# Patient Record
Sex: FEMALE | Race: OTHER | NOT HISPANIC OR LATINO | URBAN - METROPOLITAN AREA
[De-identification: names, ages, dates, MRNs, and addresses within clinical notes are randomized per-mention and may not be internally consistent; named-entity substitution may affect disease eponyms.]

---

## 2021-02-05 ENCOUNTER — INPATIENT (INPATIENT)
Facility: HOSPITAL | Age: 86
LOS: 0 days | Discharge: OTHER INSTITUTION | DRG: 178 | End: 2021-02-06
Attending: HOSPITALIST | Admitting: HOSPITALIST
Payer: MEDICARE

## 2021-02-05 VITALS
TEMPERATURE: 99 F | RESPIRATION RATE: 20 BRPM | HEIGHT: 63 IN | WEIGHT: 229.94 LBS | OXYGEN SATURATION: 97 % | DIASTOLIC BLOOD PRESSURE: 82 MMHG | SYSTOLIC BLOOD PRESSURE: 152 MMHG | HEART RATE: 92 BPM

## 2021-02-05 LAB
ALBUMIN SERPL ELPH-MCNC: 3.9 G/DL — SIGNIFICANT CHANGE UP (ref 3.3–5)
ALP SERPL-CCNC: 89 U/L — SIGNIFICANT CHANGE UP (ref 40–120)
ALT FLD-CCNC: 17 U/L — SIGNIFICANT CHANGE UP (ref 10–45)
ANION GAP SERPL CALC-SCNC: 15 MMOL/L — SIGNIFICANT CHANGE UP (ref 5–17)
APTT BLD: 28.5 SEC — SIGNIFICANT CHANGE UP (ref 27.5–35.5)
AST SERPL-CCNC: 50 U/L — HIGH (ref 10–40)
BASOPHILS # BLD AUTO: 0.03 K/UL — SIGNIFICANT CHANGE UP (ref 0–0.2)
BASOPHILS NFR BLD AUTO: 0.5 % — SIGNIFICANT CHANGE UP (ref 0–2)
BILIRUB SERPL-MCNC: 0.4 MG/DL — SIGNIFICANT CHANGE UP (ref 0.2–1.2)
BUN SERPL-MCNC: 24 MG/DL — HIGH (ref 7–23)
CALCIUM SERPL-MCNC: 10.1 MG/DL — SIGNIFICANT CHANGE UP (ref 8.4–10.5)
CHLORIDE SERPL-SCNC: 100 MMOL/L — SIGNIFICANT CHANGE UP (ref 96–108)
CK SERPL-CCNC: 1170 U/L — HIGH (ref 25–170)
CO2 SERPL-SCNC: 19 MMOL/L — LOW (ref 22–31)
CREAT SERPL-MCNC: 1.44 MG/DL — HIGH (ref 0.5–1.3)
CRP SERPL-MCNC: 1.6 MG/DL — HIGH (ref 0–0.4)
D DIMER BLD IA.RAPID-MCNC: 580 NG/ML DDU — HIGH
EOSINOPHIL # BLD AUTO: 0 K/UL — SIGNIFICANT CHANGE UP (ref 0–0.5)
EOSINOPHIL NFR BLD AUTO: 0 % — SIGNIFICANT CHANGE UP (ref 0–6)
GLUCOSE SERPL-MCNC: 310 MG/DL — HIGH (ref 70–99)
HCT VFR BLD CALC: 44.6 % — SIGNIFICANT CHANGE UP (ref 34.5–45)
HGB BLD-MCNC: 14.5 G/DL — SIGNIFICANT CHANGE UP (ref 11.5–15.5)
IMM GRANULOCYTES NFR BLD AUTO: 0.3 % — SIGNIFICANT CHANGE UP (ref 0–1.5)
INR BLD: 0.99 RATIO — SIGNIFICANT CHANGE UP (ref 0.88–1.16)
LDH SERPL L TO P-CCNC: 360 U/L — HIGH (ref 50–242)
LYMPHOCYTES # BLD AUTO: 1.95 K/UL — SIGNIFICANT CHANGE UP (ref 1–3.3)
LYMPHOCYTES # BLD AUTO: 30.5 % — SIGNIFICANT CHANGE UP (ref 13–44)
MCHC RBC-ENTMCNC: 28.3 PG — SIGNIFICANT CHANGE UP (ref 27–34)
MCHC RBC-ENTMCNC: 32.5 GM/DL — SIGNIFICANT CHANGE UP (ref 32–36)
MCV RBC AUTO: 86.9 FL — SIGNIFICANT CHANGE UP (ref 80–100)
MONOCYTES # BLD AUTO: 0.98 K/UL — HIGH (ref 0–0.9)
MONOCYTES NFR BLD AUTO: 15.3 % — HIGH (ref 2–14)
NEUTROPHILS # BLD AUTO: 3.42 K/UL — SIGNIFICANT CHANGE UP (ref 1.8–7.4)
NEUTROPHILS NFR BLD AUTO: 53.4 % — SIGNIFICANT CHANGE UP (ref 43–77)
NRBC # BLD: 0 /100 WBCS — SIGNIFICANT CHANGE UP (ref 0–0)
NT-PROBNP SERPL-SCNC: 558 PG/ML — HIGH (ref 0–300)
PLATELET # BLD AUTO: 233 K/UL — SIGNIFICANT CHANGE UP (ref 150–400)
POTASSIUM SERPL-MCNC: 4.5 MMOL/L — SIGNIFICANT CHANGE UP (ref 3.5–5.3)
POTASSIUM SERPL-SCNC: 4.5 MMOL/L — SIGNIFICANT CHANGE UP (ref 3.5–5.3)
PROCALCITONIN SERPL-MCNC: 0.1 NG/ML — SIGNIFICANT CHANGE UP (ref 0.02–0.1)
PROT SERPL-MCNC: 7.8 G/DL — SIGNIFICANT CHANGE UP (ref 6–8.3)
PROTHROM AB SERPL-ACNC: 11.9 SEC — SIGNIFICANT CHANGE UP (ref 10.6–13.6)
RBC # BLD: 5.13 M/UL — SIGNIFICANT CHANGE UP (ref 3.8–5.2)
RBC # FLD: 12.2 % — SIGNIFICANT CHANGE UP (ref 10.3–14.5)
SODIUM SERPL-SCNC: 134 MMOL/L — LOW (ref 135–145)
TROPONIN T, HIGH SENSITIVITY RESULT: 27 NG/L — SIGNIFICANT CHANGE UP (ref 0–51)
WBC # BLD: 6.4 K/UL — SIGNIFICANT CHANGE UP (ref 3.8–10.5)
WBC # FLD AUTO: 6.4 K/UL — SIGNIFICANT CHANGE UP (ref 3.8–10.5)

## 2021-02-05 PROCEDURE — 73090 X-RAY EXAM OF FOREARM: CPT | Mod: 26,LT

## 2021-02-05 PROCEDURE — 99285 EMERGENCY DEPT VISIT HI MDM: CPT | Mod: CS,GC

## 2021-02-05 PROCEDURE — 72170 X-RAY EXAM OF PELVIS: CPT | Mod: 26

## 2021-02-05 PROCEDURE — 73060 X-RAY EXAM OF HUMERUS: CPT | Mod: 26,RT

## 2021-02-05 PROCEDURE — 73110 X-RAY EXAM OF WRIST: CPT | Mod: 26,RT

## 2021-02-05 PROCEDURE — 73080 X-RAY EXAM OF ELBOW: CPT | Mod: 26,RT

## 2021-02-05 PROCEDURE — 73030 X-RAY EXAM OF SHOULDER: CPT | Mod: 26,RT

## 2021-02-05 PROCEDURE — 71045 X-RAY EXAM CHEST 1 VIEW: CPT | Mod: 26

## 2021-02-05 RX ORDER — DIPHENHYDRAMINE HCL 50 MG
50 CAPSULE ORAL ONCE
Refills: 0 | Status: COMPLETED | OUTPATIENT
Start: 2021-02-05 | End: 2021-02-05

## 2021-02-05 RX ORDER — HYDROCORTISONE 20 MG
100 TABLET ORAL ONCE
Refills: 0 | Status: DISCONTINUED | OUTPATIENT
Start: 2021-02-05 | End: 2021-02-05

## 2021-02-05 RX ORDER — HYDROCORTISONE 20 MG
200 TABLET ORAL ONCE
Refills: 0 | Status: COMPLETED | OUTPATIENT
Start: 2021-02-05 | End: 2021-02-05

## 2021-02-05 RX ADMIN — Medication 50 MILLIGRAM(S): at 23:07

## 2021-02-05 RX ADMIN — Medication 200 MILLIGRAM(S): at 21:39

## 2021-02-05 NOTE — ED PROVIDER NOTE - PHYSICAL EXAMINATION
Gen: obese  NAD  Head: NC/NT  Eyes: PERRL, EOMI,   ENT: airway patent, mmm   CV: RRR, +S1/S2   Resp: CTAB, symmetric breath sounds   GI:   abdomen soft non-distended, NTTP, no ecchymosis  Back: no CVA tenderness, no spinal ttp  Extremities -   symmetric pulses, +ttp at RT elbow. +erythema, warm and swelling of RUE  Neuro: A&Ox2 (name and date), following commands, speech clear, moving all four extremities spontaneously

## 2021-02-05 NOTE — ED PROVIDER NOTE - ATTENDING CONTRIBUTION TO CARE
Attending MD Chisholm: I personally have seen and examined this patient.  Resident note reviewed and agree on plan of care and except where noted.  See below for details.     Seen in [facility]    85F with PMH/PSH including DM, HTN, obesity, PPM placement presents to the ED with multiple falls, found down.  As per family, patient with repeated falls now with RUE pain.  Reports walks with walker, not always compliant.  Unclear of time down.  Patient complaining of RUE pain.  Denies chest pain, shortness of breath, abdominal pain, nausea, vomiting, diarrhea, urinary complaints, fevers, chills.  +COVID exposure.  A ten (10) point review of systems was negative other than as stated in the HPI or elsewhere in the chart.    Exam:   General: NAD, obese, AAOx2 (person, date)  HENT: head NCAT, airway patent  Eyes: PERRL  Lungs: lungs CTAB with good inspiratory effort, no wheezing, no rhonchi, no rales  Cardiac: +S1S2, no m/r/g  GI: abdomen soft with +BS, NT, exam limited secondary to body habitus, no ecchymosis  : no CVAT  MSK: no tenderness to midline palpation, no stepoffs along length of spine, +tenderness to palpation at R elbow, +RUE with warmth, erythema and edema  Neuro: moving all extremities spontaneously, sensory grossly intact, no gross neuro deficits  Psych: normal mood and affect     A/P: 85F with falls, RUE pain, will obtain labs, trauma scans, COVID test given exposure, DDx includes cellulitis, will obtain XRs to rule out bony injury, will send labs eval for metabolic derangement, UA r/o UTI, CXR r/o PNA, COVID, cardiac monitor, likely admit

## 2021-02-05 NOTE — ED PROVIDER NOTE - OBJECTIVE STATEMENT
History obtained from the daughter over the phone: 84 yo F with pmhx pacemaker, DM, HTN presents with multiple falls. Found on the floor multiple times during the day over past days - usually incoherent intiially when found on the floor. Ambulates independently - intermittent walker use. Unsure of how long the patient is the floor.    Pt is only complaining of pain in RT arm.   She lives by herself. Son lives upstairs.   Cardiologist: :[phone] Dr. Eliza Joaquin

## 2021-02-05 NOTE — ED PROVIDER NOTE - NS ED ROS FT
Gen: Denies fever, chills  CV: Denies chest pain  Skin: +rash, erythema  Resp: Denies SOB, cough  ENT: Denies nasal congestion  Eyes: Denies blurry vision  GI: Denies nausea, vomiting, diarrhea  Msk: +RT arm pain Denies LE swelling  : Denies dysuria  Neuro: Denies headache

## 2021-02-05 NOTE — ED PROVIDER NOTE - CARE PLAN
Principal Discharge DX:	Frequent falls  Secondary Diagnosis:	Acute kidney injury  Secondary Diagnosis:	COVID-19

## 2021-02-05 NOTE — ED PROVIDER NOTE - CLINICAL SUMMARY MEDICAL DECISION MAKING FREE TEXT BOX
Prsents with multiple falls and incoherent intermittently as per the family. Even though patient is not tender, pt might be distracted by her RT arm pain - will do trauma scan. Labs. COVID test. RT arm exam is c/w cellulitis. Ddx: fx, COVID, UTI, pneumonia, cellulitis. Likely admit

## 2021-02-05 NOTE — ED ADULT NURSE NOTE - OBJECTIVE STATEMENT
85 female covid + presents for right arm pain/redness s/p fall today. Has had increasingly frequent falls recently, and had negative imaging outpatient today but sent to ED to r/o vascular occlusion. Right upper extremity red, warm to tough on exam. Pt. is poor historian, A&Ox 2. complains of diffuse pain on exam, hips, bilat arms, sometimes left leg. abd soft, skin in tact, pending imaging and lab results. MD to contact pt. daughter for collateral.

## 2021-02-06 ENCOUNTER — TRANSCRIPTION ENCOUNTER (OUTPATIENT)
Age: 86
End: 2021-02-06

## 2021-02-06 ENCOUNTER — INPATIENT (INPATIENT)
Facility: HOSPITAL | Age: 86
LOS: 23 days | Discharge: NURSING, REHAB OR LONG-TERM CARE | End: 2021-03-02
Attending: HOSPITALIST | Admitting: HOSPITALIST
Payer: MEDICARE

## 2021-02-06 VITALS — TEMPERATURE: 99 F | DIASTOLIC BLOOD PRESSURE: 73 MMHG | SYSTOLIC BLOOD PRESSURE: 139 MMHG | HEART RATE: 83 BPM

## 2021-02-06 VITALS
TEMPERATURE: 98 F | RESPIRATION RATE: 18 BRPM | SYSTOLIC BLOOD PRESSURE: 131 MMHG | OXYGEN SATURATION: 97 % | HEART RATE: 98 BPM | DIASTOLIC BLOOD PRESSURE: 86 MMHG

## 2021-02-06 DIAGNOSIS — R56.9 UNSPECIFIED CONVULSIONS: ICD-10-CM

## 2021-02-06 DIAGNOSIS — I10 ESSENTIAL (PRIMARY) HYPERTENSION: ICD-10-CM

## 2021-02-06 DIAGNOSIS — M79.601 PAIN IN RIGHT ARM: ICD-10-CM

## 2021-02-06 DIAGNOSIS — R29.6 REPEATED FALLS: ICD-10-CM

## 2021-02-06 DIAGNOSIS — Z95.810 PRESENCE OF AUTOMATIC (IMPLANTABLE) CARDIAC DEFIBRILLATOR: Chronic | ICD-10-CM

## 2021-02-06 DIAGNOSIS — Z02.9 ENCOUNTER FOR ADMINISTRATIVE EXAMINATIONS, UNSPECIFIED: ICD-10-CM

## 2021-02-06 DIAGNOSIS — R91.1 SOLITARY PULMONARY NODULE: ICD-10-CM

## 2021-02-06 DIAGNOSIS — U07.1 COVID-19: ICD-10-CM

## 2021-02-06 DIAGNOSIS — N17.9 ACUTE KIDNEY FAILURE, UNSPECIFIED: ICD-10-CM

## 2021-02-06 DIAGNOSIS — E11.65 TYPE 2 DIABETES MELLITUS WITH HYPERGLYCEMIA: ICD-10-CM

## 2021-02-06 DIAGNOSIS — Z95.0 PRESENCE OF CARDIAC PACEMAKER: Chronic | ICD-10-CM

## 2021-02-06 DIAGNOSIS — R09.89 OTHER SPECIFIED SYMPTOMS AND SIGNS INVOLVING THE CIRCULATORY AND RESPIRATORY SYSTEMS: ICD-10-CM

## 2021-02-06 DIAGNOSIS — Z29.9 ENCOUNTER FOR PROPHYLACTIC MEASURES, UNSPECIFIED: ICD-10-CM

## 2021-02-06 DIAGNOSIS — F03.90 UNSPECIFIED DEMENTIA, UNSPECIFIED SEVERITY, WITHOUT BEHAVIORAL DISTURBANCE, PSYCHOTIC DISTURBANCE, MOOD DISTURBANCE, AND ANXIETY: ICD-10-CM
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
## 2021-02-06 DIAGNOSIS — E11.9 TYPE 2 DIABETES MELLITUS WITHOUT COMPLICATIONS: ICD-10-CM

## 2021-02-06 LAB
APPEARANCE UR: CLEAR — SIGNIFICANT CHANGE UP
BACTERIA # UR AUTO: NEGATIVE — SIGNIFICANT CHANGE UP
BASE EXCESS BLDV CALC-SCNC: -3.4 MMOL/L — LOW (ref -2–2)
BILIRUB UR-MCNC: NEGATIVE — SIGNIFICANT CHANGE UP
CA-I SERPL-SCNC: 1.31 MMOL/L — HIGH (ref 1.12–1.3)
CHLORIDE BLDV-SCNC: 107 MMOL/L — SIGNIFICANT CHANGE UP (ref 96–108)
CO2 BLDV-SCNC: 24 MMOL/L — SIGNIFICANT CHANGE UP (ref 22–30)
COLOR SPEC: SIGNIFICANT CHANGE UP
DIFF PNL FLD: ABNORMAL
EPI CELLS # UR: 0 /HPF — SIGNIFICANT CHANGE UP
FERRITIN SERPL-MCNC: 176 NG/ML — HIGH (ref 15–150)
GAS PNL BLDV: 134 MMOL/L — LOW (ref 135–145)
GAS PNL BLDV: SIGNIFICANT CHANGE UP
GAS PNL BLDV: SIGNIFICANT CHANGE UP
GLUCOSE BLDV-MCNC: 276 MG/DL — HIGH (ref 70–99)
GLUCOSE UR QL: ABNORMAL
HCO3 BLDV-SCNC: 23 MMOL/L — SIGNIFICANT CHANGE UP (ref 21–29)
HCT VFR BLDA CALC: 41 % — SIGNIFICANT CHANGE UP (ref 39–50)
HGB BLD CALC-MCNC: 13.3 G/DL — SIGNIFICANT CHANGE UP (ref 11.5–15.5)
HYALINE CASTS # UR AUTO: 1 /LPF — SIGNIFICANT CHANGE UP (ref 0–2)
KETONES UR-MCNC: NEGATIVE — SIGNIFICANT CHANGE UP
LACTATE BLDV-MCNC: 1.6 MMOL/L — SIGNIFICANT CHANGE UP (ref 0.7–2)
LEUKOCYTE ESTERASE UR-ACNC: NEGATIVE — SIGNIFICANT CHANGE UP
NITRITE UR-MCNC: NEGATIVE — SIGNIFICANT CHANGE UP
PCO2 BLDV: 47 MMHG — SIGNIFICANT CHANGE UP (ref 35–50)
PH BLDV: 7.3 — LOW (ref 7.35–7.45)
PH UR: 6 — SIGNIFICANT CHANGE UP (ref 5–8)
PO2 BLDV: 41 MMHG — SIGNIFICANT CHANGE UP (ref 25–45)
POTASSIUM BLDV-SCNC: 4.3 MMOL/L — SIGNIFICANT CHANGE UP (ref 3.5–5.3)
PROT UR-MCNC: ABNORMAL
RBC CASTS # UR COMP ASSIST: 5 /HPF — HIGH (ref 0–4)
SAO2 % BLDV: 69 % — SIGNIFICANT CHANGE UP (ref 67–88)
SARS-COV-2 RNA SPEC QL NAA+PROBE: DETECTED
SP GR SPEC: 1.02 — SIGNIFICANT CHANGE UP (ref 1.01–1.02)
TROPONIN T, HIGH SENSITIVITY RESULT: 24 NG/L — SIGNIFICANT CHANGE UP (ref 0–51)
UROBILINOGEN FLD QL: NEGATIVE — SIGNIFICANT CHANGE UP
WBC UR QL: 0 /HPF — SIGNIFICANT CHANGE UP (ref 0–5)

## 2021-02-06 PROCEDURE — 99223 1ST HOSP IP/OBS HIGH 75: CPT | Mod: CS

## 2021-02-06 PROCEDURE — 72125 CT NECK SPINE W/O DYE: CPT

## 2021-02-06 PROCEDURE — 84295 ASSAY OF SERUM SODIUM: CPT

## 2021-02-06 PROCEDURE — 96375 TX/PRO/DX INJ NEW DRUG ADDON: CPT

## 2021-02-06 PROCEDURE — 83880 ASSAY OF NATRIURETIC PEPTIDE: CPT

## 2021-02-06 PROCEDURE — 73030 X-RAY EXAM OF SHOULDER: CPT

## 2021-02-06 PROCEDURE — 80053 COMPREHEN METABOLIC PANEL: CPT

## 2021-02-06 PROCEDURE — 82947 ASSAY GLUCOSE BLOOD QUANT: CPT

## 2021-02-06 PROCEDURE — 73060 X-RAY EXAM OF HUMERUS: CPT

## 2021-02-06 PROCEDURE — 71045 X-RAY EXAM CHEST 1 VIEW: CPT

## 2021-02-06 PROCEDURE — 82435 ASSAY OF BLOOD CHLORIDE: CPT

## 2021-02-06 PROCEDURE — 85025 COMPLETE CBC W/AUTO DIFF WBC: CPT

## 2021-02-06 PROCEDURE — 84132 ASSAY OF SERUM POTASSIUM: CPT

## 2021-02-06 PROCEDURE — 82728 ASSAY OF FERRITIN: CPT

## 2021-02-06 PROCEDURE — 85018 HEMOGLOBIN: CPT

## 2021-02-06 PROCEDURE — 84145 PROCALCITONIN (PCT): CPT

## 2021-02-06 PROCEDURE — 74177 CT ABD & PELVIS W/CONTRAST: CPT

## 2021-02-06 PROCEDURE — 85379 FIBRIN DEGRADATION QUANT: CPT

## 2021-02-06 PROCEDURE — 82803 BLOOD GASES ANY COMBINATION: CPT

## 2021-02-06 PROCEDURE — 81001 URINALYSIS AUTO W/SCOPE: CPT

## 2021-02-06 PROCEDURE — 86140 C-REACTIVE PROTEIN: CPT

## 2021-02-06 PROCEDURE — 74177 CT ABD & PELVIS W/CONTRAST: CPT | Mod: 26,MA

## 2021-02-06 PROCEDURE — 82550 ASSAY OF CK (CPK): CPT

## 2021-02-06 PROCEDURE — U0003: CPT

## 2021-02-06 PROCEDURE — 85610 PROTHROMBIN TIME: CPT

## 2021-02-06 PROCEDURE — 72170 X-RAY EXAM OF PELVIS: CPT

## 2021-02-06 PROCEDURE — 71260 CT THORAX DX C+: CPT

## 2021-02-06 PROCEDURE — 83605 ASSAY OF LACTIC ACID: CPT

## 2021-02-06 PROCEDURE — 73080 X-RAY EXAM OF ELBOW: CPT

## 2021-02-06 PROCEDURE — 83615 LACTATE (LD) (LDH) ENZYME: CPT

## 2021-02-06 PROCEDURE — 85730 THROMBOPLASTIN TIME PARTIAL: CPT

## 2021-02-06 PROCEDURE — G1004: CPT

## 2021-02-06 PROCEDURE — 82330 ASSAY OF CALCIUM: CPT

## 2021-02-06 PROCEDURE — 73090 X-RAY EXAM OF FOREARM: CPT

## 2021-02-06 PROCEDURE — 82010 KETONE BODYS QUAN: CPT

## 2021-02-06 PROCEDURE — 71260 CT THORAX DX C+: CPT | Mod: 26,MA

## 2021-02-06 PROCEDURE — 96374 THER/PROPH/DIAG INJ IV PUSH: CPT

## 2021-02-06 PROCEDURE — 87086 URINE CULTURE/COLONY COUNT: CPT

## 2021-02-06 PROCEDURE — U0005: CPT

## 2021-02-06 PROCEDURE — 82962 GLUCOSE BLOOD TEST: CPT

## 2021-02-06 PROCEDURE — 85014 HEMATOCRIT: CPT

## 2021-02-06 PROCEDURE — 87040 BLOOD CULTURE FOR BACTERIA: CPT

## 2021-02-06 PROCEDURE — 70450 CT HEAD/BRAIN W/O DYE: CPT

## 2021-02-06 PROCEDURE — 99285 EMERGENCY DEPT VISIT HI MDM: CPT

## 2021-02-06 PROCEDURE — 73020 X-RAY EXAM OF SHOULDER: CPT

## 2021-02-06 PROCEDURE — 84484 ASSAY OF TROPONIN QUANT: CPT

## 2021-02-06 PROCEDURE — 70450 CT HEAD/BRAIN W/O DYE: CPT | Mod: 26,MG

## 2021-02-06 PROCEDURE — 72125 CT NECK SPINE W/O DYE: CPT | Mod: 26,MG

## 2021-02-06 PROCEDURE — 73110 X-RAY EXAM OF WRIST: CPT

## 2021-02-06 RX ORDER — DEXTROSE 50 % IN WATER 50 %
25 SYRINGE (ML) INTRAVENOUS ONCE
Refills: 0 | Status: DISCONTINUED | OUTPATIENT
Start: 2021-02-06 | End: 2021-03-02

## 2021-02-06 RX ORDER — ACETAMINOPHEN 500 MG
650 TABLET ORAL EVERY 4 HOURS
Refills: 0 | Status: DISCONTINUED | OUTPATIENT
Start: 2021-02-06 | End: 2021-02-06

## 2021-02-06 RX ORDER — SODIUM CHLORIDE 9 MG/ML
1000 INJECTION, SOLUTION INTRAVENOUS
Refills: 0 | Status: DISCONTINUED | OUTPATIENT
Start: 2021-02-06 | End: 2021-02-06

## 2021-02-06 RX ORDER — ATORVASTATIN CALCIUM 80 MG/1
20 TABLET, FILM COATED ORAL AT BEDTIME
Refills: 0 | Status: DISCONTINUED | OUTPATIENT
Start: 2021-02-06 | End: 2021-03-02

## 2021-02-06 RX ORDER — BENZOCAINE AND MENTHOL 5; 1 G/100ML; G/100ML
1 LIQUID ORAL
Refills: 0 | Status: DISCONTINUED | OUTPATIENT
Start: 2021-02-06 | End: 2021-02-06

## 2021-02-06 RX ORDER — DEXTROSE 50 % IN WATER 50 %
25 SYRINGE (ML) INTRAVENOUS ONCE
Refills: 0 | Status: DISCONTINUED | OUTPATIENT
Start: 2021-02-06 | End: 2021-02-06

## 2021-02-06 RX ORDER — OXYBUTYNIN CHLORIDE 5 MG
5 TABLET ORAL
Refills: 0 | Status: DISCONTINUED | OUTPATIENT
Start: 2021-02-06 | End: 2021-03-02

## 2021-02-06 RX ORDER — INSULIN LISPRO 100/ML
0 VIAL (ML) SUBCUTANEOUS
Qty: 0 | Refills: 0 | DISCHARGE
Start: 2021-02-06

## 2021-02-06 RX ORDER — INSULIN LISPRO 100/ML
VIAL (ML) SUBCUTANEOUS
Refills: 0 | Status: DISCONTINUED | OUTPATIENT
Start: 2021-02-06 | End: 2021-03-02

## 2021-02-06 RX ORDER — AMLODIPINE BESYLATE 2.5 MG/1
5 TABLET ORAL DAILY
Refills: 0 | Status: DISCONTINUED | OUTPATIENT
Start: 2021-02-07 | End: 2021-02-09

## 2021-02-06 RX ORDER — GLUCAGON INJECTION, SOLUTION 0.5 MG/.1ML
1 INJECTION, SOLUTION SUBCUTANEOUS ONCE
Refills: 0 | Status: DISCONTINUED | OUTPATIENT
Start: 2021-02-06 | End: 2021-02-06

## 2021-02-06 RX ORDER — IRBESARTAN 75 MG/1
1 TABLET ORAL
Qty: 0 | Refills: 0 | DISCHARGE

## 2021-02-06 RX ORDER — DORZOLAMIDE HYDROCHLORIDE TIMOLOL MALEATE 20; 5 MG/ML; MG/ML
1 SOLUTION/ DROPS OPHTHALMIC
Refills: 0 | Status: DISCONTINUED | OUTPATIENT
Start: 2021-02-06 | End: 2021-02-06

## 2021-02-06 RX ORDER — SODIUM CHLORIDE 9 MG/ML
1000 INJECTION INTRAMUSCULAR; INTRAVENOUS; SUBCUTANEOUS ONCE
Refills: 0 | Status: COMPLETED | OUTPATIENT
Start: 2021-02-06 | End: 2021-02-06

## 2021-02-06 RX ORDER — DEXTROSE 50 % IN WATER 50 %
12.5 SYRINGE (ML) INTRAVENOUS ONCE
Refills: 0 | Status: DISCONTINUED | OUTPATIENT
Start: 2021-02-06 | End: 2021-02-06

## 2021-02-06 RX ORDER — DONEPEZIL HYDROCHLORIDE 10 MG/1
10 TABLET, FILM COATED ORAL AT BEDTIME
Refills: 0 | Status: DISCONTINUED | OUTPATIENT
Start: 2021-02-06 | End: 2021-03-02

## 2021-02-06 RX ORDER — ENOXAPARIN SODIUM 100 MG/ML
40 INJECTION SUBCUTANEOUS EVERY 12 HOURS
Refills: 0 | Status: DISCONTINUED | OUTPATIENT
Start: 2021-02-06 | End: 2021-02-06

## 2021-02-06 RX ORDER — SODIUM CHLORIDE 9 MG/ML
1000 INJECTION, SOLUTION INTRAVENOUS
Refills: 0 | Status: DISCONTINUED | OUTPATIENT
Start: 2021-02-06 | End: 2021-03-02

## 2021-02-06 RX ORDER — DEXTROSE 50 % IN WATER 50 %
15 SYRINGE (ML) INTRAVENOUS ONCE
Refills: 0 | Status: DISCONTINUED | OUTPATIENT
Start: 2021-02-06 | End: 2021-03-02

## 2021-02-06 RX ORDER — LEVETIRACETAM 250 MG/1
500 TABLET, FILM COATED ORAL
Refills: 0 | Status: DISCONTINUED | OUTPATIENT
Start: 2021-02-06 | End: 2021-02-06

## 2021-02-06 RX ORDER — ESCITALOPRAM OXALATE 10 MG/1
5 TABLET, FILM COATED ORAL DAILY
Refills: 0 | Status: DISCONTINUED | OUTPATIENT
Start: 2021-02-06 | End: 2021-03-02

## 2021-02-06 RX ORDER — DEXTROSE 50 % IN WATER 50 %
15 SYRINGE (ML) INTRAVENOUS ONCE
Refills: 0 | Status: DISCONTINUED | OUTPATIENT
Start: 2021-02-06 | End: 2021-02-06

## 2021-02-06 RX ORDER — INSULIN LISPRO 100/ML
VIAL (ML) SUBCUTANEOUS AT BEDTIME
Refills: 0 | Status: DISCONTINUED | OUTPATIENT
Start: 2021-02-06 | End: 2021-03-02

## 2021-02-06 RX ORDER — BENZOCAINE AND MENTHOL 5; 1 G/100ML; G/100ML
0 LIQUID ORAL
Qty: 0 | Refills: 0 | DISCHARGE
Start: 2021-02-06

## 2021-02-06 RX ORDER — MIRABEGRON 50 MG/1
1 TABLET, EXTENDED RELEASE ORAL
Qty: 0 | Refills: 0 | DISCHARGE

## 2021-02-06 RX ORDER — DONEPEZIL HYDROCHLORIDE 10 MG/1
10 TABLET, FILM COATED ORAL AT BEDTIME
Refills: 0 | Status: DISCONTINUED | OUTPATIENT
Start: 2021-02-06 | End: 2021-02-06

## 2021-02-06 RX ORDER — INSULIN LISPRO 100/ML
VIAL (ML) SUBCUTANEOUS
Refills: 0 | Status: DISCONTINUED | OUTPATIENT
Start: 2021-02-06 | End: 2021-02-06

## 2021-02-06 RX ORDER — DEXTROSE 50 % IN WATER 50 %
12.5 SYRINGE (ML) INTRAVENOUS ONCE
Refills: 0 | Status: DISCONTINUED | OUTPATIENT
Start: 2021-02-06 | End: 2021-03-02

## 2021-02-06 RX ORDER — ATORVASTATIN CALCIUM 80 MG/1
20 TABLET, FILM COATED ORAL AT BEDTIME
Refills: 0 | Status: DISCONTINUED | OUTPATIENT
Start: 2021-02-06 | End: 2021-02-06

## 2021-02-06 RX ORDER — AMLODIPINE BESYLATE 2.5 MG/1
5 TABLET ORAL DAILY
Refills: 0 | Status: DISCONTINUED | OUTPATIENT
Start: 2021-02-06 | End: 2021-02-06

## 2021-02-06 RX ORDER — ACETAMINOPHEN 500 MG
2 TABLET ORAL
Qty: 0 | Refills: 0 | DISCHARGE
Start: 2021-02-06

## 2021-02-06 RX ORDER — ROSUVASTATIN CALCIUM 5 MG/1
1 TABLET ORAL
Qty: 0 | Refills: 0 | DISCHARGE

## 2021-02-06 RX ORDER — OXYBUTYNIN CHLORIDE 5 MG
5 TABLET ORAL
Refills: 0 | Status: DISCONTINUED | OUTPATIENT
Start: 2021-02-06 | End: 2021-02-06

## 2021-02-06 RX ORDER — DORZOLAMIDE HYDROCHLORIDE TIMOLOL MALEATE 20; 5 MG/ML; MG/ML
1 SOLUTION/ DROPS OPHTHALMIC EVERY 12 HOURS
Refills: 0 | Status: DISCONTINUED | OUTPATIENT
Start: 2021-02-06 | End: 2021-03-02

## 2021-02-06 RX ORDER — ATORVASTATIN CALCIUM 80 MG/1
1 TABLET, FILM COATED ORAL
Qty: 0 | Refills: 0 | DISCHARGE
Start: 2021-02-06

## 2021-02-06 RX ORDER — GLUCAGON INJECTION, SOLUTION 0.5 MG/.1ML
1 INJECTION, SOLUTION SUBCUTANEOUS ONCE
Refills: 0 | Status: DISCONTINUED | OUTPATIENT
Start: 2021-02-06 | End: 2021-03-02

## 2021-02-06 RX ORDER — FAMOTIDINE 10 MG/ML
1 INJECTION INTRAVENOUS
Qty: 0 | Refills: 0 | DISCHARGE

## 2021-02-06 RX ORDER — OXYBUTYNIN CHLORIDE 5 MG
1 TABLET ORAL
Qty: 0 | Refills: 0 | DISCHARGE
Start: 2021-02-06

## 2021-02-06 RX ORDER — ACETAMINOPHEN 500 MG
650 TABLET ORAL EVERY 4 HOURS
Refills: 0 | Status: DISCONTINUED | OUTPATIENT
Start: 2021-02-06 | End: 2021-03-02

## 2021-02-06 RX ORDER — ENOXAPARIN SODIUM 100 MG/ML
0 INJECTION SUBCUTANEOUS
Qty: 0 | Refills: 0 | DISCHARGE
Start: 2021-02-06

## 2021-02-06 RX ORDER — SOLIFENACIN SUCCINATE 10 MG/1
1 TABLET ORAL
Qty: 0 | Refills: 0 | DISCHARGE

## 2021-02-06 RX ORDER — REPAGLINIDE 1 MG/1
1 TABLET ORAL
Qty: 0 | Refills: 0 | DISCHARGE

## 2021-02-06 RX ORDER — INSULIN LISPRO 100/ML
VIAL (ML) SUBCUTANEOUS AT BEDTIME
Refills: 0 | Status: DISCONTINUED | OUTPATIENT
Start: 2021-02-06 | End: 2021-02-06

## 2021-02-06 RX ORDER — ENOXAPARIN SODIUM 100 MG/ML
40 INJECTION SUBCUTANEOUS EVERY 12 HOURS
Refills: 0 | Status: DISCONTINUED | OUTPATIENT
Start: 2021-02-06 | End: 2021-02-12

## 2021-02-06 RX ORDER — LEVETIRACETAM 250 MG/1
500 TABLET, FILM COATED ORAL
Refills: 0 | Status: DISCONTINUED | OUTPATIENT
Start: 2021-02-06 | End: 2021-03-02

## 2021-02-06 RX ORDER — AMLODIPINE BESYLATE 2.5 MG/1
1 TABLET ORAL
Qty: 0 | Refills: 0 | DISCHARGE
Start: 2021-02-06

## 2021-02-06 RX ORDER — ESCITALOPRAM OXALATE 10 MG/1
5 TABLET, FILM COATED ORAL DAILY
Refills: 0 | Status: DISCONTINUED | OUTPATIENT
Start: 2021-02-06 | End: 2021-02-06

## 2021-02-06 RX ADMIN — Medication 1: at 23:17

## 2021-02-06 RX ADMIN — Medication 6: at 13:20

## 2021-02-06 RX ADMIN — SODIUM CHLORIDE 1000 MILLILITER(S): 9 INJECTION INTRAMUSCULAR; INTRAVENOUS; SUBCUTANEOUS at 04:33

## 2021-02-06 RX ADMIN — BENZOCAINE AND MENTHOL 1 LOZENGE: 5; 1 LIQUID ORAL at 10:47

## 2021-02-06 RX ADMIN — LEVETIRACETAM 500 MILLIGRAM(S): 250 TABLET, FILM COATED ORAL at 23:22

## 2021-02-06 RX ADMIN — AMLODIPINE BESYLATE 5 MILLIGRAM(S): 2.5 TABLET ORAL at 13:20

## 2021-02-06 RX ADMIN — Medication 3: at 08:46

## 2021-02-06 RX ADMIN — DONEPEZIL HYDROCHLORIDE 10 MILLIGRAM(S): 10 TABLET, FILM COATED ORAL at 23:22

## 2021-02-06 RX ADMIN — ATORVASTATIN CALCIUM 20 MILLIGRAM(S): 80 TABLET, FILM COATED ORAL at 23:22

## 2021-02-06 RX ADMIN — ESCITALOPRAM OXALATE 5 MILLIGRAM(S): 10 TABLET, FILM COATED ORAL at 23:22

## 2021-02-06 NOTE — H&P ADULT - PROBLEM SELECTOR PLAN 4
CT chest at [facility] incidentally found: IIrregular 1.9 cm left upper lobe nodular opacity with associated spiculations.   It also found 1.8 cm cystic pancreatic tail lesion without associated pancreatic ductal dilatation.  Patient did not want to know about these results and the daughter is already aware of these results  will need inpatient v outpatient pulm follow up CT chest at [facility] incidentally found: Irregular 1.9 cm left upper lobe nodular opacity with associated spiculation.   It also found 1.8 cm cystic pancreatic tail lesion without associated pancreatic ductal dilatation.  Patient did not want to know about these results and the daughter is already aware of these results  will need inpatient v outpatient pulm follow up - CT chest at [facility] incidentally found: Irregular 1.9 cm left upper lobe nodular opacity with associated spiculation.   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  - Patient did not want to know about these results and the daughter is already aware of these results  - will need inpatient v outpatient pulm follow up

## 2021-02-06 NOTE — DISCHARGE NOTE NURSING/CASE MANAGEMENT/SOCIAL WORK - PATIENT PORTAL LINK FT
You can access the FollowMyHealth Patient Portal offered by [facility] by registering at the following website: [email or web address]. By joining [employer]’s FollowMyHealth portal, you will also be able to view your health information using other applications (apps) compatible with our system.

## 2021-02-06 NOTE — H&P ADULT - HISTORY OF PRESENT ILLNESS
84 yo F PMH DM2, morbid obesity, dementia, HTN, PPM placement, p/w recurrent falls at home. Per hx from daughter, pt has been falling multiple times during the day, found down on the floor incoherent, complaining of pain in RUE. Ambulates with walker and cane occasionally. Her son who she states lives with her was recently diagnosed with covid and for the past day, she has been experiencing a severe sore throat and occasional productive cough with nonbloody sputum. Denies f/chills, cp, sob, abd pain, n/v, dysuria, diarrhea.    In the ED, afebrile, VSS SBP 130s-160s  Medications received 1L NS

## 2021-02-06 NOTE — H&P ADULT - PROBLEM SELECTOR PLAN 5
Patient's arb was held given ?roberto v ROBERTO  She was given IVF   Monitor Cr  avoid nephrotoxic meds Patient's arb was held given ?neptali v CKD at [facility]  She was given IVF at [facility]  Monitor Cr  avoid nephrotoxic meds - Patient's arb was held given ?neptali v CKD at [facility]  - She was given IVF at [facility]  - Monitor Cr  - avoid nephrotoxic meds

## 2021-02-06 NOTE — H&P ADULT - PROBLEM SELECTOR PLAN 3
1.9 cm DONNA spiculated nodule, no smoking hx, suspicious for malign  - will need close outpt pulm f/u, possible bx 1.9 cm DONNA spiculated nodule, no smoking hx, suspicious for malign  - will need close outpt pulm f/u, possible bx   Also with a pancreatic tail 1.8 cm lesion - outpt follow up with PCP 1.9 cm DONNA spiculated nodule, no smoking hx, suspicious for malign  - will need close outpt pulm f/u, possible bx   Also with a pancreatic tail 1.8 cm lesion - outpt follow up with PCP  - findings of both nodules discussed at length with daughter Emerson, understands need for f/u. Pt would prefer not to know details about CT findings and defer to daughter and is not aware of nodules.

## 2021-02-06 NOTE — H&P ADULT - PROBLEM SELECTOR PLAN 3
Shoulder, elbow, humerus, forearm, wrist xrays --> neg for fractures  Patient complaining of numbness,   RUE dopplers ordered to r/o DVT  Patient with PPM --> needs to confirm with EP for MRI compability Shoulder, elbow, humerus, forearm, wrist xrays --> neg for fractures  Patient complaining of numbness, and pain  neuro consult for further eval givne concern for plexus injury  RUE dopplers ordered to r/o DVT  Patient with PPM --> needs to confirm with EP for MRI compatibility Shoulder, elbow, humerus, forearm, wrist xrays --> neg for fractures  Patient complaining of numbness, and pain  neuro consult for further eval given concern for plexus injury  RUE dopplers ordered to r/o DVT  Patient with PPM --> needs to confirm with EP for MRI compatibility - Shoulder, elbow, humerus, forearm, wrist xrays --> neg for fractures  - Patient complaining of numbness, and pain, has elevated CK at [facility], concern for developing compartment syndrome in setting of rhabdomyolysis -> would start patient on IVF, gen surgery consult called for eval, currently with intact pulses  - Neurovascular checks q4h for now  - neuro consult for further eval given concern for plexus injury though low suspicion at this time, likely has symptoms from rhabdomyolysis/?compartment syndrome  - RUE dopplers ordered to r/o DVT

## 2021-02-06 NOTE — DISCHARGE NOTE PROVIDER - NSDCCPCAREPLAN_GEN_ALL_CORE_FT
PRINCIPAL DISCHARGE DIAGNOSIS  Diagnosis: Frequent falls  Assessment and Plan of Treatment: f/u PT recs, f/u rue ultrasound r/o DVT      SECONDARY DISCHARGE DIAGNOSES  Diagnosis: COVID-19  Assessment and Plan of Treatment: will clarify if candidate for monoclonal ab infusion. not candidate for remdesivir or dexamethasone now    Diagnosis: Acute kidney injury  Assessment and Plan of Treatment: s/p ivf

## 2021-02-06 NOTE — H&P ADULT - NSHPLABSRESULTS_GEN_ALL_CORE
EKG: Sinus with premature supraventricular complex. Flat T in V2, V3 TWI in V1, V4                          14.5   6.40  )-----------( 233      ( 05 Feb 2021 19:18 )             44.6       02-05    134<L>  |  100  |  24<H>  ----------------------------<  310<H>  4.5   |  19<L>  |  1.44<H>    Ca    10.1      05 Feb 2021 19:18    TPro  7.8  /  Alb  3.9  /  TBili  0.4  /  DBili  x   /  AST  50<H>  /  ALT  17  /  AlkPhos  89  02-05      Troponin T, High Sensitivity Result: 27 -->24    < from: CT Abdomen and Pelvis w/ IV Cont (02.06.21 @ 01:25) >      Irregular 1.9 cm left upper lobe nodular opacity with associated spiculations. Follow-up with PET/CT and/or biopsy is recommended, as malignancy is a diagnostic consideration.    No CT evidence of acute traumatic sequelae within the chest, abdomen, or pelvis.    1.8 cm cystic pancreatic tail lesion without associated pancreatic ductal dilatation.    < end of copied text >    < from: CT Head No Cont (02.06.21 @ 01:25) >    CT Head: No acute intracranial hemorrhage or calvarial fracture.    CT cervical spine: No acute cervical spine fracture or evidence of traumatic malalignment.    < end of copied text >    < from: Xray Pelvis AP only (02.05.21 @ 21:47) >    No fracture or dislocation.      < end of copied text > EKG: Sinus with premature supraventricular complex. Flat T in V2, V3 TWI in V1, V4                        14.5   6.40  )-----------( 233      ( 05 Feb 2021 19:18 )             44.6     02-05    134<L>  |  100  |  24<H>  ----------------------------<  310<H>  4.5   |  19<L>  |  1.44<H>    Ca    10.1      05 Feb 2021 19:18    TPro  7.8  /  Alb  3.9  /  TBili  0.4  /  DBili  x   /  AST  50<H>  /  ALT  17  /  AlkPhos  89  02-05    Troponin T, High Sensitivity Result: 27 -->24  Creatine Kinase, Serum (02.05.21 @ 19:18)   Creatine Kinase, Serum: 1170 U/L     < from: CT Abdomen and Pelvis w/ IV Cont (02.06.21 @ 01:25) >  Irregular 1.9 cm left upper lobe nodular opacity with associated spiculations. Follow-up with PET/CT and/or biopsy is recommended, as malignancy is a diagnostic consideration.    No CT evidence of acute traumatic sequelae within the chest, abdomen, or pelvis.    1.8 cm cystic pancreatic tail lesion without associated pancreatic ductal dilatation.  < end of copied text >    < from: CT Head No Cont (02.06.21 @ 01:25) >    CT Head: No acute intracranial hemorrhage or calvarial fracture.    CT cervical spine: No acute cervical spine fracture or evidence of traumatic malalignment.    < end of copied text >    < from: Xray Pelvis AP only (02.05.21 @ 21:47) >    No fracture or dislocation.      < end of copied text >

## 2021-02-06 NOTE — H&P ADULT - ATTENDING COMMENTS
Patient seen and examined on 2/7/2021, case discussed with ZAIRA Mclean. This is an 85F with history as above who presents to the hospital with frequent falls at home. Patient states that she falls because she is weak, denies any syncope/LOC, chest pain/palpitations, or respiratory symptoms. Denies any Patient seen and examined on 2/7/2021, case discussed with ZAIRA Mclean. This is an 85F with history as above who presents to the hospital with frequent falls at home. Patient states that she falls because she is weak, denies any syncope/LOC, chest pain/palpitations, or respiratory symptoms. Denies any head trauma with her falls but states that her fell onto her R side the last time she fell. Has R arm pain/swelling/weakness/numbness since that fall. Denies any other complaints. Spoke with patient's son, Griffin, who corroborated this information. States that he believes she is weak because her sugars ave been high recently (only over the past week). Said that her last fall was on Thursday night and he had found her on Friday morning on her R side with her arm underneath her and had therefore brought her to [facility] for eval. Initial work up there was negative for ACS (stable trops, nonischemic EKG) but was notable for elevated CK and large blood on UA (with minimal RBCs in UA). Imaging was negative for fractures but did note a 1.9cm spiculated L UL nodule and 1.8cm cystic lesion in the pancreatic tail. She was noted to be COVID19 positive and was therefore transferred to [facility] for COVID19 load bearing.  - Currently concern for rhabdomyolysis in R UE given elevated CK and large blood on UA, R arm noted to be swollen, tense to touch, slightly tender, warm, mildly erythematous, and weaker compared to L arm, patient states that she feels tingling in her fingers, said that these symptoms have been present since her fall -> concern for possible compartment syndrome, patient currently with intact radial pulse, seems to have good palmar pulse, good cap refill, warm to touch currently  - Will place patient on IVF for rhabdomyolysis, general surgery consult called for evaluation of possible compartment syndrome  - R UE elevation  - If rhabdo improving and patient without significant compartment syndrome but still with weakness and numbness in arm/hand consider neuro eval for possible plexus injury  - FS elevated to 200s to 300s, relatively new issue as per family, patient only on oral hypoglycemics at home, not on insulin -> will place on ISS for now, check A1C, if significantly elevated then might need insulin therapy at home though suspect some FS elevation is driven by acute stress state from COVID19 and rhabdomyolysis  - Has mild ROBERTO, likely in setting of rhabdo, will hydrate, monitor BUN/Cr  - PT eval for her falls/weakness  - EP eval for PPM interrogation  - Other management as above. Patient seen and examined on 2/7/2021, case discussed with ZAIRA Mclean. This is an 85F with history as above who presents to the hospital with frequent falls at home. Patient states that she falls because she is weak, denies any syncope/LOC, chest pain/palpitations, or respiratory symptoms. Denies any head trauma with her falls but states that her fell onto her R side the last time she fell. Has R arm pain/swelling/weakness/numbness since that fall. Denies any other complaints. Spoke with patient's son, Griffin, who corroborated this information. States that he believes she is weak because her sugars ave been high recently (only over the past week). Said that her last fall was on Thursday night and he had found her on Friday morning on her R side with her arm underneath her and had therefore brought her to [facility] for eval. Initial work up there was negative for ACS (stable trops, nonischemic EKG) but was notable for elevated CK and large blood on UA (with minimal RBCs in UA). Imaging was negative for fractures but did note a 1.9cm spiculated L UL nodule and 1.8cm cystic lesion in the pancreatic tail. She was noted to be COVID19 positive and was therefore transferred to [facility] for COVID19 load bearing.  - Currently concern for rhabdomyolysis in R UE given elevated CK and large blood on UA, R arm noted to be swollen, tense to touch, slightly tender, warm, mildly erythematous, and weaker compared to L arm, patient states that she feels tingling in her fingers, said that these symptoms have been present since her fall -> concern for possible compartment syndrome, patient currently with intact radial pulse, seems to have good palmar pulse, good cap refill, warm to touch currently  - f/u R UE doppler to eval for possible DVT  - Will place patient on IVF for rhabdomyolysis, general surgery consult called for evaluation of possible compartment syndrome  - R UE elevation  - If rhabdo improving and patient without significant compartment syndrome or DVT but still with weakness and numbness in arm/hand consider neuro eval for possible plexus injury  - FS elevated to 200s to 300s, relatively new issue as per family, patient only on oral hypoglycemics at home, not on insulin -> will place on ISS for now, check A1C, if significantly elevated then might need insulin therapy at home though suspect some FS elevation is driven by acute stress state from COVID19 and rhabdomyolysis  - Has mild ROBERTO, likely in setting of rhabdo, will hydrate, monitor BUN/Cr  - PT eval for her falls/weakness  - EP eval for PPM interrogation  - Other management as above.

## 2021-02-06 NOTE — H&P ADULT - NEGATIVE CARDIOVASCULAR SYMPTOMS
no chest pain/no palpitations/no dyspnea on exertion no chest pain/no palpitations/no dyspnea on exertion/no orthopnea/no peripheral edema

## 2021-02-06 NOTE — H&P ADULT - PROBLEM SELECTOR PLAN 10
1.  Name of PCP: Dr. Hawk  2.  PCP Contacted on Admission: [ ] Y    [ ] N    3.  PCP contacted at Discharge: [ ] Y    [ ] N    [ ] N/A  4.  Post-Discharge Appointment Date and Location:  5.  Summary of Handoff given to PCP:

## 2021-02-06 NOTE — H&P ADULT - PROBLEM SELECTOR PLAN 1
Admit to medicine  not requiring oxygen currently  Not a candidate for Remdesvir or decadron currently  trend COVID labs Admit to medicine  not requiring oxygen currently  Not a candidate for Remdesvir or decadron currently  trend COVID labs  Call ID in Am to see if patient qualify for monoclonal antibody - Admit to medicine  - Has mild symptoms at home but here saturating well on RA, not requiring oxygen   - Not a candidate for Remdesvir or decadron currently  - trend COVID labs  - Call ID in Am to see if patient qualify for monoclonal antibody

## 2021-02-06 NOTE — H&P ADULT - PROBLEM SELECTOR PLAN 4
unknown Cr baseline, on adm Cr 1.4  - s/p IVF, will c/w encouraging PO intake  - monitor UOP and trend

## 2021-02-06 NOTE — H&P ADULT - EXTREMITIES COMMENTS
1+ edema b/l 1+ edema b/l  R UE swollen, tender to palpation, more tense than L UE, mild erythema throughout, reduced strength throughout, good radial pulse, seems to have intact palmar pulse

## 2021-02-06 NOTE — H&P ADULT - NSHPLABSRESULTS_GEN_ALL_CORE
LABS:                         14.5   6.40  )-----------( 233      ( 2021 19:18 )             44.6     02-    134<L>  |  100  |  24<H>  ----------------------------<  310<H>  4.5   |  19<L>  |  1.44<H>    Ca    10.1      2021 19:18    TPro  7.8  /  Alb  3.9  /  TBili  0.4  /  DBili  x   /  AST  50<H>  /  ALT  17  /  AlkPhos  89  02-05    PT/INR - ( 2021 19:18 )   PT: 11.9 sec;   INR: 0.99 ratio         PTT - ( 2021 19:18 )  PTT:28.5 sec  Urinalysis Basic - ( 2021 02:55 )    Color: Light Yellow / Appearance: Clear / S.018 / pH: x  Gluc: x / Ketone: Negative  / Bili: Negative / Urobili: Negative   Blood: x / Protein: 30 mg/dL / Nitrite: Negative   Leuk Esterase: Negative / RBC: 5 /hpf / WBC 0 /HPF   Sq Epi: x / Non Sq Epi: 0 /hpf / Bacteria: Negative      CARDIAC MARKERS ( 2021 19:18 )  x     / x     / 1170 U/L / x     / x            EKG personally reviewed old q waves, no STED  CXR personally reviewed clear.  Imaging - CTH, C spine, r arm neg for acute fx.

## 2021-02-06 NOTE — H&P ADULT - NEGATIVE GENERAL GENITOURINARY SYMPTOMS
no hematuria/no renal colic/no flank pain L/no flank pain R no hematuria/no renal colic/no flank pain L/no flank pain R/no dysuria/normal urinary frequency

## 2021-02-06 NOTE — H&P ADULT - RS GEN PE MLT RESP DETAILS PC
airway patent/breath sounds equal/good air movement/respirations non-labored/clear to auscultation bilaterally airway patent/breath sounds equal/good air movement/respirations non-labored/clear to auscultation bilaterally/no rales/no rhonchi/no wheezes

## 2021-02-06 NOTE — H&P ADULT - NEGATIVE OPHTHALMOLOGIC SYMPTOMS
no lacrimation L/no lacrimation R no lacrimation L/no lacrimation R/no blurred vision L/no blurred vision R

## 2021-02-06 NOTE — H&P ADULT - ASSESSMENT
84 y/o F with hx of dementia (A&Ox2-3, forgetful at times), Diabetes type II (not on insulin), PPM, HTN, ?seizure on keppra, presents with falls

## 2021-02-06 NOTE — DISCHARGE NOTE PROVIDER - HOSPITAL COURSE
86 yo F PMH DM2, morbid obesity, dementia, HTN, PPM placement, p/w recurrent falls at home. Per hx from daughter, pt has been falling multiple times during the day, found down on the floor incoherent, complaining of pain in RUE. Ambulates with walker and cane occasionally. Her son who she states lives with her was recently diagnosed with covid and for the past day, she has been experiencing a severe sore throat and occasional productive cough with nonbloody sputum. Denies f/chills, cp, sob, abd pain, n/v, dysuria, diarrhea.    Pt admitted and noted to have RUE edema pending RUE doppler r/o DVT, imaging neg for fx of r wrist/elbow/shoulder. Awaiting PT eval. As discussed w daughter, pt will also need 24 hr help at home or YUE upon discharged. Discussed findings of lung nodule and pancreatic nodule w/ daughter Emerson, who is aware that pt is to follow up closely with pulmolonogist and PCP outpt. Pt prefers not to know details of the nodules or CT findings.   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   COVID+ not requiring O2, not currently candidate for remdes/dexamethasone but may benefit from monoclonal antibody infusion is she is a candidate upon transfer to [facility].

## 2021-02-06 NOTE — H&P ADULT - PROBLEM SELECTOR PLAN 2
Patietn with multiple falls which are unwitnessed  CTH/CT spine: no acute findings  Pelvis xray; neg  PT consult  EP in AM for PPM interrogation given the falls Patient with multiple falls which are unwitnessed  CTH/CT spine: no acute findings  Pelvis xray; neg  PT consult  EP in AM for PPM interrogation given the falls - Patient with multiple falls which are unwitnessed but patient states that she falls due to weakness, denies any syncope, palpitations, LOC, chest pain, SOB prior to her falls  - CTH/CT spine: no acute findings  - Pelvis xray; neg  - PT consult  - EP in AM for PPM interrogation given the falls

## 2021-02-06 NOTE — H&P ADULT - NEGATIVE ENMT SYMPTOMS
no hearing difficulty/no ear pain/no tinnitus no hearing difficulty/no ear pain/no tinnitus/no sinus symptoms/no nasal congestion/no nasal discharge

## 2021-02-06 NOTE — H&P ADULT - VASCULAR
CM met with pt bedside to complete discharge assessment. Pt verified information on facesheet as correct. Denies POA/LW. Reports living at listed address with his spouse. PCP is Dr. Saunders. Pharm is [facility] on [address]. DME- rw, bsc, tub tx bench. Transportation home at discharge will be provided by spouse. Verified insurance as PHN. Denies recent inpt stay in last 30 days. Discharge plan is home with [facility] (disclosure signed).  CM following to assist in any DC needs.      06/15/20 1510   Discharge Assessment   Assessment Type Discharge Planning Assessment   Confirmed/corrected address and phone number on facesheet? Yes   Assessment information obtained from? Patient   Communicated expected length of stay with patient/caregiver yes   Prior to hospitilization cognitive status: Alert/Oriented   Prior to hospitalization functional status: Independent   Current cognitive status: Alert/Oriented   Current Functional Status: Assistive Equipment   Lives With spouse   Able to Return to Prior Arrangements yes   Is patient able to care for self after discharge? Yes   Patient's perception of discharge disposition home health   Readmission Within the Last 30 Days no previous admission in last 30 days   Patient currently being followed by outpatient case management? No   Patient currently receives any other outside agency services? No   Equipment Currently Used at Home walker, rolling;bedside commode;bath bench   Do you have any problems affording any of your prescribed medications? No   Is the patient taking medications as prescribed? yes   Does the patient have transportation home? Yes   Transportation Anticipated family or friend will provide   Does the patient receive services at [facility]? No   Discharge Plan A Home Health   Discharge Plan B Home with family   DME Needed Upon Discharge  none   Patient/Family in Agreement with Plan yes      detailed exam

## 2021-02-06 NOTE — H&P ADULT - NEGATIVE GASTROINTESTINAL SYMPTOMS
no nausea/no vomiting/no diarrhea/no constipation no vomiting/no diarrhea/no constipation/no abdominal pain

## 2021-02-06 NOTE — H&P ADULT - PROBLEM SELECTOR PLAN 7
cont keppra  per daughter, patient has been on keppra for years. Has not seizures in years. She was having seizure/syncope years ago. The symptoms improved after placing PPM and being on keppra. - cont keppra  - per daughter, patient has been on keppra for years. Has not seizures in years. She was having seizure/syncope years ago. The symptoms improved after placing PPM and being on keppra.

## 2021-02-06 NOTE — H&P ADULT - PROBLEM SELECTOR PLAN 8
dvt ppx: lovenox bid given weight  PT c/s  For load balancing, pt to be transferred dvt ppx: lovenox bid given weight  PT c/s  For load balancing, pt to be transferred to [facility]  d/w daughter Emerson over phone, all ques answered, she states pt will need 24 hr help or long-term upon d/c

## 2021-02-06 NOTE — H&P ADULT - PROBLEM SELECTOR PLAN 1
likely in setting of covid infection and weakness associated. img neg for trauma.  - PT c/s  - RUE doppler ordered - r/o DVT  - pain control - tylenol prn

## 2021-02-06 NOTE — H&P ADULT - NSHPPHYSICALEXAM_GEN_ALL_CORE
Vital Signs Last 24 Hrs  T(C): 36.3 (06 Feb 2021 05:20), Max: 37.3 (05 Feb 2021 18:55)  T(F): 97.3 (06 Feb 2021 05:20), Max: 99.1 (05 Feb 2021 18:55)  HR: 99 (06 Feb 2021 05:20) (86 - 99)  BP: 169/82 (06 Feb 2021 05:20) (132/72 - 169/82)  BP(mean): --  RR: 18 (06 Feb 2021 05:20) (14 - 20)  SpO2: 97% (06 Feb 2021 05:20) (97% - 98%)    PHYSICAL EXAM:  GENERAL:  Well appearing, obese f, in NAD  HEAD:  NCAT  EYES: PERRLA, conjunctiva clear  NECK: Supple,  CHEST/LUNG: CTA B/L. No w/r/r.  HEART: Reg rate. Normal S1, S2.  ABDOMEN: SNTND. Bowel sounds present  EXTREMITIES:  2+ Peripheral Pulses, No clubbing, cyanosis  RUE w/ edema, able to flex/dorsiflex at wrist/elbow and ROM of shoulder wnl w/ some limitation 2/2 pain  PSYCH: appropriate affect

## 2021-02-06 NOTE — H&P ADULT - HISTORY OF PRESENT ILLNESS
86 y/o F with hx of dementia (A&Ox2-3, forgetful at times), Diabetes type II (not on insulin), PPM, HTN, ?seizure on keppra,  84 y/o F with hx of dementia (A&Ox2-3, forgetful at times), Diabetes type II (not on insulin), PPM, HTN, ?seizure on keppra, presents with falls. Patient states she has been falling multiple times over the week. Patient with hx of dementia. She is A&Ox2-3. She is forget at times. Patient also complains of pain and numbness in her RUE since the fall 2 days ago. Patient's son was diagnosed with COVID earlier this week. History obtained from daughter. As per daughter, patient has been falling multiple times every night. She would get up to use the bathroom overnight and she falls. She is then unable to get up from the floor. patient's son would then find her on the floor in AM. When she fell two days ago, she fell on her R arm and she has been having pain. She also complains of numbness in the R arm. She states she also has difficulty opening her hand. She also was complaining sore throat and cough at home. She was taken to an  urgent care on Friday where she tested positive for covid. She was then sent to hospital for further work up. Per daughter, patient would need more help at home for care. She currently only has aide 8 hours for 5 days a week. Denies fever, chills, LOC, chest pain, SOB, nausea, vomiting ,melena, hematochezia, or dysuria.     Patient was admitted to [facility]:  She was noted to have RUE edema pending RUE doppler r/o DVT, imaging neg for fx of r wrist/elbow/shoulder. Patient was awaiting PT eval. Pj was incidentally found to have lung nodule and pancreatic nodule. the providers at [facility] discussed findings of lung nodule and pancreatic nodule w/ daughter Emerson, who is aware that pt is to follow up closely with pulmolonogist and PCP outpt. Pt prefers not to know details of the nodules or CT findings.   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   COVID+ not requiring O2, not currently candidate for remdes/dexamethasone but may benefit from monoclonal antibody infusion is she is a candidate upon transfer to [facility].   84 y/o F with hx of dementia (A&Ox2-3, forgetful at times), Diabetes type II (not on insulin), PPM, HTN, ?seizure on keppra, presents with falls. Patient states she has been falling multiple times over the week. Patient with hx of dementia. She is A&Ox2-3. She is forgetful at times. Patient also complains of pain and numbness in her RUE since the fall 2 days ago. Patient's son was diagnosed with COVID earlier this week. History obtained from daughter. As per daughter, patient has been falling multiple times every night. She would get up to use the bathroom overnight and she falls. She is then unable to get up from the floor. Patient denies head trauma or LOC.  Patient's son would then find her on the floor in AM. When she fell two days ago, she fell on her R arm and she has been having pain. She also complains of numbness in the R arm. She states she also has difficulty opening her hand. She also was complaining sore throat and cough at home. She was taken to an  urgent care on Friday where she tested positive for covid. She was then sent to hospital for further work up. Per daughter, patient would need more help at home for care. She currently only has aide 8 hours for 5 days a week. Denies fever, chills, LOC, chest pain, SOB, nausea, vomiting ,melena, hematochezia, or dysuria.     Patient was admitted to [facility]:  She was noted to have RUE edema pending RUE doppler r/o DVT, imaging neg for fx of r wrist/elbow/shoulder. Patient was awaiting PT eval. Pj was incidentally found to have lung nodule and pancreatic nodule. the providers at [facility] discussed findings of lung nodule and pancreatic nodule w/ daughter Emerson, who is aware that pt is to follow up closely with pulmolonogist and PCP outpt. Pt prefers not to know details of the nodules or CT findings.   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   COVID+ not requiring O2, not currently candidate for remdes/dexamethasone but may benefit from monoclonal antibody infusion is she is a candidate upon transfer to [facility].   84 y/o F with hx of dementia (A&Ox2-3, forgetful at times), Diabetes type II (not on insulin), s/p PPM, HTN, ?seizure on keppra, presents with falls. Patient states she has been falling multiple times over the week. Patient with hx of dementia. She is A&Ox2-3. She is forgetful at times. Patient also complains of pain and numbness in her RUE since the fall 2 days ago. Patient's son was diagnosed with COVID earlier this week. History obtained from daughter. As per daughter, patient has been falling multiple times every night. She would get up to use the bathroom overnight and she falls. She is then unable to get up from the floor. Patient denies head trauma or LOC.  Patient's son would then find her on the floor in AM. When she fell two days ago, she fell on her R arm and she has been having pain. She also complains of numbness in the R arm. She states she also has difficulty opening her hand. She also was complaining sore throat and cough at home. She was taken to an  urgent care on Friday where she tested positive for covid. She was then sent to hospital for further work up. Per daughter, patient would need more help at home for care. She currently only has aide 8 hours for 5 days a week. Denies fever, chills, LOC, chest pain, SOB, nausea, vomiting ,melena, hematochezia, or dysuria.     Patient was admitted to [facility]:  She was noted to have RUE edema pending RUE doppler r/o DVT, imaging neg for fx of r wrist/elbow/shoulder. Patient was awaiting PT eval. Pj was incidentally found to have lung nodule and pancreatic nodule. the providers at [facility] discussed findings of lung nodule and pancreatic nodule w/ daughter Emerson, who is aware that pt is to follow up closely with pulmolonogist and PCP outpt. Pt prefers not to know details of the nodules or CT findings.   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   COVID+ not requiring O2, not currently candidate for remdes/dexamethasone but may benefit from monoclonal antibody infusion is she is a candidate upon transfer to [facility].

## 2021-02-06 NOTE — PATIENT PROFILE ADULT - NSTOBACCONEVERSMOKERY/N_GEN_A
No
Implemented All Universal Safety Interventions:  [place] to call system. Call bell, personal items and telephone within reach. Instruct patient to call for assistance. Room bathroom lighting operational. Non-slip footwear when patient is off stretcher. Physically safe environment: no spills, clutter or unnecessary equipment. Stretcher in lowest position, wheels locked, appropriate side rails in place.

## 2021-02-06 NOTE — H&P ADULT - NSHPREVIEWOFSYSTEMS_GEN_ALL_CORE
Review of Systems:   CONSTITUTIONAL: No fever, weight loss  EYES: No eye pain, visual disturbances, or discharge  ENMT:  No difficulty hearing, tinnitus, vertigo; No sinus or throat pain  RESPIRATORY: +cough. No SOB. No wheezing, chills or hemoptysis  CARDIOVASCULAR: No chest pain, palpitations, dizziness, or leg swelling  GASTROINTESTINAL: No abdominal or epigastric pain. No nausea, vomiting, or hematemesis; No diarrhea or constipation. No melena or hematochezia.  GENITOURINARY: No dysuria, frequency, hematuria, or incontinence  NEUROLOGICAL: No headaches, memory loss, loss of strength, numbness, or tremors  SKIN: No itching, burning, rashes, or lesions   LYMPH NODES: No enlarged glands  ENDOCRINE: No heat or cold intolerance; No hair loss  MUSCULOSKELETAL: r arm pain  PSYCHIATRIC: No depression, anxiety, mood swings, or difficulty sleeping  HEME/LYMPH: No easy bruising, or bleeding gums

## 2021-02-06 NOTE — H&P ADULT - ASSESSMENT
84 yo F PMH DM2, morbid obesity, dementia, HTN, PPM placement, p/w recurrent falls at home, found to have COVID19.

## 2021-02-06 NOTE — H&P ADULT - PROBLEM SELECTOR PLAN 5
hyperglycemic to 300s, on prandin at home  - c/w iss moderate scale  - monitor for needs for lantus  - monitor fs

## 2021-02-06 NOTE — DISCHARGE NOTE PROVIDER - NSDCMRMEDTOKEN_GEN_ALL_CORE_FT
acetaminophen 325 mg oral tablet: 2 tab(s) orally every 4 hours, As needed, Temp greater or equal to 38.5C (101.3F)  amLODIPine 5 mg oral tablet: 1 tab(s) orally once a day  atorvastatin 20 mg oral tablet: 1 tab(s) orally once a day (at bedtime)  Cosopt 2.23%-0.68% ophthalmic solution: 1 drop(s) in the left eye 2 times a day  donepezil 10 mg oral tablet: 1 tab(s) orally once a day (at bedtime)  enoxaparin:   guaiFENesin 100 mg/5 mL oral liquid: 5 milliliter(s) orally every 6 hours, As needed, Cough  insulin lispro 100 units/mL injectable solution:  injectable   insulin lispro 100 units/mL injectable solution:  injectable   Keppra 500 mg oral tablet: 1 tab(s) orally 2 times a day  Lexapro 5 mg oral tablet: 1 tab(s) orally once a day  menthol-benzocaine 3.6 mg-15 mg mucous membrane lozenge:  mucous membrane   oxybutynin 5 mg oral tablet: 1 tab(s) orally 2 times a day

## 2021-02-06 NOTE — H&P ADULT - MENTAL STATUS
A&Ox2-3 (thought [facility] the [facility] was [facility], knows her name and knows its Feb 2021)

## 2021-02-07 PROBLEM — I10 ESSENTIAL (PRIMARY) HYPERTENSION: Chronic | Status: ACTIVE | Noted: 2021-02-06

## 2021-02-07 PROBLEM — E66.9 OBESITY, UNSPECIFIED: Chronic | Status: ACTIVE | Noted: 2021-02-06

## 2021-02-07 PROBLEM — E11.9 TYPE 2 DIABETES MELLITUS WITHOUT COMPLICATIONS: Chronic | Status: ACTIVE | Noted: 2021-02-06

## 2021-02-07 PROBLEM — F03.90 UNSPECIFIED DEMENTIA, UNSPECIFIED SEVERITY, WITHOUT BEHAVIORAL DISTURBANCE, PSYCHOTIC DISTURBANCE, MOOD DISTURBANCE, AND ANXIETY: Chronic | Status: ACTIVE | Noted: 2021-02-06

## 2021-02-07 LAB
A1C WITH ESTIMATED AVERAGE GLUCOSE RESULT: 10.6 % — HIGH (ref 4–5.6)
ANION GAP SERPL CALC-SCNC: 15 MMOL/L — HIGH (ref 7–14)
BASE EXCESS BLDV CALC-SCNC: -2.6 MMOL/L — SIGNIFICANT CHANGE UP (ref -3–2)
BASOPHILS # BLD AUTO: 0.03 K/UL — SIGNIFICANT CHANGE UP (ref 0–0.2)
BASOPHILS NFR BLD AUTO: 0.4 % — SIGNIFICANT CHANGE UP (ref 0–2)
BLOOD GAS VENOUS - CREATININE: 1.5 MG/DL — HIGH (ref 0.5–1.3)
BLOOD GAS VENOUS COMPREHENSIVE RESULT: SIGNIFICANT CHANGE UP
BUN SERPL-MCNC: 25 MG/DL — HIGH (ref 7–23)
CALCIUM SERPL-MCNC: 9.9 MG/DL — SIGNIFICANT CHANGE UP (ref 8.4–10.5)
CHLORIDE BLDV-SCNC: 109 MMOL/L — HIGH (ref 96–108)
CHLORIDE SERPL-SCNC: 104 MMOL/L — SIGNIFICANT CHANGE UP (ref 98–107)
CHOLEST SERPL-MCNC: 171 MG/DL — SIGNIFICANT CHANGE UP
CK SERPL-CCNC: 782 U/L — HIGH (ref 25–170)
CO2 SERPL-SCNC: 17 MMOL/L — LOW (ref 22–31)
CREAT SERPL-MCNC: 1.33 MG/DL — HIGH (ref 0.5–1.3)
CRP SERPL-MCNC: 21.4 MG/L — HIGH
CULTURE RESULTS: NO GROWTH — SIGNIFICANT CHANGE UP
D DIMER BLD IA.RAPID-MCNC: 470 NG/ML DDU — HIGH
EOSINOPHIL # BLD AUTO: 0 K/UL — SIGNIFICANT CHANGE UP (ref 0–0.5)
EOSINOPHIL NFR BLD AUTO: 0 % — SIGNIFICANT CHANGE UP (ref 0–6)
ESTIMATED AVERAGE GLUCOSE: 258 MG/DL — HIGH (ref 68–114)
FERRITIN SERPL-MCNC: 206 NG/ML — HIGH (ref 15–150)
GAS PNL BLDV: 137 MMOL/L — SIGNIFICANT CHANGE UP (ref 136–146)
GLUCOSE BLDV-MCNC: 257 MG/DL — HIGH (ref 70–99)
GLUCOSE SERPL-MCNC: 231 MG/DL — HIGH (ref 70–99)
HCO3 BLDV-SCNC: 22 MMOL/L — SIGNIFICANT CHANGE UP (ref 20–27)
HCT VFR BLD CALC: 42 % — SIGNIFICANT CHANGE UP (ref 34.5–45)
HCT VFR BLDA CALC: 40.6 % — SIGNIFICANT CHANGE UP (ref 34.5–46.5)
HDLC SERPL-MCNC: 38 MG/DL — LOW
HGB BLD CALC-MCNC: 13.2 G/DL — SIGNIFICANT CHANGE UP (ref 11.5–15.5)
HGB BLD-MCNC: 13.4 G/DL — SIGNIFICANT CHANGE UP (ref 11.5–15.5)
IANC: 4.48 K/UL — SIGNIFICANT CHANGE UP (ref 1.5–8.5)
IMM GRANULOCYTES NFR BLD AUTO: 0.4 % — SIGNIFICANT CHANGE UP (ref 0–1.5)
LACTATE BLDV-MCNC: 1.7 MMOL/L — SIGNIFICANT CHANGE UP (ref 0.5–2)
LDH SERPL L TO P-CCNC: 281 U/L — HIGH (ref 135–225)
LIPID PNL WITH DIRECT LDL SERPL: 98 MG/DL — SIGNIFICANT CHANGE UP
LYMPHOCYTES # BLD AUTO: 1.84 K/UL — SIGNIFICANT CHANGE UP (ref 1–3.3)
LYMPHOCYTES # BLD AUTO: 26.1 % — SIGNIFICANT CHANGE UP (ref 13–44)
MAGNESIUM SERPL-MCNC: 1.9 MG/DL — SIGNIFICANT CHANGE UP (ref 1.6–2.6)
MCHC RBC-ENTMCNC: 27.7 PG — SIGNIFICANT CHANGE UP (ref 27–34)
MCHC RBC-ENTMCNC: 31.9 GM/DL — LOW (ref 32–36)
MCV RBC AUTO: 86.8 FL — SIGNIFICANT CHANGE UP (ref 80–100)
MONOCYTES # BLD AUTO: 0.68 K/UL — SIGNIFICANT CHANGE UP (ref 0–0.9)
MONOCYTES NFR BLD AUTO: 9.6 % — SIGNIFICANT CHANGE UP (ref 2–14)
NEUTROPHILS # BLD AUTO: 4.48 K/UL — SIGNIFICANT CHANGE UP (ref 1.8–7.4)
NEUTROPHILS NFR BLD AUTO: 63.5 % — SIGNIFICANT CHANGE UP (ref 43–77)
NON HDL CHOLESTEROL: 133 MG/DL — HIGH
NRBC # BLD: 0 /100 WBCS — SIGNIFICANT CHANGE UP
NRBC # FLD: 0 K/UL — SIGNIFICANT CHANGE UP
NT-PROBNP SERPL-SCNC: 465 PG/ML — HIGH
PCO2 BLDV: 38 MMHG — LOW (ref 41–51)
PH BLDV: 7.37 — SIGNIFICANT CHANGE UP (ref 7.32–7.43)
PHOSPHATE SERPL-MCNC: 2.3 MG/DL — LOW (ref 2.5–4.5)
PLATELET # BLD AUTO: 193 K/UL — SIGNIFICANT CHANGE UP (ref 150–400)
PO2 BLDV: 47 MMHG — HIGH (ref 35–40)
POTASSIUM BLDV-SCNC: 3.8 MMOL/L — SIGNIFICANT CHANGE UP (ref 3.4–4.5)
POTASSIUM SERPL-MCNC: 4.5 MMOL/L — SIGNIFICANT CHANGE UP (ref 3.5–5.3)
POTASSIUM SERPL-SCNC: 4.5 MMOL/L — SIGNIFICANT CHANGE UP (ref 3.5–5.3)
RBC # BLD: 4.84 M/UL — SIGNIFICANT CHANGE UP (ref 3.8–5.2)
RBC # FLD: 12.4 % — SIGNIFICANT CHANGE UP (ref 10.3–14.5)
SAO2 % BLDV: 80.9 % — SIGNIFICANT CHANGE UP (ref 60–85)
SODIUM SERPL-SCNC: 136 MMOL/L — SIGNIFICANT CHANGE UP (ref 135–145)
SPECIMEN SOURCE: SIGNIFICANT CHANGE UP
TRIGL SERPL-MCNC: 175 MG/DL — HIGH
TROPONIN T, HIGH SENSITIVITY RESULT: 25 NG/L — SIGNIFICANT CHANGE UP
TSH SERPL-MCNC: 0.97 UIU/ML — SIGNIFICANT CHANGE UP (ref 0.27–4.2)
WBC # BLD: 7.06 K/UL — SIGNIFICANT CHANGE UP (ref 3.8–10.5)
WBC # FLD AUTO: 7.06 K/UL — SIGNIFICANT CHANGE UP (ref 3.8–10.5)

## 2021-02-07 PROCEDURE — 99223 1ST HOSP IP/OBS HIGH 75: CPT | Mod: CS

## 2021-02-07 RX ORDER — SODIUM CHLORIDE 9 MG/ML
1000 INJECTION INTRAMUSCULAR; INTRAVENOUS; SUBCUTANEOUS
Refills: 0 | Status: DISCONTINUED | OUTPATIENT
Start: 2021-02-07 | End: 2021-02-19

## 2021-02-07 RX ORDER — MORPHINE SULFATE 50 MG/1
2 CAPSULE, EXTENDED RELEASE ORAL EVERY 6 HOURS
Refills: 0 | Status: DISCONTINUED | OUTPATIENT
Start: 2021-02-07 | End: 2021-02-07

## 2021-02-07 RX ADMIN — Medication 5 MILLIGRAM(S): at 19:21

## 2021-02-07 RX ADMIN — DONEPEZIL HYDROCHLORIDE 10 MILLIGRAM(S): 10 TABLET, FILM COATED ORAL at 21:49

## 2021-02-07 RX ADMIN — Medication 2: at 13:58

## 2021-02-07 RX ADMIN — SODIUM CHLORIDE 100 MILLILITER(S): 9 INJECTION INTRAMUSCULAR; INTRAVENOUS; SUBCUTANEOUS at 09:26

## 2021-02-07 RX ADMIN — DORZOLAMIDE HYDROCHLORIDE TIMOLOL MALEATE 1 DROP(S): 20; 5 SOLUTION/ DROPS OPHTHALMIC at 06:03

## 2021-02-07 RX ADMIN — Medication 5 MILLIGRAM(S): at 06:03

## 2021-02-07 RX ADMIN — Medication 2: at 19:29

## 2021-02-07 RX ADMIN — Medication 650 MILLIGRAM(S): at 21:45

## 2021-02-07 RX ADMIN — DORZOLAMIDE HYDROCHLORIDE TIMOLOL MALEATE 1 DROP(S): 20; 5 SOLUTION/ DROPS OPHTHALMIC at 19:20

## 2021-02-07 RX ADMIN — ESCITALOPRAM OXALATE 5 MILLIGRAM(S): 10 TABLET, FILM COATED ORAL at 11:46

## 2021-02-07 RX ADMIN — LEVETIRACETAM 500 MILLIGRAM(S): 250 TABLET, FILM COATED ORAL at 19:21

## 2021-02-07 RX ADMIN — SODIUM CHLORIDE 100 MILLILITER(S): 9 INJECTION INTRAMUSCULAR; INTRAVENOUS; SUBCUTANEOUS at 21:50

## 2021-02-07 RX ADMIN — LEVETIRACETAM 500 MILLIGRAM(S): 250 TABLET, FILM COATED ORAL at 06:03

## 2021-02-07 RX ADMIN — Medication 2: at 09:26

## 2021-02-07 RX ADMIN — AMLODIPINE BESYLATE 5 MILLIGRAM(S): 2.5 TABLET ORAL at 06:03

## 2021-02-07 RX ADMIN — ENOXAPARIN SODIUM 40 MILLIGRAM(S): 100 INJECTION SUBCUTANEOUS at 19:20

## 2021-02-07 RX ADMIN — ATORVASTATIN CALCIUM 20 MILLIGRAM(S): 80 TABLET, FILM COATED ORAL at 21:49

## 2021-02-07 RX ADMIN — ENOXAPARIN SODIUM 40 MILLIGRAM(S): 100 INJECTION SUBCUTANEOUS at 06:03

## 2021-02-07 RX ADMIN — Medication 650 MILLIGRAM(S): at 12:05

## 2021-02-07 NOTE — PROGRESS NOTE ADULT - SUBJECTIVE AND OBJECTIVE BOX
Pt seen and examined. She seemed to have some slow mentation and responds "yes" to every question, Denies any active complaints    Home Medications:  amLODIPine 5 mg oral tablet: 1 tab(s) orally once a day (06 Feb 2021 22:38)  atorvastatin 20 mg oral tablet: 1 tab(s) orally once a day (at bedtime)    was on crestor at home   (06 Feb 2021 22:38)  Cosopt 2.23%-0.68% ophthalmic solution: 1 drop(s) in the left eye 2 times a day (06 Feb 2021 22:38)  donepezil 10 mg oral tablet: 1 tab(s) orally once a day (at bedtime) (06 Feb 2021 22:38)  enoxaparin:  started at [facility] (06 Feb 2021 22:38)  guaiFENesin 100 mg/5 mL oral liquid: 5 milliliter(s) orally every 6 hours, As needed, Cough     started at [facility] (06 Feb 2021 22:38)  insulin lispro 100 units/mL injectable solution: sliding scale and started at [facility] (06 Feb 2021 22:38)  Keppra 500 mg oral tablet: 1 tab(s) orally 2 times a day (06 Feb 2021 22:38)  Lexapro 5 mg oral tablet: 1 tab(s) orally once a day (06 Feb 2021 22:38)  menthol-benzocaine 3.6 mg-15 mg mucous membrane lozenge:  mucous membrane  (06 Feb 2021 22:38)  MYRBETRIQ ER 50 MG TABLET:  (06 Feb 2021 22:38)  oxybutynin 5 mg oral tablet: 1 tab(s) orally 2 times a day (06 Feb 2021 22:38)  repaglinide 1 mg oral tablet: 1 tab(s) orally once a day (06 Feb 2021 22:38)      MEDICATIONS  (STANDING):  amLODIPine   Tablet 5 milliGRAM(s) Oral daily  atorvastatin 20 milliGRAM(s) Oral at bedtime  dextrose 40% Gel 15 Gram(s) Oral once  dextrose 5%. 1000 milliLiter(s) (50 mL/Hr) IV Continuous <Continuous>  dextrose 5%. 1000 milliLiter(s) (100 mL/Hr) IV Continuous <Continuous>  dextrose 50% Injectable 25 Gram(s) IV Push once  dextrose 50% Injectable 12.5 Gram(s) IV Push once  dextrose 50% Injectable 25 Gram(s) IV Push once  donepezil 10 milliGRAM(s) Oral at bedtime  dorzolamide 2%/timolol 0.5% Ophthalmic Solution 1 Drop(s) Both EYES every 12 hours  enoxaparin Injectable 40 milliGRAM(s) SubCutaneous every 12 hours  escitalopram 5 milliGRAM(s) Oral daily  glucagon  Injectable 1 milliGRAM(s) IntraMuscular once  insulin lispro (ADMELOG) corrective regimen sliding scale   SubCutaneous three times a day before meals  insulin lispro (ADMELOG) corrective regimen sliding scale   SubCutaneous at bedtime  levETIRAcetam 500 milliGRAM(s) Oral two times a day  oxybutynin 5 milliGRAM(s) Oral two times a day  sodium chloride 0.9%. 1000 milliLiter(s) (100 mL/Hr) IV Continuous <Continuous>    MEDICATIONS  (PRN):  acetaminophen   Tablet .. 650 milliGRAM(s) Oral every 4 hours PRN Temp greater or equal to 38.5C (101.3F)      Vital Signs Last 24 Hrs  T(C): 38.4 (07 Feb 2021 14:00), Max: 39.3 (07 Feb 2021 12:06)  T(F): 101.1 (07 Feb 2021 14:00), Max: 102.7 (07 Feb 2021 12:06)  HR: 100 (07 Feb 2021 12:01) (83 - 105)  BP: 133/66 (07 Feb 2021 12:01) (133/66 - 143/72)  BP(mean): --  RR: 18 (07 Feb 2021 12:01) (18 - 18)  SpO2: 100% (07 Feb 2021 12:01) (96% - 100%)    CONSTITUTIONAL: NAD  RESPIRATORY: breathing comfortably, lungs CTA without wheeze/rales/rhonchi  CARDDIOVASCULAR: regular rate and rhythm; +S1S2, no murmurs, rubs, or gallops, no lower extremity edema, 2+ peripheral pulses  GASTROINTESTINAL: soft, nontender, nondistended; +BS throughout, no rebound/guarding  Extremities: RUE; swelling, mildly tender to palpation, mild erythema throughout, reduced strength throughout, good radial pulse, intact palmar pulse  NEUROLOGIC:  AAOx1  SKIN: no rashes or lesions, warm

## 2021-02-07 NOTE — CONSULT NOTE ADULT - ATTENDING COMMENTS
I personally interviewed, examined, and participated in the care of this patient, on rounds 2/7/21 with the neurology consult service team.  Reviewed findings and management plan with the team.  History as reported above.    On examination I note in particular, or in addition to or instead of the findings reported above, as follows:    Obese.  Awake semi-recumbent in bed.  Somewhat bewildered facial expression.  Moderate psychomotor retardation.  Speaks in single words or short phrases. Gives date in piecemeal fashion as early in February 2021.   Swollen RUE.      There is a constant 6+ Hz symmetric tremor of all 4 limbs.  No vocal tremor.      She is very weak in all 4 limbs and guards against RUE movements and movement at each ankle.  Weakness and guarding are such that focal or lateralized motor deficits cannot be excluded.  As an example, she does not lift either LE up off the mattress and has limited ability to extend at either knee with the examiner supporting her thigh in 45 degrees of flexion.     P/LT sensation grossly intact face, neck, 4 limbs.      As best can be ascertained, and on repeated testing, she appears to have no proprioception (does not detect slow, or even large amplitude rapid deflections) at the fingers, ankles or toes, and severely impaired proprioception at the wrists.  CAVEAT: psychomotor retardation may  play a role in impairing her responses.    Reflex                           Right    Left   Comment    Pectoralis            obesity and position interfere  Biceps                            1         1  Triceps                           0         0  Burgess                    absent   absent  Suprapatellar                  1+      1+  Infrapatellar             pain 7 lack of relaxation interfere  Gastroc                        pain interferes  Plantar                      flexor  flexor      Hgb A1c 10.6%  CK 1170 (2/5/21), 782 (2/7/21) - post trauma  CRP 21.4 (2/7/21), 1.6 (2/5/21)    DISCUSSION    The most important neurologic issue here is WHY HAS THIS PATIENT BEEN FALLING.    Differential diagnosis includes (non-exhaustive list):     large fiber sensorimotor polyneuropathy (R/O causes other than DM);      myopathy;      frontal lobe gait disorder or gait apraxia;     myelopathy (if so would likely be a cervical myelopathy superimposed on one or more of the above).           RECOMMENDATIONS    As above; in addition:    B12, folate, methylmalonic acid, homocysteine, TSH, syphilis serology, SPEP, serum immunofixation, zinc, copper, KATHERYN.      MR c-spine (non-con)

## 2021-02-07 NOTE — PROGRESS NOTE ADULT - PROBLEM SELECTOR PLAN 4
- CT chest at [facility] incidentally found: Irregular 1.9 cm left upper lobe nodular opacity with associated spiculation.   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  - Patient did not want to know about these results and the daughter is already aware of these results  - will need inpatient vs outpatient pulm follow up

## 2021-02-07 NOTE — PROGRESS NOTE ADULT - PROBLEM SELECTOR PLAN 5
- Patient's arb was held given ?neptali v CKD at [facility]  - She was given IVF at [facility]  - Monitor Cr  - avoid nephrotoxic meds

## 2021-02-07 NOTE — CONSULT NOTE ADULT - ASSESSMENT
84yo R-handed woman with PMH of dementia (baseline AAOx2-3), T2DM, PPM, HTN, and ?seizures (on Keppra) admitted with recent diagnosis of COVID-19 and recurrent falls x 1week. Her most recent fall was 2 days ago where she fell on her R arm. Neurology consulted for R arm weakness and numbness worse distally. Physical exam notable for decreased finger and wrist extension with mild hypoesthesia, inconsistently medial vs. lateral. CTH showed no acute pathology and CT c-spine showed degenerative changes.    Impression: RUE weakness, distal>proximal, and hypoesthesia likely due to brachial plexopathy in the setting of recent fall.    Recommendations:   [] MR R brachial plexus w/w/o contrast after primary team confirms if PPM is MRI compatible  [] F/u RUE doppler  [] Frequent reorientation and avoid sedatives  [] Pain management as per primary team  [] Monitor CBC, BMP, B12, folate, UA  [] PT/OT  [] Fall precautions  [] Will likely need EMG/NCS after 3 weeks for further evaluation and care    Case to be seen and discussed with neurology attending Dr. Edgar.

## 2021-02-07 NOTE — PROGRESS NOTE ADULT - ASSESSMENT
86 y/o F with hx of dementia (A&Ox2-3, forgetful at times), Diabetes type II (not on insulin), PPM, HTN, ?seizure on keppra, presents with falls

## 2021-02-07 NOTE — PHYSICAL THERAPY INITIAL EVALUATION ADULT - DIAGNOSIS, PT EVAL
Called pt to remind her of cardioMEMS readings and thanking her for doing her readings consistently. PAD has been stable since she has been doing her readings consistently. Await readings today- she reports she will do her readings daily in the evening when she gets home from work.     PADd has been 23-29 mmHg since last week when she started doing them.     Pt reports she will NOT be taking entresto due to cost. JENNY Smart aware.      deconditioning

## 2021-02-07 NOTE — PROGRESS NOTE ADULT - PROBLEM SELECTOR PLAN 6
- FS significantly elevated, not on insulin at home, possibly elevated in setting of COVID19 and rhabdomyolysis  - ISS  - monitor fingersticks  - A1C  - hold oral hypoglycemics

## 2021-02-07 NOTE — PROGRESS NOTE ADULT - PROBLEM SELECTOR PLAN 1
- Has mild symptoms at home but here saturating well on RA, not requiring oxygen   - Not a candidate for Remdesvir or decadron currently

## 2021-02-07 NOTE — PHYSICAL THERAPY INITIAL EVALUATION ADULT - PERTINENT HX OF CURRENT PROBLEM, REHAB EVAL
patient presents with (+) COVID 19. PMH includes dementia (A&Ox2-3, forgetful at times), Diabetes type II (not on insulin), s/p PPM, HTN, ?seizure on keppra

## 2021-02-07 NOTE — CONSULT NOTE ADULT - SUBJECTIVE AND OBJECTIVE BOX
Neurology  Consult Note  02-07-21    Name:  PONCE MEJIA; 85y (1935)    Reason for Admission: Infection due to severe acute respiratory syndrome coronavirus 2 (SARS-CoV-2)    Neurology consult: 86yo R-handed woman with PMH of dementia (baseline AAOx2-3), T2DM, PPM, HTN, and ?seizures (on Keppra) admitted with recent diagnosis of COVID-19 and recurrent falls x 1week. Patient was a poor historian therefore details obtained from EMR. As per daughter, patient was having multiple falls every night while trying to use the bathroom. Her most recent fall was 2 days ago where she fell on her R arm. Neurology consulted for R arm weakness and numbness. Patient reports she has pain primarily at her wrist and elbow. She notes that she is unable to open her hand and feels numbness throughout her arm but is worse distally. Patient denies acute weakness or numbness in the L, headache, or visual disturbance. Patient was initially at [facility] where RUE xrays showed no acute fracture or dislocation, and RUE doppler was pending. CTH showed no acute pathology and CT c-spine showed degenerative changes.    Allergies:  iodine (Hives)  penicillin (Hives)      PMHx:   Diabetes    HTN (hypertension)    Mild HTN    Obesity    Dementia      PFHx:   Unknown status of immunity to COVID-19 virus    No pertinent family history in first degree relatives      PSuHx:   Pacemaker    History of implantable cardiac defibrillator (ICD)    No significant past surgical history        Medications:  MEDICATIONS  (STANDING):  amLODIPine   Tablet 5 milliGRAM(s) Oral daily  atorvastatin 20 milliGRAM(s) Oral at bedtime  dextrose 40% Gel 15 Gram(s) Oral once  dextrose 5%. 1000 milliLiter(s) (50 mL/Hr) IV Continuous <Continuous>  dextrose 5%. 1000 milliLiter(s) (100 mL/Hr) IV Continuous <Continuous>  dextrose 50% Injectable 25 Gram(s) IV Push once  dextrose 50% Injectable 12.5 Gram(s) IV Push once  dextrose 50% Injectable 25 Gram(s) IV Push once  donepezil 10 milliGRAM(s) Oral at bedtime  dorzolamide 2%/timolol 0.5% Ophthalmic Solution 1 Drop(s) Both EYES every 12 hours  enoxaparin Injectable 40 milliGRAM(s) SubCutaneous every 12 hours  escitalopram 5 milliGRAM(s) Oral daily  glucagon  Injectable 1 milliGRAM(s) IntraMuscular once  insulin lispro (ADMELOG) corrective regimen sliding scale   SubCutaneous three times a day before meals  insulin lispro (ADMELOG) corrective regimen sliding scale   SubCutaneous at bedtime  levETIRAcetam 500 milliGRAM(s) Oral two times a day  oxybutynin 5 milliGRAM(s) Oral two times a day    MEDICATIONS  (PRN):  acetaminophen   Tablet .. 650 milliGRAM(s) Oral every 4 hours PRN Temp greater or equal to 38.5C (101.3F)      Vitals:  T(C): 36.7 (02-07-21 @ 05:59), Max: 37.2 (02-06-21 @ 22:06)  HR: 105 (02-07-21 @ 05:59) (83 - 105)  BP: 137/62 (02-07-21 @ 05:59) (131/86 - 143/72)  RR: 18 (02-07-21 @ 05:59) (18 - 18)  SpO2: 96% (02-07-21 @ 05:59) (96% - 100%)    Physical Examination:  Neurologic:  - Mental Status: Alert, awake, oriented to person and time but not place; Speech is mildly slowed with cognitive impairment, with intact comprehension. Able to follow simple 1 step commands.  - Cranial Nerves: Visual fields are full to confrontation; Pupils are equal, round, and reactive to light; Extraocular movements occurring in all directions spontaneously. Face is symmetric with normal eye closure and smile.  - Motor: Limited pronation at the R wrist due to pain. Mildly diminished finger flexion in R hand, unable to perform finger extension or wrist extension even on a flat surface. Able to flex/extend at the elbow, again limited by pain, and able to abduct RUE overhead. Intact ROM and strength in the LUE. Hip flexion 4/5 bilaterally, dorsiflexion/plantar flexion subtly diminished symmetrically.  - Reflexes: 3+ at the R brachioradialis, 2+ and symmetric at the R bicep, 1+ at the L brachioradialis and bicep and knees. Plantar responses down bilaterally.  - Sensory: Reportedly diminished throughout the RUE to light touch compared to LUE. Mildly diminished to pinprick in R fingertips compared to L but able to distinguish pinprick from soft touch. Inconsistently diminished on medial vs lateral fingertips and forearm.  - Gait: Deferred    Labs:                        14.5   6.40  )-----------( 233      ( 05 Feb 2021 19:18 )             44.6     02-05    134<L>  |  100  |  24<H>  ----------------------------<  310<H>  4.5   |  19<L>  |  1.44<H>    Ca    10.1      05 Feb 2021 19:18    TPro  7.8  /  Alb  3.9  /  TBili  0.4  /  DBili  x   /  AST  50<H>  /  ALT  17  /  AlkPhos  89  02-05    CAPILLARY BLOOD GLUCOSE      POCT Blood Glucose.: 267 mg/dL (06 Feb 2021 22:42)    LIVER FUNCTIONS - ( 05 Feb 2021 19:18 )  Alb: 3.9 g/dL / Pro: 7.8 g/dL / ALK PHOS: 89 U/L / ALT: 17 U/L / AST: 50 U/L / GGT: x             Culture - Blood (collected 06 Feb 2021 00:38)  Source: .Blood Blood-Peripheral  Preliminary Report (07 Feb 2021 01:02):    No growth to date.    Culture - Blood (collected 06 Feb 2021 00:38)  Source: .Blood Blood-Peripheral  Preliminary Report (07 Feb 2021 01:02):    No growth to date.      PT/INR - ( 05 Feb 2021 19:18 )   PT: 11.9 sec;   INR: 0.99 ratio    PTT - ( 05 Feb 2021 19:18 )  PTT:28.5 sec      Radiology:  CT Head No Cont:  (06 Feb 2021 01:25)  IMPRESSION:    CT Head: No acute intracranial hemorrhage or calvarial fracture.    CT cervical spine: No acute cervical spine fracture or evidence of traumatic malalignment.

## 2021-02-08 LAB
ANION GAP SERPL CALC-SCNC: 12 MMOL/L — SIGNIFICANT CHANGE UP (ref 7–14)
APPEARANCE UR: CLEAR — SIGNIFICANT CHANGE UP
BACTERIA # UR AUTO: ABNORMAL
BILIRUB UR-MCNC: NEGATIVE — SIGNIFICANT CHANGE UP
BUN SERPL-MCNC: 23 MG/DL — SIGNIFICANT CHANGE UP (ref 7–23)
CALCIUM SERPL-MCNC: 9.3 MG/DL — SIGNIFICANT CHANGE UP (ref 8.4–10.5)
CHLORIDE SERPL-SCNC: 101 MMOL/L — SIGNIFICANT CHANGE UP (ref 98–107)
CO2 SERPL-SCNC: 22 MMOL/L — SIGNIFICANT CHANGE UP (ref 22–31)
COLOR SPEC: YELLOW — SIGNIFICANT CHANGE UP
CREAT SERPL-MCNC: 1.3 MG/DL — SIGNIFICANT CHANGE UP (ref 0.5–1.3)
CRP SERPL-MCNC: 38.1 MG/L — HIGH
DIFF PNL FLD: ABNORMAL
EPI CELLS # UR: 4 /HPF — SIGNIFICANT CHANGE UP (ref 0–5)
FERRITIN SERPL-MCNC: 283 NG/ML — HIGH (ref 15–150)
GLUCOSE BLDC GLUCOMTR-MCNC: 241 MG/DL — HIGH (ref 70–99)
GLUCOSE BLDC GLUCOMTR-MCNC: 260 MG/DL — HIGH (ref 70–99)
GLUCOSE BLDC GLUCOMTR-MCNC: 283 MG/DL — HIGH (ref 70–99)
GLUCOSE SERPL-MCNC: 281 MG/DL — HIGH (ref 70–99)
GLUCOSE UR QL: ABNORMAL
HCT VFR BLD CALC: 42.4 % — SIGNIFICANT CHANGE UP (ref 34.5–45)
HGB BLD-MCNC: 13.2 G/DL — SIGNIFICANT CHANGE UP (ref 11.5–15.5)
HYALINE CASTS # UR AUTO: 0 /LPF — SIGNIFICANT CHANGE UP (ref 0–7)
KETONES UR-MCNC: NEGATIVE — SIGNIFICANT CHANGE UP
LDH SERPL L TO P-CCNC: 268 U/L — HIGH (ref 135–225)
LEUKOCYTE ESTERASE UR-ACNC: NEGATIVE — SIGNIFICANT CHANGE UP
MAGNESIUM SERPL-MCNC: 1.7 MG/DL — SIGNIFICANT CHANGE UP (ref 1.6–2.6)
MCHC RBC-ENTMCNC: 27.1 PG — SIGNIFICANT CHANGE UP (ref 27–34)
MCHC RBC-ENTMCNC: 31.1 GM/DL — LOW (ref 32–36)
MCV RBC AUTO: 87.1 FL — SIGNIFICANT CHANGE UP (ref 80–100)
NITRITE UR-MCNC: NEGATIVE — SIGNIFICANT CHANGE UP
NRBC # BLD: 0 /100 WBCS — SIGNIFICANT CHANGE UP
NRBC # FLD: 0 K/UL — SIGNIFICANT CHANGE UP
PH UR: 6 — SIGNIFICANT CHANGE UP (ref 5–8)
PHOSPHATE SERPL-MCNC: 2.2 MG/DL — LOW (ref 2.5–4.5)
PLATELET # BLD AUTO: 197 K/UL — SIGNIFICANT CHANGE UP (ref 150–400)
POTASSIUM SERPL-MCNC: 4.1 MMOL/L — SIGNIFICANT CHANGE UP (ref 3.5–5.3)
POTASSIUM SERPL-SCNC: 4.1 MMOL/L — SIGNIFICANT CHANGE UP (ref 3.5–5.3)
PROT UR-MCNC: ABNORMAL
RBC # BLD: 4.87 M/UL — SIGNIFICANT CHANGE UP (ref 3.8–5.2)
RBC # FLD: 12.1 % — SIGNIFICANT CHANGE UP (ref 10.3–14.5)
RBC CASTS # UR COMP ASSIST: 15 /HPF — HIGH (ref 0–4)
SODIUM SERPL-SCNC: 135 MMOL/L — SIGNIFICANT CHANGE UP (ref 135–145)
SP GR SPEC: 1.02 — SIGNIFICANT CHANGE UP (ref 1.01–1.02)
UROBILINOGEN FLD QL: SIGNIFICANT CHANGE UP
WBC # BLD: 4.62 K/UL — SIGNIFICANT CHANGE UP (ref 3.8–10.5)
WBC # FLD AUTO: 4.62 K/UL — SIGNIFICANT CHANGE UP (ref 3.8–10.5)
WBC UR QL: 8 /HPF — HIGH (ref 0–5)

## 2021-02-08 PROCEDURE — 99232 SBSQ HOSP IP/OBS MODERATE 35: CPT | Mod: CS

## 2021-02-08 RX ORDER — AMLODIPINE BESYLATE 2.5 MG/1
10 TABLET ORAL DAILY
Refills: 0 | Status: DISCONTINUED | OUTPATIENT
Start: 2021-02-09 | End: 2021-02-09

## 2021-02-08 RX ORDER — INSULIN GLARGINE 100 [IU]/ML
5 INJECTION, SOLUTION SUBCUTANEOUS AT BEDTIME
Refills: 0 | Status: DISCONTINUED | OUTPATIENT
Start: 2021-02-08 | End: 2021-02-15

## 2021-02-08 RX ORDER — ACETAMINOPHEN 500 MG
325 TABLET ORAL ONCE
Refills: 0 | Status: COMPLETED | OUTPATIENT
Start: 2021-02-08 | End: 2021-02-08

## 2021-02-08 RX ORDER — AMLODIPINE BESYLATE 2.5 MG/1
5 TABLET ORAL ONCE
Refills: 0 | Status: COMPLETED | OUTPATIENT
Start: 2021-02-08 | End: 2021-02-08

## 2021-02-08 RX ORDER — INSULIN LISPRO 100/ML
2 VIAL (ML) SUBCUTANEOUS
Refills: 0 | Status: DISCONTINUED | OUTPATIENT
Start: 2021-02-08 | End: 2021-03-02

## 2021-02-08 RX ORDER — KETOROLAC TROMETHAMINE 30 MG/ML
15 SYRINGE (ML) INJECTION ONCE
Refills: 0 | Status: DISCONTINUED | OUTPATIENT
Start: 2021-02-08 | End: 2021-02-08

## 2021-02-08 RX ADMIN — AMLODIPINE BESYLATE 5 MILLIGRAM(S): 2.5 TABLET ORAL at 06:38

## 2021-02-08 RX ADMIN — DONEPEZIL HYDROCHLORIDE 10 MILLIGRAM(S): 10 TABLET, FILM COATED ORAL at 21:38

## 2021-02-08 RX ADMIN — Medication 325 MILLIGRAM(S): at 21:26

## 2021-02-08 RX ADMIN — SODIUM CHLORIDE 100 MILLILITER(S): 9 INJECTION INTRAMUSCULAR; INTRAVENOUS; SUBCUTANEOUS at 20:55

## 2021-02-08 RX ADMIN — Medication 3: at 13:39

## 2021-02-08 RX ADMIN — Medication 3: at 18:24

## 2021-02-08 RX ADMIN — Medication 3: at 10:54

## 2021-02-08 RX ADMIN — Medication 650 MILLIGRAM(S): at 06:38

## 2021-02-08 RX ADMIN — SODIUM CHLORIDE 100 MILLILITER(S): 9 INJECTION INTRAMUSCULAR; INTRAVENOUS; SUBCUTANEOUS at 12:51

## 2021-02-08 RX ADMIN — Medication 2 UNIT(S): at 18:25

## 2021-02-08 RX ADMIN — Medication 650 MILLIGRAM(S): at 18:24

## 2021-02-08 RX ADMIN — Medication 5 MILLIGRAM(S): at 08:02

## 2021-02-08 RX ADMIN — DORZOLAMIDE HYDROCHLORIDE TIMOLOL MALEATE 1 DROP(S): 20; 5 SOLUTION/ DROPS OPHTHALMIC at 18:03

## 2021-02-08 RX ADMIN — LEVETIRACETAM 500 MILLIGRAM(S): 250 TABLET, FILM COATED ORAL at 06:38

## 2021-02-08 RX ADMIN — INSULIN GLARGINE 5 UNIT(S): 100 INJECTION, SOLUTION SUBCUTANEOUS at 21:26

## 2021-02-08 RX ADMIN — Medication 15 MILLIGRAM(S): at 21:26

## 2021-02-08 RX ADMIN — DORZOLAMIDE HYDROCHLORIDE TIMOLOL MALEATE 1 DROP(S): 20; 5 SOLUTION/ DROPS OPHTHALMIC at 06:38

## 2021-02-08 RX ADMIN — ENOXAPARIN SODIUM 40 MILLIGRAM(S): 100 INJECTION SUBCUTANEOUS at 18:06

## 2021-02-08 RX ADMIN — ENOXAPARIN SODIUM 40 MILLIGRAM(S): 100 INJECTION SUBCUTANEOUS at 06:38

## 2021-02-08 RX ADMIN — ESCITALOPRAM OXALATE 5 MILLIGRAM(S): 10 TABLET, FILM COATED ORAL at 12:50

## 2021-02-08 RX ADMIN — Medication 2 UNIT(S): at 13:39

## 2021-02-08 RX ADMIN — ATORVASTATIN CALCIUM 20 MILLIGRAM(S): 80 TABLET, FILM COATED ORAL at 21:38

## 2021-02-08 RX ADMIN — LEVETIRACETAM 500 MILLIGRAM(S): 250 TABLET, FILM COATED ORAL at 18:03

## 2021-02-08 RX ADMIN — Medication 5 MILLIGRAM(S): at 18:03

## 2021-02-08 NOTE — PROGRESS NOTE ADULT - PROBLEM SELECTOR PLAN 4
- CT chest at [facility] incidentally found: Irregular 1.9 cm left upper lobe nodular opacity with associated spiculation.   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  - Patient did not want to know about these results and the daughter is already aware of these results  - will need inpatient vs outpatient pulm follow up

## 2021-02-08 NOTE — PROGRESS NOTE ADULT - PROBLEM SELECTOR PLAN 1
- Stable on RA at this time w/ good O2 sats >95%   - Not a candidate for Remdesvir or decadron currently

## 2021-02-08 NOTE — PROGRESS NOTE ADULT - PROBLEM SELECTOR PLAN 2
- Patient with multiple falls which are unwitnessed but patient states that she falls due to weakness, denies any syncope, palpitations, LOC, chest pain, SOB prior to her falls  - CTH/CT spine: no acute findings  - Pelvis xray; neg  - PT and Neuro consult  - EP in AM for PPM interrogation given the falls

## 2021-02-08 NOTE — DIETITIAN INITIAL EVALUATION ADULT. - PERTINENT LABORATORY DATA
(2/7) BUN 25 H, Creat 1.33 H, Glu 231 H, HbA1c 10.6% H, Triglycerides 175 H, HDL 38 L;     (2/5) Albumin 3.9, AST 50 H

## 2021-02-08 NOTE — DIETITIAN INITIAL EVALUATION ADULT. - ADD RECOMMEND
1. Encourage & assist Pt with meals; Monitor PO diet tolerance; Honor food preferences;          2. Monitor labs, hydration status;            3. Will recommend to follow up with appropriate RDN upon discharge for the purposes of long-term nutrition evaluation and diet education;

## 2021-02-08 NOTE — PROGRESS NOTE ADULT - PROBLEM SELECTOR PLAN 5
- Patient's arb was held given ?neptali v CKD at [facility]  - She was given IVF at [facility]  - Monitor Cr  - avoid nephrotoxic meds

## 2021-02-08 NOTE — DIETITIAN INITIAL EVALUATION ADULT. - OTHER INFO
Pt 84 yo female with PMHx of dementia (A&Ox2-3, forgetful at times), Diabetes type II, PPM, HTN presented with falls - per chart review.     Unable to conduct a face to face interview or nutrition-focused physical exam due to limited contact restrictions related to Pt's medical condition and isolation precautions. Collateral obtained from chart review. Unable to assess Pt's PO intake/appetite @ present. No report of chewing or swallowing difficulties; no report of GI distress (nausea/vomiting/diarrhea) @ present.    Of note Pt's HbA1c level 10.6% (2/7). Unable to assess Consistent Carbohydrate diet at present. Will recommend to follow up with appropriate RDN upon discharge for the purposes of long-term nutrition evaluation and diet education. RDN remains available.

## 2021-02-08 NOTE — DIETITIAN INITIAL EVALUATION ADULT. - REASON INDICATOR FOR ASSESSMENT
Pressure Injury Stage 2 or >  not appropriate, no report of pressure injury at present; +IAD/ecchymosis, skin tear on right buttock

## 2021-02-08 NOTE — PROGRESS NOTE ADULT - SUBJECTIVE AND OBJECTIVE BOX
Patient is a 85y old  Female who presents with a chief complaint of COVID/Falls (2021 19:15)      SUBJECTIVE / OVERNIGHT EVENTS: Examined the patient at the bedside. Noted to be resting in bed at the time in NAD. Pt reported frequent falls at home in the past as she states that she becomes lightheaded. She denies any cp or sob, dizziness or lightheadedness at this time.    MEDICATIONS  (STANDING):  amLODIPine   Tablet 5 milliGRAM(s) Oral daily  amLODIPine   Tablet 10 milliGRAM(s) Oral daily  atorvastatin 20 milliGRAM(s) Oral at bedtime  dextrose 40% Gel 15 Gram(s) Oral once  dextrose 5%. 1000 milliLiter(s) (50 mL/Hr) IV Continuous <Continuous>  dextrose 5%. 1000 milliLiter(s) (100 mL/Hr) IV Continuous <Continuous>  dextrose 50% Injectable 25 Gram(s) IV Push once  dextrose 50% Injectable 12.5 Gram(s) IV Push once  dextrose 50% Injectable 25 Gram(s) IV Push once  donepezil 10 milliGRAM(s) Oral at bedtime  dorzolamide 2%/timolol 0.5% Ophthalmic Solution 1 Drop(s) Both EYES every 12 hours  enoxaparin Injectable 40 milliGRAM(s) SubCutaneous every 12 hours  escitalopram 5 milliGRAM(s) Oral daily  glucagon  Injectable 1 milliGRAM(s) IntraMuscular once  insulin glargine Injectable (LANTUS) 5 Unit(s) SubCutaneous at bedtime  insulin lispro (ADMELOG) corrective regimen sliding scale   SubCutaneous three times a day before meals  insulin lispro (ADMELOG) corrective regimen sliding scale   SubCutaneous at bedtime  insulin lispro Injectable (ADMELOG) 2 Unit(s) SubCutaneous three times a day before meals  levETIRAcetam 500 milliGRAM(s) Oral two times a day  oxybutynin 5 milliGRAM(s) Oral two times a day  sodium chloride 0.9%. 1000 milliLiter(s) (100 mL/Hr) IV Continuous <Continuous>    MEDICATIONS  (PRN):  acetaminophen   Tablet .. 650 milliGRAM(s) Oral every 4 hours PRN Temp greater or equal to 38.5C (101.3F)  morphine  - Injectable 2 milliGRAM(s) IV Push every 6 hours PRN Moderate Pain (4 - 6)      PHYSICAL EXAM:  Vital Signs Last 24 Hrs  T(C): 36.9 (2021 10:40), Max: 39.3 (2021 21:16)  T(F): 98.4 (2021 10:40), Max: 102.8 (2021 21:16)  HR: 76 (2021 10:40) (75 - 92)  BP: 103/59 (2021 10:40) (103/59 - 176/81)  BP(mean): --  RR: 19 (2021 10:40) (18 - 19)  SpO2: 99% (2021 10:40) (95% - 100%)    CONSTITUTIONAL: NAD  RESPIRATORY: Normal respiratory effort; lungs are clear to auscultation bilaterally  CARDIOVASCULAR: Regular rate and rhythm, normal S1 and S2, no murmur/rub/gallop; No lower extremity edema  NEUROLOGY: non-focal; no gross sensory deficits   PSYCH: A+O to person, place, and time; affect appropriate  SKIN: No rashes; warm     LABS:                        13.2   4.62  )-----------( 197      ( 2021 16:00 )             42.4     02-08    135  |  101  |  23  ----------------------------<  281<H>  4.1   |  22  |  1.30    Ca    9.3      2021 16:00  Phos  2.2     02-08  Mg     1.7     02-08        CARDIAC MARKERS ( 2021 07:45 )  x     / x     / 782 U/L / x     / x          Urinalysis Basic - ( 2021 03:28 )    Color: Yellow / Appearance: Clear / S.024 / pH: x  Gluc: x / Ketone: Negative  / Bili: Negative / Urobili: <2 mg/dL   Blood: x / Protein: 30 mg/dL / Nitrite: Negative   Leuk Esterase: Negative / RBC: 15 /HPF / WBC 8 /HPF   Sq Epi: x / Non Sq Epi: 4 /HPF / Bacteria: Moderate        Culture - Urine (collected 2021 10:07)  Source: .Urine Clean Catch (Midstream)  Final Report (2021 08:44):    No growth    Culture - Blood (collected 2021 00:38)  Source: .Blood Blood-Peripheral  Preliminary Report (2021 01:02):    No growth to date.    Culture - Blood (collected 2021 00:38)  Source: .Blood Blood-Peripheral  Preliminary Report (2021 01:02):    No growth to date.

## 2021-02-09 LAB
ANION GAP SERPL CALC-SCNC: 13 MMOL/L — SIGNIFICANT CHANGE UP (ref 7–14)
BUN SERPL-MCNC: 24 MG/DL — HIGH (ref 7–23)
CALCIUM SERPL-MCNC: 8.8 MG/DL — SIGNIFICANT CHANGE UP (ref 8.4–10.5)
CHLORIDE SERPL-SCNC: 101 MMOL/L — SIGNIFICANT CHANGE UP (ref 98–107)
CO2 SERPL-SCNC: 18 MMOL/L — LOW (ref 22–31)
CREAT SERPL-MCNC: 1.2 MG/DL — SIGNIFICANT CHANGE UP (ref 0.5–1.3)
CRP SERPL-MCNC: 26.8 MG/L — HIGH
FERRITIN SERPL-MCNC: 344 NG/ML — HIGH (ref 15–150)
GLUCOSE BLDC GLUCOMTR-MCNC: 159 MG/DL — HIGH (ref 70–99)
GLUCOSE BLDC GLUCOMTR-MCNC: 161 MG/DL — HIGH (ref 70–99)
GLUCOSE BLDC GLUCOMTR-MCNC: 237 MG/DL — HIGH (ref 70–99)
GLUCOSE BLDC GLUCOMTR-MCNC: 275 MG/DL — HIGH (ref 70–99)
GLUCOSE SERPL-MCNC: 227 MG/DL — HIGH (ref 70–99)
HCT VFR BLD CALC: 45 % — SIGNIFICANT CHANGE UP (ref 34.5–45)
HGB BLD-MCNC: 13.9 G/DL — SIGNIFICANT CHANGE UP (ref 11.5–15.5)
LDH SERPL L TO P-CCNC: 620 U/L — HIGH (ref 135–225)
MAGNESIUM SERPL-MCNC: 1.8 MG/DL — SIGNIFICANT CHANGE UP (ref 1.6–2.6)
MCHC RBC-ENTMCNC: 27.3 PG — SIGNIFICANT CHANGE UP (ref 27–34)
MCHC RBC-ENTMCNC: 30.9 GM/DL — LOW (ref 32–36)
MCV RBC AUTO: 88.2 FL — SIGNIFICANT CHANGE UP (ref 80–100)
NRBC # BLD: 0 /100 WBCS — SIGNIFICANT CHANGE UP
NRBC # FLD: 0 K/UL — SIGNIFICANT CHANGE UP
PHOSPHATE SERPL-MCNC: 2.4 MG/DL — LOW (ref 2.5–4.5)
PLATELET # BLD AUTO: 175 K/UL — SIGNIFICANT CHANGE UP (ref 150–400)
POTASSIUM SERPL-MCNC: 5 MMOL/L — SIGNIFICANT CHANGE UP (ref 3.5–5.3)
POTASSIUM SERPL-SCNC: 5 MMOL/L — SIGNIFICANT CHANGE UP (ref 3.5–5.3)
RBC # BLD: 5.1 M/UL — SIGNIFICANT CHANGE UP (ref 3.8–5.2)
RBC # FLD: 12 % — SIGNIFICANT CHANGE UP (ref 10.3–14.5)
SODIUM SERPL-SCNC: 132 MMOL/L — LOW (ref 135–145)
WBC # BLD: 4.8 K/UL — SIGNIFICANT CHANGE UP (ref 3.8–10.5)
WBC # FLD AUTO: 4.8 K/UL — SIGNIFICANT CHANGE UP (ref 3.8–10.5)

## 2021-02-09 PROCEDURE — 93970 EXTREMITY STUDY: CPT | Mod: 26

## 2021-02-09 PROCEDURE — 99232 SBSQ HOSP IP/OBS MODERATE 35: CPT | Mod: CS

## 2021-02-09 PROCEDURE — 93971 EXTREMITY STUDY: CPT | Mod: 26,59

## 2021-02-09 PROCEDURE — 93280 PM DEVICE PROGR EVAL DUAL: CPT | Mod: 26

## 2021-02-09 RX ORDER — IBUPROFEN 200 MG
400 TABLET ORAL ONCE
Refills: 0 | Status: DISCONTINUED | OUTPATIENT
Start: 2021-02-09 | End: 2021-02-09

## 2021-02-09 RX ORDER — ACETAMINOPHEN 500 MG
325 TABLET ORAL ONCE
Refills: 0 | Status: COMPLETED | OUTPATIENT
Start: 2021-02-09 | End: 2021-02-09

## 2021-02-09 RX ORDER — AMLODIPINE BESYLATE 2.5 MG/1
5 TABLET ORAL DAILY
Refills: 0 | Status: DISCONTINUED | OUTPATIENT
Start: 2021-02-09 | End: 2021-03-02

## 2021-02-09 RX ADMIN — DORZOLAMIDE HYDROCHLORIDE TIMOLOL MALEATE 1 DROP(S): 20; 5 SOLUTION/ DROPS OPHTHALMIC at 08:48

## 2021-02-09 RX ADMIN — DONEPEZIL HYDROCHLORIDE 10 MILLIGRAM(S): 10 TABLET, FILM COATED ORAL at 21:30

## 2021-02-09 RX ADMIN — Medication 2 UNIT(S): at 18:02

## 2021-02-09 RX ADMIN — Medication 2 UNIT(S): at 13:30

## 2021-02-09 RX ADMIN — Medication 2: at 09:26

## 2021-02-09 RX ADMIN — Medication 650 MILLIGRAM(S): at 09:31

## 2021-02-09 RX ADMIN — ENOXAPARIN SODIUM 40 MILLIGRAM(S): 100 INJECTION SUBCUTANEOUS at 06:28

## 2021-02-09 RX ADMIN — ENOXAPARIN SODIUM 40 MILLIGRAM(S): 100 INJECTION SUBCUTANEOUS at 18:04

## 2021-02-09 RX ADMIN — LEVETIRACETAM 500 MILLIGRAM(S): 250 TABLET, FILM COATED ORAL at 06:27

## 2021-02-09 RX ADMIN — Medication 650 MILLIGRAM(S): at 18:45

## 2021-02-09 RX ADMIN — DORZOLAMIDE HYDROCHLORIDE TIMOLOL MALEATE 1 DROP(S): 20; 5 SOLUTION/ DROPS OPHTHALMIC at 18:04

## 2021-02-09 RX ADMIN — AMLODIPINE BESYLATE 10 MILLIGRAM(S): 2.5 TABLET ORAL at 09:37

## 2021-02-09 RX ADMIN — ATORVASTATIN CALCIUM 20 MILLIGRAM(S): 80 TABLET, FILM COATED ORAL at 21:30

## 2021-02-09 RX ADMIN — SODIUM CHLORIDE 100 MILLILITER(S): 9 INJECTION INTRAMUSCULAR; INTRAVENOUS; SUBCUTANEOUS at 09:37

## 2021-02-09 RX ADMIN — Medication 1: at 18:02

## 2021-02-09 RX ADMIN — Medication 5 MILLIGRAM(S): at 06:27

## 2021-02-09 RX ADMIN — Medication 2 UNIT(S): at 09:26

## 2021-02-09 RX ADMIN — SODIUM CHLORIDE 100 MILLILITER(S): 9 INJECTION INTRAMUSCULAR; INTRAVENOUS; SUBCUTANEOUS at 18:45

## 2021-02-09 RX ADMIN — LEVETIRACETAM 500 MILLIGRAM(S): 250 TABLET, FILM COATED ORAL at 18:03

## 2021-02-09 RX ADMIN — ESCITALOPRAM OXALATE 5 MILLIGRAM(S): 10 TABLET, FILM COATED ORAL at 12:09

## 2021-02-09 RX ADMIN — Medication 325 MILLIGRAM(S): at 21:30

## 2021-02-09 RX ADMIN — INSULIN GLARGINE 5 UNIT(S): 100 INJECTION, SOLUTION SUBCUTANEOUS at 21:30

## 2021-02-09 RX ADMIN — Medication 3: at 13:31

## 2021-02-09 RX ADMIN — Medication 5 MILLIGRAM(S): at 18:03

## 2021-02-09 NOTE — PROGRESS NOTE ADULT - PROBLEM SELECTOR PLAN 4
- CT chest at [facility] incidentally found: Irregular 1.9 cm left upper lobe nodular opacity with associated spiculation.   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  - Patient did not want to know about these results and the daughter is already aware of these results  - will need inpatient vs outpatient pulm follow up

## 2021-02-09 NOTE — PROCEDURE NOTE - ADDITIONAL PROCEDURE DETAILS
normal pacemaker function  sensing and pacing well  good impedance  thresholds measured via iterative testing  no events  no reprogramming required  report in chart

## 2021-02-09 NOTE — PROGRESS NOTE ADULT - PROBLEM SELECTOR PLAN 2
- Would reduce Pt's CCB amlodipine to 5 mg qd; given her age a -155 is appropriate given JNC 8 findings   - Pending EEG   - Patient with multiple falls which are unwitnessed but patient states that she falls due to weakness, denies any syncope, palpitations, LOC, chest pain, SOB prior to her falls  - CTH/CT spine: no acute findings  - Pelvis xray; neg  - PT and Neuro consult  - EP in AM for PPM interrogation given the falls

## 2021-02-09 NOTE — PROGRESS NOTE ADULT - SUBJECTIVE AND OBJECTIVE BOX
Patient is a 85y old  Female who presents with a chief complaint of COVID/Falls (2021 17:54)      SUBJECTIVE / OVERNIGHT EVENTS: examined Pt at the bedside. Reports constipation at this time. Pacer interrogation notes no cardiac events.    MEDICATIONS  (STANDING):  amLODIPine   Tablet 10 milliGRAM(s) Oral daily  amLODIPine   Tablet 5 milliGRAM(s) Oral daily  atorvastatin 20 milliGRAM(s) Oral at bedtime  dextrose 40% Gel 15 Gram(s) Oral once  dextrose 5%. 1000 milliLiter(s) (100 mL/Hr) IV Continuous <Continuous>  dextrose 5%. 1000 milliLiter(s) (50 mL/Hr) IV Continuous <Continuous>  dextrose 50% Injectable 25 Gram(s) IV Push once  dextrose 50% Injectable 12.5 Gram(s) IV Push once  dextrose 50% Injectable 25 Gram(s) IV Push once  donepezil 10 milliGRAM(s) Oral at bedtime  dorzolamide 2%/timolol 0.5% Ophthalmic Solution 1 Drop(s) Both EYES every 12 hours  enoxaparin Injectable 40 milliGRAM(s) SubCutaneous every 12 hours  escitalopram 5 milliGRAM(s) Oral daily  glucagon  Injectable 1 milliGRAM(s) IntraMuscular once  insulin glargine Injectable (LANTUS) 5 Unit(s) SubCutaneous at bedtime  insulin lispro (ADMELOG) corrective regimen sliding scale   SubCutaneous three times a day before meals  insulin lispro (ADMELOG) corrective regimen sliding scale   SubCutaneous at bedtime  insulin lispro Injectable (ADMELOG) 2 Unit(s) SubCutaneous three times a day before meals  levETIRAcetam 500 milliGRAM(s) Oral two times a day  oxybutynin 5 milliGRAM(s) Oral two times a day  sodium chloride 0.9%. 1000 milliLiter(s) (100 mL/Hr) IV Continuous <Continuous>    MEDICATIONS  (PRN):  acetaminophen   Tablet .. 650 milliGRAM(s) Oral every 4 hours PRN Temp greater or equal to 38.5C (101.3F)  morphine  - Injectable 2 milliGRAM(s) IV Push every 6 hours PRN Moderate Pain (4 - 6)      PHYSICAL EXAM:  Vital Signs Last 24 Hrs  T(C): 38.9 (2021 08:47), Max: 39.4 (2021 18:24)  T(F): 102.1 (2021 08:47), Max: 102.9 (2021 18:24)  HR: 85 (2021 09:34) (70 - 89)  BP: 153/78 (2021 09:34) (116/71 - 153/78)  BP(mean): --  RR: 16 (2021 09:34) (16 - 19)  SpO2: 98% (2021 09:34) (96% - 98%)    CONSTITUTIONAL: NAD, well-developed, well-groomed  RESPIRATORY: Normal respiratory effort; lungs are clear to auscultation bilaterally  CARDIOVASCULAR: Regular rate and rhythm, normal S1 and S2, no murmur/rub/gallop; No lower extremity edema  NEUROLOGY: non-focal; no gross sensory deficits   PSYCH: A+O to person, place, and time; affect appropriate  SKIN: No rashes; warm     LABS:                        13.9   4.80  )-----------( 175      ( 2021 08:34 )             45.0     02-09    132<L>  |  101  |  24<H>  ----------------------------<  227<H>  5.0   |  18<L>  |  x     Ca    8.8      2021 08:34  Phos  2.4     02-09  Mg     1.7     02-08            Urinalysis Basic - ( 2021 03:28 )    Color: Yellow / Appearance: Clear / S.024 / pH: x  Gluc: x / Ketone: Negative  / Bili: Negative / Urobili: <2 mg/dL   Blood: x / Protein: 30 mg/dL / Nitrite: Negative   Leuk Esterase: Negative / RBC: 15 /HPF / WBC 8 /HPF   Sq Epi: x / Non Sq Epi: 4 /HPF / Bacteria: Moderate        Culture - Blood (collected 2021 01:55)  Source: .Blood Blood-Venous  Preliminary Report (2021 02:02):    No growth to date.    Culture - Blood (collected 2021 01:55)  Source: .Blood Blood-Peripheral  Preliminary Report (2021 02:02):    No growth to date.

## 2021-02-09 NOTE — PROGRESS NOTE ADULT - PROBLEM SELECTOR PLAN 5
Resolved - Patient's arb was held given ?neptali v CKD at [facility]  - She was given IVF at [facility]  - Monitor Cr  - avoid nephrotoxic meds

## 2021-02-10 LAB
ANION GAP SERPL CALC-SCNC: 14 MMOL/L — SIGNIFICANT CHANGE UP (ref 7–14)
BUN SERPL-MCNC: 18 MG/DL — SIGNIFICANT CHANGE UP (ref 7–23)
CALCIUM SERPL-MCNC: 8.3 MG/DL — LOW (ref 8.4–10.5)
CHLORIDE SERPL-SCNC: 104 MMOL/L — SIGNIFICANT CHANGE UP (ref 98–107)
CO2 SERPL-SCNC: 15 MMOL/L — LOW (ref 22–31)
CREAT SERPL-MCNC: 1.06 MG/DL — SIGNIFICANT CHANGE UP (ref 0.5–1.3)
GLUCOSE BLDC GLUCOMTR-MCNC: 143 MG/DL — HIGH (ref 70–99)
GLUCOSE BLDC GLUCOMTR-MCNC: 149 MG/DL — HIGH (ref 70–99)
GLUCOSE BLDC GLUCOMTR-MCNC: 178 MG/DL — HIGH (ref 70–99)
GLUCOSE BLDC GLUCOMTR-MCNC: 200 MG/DL — HIGH (ref 70–99)
GLUCOSE SERPL-MCNC: 159 MG/DL — HIGH (ref 70–99)
HCT VFR BLD CALC: 38.1 % — SIGNIFICANT CHANGE UP (ref 34.5–45)
HGB BLD-MCNC: 12.3 G/DL — SIGNIFICANT CHANGE UP (ref 11.5–15.5)
MAGNESIUM SERPL-MCNC: 1.5 MG/DL — LOW (ref 1.6–2.6)
MCHC RBC-ENTMCNC: 27.2 PG — SIGNIFICANT CHANGE UP (ref 27–34)
MCHC RBC-ENTMCNC: 32.3 GM/DL — SIGNIFICANT CHANGE UP (ref 32–36)
MCV RBC AUTO: 84.3 FL — SIGNIFICANT CHANGE UP (ref 80–100)
NRBC # BLD: 0 /100 WBCS — SIGNIFICANT CHANGE UP
NRBC # FLD: 0 K/UL — SIGNIFICANT CHANGE UP
PHOSPHATE SERPL-MCNC: 2.1 MG/DL — LOW (ref 2.5–4.5)
PLATELET # BLD AUTO: 171 K/UL — SIGNIFICANT CHANGE UP (ref 150–400)
POTASSIUM SERPL-MCNC: 3.4 MMOL/L — LOW (ref 3.5–5.3)
POTASSIUM SERPL-SCNC: 3.4 MMOL/L — LOW (ref 3.5–5.3)
RBC # BLD: 4.52 M/UL — SIGNIFICANT CHANGE UP (ref 3.8–5.2)
RBC # FLD: 12.1 % — SIGNIFICANT CHANGE UP (ref 10.3–14.5)
SODIUM SERPL-SCNC: 133 MMOL/L — LOW (ref 135–145)
WBC # BLD: 4.29 K/UL — SIGNIFICANT CHANGE UP (ref 3.8–10.5)
WBC # FLD AUTO: 4.29 K/UL — SIGNIFICANT CHANGE UP (ref 3.8–10.5)

## 2021-02-10 PROCEDURE — 99233 SBSQ HOSP IP/OBS HIGH 50: CPT | Mod: CS

## 2021-02-10 PROCEDURE — 71045 X-RAY EXAM CHEST 1 VIEW: CPT | Mod: 26

## 2021-02-10 RX ADMIN — Medication 5 MILLIGRAM(S): at 05:29

## 2021-02-10 RX ADMIN — ENOXAPARIN SODIUM 40 MILLIGRAM(S): 100 INJECTION SUBCUTANEOUS at 05:29

## 2021-02-10 RX ADMIN — ESCITALOPRAM OXALATE 5 MILLIGRAM(S): 10 TABLET, FILM COATED ORAL at 12:12

## 2021-02-10 RX ADMIN — Medication 1: at 17:57

## 2021-02-10 RX ADMIN — LEVETIRACETAM 500 MILLIGRAM(S): 250 TABLET, FILM COATED ORAL at 05:29

## 2021-02-10 RX ADMIN — INSULIN GLARGINE 5 UNIT(S): 100 INJECTION, SOLUTION SUBCUTANEOUS at 22:22

## 2021-02-10 RX ADMIN — Medication 2 UNIT(S): at 09:45

## 2021-02-10 RX ADMIN — Medication 5 MILLIGRAM(S): at 18:36

## 2021-02-10 RX ADMIN — Medication 2 UNIT(S): at 13:34

## 2021-02-10 RX ADMIN — Medication 1: at 13:34

## 2021-02-10 RX ADMIN — Medication 650 MILLIGRAM(S): at 13:06

## 2021-02-10 RX ADMIN — ENOXAPARIN SODIUM 40 MILLIGRAM(S): 100 INJECTION SUBCUTANEOUS at 18:35

## 2021-02-10 RX ADMIN — SODIUM CHLORIDE 100 MILLILITER(S): 9 INJECTION INTRAMUSCULAR; INTRAVENOUS; SUBCUTANEOUS at 09:45

## 2021-02-10 RX ADMIN — ATORVASTATIN CALCIUM 20 MILLIGRAM(S): 80 TABLET, FILM COATED ORAL at 22:22

## 2021-02-10 RX ADMIN — Medication 2 UNIT(S): at 17:57

## 2021-02-10 RX ADMIN — LEVETIRACETAM 500 MILLIGRAM(S): 250 TABLET, FILM COATED ORAL at 18:36

## 2021-02-10 RX ADMIN — DORZOLAMIDE HYDROCHLORIDE TIMOLOL MALEATE 1 DROP(S): 20; 5 SOLUTION/ DROPS OPHTHALMIC at 18:35

## 2021-02-10 RX ADMIN — AMLODIPINE BESYLATE 5 MILLIGRAM(S): 2.5 TABLET ORAL at 05:29

## 2021-02-10 RX ADMIN — DONEPEZIL HYDROCHLORIDE 10 MILLIGRAM(S): 10 TABLET, FILM COATED ORAL at 22:22

## 2021-02-10 RX ADMIN — DORZOLAMIDE HYDROCHLORIDE TIMOLOL MALEATE 1 DROP(S): 20; 5 SOLUTION/ DROPS OPHTHALMIC at 05:29

## 2021-02-10 NOTE — PROGRESS NOTE ADULT - PROBLEM SELECTOR PLAN 2
- Would reduce Pt's CCB amlodipine to 5 mg qd; given her age a -155 is appropriate given JNC 8 findings   - Pending EEG   - Patient with multiple falls which are unwitnessed but patient states that she falls due to weakness, denies any syncope, palpitations, LOC, chest pain, SOB prior to her falls  - CTH/CT spine: no acute findings  - Pelvis xray; neg  - PT and Neuro consult  - PPM done, pt normal

## 2021-02-10 NOTE — PROGRESS NOTE ADULT - PROBLEM SELECTOR PLAN 4
- CT chest at [facility] incidentally found: Irregular 1.9 cm left upper lobe nodular opacity with associated spiculation.   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  - Patient did not want to know about these results and the daughter is already aware of these results  - will need inpatient vs outpatient pulm follow up

## 2021-02-10 NOTE — PROGRESS NOTE ADULT - PROBLEM SELECTOR PLAN 5
Resolved - Patient's arb was held given ?neptali v CKD at [facility]  - She was given IVF at [facility]  - Monitor Cr  - avoid nephrotoxic meds

## 2021-02-10 NOTE — PROGRESS NOTE ADULT - SUBJECTIVE AND OBJECTIVE BOX
Patient is a 85y old  Female who presents with a chief complaint of COVID/Falls (09 Feb 2021 14:22)      SUBJECTIVE / OVERNIGHT EVENTS: CARI overnight, patient states the swelling on her hand is stable. Does feel her LE weakness is improving. Feels stronger  ADDITIONAL REVIEW OF SYSTEMS: negative as per above    MEDICATIONS  (STANDING):  amLODIPine   Tablet 5 milliGRAM(s) Oral daily  atorvastatin 20 milliGRAM(s) Oral at bedtime  dextrose 40% Gel 15 Gram(s) Oral once  dextrose 5%. 1000 milliLiter(s) (50 mL/Hr) IV Continuous <Continuous>  dextrose 5%. 1000 milliLiter(s) (100 mL/Hr) IV Continuous <Continuous>  dextrose 50% Injectable 25 Gram(s) IV Push once  dextrose 50% Injectable 12.5 Gram(s) IV Push once  dextrose 50% Injectable 25 Gram(s) IV Push once  donepezil 10 milliGRAM(s) Oral at bedtime  dorzolamide 2%/timolol 0.5% Ophthalmic Solution 1 Drop(s) Both EYES every 12 hours  enoxaparin Injectable 40 milliGRAM(s) SubCutaneous every 12 hours  escitalopram 5 milliGRAM(s) Oral daily  glucagon  Injectable 1 milliGRAM(s) IntraMuscular once  insulin glargine Injectable (LANTUS) 5 Unit(s) SubCutaneous at bedtime  insulin lispro (ADMELOG) corrective regimen sliding scale   SubCutaneous three times a day before meals  insulin lispro (ADMELOG) corrective regimen sliding scale   SubCutaneous at bedtime  insulin lispro Injectable (ADMELOG) 2 Unit(s) SubCutaneous three times a day before meals  levETIRAcetam 500 milliGRAM(s) Oral two times a day  oxybutynin 5 milliGRAM(s) Oral two times a day  sodium chloride 0.9%. 1000 milliLiter(s) (100 mL/Hr) IV Continuous <Continuous>    MEDICATIONS  (PRN):  acetaminophen   Tablet .. 650 milliGRAM(s) Oral every 4 hours PRN Temp greater or equal to 38.5C (101.3F)  morphine  - Injectable 2 milliGRAM(s) IV Push every 6 hours PRN Moderate Pain (4 - 6)      CAPILLARY BLOOD GLUCOSE      POCT Blood Glucose.: 200 mg/dL (10 Feb 2021 13:31)  POCT Blood Glucose.: 143 mg/dL (10 Feb 2021 09:24)  POCT Blood Glucose.: 161 mg/dL (09 Feb 2021 21:11)  POCT Blood Glucose.: 159 mg/dL (09 Feb 2021 17:38)    I&O's Summary    09 Feb 2021 07:01  -  10 Feb 2021 07:00  --------------------------------------------------------  IN: 1300 mL / OUT: 600 mL / NET: 700 mL    10 Feb 2021 07:01  -  10 Feb 2021 15:14  --------------------------------------------------------  IN: 500 mL / OUT: 200 mL / NET: 300 mL        PHYSICAL EXAM:  Vital Signs Last 24 Hrs  T(C): 38.7 (10 Feb 2021 13:04), Max: 39.1 (09 Feb 2021 18:45)  T(F): 101.7 (10 Feb 2021 13:04), Max: 102.3 (09 Feb 2021 18:45)  HR: 80 (10 Feb 2021 12:08) (80 - 89)  BP: 139/67 (10 Feb 2021 12:08) (115/80 - 150/71)  BP(mean): --  RR: 18 (10 Feb 2021 12:08) (18 - 18)  SpO2: 99% (10 Feb 2021 12:08) (96% - 99%)    CONSTITUTIONAL: NAD,    EYES: PERRLA; conjunctiva and sclera clear  RESPIRATORY: Normal respiratory effort; lungs are clear to auscultation bilaterally  CARDIOVASCULAR: Regular rate and rhythm, normal S1 and S2, no murmur/rub/gallop; No lower extremity edema; Peripheral pulses are 2+ bilaterally  ABDOMEN: Nontender to palpation, normoactive bowel sounds, no rebound/guarding; No hepatosplenomegaly  PSYCH: A+O to person, place, and time; affect appropriate   MSK: RUE 2-3+ swelling, pulses intact    LABS:                        12.3   4.29  )-----------( 171      ( 10 Feb 2021 09:50 )             38.1     02-10    133<L>  |  104  |  18  ----------------------------<  159<H>  3.4<L>   |  15<L>  |  1.06    Ca    8.3<L>      10 Feb 2021 09:50  Phos  2.1     02-10  Mg     1.5     02-10                Culture - Blood (collected 08 Feb 2021 01:55)  Source: .Blood Blood-Venous  Preliminary Report (09 Feb 2021 02:02):    No growth to date.    Culture - Blood (collected 08 Feb 2021 01:55)  Source: .Blood Blood-Peripheral  Preliminary Report (09 Feb 2021 02:02):    No growth to date.      Trend Procalcitonin  02-05-21 @ 19:18   -  0.10      C-Reactive Protein, Serum: 26.8 mg/L (02-09-21 @ 08:34)  C-Reactive Protein, Serum: 38.1 mg/L (02-08-21 @ 07:25)  C-Reactive Protein, Serum: 21.4 mg/L (02-07-21 @ 07:45)  C-Reactive Protein, Serum: 1.60 mg/dL (02-05-21 @ 19:18)    Ferritin, Serum: 344 ng/mL (02-09-21 @ 08:34)  Ferritin, Serum: 283 ng/mL (02-08-21 @ 07:25)  Ferritin, Serum: 206 ng/mL (02-07-21 @ 07:45)  Ferritin, Serum: 176 ng/mL (02-06-21 @ 03:49)    D-Dimer:  470 ng/mL DDU (02-07-21 @ 07:45)  580 ng/mL DDU (02-05-21 @ 19:18)      RADIOLOGY & ADDITIONAL TESTS:  Results Reviewed:   Imaging Personally Reviewed:  Electrocardiogram Personally Reviewed:    COORDINATION OF CARE:  Care Discussed with Consultants/Other Providers [Y/N]:  Prior or Outpatient Records Reviewed [Y/N]:

## 2021-02-11 LAB
ALBUMIN SERPL ELPH-MCNC: 2.8 G/DL — LOW (ref 3.3–5)
ALP SERPL-CCNC: 50 U/L — SIGNIFICANT CHANGE UP (ref 40–120)
ALT FLD-CCNC: 20 U/L — SIGNIFICANT CHANGE UP (ref 4–33)
ANION GAP SERPL CALC-SCNC: 11 MMOL/L — SIGNIFICANT CHANGE UP (ref 7–14)
AST SERPL-CCNC: 43 U/L — HIGH (ref 4–32)
BILIRUB SERPL-MCNC: 0.3 MG/DL — SIGNIFICANT CHANGE UP (ref 0.2–1.2)
BUN SERPL-MCNC: 14 MG/DL — SIGNIFICANT CHANGE UP (ref 7–23)
CALCIUM SERPL-MCNC: 8.3 MG/DL — LOW (ref 8.4–10.5)
CHLORIDE SERPL-SCNC: 105 MMOL/L — SIGNIFICANT CHANGE UP (ref 98–107)
CO2 SERPL-SCNC: 19 MMOL/L — LOW (ref 22–31)
CREAT SERPL-MCNC: 1.01 MG/DL — SIGNIFICANT CHANGE UP (ref 0.5–1.3)
CRP SERPL-MCNC: 51.5 MG/L — HIGH
CULTURE RESULTS: SIGNIFICANT CHANGE UP
CULTURE RESULTS: SIGNIFICANT CHANGE UP
D DIMER BLD IA.RAPID-MCNC: 366 NG/ML DDU — HIGH
FERRITIN SERPL-MCNC: 380 NG/ML — HIGH (ref 15–150)
GLUCOSE BLDC GLUCOMTR-MCNC: 145 MG/DL — HIGH (ref 70–99)
GLUCOSE BLDC GLUCOMTR-MCNC: 160 MG/DL — HIGH (ref 70–99)
GLUCOSE BLDC GLUCOMTR-MCNC: 174 MG/DL — HIGH (ref 70–99)
GLUCOSE BLDC GLUCOMTR-MCNC: 210 MG/DL — HIGH (ref 70–99)
GLUCOSE SERPL-MCNC: 183 MG/DL — HIGH (ref 70–99)
HCT VFR BLD CALC: 44.6 % — SIGNIFICANT CHANGE UP (ref 34.5–45)
HGB BLD-MCNC: 13.8 G/DL — SIGNIFICANT CHANGE UP (ref 11.5–15.5)
MAGNESIUM SERPL-MCNC: 1.4 MG/DL — LOW (ref 1.6–2.6)
MCHC RBC-ENTMCNC: 27.2 PG — SIGNIFICANT CHANGE UP (ref 27–34)
MCHC RBC-ENTMCNC: 30.9 GM/DL — LOW (ref 32–36)
MCV RBC AUTO: 87.8 FL — SIGNIFICANT CHANGE UP (ref 80–100)
NRBC # BLD: 0 /100 WBCS — SIGNIFICANT CHANGE UP
NRBC # FLD: 0 K/UL — SIGNIFICANT CHANGE UP
PHOSPHATE SERPL-MCNC: 2.2 MG/DL — LOW (ref 2.5–4.5)
PLATELET # BLD AUTO: 197 K/UL — SIGNIFICANT CHANGE UP (ref 150–400)
POTASSIUM SERPL-MCNC: 2.9 MMOL/L — CRITICAL LOW (ref 3.5–5.3)
POTASSIUM SERPL-SCNC: 2.9 MMOL/L — CRITICAL LOW (ref 3.5–5.3)
PROT SERPL-MCNC: 5.8 G/DL — LOW (ref 6–8.3)
RBC # BLD: 5.08 M/UL — SIGNIFICANT CHANGE UP (ref 3.8–5.2)
RBC # FLD: 11.9 % — SIGNIFICANT CHANGE UP (ref 10.3–14.5)
SODIUM SERPL-SCNC: 135 MMOL/L — SIGNIFICANT CHANGE UP (ref 135–145)
SPECIMEN SOURCE: SIGNIFICANT CHANGE UP
SPECIMEN SOURCE: SIGNIFICANT CHANGE UP
WBC # BLD: 6.72 K/UL — SIGNIFICANT CHANGE UP (ref 3.8–10.5)
WBC # FLD AUTO: 6.72 K/UL — SIGNIFICANT CHANGE UP (ref 3.8–10.5)

## 2021-02-11 PROCEDURE — 99233 SBSQ HOSP IP/OBS HIGH 50: CPT | Mod: CS

## 2021-02-11 RX ORDER — MAGNESIUM SULFATE 500 MG/ML
1 VIAL (ML) INJECTION ONCE
Refills: 0 | Status: COMPLETED | OUTPATIENT
Start: 2021-02-11 | End: 2021-02-11

## 2021-02-11 RX ORDER — POTASSIUM CHLORIDE 20 MEQ
10 PACKET (EA) ORAL
Refills: 0 | Status: COMPLETED | OUTPATIENT
Start: 2021-02-11 | End: 2021-02-11

## 2021-02-11 RX ORDER — POTASSIUM PHOSPHATE, MONOBASIC POTASSIUM PHOSPHATE, DIBASIC 236; 224 MG/ML; MG/ML
15 INJECTION, SOLUTION INTRAVENOUS ONCE
Refills: 0 | Status: COMPLETED | OUTPATIENT
Start: 2021-02-11 | End: 2021-02-11

## 2021-02-11 RX ADMIN — ENOXAPARIN SODIUM 40 MILLIGRAM(S): 100 INJECTION SUBCUTANEOUS at 06:34

## 2021-02-11 RX ADMIN — DORZOLAMIDE HYDROCHLORIDE TIMOLOL MALEATE 1 DROP(S): 20; 5 SOLUTION/ DROPS OPHTHALMIC at 06:34

## 2021-02-11 RX ADMIN — POTASSIUM PHOSPHATE, MONOBASIC POTASSIUM PHOSPHATE, DIBASIC 62.5 MILLIMOLE(S): 236; 224 INJECTION, SOLUTION INTRAVENOUS at 19:27

## 2021-02-11 RX ADMIN — INSULIN GLARGINE 5 UNIT(S): 100 INJECTION, SOLUTION SUBCUTANEOUS at 22:28

## 2021-02-11 RX ADMIN — Medication 2: at 13:39

## 2021-02-11 RX ADMIN — ENOXAPARIN SODIUM 40 MILLIGRAM(S): 100 INJECTION SUBCUTANEOUS at 19:28

## 2021-02-11 RX ADMIN — Medication 100 GRAM(S): at 14:56

## 2021-02-11 RX ADMIN — Medication 1: at 19:03

## 2021-02-11 RX ADMIN — ATORVASTATIN CALCIUM 20 MILLIGRAM(S): 80 TABLET, FILM COATED ORAL at 22:28

## 2021-02-11 RX ADMIN — LEVETIRACETAM 500 MILLIGRAM(S): 250 TABLET, FILM COATED ORAL at 19:29

## 2021-02-11 RX ADMIN — ESCITALOPRAM OXALATE 5 MILLIGRAM(S): 10 TABLET, FILM COATED ORAL at 14:56

## 2021-02-11 RX ADMIN — Medication 100 MILLIEQUIVALENT(S): at 22:27

## 2021-02-11 RX ADMIN — LEVETIRACETAM 500 MILLIGRAM(S): 250 TABLET, FILM COATED ORAL at 06:34

## 2021-02-11 RX ADMIN — Medication 5 MILLIGRAM(S): at 19:28

## 2021-02-11 RX ADMIN — Medication 2 UNIT(S): at 09:47

## 2021-02-11 RX ADMIN — AMLODIPINE BESYLATE 5 MILLIGRAM(S): 2.5 TABLET ORAL at 06:34

## 2021-02-11 RX ADMIN — DORZOLAMIDE HYDROCHLORIDE TIMOLOL MALEATE 1 DROP(S): 20; 5 SOLUTION/ DROPS OPHTHALMIC at 19:28

## 2021-02-11 RX ADMIN — Medication 100 MILLIEQUIVALENT(S): at 18:37

## 2021-02-11 RX ADMIN — Medication 100 MILLIEQUIVALENT(S): at 16:50

## 2021-02-11 RX ADMIN — DONEPEZIL HYDROCHLORIDE 10 MILLIGRAM(S): 10 TABLET, FILM COATED ORAL at 22:28

## 2021-02-11 RX ADMIN — SODIUM CHLORIDE 100 MILLILITER(S): 9 INJECTION INTRAMUSCULAR; INTRAVENOUS; SUBCUTANEOUS at 17:38

## 2021-02-11 RX ADMIN — Medication 5 MILLIGRAM(S): at 06:33

## 2021-02-11 RX ADMIN — Medication 2 UNIT(S): at 19:03

## 2021-02-11 RX ADMIN — Medication 2 UNIT(S): at 13:39

## 2021-02-11 NOTE — PROGRESS NOTE ADULT - PROBLEM SELECTOR PLAN 4
- CT chest at [facility] incidentally found: Irregular 1.9 cm left upper lobe nodular opacity with associated spiculation.   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  - Patient did not want to know about these results and the daughter is already aware of these results  - will need inpatient v outpatient pulm follow up

## 2021-02-11 NOTE — PROGRESS NOTE ADULT - PROBLEM SELECTOR PLAN 1
- Admit to medicine  - Has mild symptoms at home but here saturating well on RA, not requiring oxygen   - Not a candidate for Remdesvir or decadron currently  - trend COVID labs

## 2021-02-11 NOTE — PROGRESS NOTE ADULT - PROBLEM SELECTOR PLAN 5
- Patient's arb was held given ?neptali v CKD at [facility]  - She was given IVF at [facility]  - Monitor Cr  - avoid nephrotoxic meds

## 2021-02-11 NOTE — PROGRESS NOTE ADULT - SUBJECTIVE AND OBJECTIVE BOX
Patient is a 85y old  Female who presents with a chief complaint of COVID/Falls (10 Feb 2021 15:14)      SUBJECTIVE / OVERNIGHT EVENTS: CARI overnight, patient LUE swelling stable   ADDITIONAL REVIEW OF SYSTEMS: negative as per above    MEDICATIONS  (STANDING):  amLODIPine   Tablet 5 milliGRAM(s) Oral daily  atorvastatin 20 milliGRAM(s) Oral at bedtime  dextrose 40% Gel 15 Gram(s) Oral once  dextrose 5%. 1000 milliLiter(s) (50 mL/Hr) IV Continuous <Continuous>  dextrose 5%. 1000 milliLiter(s) (100 mL/Hr) IV Continuous <Continuous>  dextrose 50% Injectable 25 Gram(s) IV Push once  dextrose 50% Injectable 12.5 Gram(s) IV Push once  dextrose 50% Injectable 25 Gram(s) IV Push once  donepezil 10 milliGRAM(s) Oral at bedtime  dorzolamide 2%/timolol 0.5% Ophthalmic Solution 1 Drop(s) Both EYES every 12 hours  enoxaparin Injectable 40 milliGRAM(s) SubCutaneous every 12 hours  escitalopram 5 milliGRAM(s) Oral daily  glucagon  Injectable 1 milliGRAM(s) IntraMuscular once  insulin glargine Injectable (LANTUS) 5 Unit(s) SubCutaneous at bedtime  insulin lispro (ADMELOG) corrective regimen sliding scale   SubCutaneous three times a day before meals  insulin lispro (ADMELOG) corrective regimen sliding scale   SubCutaneous at bedtime  insulin lispro Injectable (ADMELOG) 2 Unit(s) SubCutaneous three times a day before meals  levETIRAcetam 500 milliGRAM(s) Oral two times a day  oxybutynin 5 milliGRAM(s) Oral two times a day  potassium chloride  10 mEq/100 mL IVPB 10 milliEquivalent(s) IV Intermittent every 1 hour  potassium phosphate IVPB 15 milliMole(s) IV Intermittent once  sodium chloride 0.9%. 1000 milliLiter(s) (100 mL/Hr) IV Continuous <Continuous>    MEDICATIONS  (PRN):  acetaminophen   Tablet .. 650 milliGRAM(s) Oral every 4 hours PRN Temp greater or equal to 38.5C (101.3F)  morphine  - Injectable 2 milliGRAM(s) IV Push every 6 hours PRN Moderate Pain (4 - 6)      CAPILLARY BLOOD GLUCOSE      POCT Blood Glucose.: 210 mg/dL (11 Feb 2021 13:22)  POCT Blood Glucose.: 145 mg/dL (11 Feb 2021 09:08)  POCT Blood Glucose.: 149 mg/dL (10 Feb 2021 21:30)  POCT Blood Glucose.: 178 mg/dL (10 Feb 2021 17:38)    I&O's Summary    10 Feb 2021 07:01  -  11 Feb 2021 07:00  --------------------------------------------------------  IN: 500 mL / OUT: 1050 mL / NET: -550 mL        PHYSICAL EXAM:  Vital Signs Last 24 Hrs  T(C): 36.9 (11 Feb 2021 06:32), Max: 37.9 (10 Feb 2021 18:00)  T(F): 98.5 (11 Feb 2021 06:32), Max: 100.3 (10 Feb 2021 18:00)  HR: 80 (11 Feb 2021 06:32) (80 - 81)  BP: 148/66 (11 Feb 2021 06:32) (145/75 - 148/66)  BP(mean): --  RR: 20 (11 Feb 2021 06:32) (19 - 20)  SpO2: 95% (11 Feb 2021 06:32) (95% - 97%)      CONSTITUTIONAL: NAD,  obese  EYES: PERRLA; conjunctiva and sclera clear  ENMT: Moist oral mucosa, no pharyngeal injection or exudates;   RESPIRATORY: Normal respiratory effort; lungs are clear to auscultation bilaterally  CARDIOVASCULAR: Regular rate and rhythm, normal S1 and S2, no murmur/rub/gallop; No lower extremity edema; Peripheral pulses are 2+ bilaterally  ABDOMEN: Nontender to palpation, normoactive bowel sounds, no rebound/guarding   PSYCH: A+O to person, place, and time; affect appropriate      LABS:                        13.8   6.72  )-----------( 197      ( 11 Feb 2021 11:34 )             44.6     02-11    135  |  105  |  14  ----------------------------<  183<H>  2.9<LL>   |  19<L>  |  1.01    Ca    8.3<L>      11 Feb 2021 12:39  Phos  2.2     02-11  Mg     1.4     02-11    TPro  5.8<L>  /  Alb  2.8<L>  /  TBili  0.3  /  DBili  x   /  AST  43<H>  /  ALT  20  /  AlkPhos  50  02-11              Trend Procalcitonin  02-05-21 @ 19:18   -  0.10      C-Reactive Protein, Serum: 51.5 mg/L (02-11-21 @ 12:39)  C-Reactive Protein, Serum: 26.8 mg/L (02-09-21 @ 08:34)  C-Reactive Protein, Serum: 38.1 mg/L (02-08-21 @ 07:25)  C-Reactive Protein, Serum: 21.4 mg/L (02-07-21 @ 07:45)  C-Reactive Protein, Serum: 1.60 mg/dL (02-05-21 @ 19:18)    Ferritin, Serum: 380 ng/mL (02-11-21 @ 12:39)  Ferritin, Serum: 344 ng/mL (02-09-21 @ 08:34)  Ferritin, Serum: 283 ng/mL (02-08-21 @ 07:25)  Ferritin, Serum: 206 ng/mL (02-07-21 @ 07:45)  Ferritin, Serum: 176 ng/mL (02-06-21 @ 03:49)    D-Dimer:  366 ng/mL DDU (02-11-21 @ 12:39)  470 ng/mL DDU (02-07-21 @ 07:45)  580 ng/mL DDU (02-05-21 @ 19:18)      RADIOLOGY & ADDITIONAL TESTS:  Results Reviewed:   Imaging Personally Reviewed:  Electrocardiogram Personally Reviewed:    COORDINATION OF CARE:  Care Discussed with Consultants/Other Providers [Y/N]:  Prior or Outpatient Records Reviewed [Y/N]:

## 2021-02-11 NOTE — PROGRESS NOTE ADULT - PROBLEM SELECTOR PLAN 2
- Patient with multiple falls which are unwitnessed but patient states that she falls due to weakness, denies any syncope, palpitations, LOC, chest pain, SOB prior to her falls  - CTH/CT spine: no acute findings  - Pelvis xray; neg  - PT consult  - s/p PPM interrogation by EP. PM is MRI conditional

## 2021-02-12 DIAGNOSIS — I82.409 ACUTE EMBOLISM AND THROMBOSIS OF UNSPECIFIED DEEP VEINS OF UNSPECIFIED LOWER EXTREMITY: ICD-10-CM

## 2021-02-12 LAB
ALBUMIN SERPL ELPH-MCNC: 2.5 G/DL — LOW (ref 3.3–5)
ALP SERPL-CCNC: 54 U/L — SIGNIFICANT CHANGE UP (ref 40–120)
ALT FLD-CCNC: 39 U/L — HIGH (ref 4–33)
ANION GAP SERPL CALC-SCNC: 10 MMOL/L — SIGNIFICANT CHANGE UP (ref 7–14)
ANION GAP SERPL CALC-SCNC: 15 MMOL/L — HIGH (ref 7–14)
AST SERPL-CCNC: 86 U/L — HIGH (ref 4–32)
BILIRUB SERPL-MCNC: 0.3 MG/DL — SIGNIFICANT CHANGE UP (ref 0.2–1.2)
BUN SERPL-MCNC: 13 MG/DL — SIGNIFICANT CHANGE UP (ref 7–23)
BUN SERPL-MCNC: 14 MG/DL — SIGNIFICANT CHANGE UP (ref 7–23)
CALCIUM SERPL-MCNC: 8.5 MG/DL — SIGNIFICANT CHANGE UP (ref 8.4–10.5)
CALCIUM SERPL-MCNC: 8.6 MG/DL — SIGNIFICANT CHANGE UP (ref 8.4–10.5)
CHLORIDE SERPL-SCNC: 103 MMOL/L — SIGNIFICANT CHANGE UP (ref 98–107)
CHLORIDE SERPL-SCNC: 105 MMOL/L — SIGNIFICANT CHANGE UP (ref 98–107)
CO2 SERPL-SCNC: 14 MMOL/L — LOW (ref 22–31)
CO2 SERPL-SCNC: 17 MMOL/L — LOW (ref 22–31)
CREAT SERPL-MCNC: 0.88 MG/DL — SIGNIFICANT CHANGE UP (ref 0.5–1.3)
CREAT SERPL-MCNC: 0.98 MG/DL — SIGNIFICANT CHANGE UP (ref 0.5–1.3)
FERRITIN SERPL-MCNC: 517 NG/ML — HIGH (ref 15–150)
GLUCOSE BLDC GLUCOMTR-MCNC: 164 MG/DL — HIGH (ref 70–99)
GLUCOSE BLDC GLUCOMTR-MCNC: 196 MG/DL — HIGH (ref 70–99)
GLUCOSE BLDC GLUCOMTR-MCNC: 238 MG/DL — HIGH (ref 70–99)
GLUCOSE BLDC GLUCOMTR-MCNC: 262 MG/DL — HIGH (ref 70–99)
GLUCOSE SERPL-MCNC: 164 MG/DL — HIGH (ref 70–99)
GLUCOSE SERPL-MCNC: 169 MG/DL — HIGH (ref 70–99)
LDH SERPL L TO P-CCNC: 555 U/L — HIGH (ref 135–225)
MAGNESIUM SERPL-MCNC: 1.6 MG/DL — SIGNIFICANT CHANGE UP (ref 1.6–2.6)
MAGNESIUM SERPL-MCNC: 1.8 MG/DL — SIGNIFICANT CHANGE UP (ref 1.6–2.6)
PHOSPHATE SERPL-MCNC: 2.4 MG/DL — LOW (ref 2.5–4.5)
PHOSPHATE SERPL-MCNC: 2.8 MG/DL — SIGNIFICANT CHANGE UP (ref 2.5–4.5)
POTASSIUM SERPL-MCNC: 3.9 MMOL/L — SIGNIFICANT CHANGE UP (ref 3.5–5.3)
POTASSIUM SERPL-MCNC: 4.7 MMOL/L — SIGNIFICANT CHANGE UP (ref 3.5–5.3)
POTASSIUM SERPL-SCNC: 3.9 MMOL/L — SIGNIFICANT CHANGE UP (ref 3.5–5.3)
POTASSIUM SERPL-SCNC: 4.7 MMOL/L — SIGNIFICANT CHANGE UP (ref 3.5–5.3)
PROT SERPL-MCNC: 6.5 G/DL — SIGNIFICANT CHANGE UP (ref 6–8.3)
SODIUM SERPL-SCNC: 132 MMOL/L — LOW (ref 135–145)
SODIUM SERPL-SCNC: 132 MMOL/L — LOW (ref 135–145)

## 2021-02-12 PROCEDURE — 99233 SBSQ HOSP IP/OBS HIGH 50: CPT | Mod: CS

## 2021-02-12 RX ORDER — ENOXAPARIN SODIUM 100 MG/ML
100 INJECTION SUBCUTANEOUS
Refills: 0 | Status: DISCONTINUED | OUTPATIENT
Start: 2021-02-12 | End: 2021-02-18

## 2021-02-12 RX ORDER — MAGNESIUM SULFATE 500 MG/ML
1 VIAL (ML) INJECTION ONCE
Refills: 0 | Status: COMPLETED | OUTPATIENT
Start: 2021-02-12 | End: 2021-02-12

## 2021-02-12 RX ORDER — POTASSIUM CHLORIDE 20 MEQ
40 PACKET (EA) ORAL ONCE
Refills: 0 | Status: COMPLETED | OUTPATIENT
Start: 2021-02-12 | End: 2021-02-12

## 2021-02-12 RX ADMIN — DONEPEZIL HYDROCHLORIDE 10 MILLIGRAM(S): 10 TABLET, FILM COATED ORAL at 22:47

## 2021-02-12 RX ADMIN — DORZOLAMIDE HYDROCHLORIDE TIMOLOL MALEATE 1 DROP(S): 20; 5 SOLUTION/ DROPS OPHTHALMIC at 06:00

## 2021-02-12 RX ADMIN — Medication 1: at 13:59

## 2021-02-12 RX ADMIN — Medication 2 UNIT(S): at 13:59

## 2021-02-12 RX ADMIN — Medication 2: at 18:15

## 2021-02-12 RX ADMIN — Medication 5 MILLIGRAM(S): at 06:00

## 2021-02-12 RX ADMIN — Medication 1: at 09:22

## 2021-02-12 RX ADMIN — LEVETIRACETAM 500 MILLIGRAM(S): 250 TABLET, FILM COATED ORAL at 18:40

## 2021-02-12 RX ADMIN — Medication 2 UNIT(S): at 09:22

## 2021-02-12 RX ADMIN — DORZOLAMIDE HYDROCHLORIDE TIMOLOL MALEATE 1 DROP(S): 20; 5 SOLUTION/ DROPS OPHTHALMIC at 18:40

## 2021-02-12 RX ADMIN — INSULIN GLARGINE 5 UNIT(S): 100 INJECTION, SOLUTION SUBCUTANEOUS at 22:03

## 2021-02-12 RX ADMIN — Medication 2 UNIT(S): at 18:15

## 2021-02-12 RX ADMIN — ESCITALOPRAM OXALATE 5 MILLIGRAM(S): 10 TABLET, FILM COATED ORAL at 14:21

## 2021-02-12 RX ADMIN — SODIUM CHLORIDE 100 MILLILITER(S): 9 INJECTION INTRAMUSCULAR; INTRAVENOUS; SUBCUTANEOUS at 08:12

## 2021-02-12 RX ADMIN — Medication 650 MILLIGRAM(S): at 22:47

## 2021-02-12 RX ADMIN — ENOXAPARIN SODIUM 100 MILLIGRAM(S): 100 INJECTION SUBCUTANEOUS at 18:40

## 2021-02-12 RX ADMIN — Medication 5 MILLIGRAM(S): at 18:40

## 2021-02-12 RX ADMIN — Medication 40 MILLIEQUIVALENT(S): at 01:29

## 2021-02-12 RX ADMIN — AMLODIPINE BESYLATE 5 MILLIGRAM(S): 2.5 TABLET ORAL at 06:00

## 2021-02-12 RX ADMIN — ENOXAPARIN SODIUM 40 MILLIGRAM(S): 100 INJECTION SUBCUTANEOUS at 06:00

## 2021-02-12 RX ADMIN — LEVETIRACETAM 500 MILLIGRAM(S): 250 TABLET, FILM COATED ORAL at 06:00

## 2021-02-12 RX ADMIN — ATORVASTATIN CALCIUM 20 MILLIGRAM(S): 80 TABLET, FILM COATED ORAL at 22:47

## 2021-02-12 RX ADMIN — SODIUM CHLORIDE 100 MILLILITER(S): 9 INJECTION INTRAMUSCULAR; INTRAVENOUS; SUBCUTANEOUS at 06:01

## 2021-02-12 RX ADMIN — Medication 100 GRAM(S): at 01:30

## 2021-02-12 RX ADMIN — Medication 1: at 22:03

## 2021-02-12 NOTE — PROGRESS NOTE ADULT - PROBLEM SELECTOR PLAN 1
- Patient complaining of numbness, and pain, has elevated CK at [facility], concern for developing compartment syndrome in setting of rhabdomyolysis -> patient s/p IVF, pulses intact currently   - Shoulder, elbow, humerus, forearm, wrist xrays --> neg for fracture  - Neurovascular checks q4h for now  - neuro consult for further eval given concern for plexus injury though low suspicion at this time, likely has symptoms from rhabdomyolysis/?compartment syndrome  - RUE dopplers negative for DVT  - pacemaker is MRI conditional, consent obtained and MRI RUE expedited for today

## 2021-02-12 NOTE — PROGRESS NOTE ADULT - PROBLEM SELECTOR PLAN 4
- CT chest at [facility] incidentally found: Irregular 1.9 cm left upper lobe nodular opacity with associated spiculation.   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  - Patient did not want to know about these results and the daughter is already aware of these results  - will need  outpatient pulm follow up

## 2021-02-12 NOTE — PROGRESS NOTE ADULT - SUBJECTIVE AND OBJECTIVE BOX
Patient is a 85y old  Female who presents with a chief complaint of COVID/Falls (11 Feb 2021 16:20)      SUBJECTIVE / OVERNIGHT EVENTS: CARI overnight, RUE swelling remains stable. MRI pending at this time. Pain controlled, no other complaints  ADDITIONAL REVIEW OF SYSTEMS: negative as per above    MEDICATIONS  (STANDING):  amLODIPine   Tablet 5 milliGRAM(s) Oral daily  atorvastatin 20 milliGRAM(s) Oral at bedtime  dextrose 40% Gel 15 Gram(s) Oral once  dextrose 5%. 1000 milliLiter(s) (50 mL/Hr) IV Continuous <Continuous>  dextrose 5%. 1000 milliLiter(s) (100 mL/Hr) IV Continuous <Continuous>  dextrose 50% Injectable 12.5 Gram(s) IV Push once  dextrose 50% Injectable 25 Gram(s) IV Push once  dextrose 50% Injectable 25 Gram(s) IV Push once  donepezil 10 milliGRAM(s) Oral at bedtime  dorzolamide 2%/timolol 0.5% Ophthalmic Solution 1 Drop(s) Both EYES every 12 hours  enoxaparin Injectable 40 milliGRAM(s) SubCutaneous every 12 hours  escitalopram 5 milliGRAM(s) Oral daily  glucagon  Injectable 1 milliGRAM(s) IntraMuscular once  insulin glargine Injectable (LANTUS) 5 Unit(s) SubCutaneous at bedtime  insulin lispro (ADMELOG) corrective regimen sliding scale   SubCutaneous three times a day before meals  insulin lispro (ADMELOG) corrective regimen sliding scale   SubCutaneous at bedtime  insulin lispro Injectable (ADMELOG) 2 Unit(s) SubCutaneous three times a day before meals  levETIRAcetam 500 milliGRAM(s) Oral two times a day  oxybutynin 5 milliGRAM(s) Oral two times a day  sodium chloride 0.9%. 1000 milliLiter(s) (100 mL/Hr) IV Continuous <Continuous>    MEDICATIONS  (PRN):  acetaminophen   Tablet .. 650 milliGRAM(s) Oral every 4 hours PRN Temp greater or equal to 38.5C (101.3F)  morphine  - Injectable 2 milliGRAM(s) IV Push every 6 hours PRN Moderate Pain (4 - 6)      CAPILLARY BLOOD GLUCOSE      POCT Blood Glucose.: 196 mg/dL (12 Feb 2021 13:15)  POCT Blood Glucose.: 164 mg/dL (12 Feb 2021 09:10)  POCT Blood Glucose.: 160 mg/dL (11 Feb 2021 22:08)  POCT Blood Glucose.: 174 mg/dL (11 Feb 2021 18:10)    I&O's Summary      PHYSICAL EXAM:  Vital Signs Last 24 Hrs  T(C): 36.6 (12 Feb 2021 10:00), Max: 37.7 (12 Feb 2021 05:54)  T(F): 97.9 (12 Feb 2021 10:00), Max: 99.9 (12 Feb 2021 05:54)  HR: 88 (12 Feb 2021 10:00) (81 - 88)  BP: 156/78 (12 Feb 2021 10:00) (143/73 - 156/78)  BP(mean): --  RR: 20 (12 Feb 2021 10:00) (20 - 20)  SpO2: 92% (12 Feb 2021 10:00) (92% - 94%)  CONSTITUTIONAL: NAD, resting comfortably in bed  ENMT: Moist oral mucosa, no pharyngeal injection or exudates;    RESPIRATORY: Normal respiratory effort; lungs are clear to auscultation bilaterally  CARDIOVASCULAR: Regular rate and rhythm, normal S1 and S2, no murmur/rub/gallop; No lower extremity edema; Peripheral pulses are 2+ bilaterally  ABDOMEN: Nontender to palpation, normoactive bowel sounds, no rebound/guarding;    PSYCH: A+O to person, place, and time; affect appropriate  Ext: LUE with 2+ edema up to forearm, pulses palpable    LABS:                        13.8   6.72  )-----------( 197      ( 11 Feb 2021 11:34 )             44.6     02-12    132<L>  |  103  |  13  ----------------------------<  164<H>  4.7   |  14<L>  |  0.88    Ca    8.6      12 Feb 2021 06:54  Phos  2.4     02-12  Mg     1.8     02-12    TPro  6.5  /  Alb  2.5<L>  /  TBili  0.3  /  DBili  x   /  AST  86<H>  /  ALT  39<H>  /  AlkPhos  54  02-12              Trend Procalcitonin  02-05-21 @ 19:18   -  0.10      C-Reactive Protein, Serum: 51.5 mg/L (02-11-21 @ 12:39)  C-Reactive Protein, Serum: 26.8 mg/L (02-09-21 @ 08:34)  C-Reactive Protein, Serum: 38.1 mg/L (02-08-21 @ 07:25)  C-Reactive Protein, Serum: 21.4 mg/L (02-07-21 @ 07:45)  C-Reactive Protein, Serum: 1.60 mg/dL (02-05-21 @ 19:18)    Ferritin, Serum: 517 ng/mL (02-12-21 @ 06:54)  Ferritin, Serum: 380 ng/mL (02-11-21 @ 12:39)  Ferritin, Serum: 344 ng/mL (02-09-21 @ 08:34)  Ferritin, Serum: 283 ng/mL (02-08-21 @ 07:25)  Ferritin, Serum: 206 ng/mL (02-07-21 @ 07:45)  Ferritin, Serum: 176 ng/mL (02-06-21 @ 03:49)    D-Dimer:  366 ng/mL DDU (02-11-21 @ 12:39)  470 ng/mL DDU (02-07-21 @ 07:45)  580 ng/mL DDU (02-05-21 @ 19:18)      RADIOLOGY & ADDITIONAL TESTS:  Results Reviewed:   Imaging Personally Reviewed:  Electrocardiogram Personally Reviewed:    COORDINATION OF CARE:  Care Discussed with Consultants/Other Providers [Y/N]:  Prior or Outpatient Records Reviewed [Y/N]:

## 2021-02-12 NOTE — PROGRESS NOTE ADULT - PROBLEM SELECTOR PLAN 5
- Patient's arb was held given ?neptali v CKD at [facility]  - She was given IVF at [facility]  - Monitor Cr  - avoid nephrotoxic meds

## 2021-02-13 LAB
ALT FLD-CCNC: 43 U/L — HIGH (ref 4–33)
ANION GAP SERPL CALC-SCNC: 8 MMOL/L — SIGNIFICANT CHANGE UP (ref 7–14)
AST SERPL-CCNC: 61 U/L — HIGH (ref 4–32)
BUN SERPL-MCNC: 14 MG/DL — SIGNIFICANT CHANGE UP (ref 7–23)
CALCIUM SERPL-MCNC: 8.7 MG/DL — SIGNIFICANT CHANGE UP (ref 8.4–10.5)
CHLORIDE SERPL-SCNC: 108 MMOL/L — HIGH (ref 98–107)
CO2 SERPL-SCNC: 18 MMOL/L — LOW (ref 22–31)
CREAT SERPL-MCNC: 1.05 MG/DL — SIGNIFICANT CHANGE UP (ref 0.5–1.3)
CULTURE RESULTS: SIGNIFICANT CHANGE UP
CULTURE RESULTS: SIGNIFICANT CHANGE UP
FERRITIN SERPL-MCNC: 529 NG/ML — HIGH (ref 15–150)
FOLATE SERPL-MCNC: 10.4 NG/ML — SIGNIFICANT CHANGE UP (ref 3.1–17.5)
GLUCOSE BLDC GLUCOMTR-MCNC: 168 MG/DL — HIGH (ref 70–99)
GLUCOSE BLDC GLUCOMTR-MCNC: 174 MG/DL — HIGH (ref 70–99)
GLUCOSE BLDC GLUCOMTR-MCNC: 182 MG/DL — HIGH (ref 70–99)
GLUCOSE BLDC GLUCOMTR-MCNC: 213 MG/DL — HIGH (ref 70–99)
GLUCOSE SERPL-MCNC: 198 MG/DL — HIGH (ref 70–99)
HCT VFR BLD CALC: 40.4 % — SIGNIFICANT CHANGE UP (ref 34.5–45)
HGB BLD-MCNC: 12.5 G/DL — SIGNIFICANT CHANGE UP (ref 11.5–15.5)
LDH SERPL L TO P-CCNC: 434 U/L — HIGH (ref 135–225)
MAGNESIUM SERPL-MCNC: 1.7 MG/DL — SIGNIFICANT CHANGE UP (ref 1.6–2.6)
MCHC RBC-ENTMCNC: 26.8 PG — LOW (ref 27–34)
MCHC RBC-ENTMCNC: 30.9 GM/DL — LOW (ref 32–36)
MCV RBC AUTO: 86.7 FL — SIGNIFICANT CHANGE UP (ref 80–100)
NRBC # BLD: 0 /100 WBCS — SIGNIFICANT CHANGE UP
NRBC # FLD: 0 K/UL — SIGNIFICANT CHANGE UP
PHOSPHATE SERPL-MCNC: 1.9 MG/DL — LOW (ref 2.5–4.5)
PLATELET # BLD AUTO: 230 K/UL — SIGNIFICANT CHANGE UP (ref 150–400)
POTASSIUM SERPL-MCNC: 3.2 MMOL/L — LOW (ref 3.5–5.3)
POTASSIUM SERPL-SCNC: 3.2 MMOL/L — LOW (ref 3.5–5.3)
PROT ?TM UR-MCNC: 87 MG/DL — SIGNIFICANT CHANGE UP
PROT SERPL-MCNC: 6.1 G/DL — SIGNIFICANT CHANGE UP (ref 6–8.3)
RBC # BLD: 4.66 M/UL — SIGNIFICANT CHANGE UP (ref 3.8–5.2)
RBC # FLD: 12.3 % — SIGNIFICANT CHANGE UP (ref 10.3–14.5)
SODIUM SERPL-SCNC: 134 MMOL/L — LOW (ref 135–145)
SPECIMEN SOURCE: SIGNIFICANT CHANGE UP
SPECIMEN SOURCE: SIGNIFICANT CHANGE UP
TSH SERPL-MCNC: 0.63 UIU/ML — SIGNIFICANT CHANGE UP (ref 0.27–4.2)
VIT B12 SERPL-MCNC: 1010 PG/ML — HIGH (ref 200–900)
WBC # BLD: 6.68 K/UL — SIGNIFICANT CHANGE UP (ref 3.8–10.5)
WBC # FLD AUTO: 6.68 K/UL — SIGNIFICANT CHANGE UP (ref 3.8–10.5)

## 2021-02-13 PROCEDURE — 99233 SBSQ HOSP IP/OBS HIGH 50: CPT | Mod: CS

## 2021-02-13 PROCEDURE — 84166 PROTEIN E-PHORESIS/URINE/CSF: CPT | Mod: 26

## 2021-02-13 PROCEDURE — 84165 PROTEIN E-PHORESIS SERUM: CPT | Mod: 26

## 2021-02-13 RX ORDER — DEXAMETHASONE 0.5 MG/5ML
6 ELIXIR ORAL DAILY
Refills: 0 | Status: DISCONTINUED | OUTPATIENT
Start: 2021-02-13 | End: 2021-02-13

## 2021-02-13 RX ORDER — DEXAMETHASONE 0.5 MG/5ML
6 ELIXIR ORAL DAILY
Refills: 0 | Status: DISCONTINUED | OUTPATIENT
Start: 2021-02-13 | End: 2021-02-22

## 2021-02-13 RX ORDER — POTASSIUM PHOSPHATE, MONOBASIC POTASSIUM PHOSPHATE, DIBASIC 236; 224 MG/ML; MG/ML
15 INJECTION, SOLUTION INTRAVENOUS ONCE
Refills: 0 | Status: COMPLETED | OUTPATIENT
Start: 2021-02-13 | End: 2021-02-13

## 2021-02-13 RX ORDER — POTASSIUM CHLORIDE 20 MEQ
20 PACKET (EA) ORAL
Refills: 0 | Status: COMPLETED | OUTPATIENT
Start: 2021-02-13 | End: 2021-02-13

## 2021-02-13 RX ADMIN — ENOXAPARIN SODIUM 100 MILLIGRAM(S): 100 INJECTION SUBCUTANEOUS at 04:28

## 2021-02-13 RX ADMIN — DONEPEZIL HYDROCHLORIDE 10 MILLIGRAM(S): 10 TABLET, FILM COATED ORAL at 22:10

## 2021-02-13 RX ADMIN — Medication 1: at 18:03

## 2021-02-13 RX ADMIN — Medication 5 MILLIGRAM(S): at 18:04

## 2021-02-13 RX ADMIN — Medication 5 MILLIGRAM(S): at 04:17

## 2021-02-13 RX ADMIN — AMLODIPINE BESYLATE 5 MILLIGRAM(S): 2.5 TABLET ORAL at 04:17

## 2021-02-13 RX ADMIN — LEVETIRACETAM 500 MILLIGRAM(S): 250 TABLET, FILM COATED ORAL at 04:17

## 2021-02-13 RX ADMIN — Medication 2 UNIT(S): at 13:27

## 2021-02-13 RX ADMIN — POTASSIUM PHOSPHATE, MONOBASIC POTASSIUM PHOSPHATE, DIBASIC 62.5 MILLIMOLE(S): 236; 224 INJECTION, SOLUTION INTRAVENOUS at 09:41

## 2021-02-13 RX ADMIN — ATORVASTATIN CALCIUM 20 MILLIGRAM(S): 80 TABLET, FILM COATED ORAL at 22:10

## 2021-02-13 RX ADMIN — Medication 6 MILLIGRAM(S): at 13:02

## 2021-02-13 RX ADMIN — Medication 20 MILLIEQUIVALENT(S): at 09:41

## 2021-02-13 RX ADMIN — Medication 20 MILLIEQUIVALENT(S): at 13:02

## 2021-02-13 RX ADMIN — DORZOLAMIDE HYDROCHLORIDE TIMOLOL MALEATE 1 DROP(S): 20; 5 SOLUTION/ DROPS OPHTHALMIC at 18:03

## 2021-02-13 RX ADMIN — LEVETIRACETAM 500 MILLIGRAM(S): 250 TABLET, FILM COATED ORAL at 18:03

## 2021-02-13 RX ADMIN — Medication 2 UNIT(S): at 18:03

## 2021-02-13 RX ADMIN — ENOXAPARIN SODIUM 100 MILLIGRAM(S): 100 INJECTION SUBCUTANEOUS at 18:03

## 2021-02-13 RX ADMIN — Medication 1: at 09:38

## 2021-02-13 RX ADMIN — DORZOLAMIDE HYDROCHLORIDE TIMOLOL MALEATE 1 DROP(S): 20; 5 SOLUTION/ DROPS OPHTHALMIC at 04:28

## 2021-02-13 RX ADMIN — Medication 2: at 13:27

## 2021-02-13 RX ADMIN — ESCITALOPRAM OXALATE 5 MILLIGRAM(S): 10 TABLET, FILM COATED ORAL at 13:02

## 2021-02-13 RX ADMIN — Medication 2 UNIT(S): at 09:40

## 2021-02-13 RX ADMIN — INSULIN GLARGINE 5 UNIT(S): 100 INJECTION, SOLUTION SUBCUTANEOUS at 22:10

## 2021-02-13 NOTE — PROGRESS NOTE ADULT - SUBJECTIVE AND OBJECTIVE BOX
Patient is a 85y old  Female who presents with a chief complaint of COVID/Falls (12 Feb 2021 14:50)      SUBJECTIVE / OVERNIGHT EVENTS: CARI overnight, R upper extremity swelling slightly improved, still with weakness. Patient was supposed to get MRI yesterday but was found to have an IVC filter, per MRI tech since some IVC filters are not compatible with the MRI, exam was cancelled. Patient w dementia does not know about IVC filter. Called daughter to obtain serial number for IVC filter but she does not have records.   ADDITIONAL REVIEW OF SYSTEMS: negative as per above    MEDICATIONS  (STANDING):  amLODIPine   Tablet 5 milliGRAM(s) Oral daily  atorvastatin 20 milliGRAM(s) Oral at bedtime  dextrose 40% Gel 15 Gram(s) Oral once  dextrose 5%. 1000 milliLiter(s) (50 mL/Hr) IV Continuous <Continuous>  dextrose 5%. 1000 milliLiter(s) (100 mL/Hr) IV Continuous <Continuous>  dextrose 50% Injectable 25 Gram(s) IV Push once  dextrose 50% Injectable 12.5 Gram(s) IV Push once  dextrose 50% Injectable 25 Gram(s) IV Push once  donepezil 10 milliGRAM(s) Oral at bedtime  dorzolamide 2%/timolol 0.5% Ophthalmic Solution 1 Drop(s) Both EYES every 12 hours  enoxaparin Injectable 100 milliGRAM(s) SubCutaneous two times a day  escitalopram 5 milliGRAM(s) Oral daily  glucagon  Injectable 1 milliGRAM(s) IntraMuscular once  insulin glargine Injectable (LANTUS) 5 Unit(s) SubCutaneous at bedtime  insulin lispro (ADMELOG) corrective regimen sliding scale   SubCutaneous three times a day before meals  insulin lispro (ADMELOG) corrective regimen sliding scale   SubCutaneous at bedtime  insulin lispro Injectable (ADMELOG) 2 Unit(s) SubCutaneous three times a day before meals  levETIRAcetam 500 milliGRAM(s) Oral two times a day  oxybutynin 5 milliGRAM(s) Oral two times a day  potassium chloride    Tablet ER 20 milliEquivalent(s) Oral every 2 hours  sodium chloride 0.9%. 1000 milliLiter(s) (100 mL/Hr) IV Continuous <Continuous>    MEDICATIONS  (PRN):  acetaminophen   Tablet .. 650 milliGRAM(s) Oral every 4 hours PRN Temp greater or equal to 38.5C (101.3F)  morphine  - Injectable 2 milliGRAM(s) IV Push every 6 hours PRN Moderate Pain (4 - 6)      CAPILLARY BLOOD GLUCOSE      POCT Blood Glucose.: 168 mg/dL (13 Feb 2021 09:14)  POCT Blood Glucose.: 262 mg/dL (12 Feb 2021 21:15)  POCT Blood Glucose.: 238 mg/dL (12 Feb 2021 17:36)  POCT Blood Glucose.: 196 mg/dL (12 Feb 2021 13:15)    I&O's Summary      PHYSICAL EXAM:  Vital Signs Last 24 Hrs  T(C): 36.4 (13 Feb 2021 04:15), Max: 38.8 (12 Feb 2021 22:32)  T(F): 97.6 (13 Feb 2021 04:15), Max: 101.8 (12 Feb 2021 22:32)  HR: 81 (13 Feb 2021 04:15) (81 - 93)  BP: 140/54 (13 Feb 2021 04:15) (140/54 - 150/70)  BP(mean): --  RR: 20 (13 Feb 2021 04:15) (20 - 20)  SpO2: 95% (13 Feb 2021 04:15) (88% - 95%)    CONSTITUTIONAL: NAD,    EYES: PERRLA; conjunctiva and sclera clear  ENMT: Moist oral mucosa, no pharyngeal injection or exudates   RESPIRATORY: Normal respiratory effort; lungs are clear to auscultation bilaterally  CARDIOVASCULAR: Regular rate and rhythm, normal S1 and S2, no murmur/rub/gallop; No lower extremity edema; Peripheral pulses are 2+ bilaterally  ABDOMEN: Nontender to palpation, normoactive bowel sounds, no rebound/guarding;   PSYCH: A+O to person, place, and time; affect appropriate       LABS:                        12.5   6.68  )-----------( 230      ( 13 Feb 2021 05:34 )             40.4     02-13    134<L>  |  108<H>  |  14  ----------------------------<  198<H>  3.2<L>   |  18<L>  |  1.05    Ca    8.7      13 Feb 2021 05:34  Phos  1.9     02-13  Mg     1.7     02-13    TPro  6.5  /  Alb  2.5<L>  /  TBili  0.3  /  DBili  x   /  AST  86<H>  /  ALT  39<H>  /  AlkPhos  54  02-12              Trend Procalcitonin  02-05-21 @ 19:18   -  0.10      C-Reactive Protein, Serum: 51.5 mg/L (02-11-21 @ 12:39)  C-Reactive Protein, Serum: 26.8 mg/L (02-09-21 @ 08:34)  C-Reactive Protein, Serum: 38.1 mg/L (02-08-21 @ 07:25)  C-Reactive Protein, Serum: 21.4 mg/L (02-07-21 @ 07:45)  C-Reactive Protein, Serum: 1.60 mg/dL (02-05-21 @ 19:18)    Ferritin, Serum: 529 ng/mL (02-13-21 @ 05:34)  Ferritin, Serum: 517 ng/mL (02-12-21 @ 06:54)  Ferritin, Serum: 380 ng/mL (02-11-21 @ 12:39)  Ferritin, Serum: 344 ng/mL (02-09-21 @ 08:34)  Ferritin, Serum: 283 ng/mL (02-08-21 @ 07:25)  Ferritin, Serum: 206 ng/mL (02-07-21 @ 07:45)  Ferritin, Serum: 176 ng/mL (02-06-21 @ 03:49)    D-Dimer:  366 ng/mL DDU (02-11-21 @ 12:39)  470 ng/mL DDU (02-07-21 @ 07:45)  580 ng/mL DDU (02-05-21 @ 19:18)      RADIOLOGY & ADDITIONAL TESTS:  Results Reviewed:   Imaging Personally Reviewed:  Electrocardiogram Personally Reviewed:    COORDINATION OF CARE:  Care Discussed with Consultants/Other Providers [Y/N]:  Prior or Outpatient Records Reviewed [Y/N]:

## 2021-02-13 NOTE — OCCUPATIONAL THERAPY INITIAL EVALUATION ADULT - PLANNED THERAPY INTERVENTIONS, OT EVAL
ADL retraining/balance training/bed mobility training/cognitive, visual perceptual/neuromuscular re-education/ROM/strengthening/transfer training

## 2021-02-13 NOTE — PROGRESS NOTE ADULT - PROBLEM SELECTOR PLAN 5
- Patient's arb was held given ?neptali v CKD at [facility]  - She was given IVF at [facility]  - Monitor Cr  - avoid nephrotoxic meds

## 2021-02-13 NOTE — PROGRESS NOTE ADULT - PROBLEM SELECTOR PLAN 1
Patient complaining of numbness, and pain, has elevated CK at [facility], concern for developing compartment syndrome in setting of rhabdomyolysis -> patient s/p IVF, pulses intact currently   - Shoulder, elbow, humerus, forearm, wrist xrays --> neg for fracture  - Neurovascular checks q4h for now  - neuro consult for further eval given concern for plexus injury though low suspicion at this time, likely has symptoms from rhabdomyolysis/?compartment syndrome  - RUE dopplers negative for DVT  - pacemaker interrogated and MRI is conditional, consent obtained for MRI  - Patient was supposed to get MRI yesterday but was found to have an IVC filter, per MRI tech since some IVC filters are not compatible with the MRI, exam was cancelled. Patient w dementia does not know about IVC filter. Called daughter to obtain serial number for IVC filter but she does not have records. CK downtrending.  - RUE less swollen compared to 2 days ago on 2/13/2021  - will discuss with neuro regarding alternate imaging if MRI cannot be obtained

## 2021-02-13 NOTE — PROGRESS NOTE ADULT - PROBLEM SELECTOR PLAN 4
- CT chest at [facility] incidentally found: Irregular 1.9 cm left upper lobe nodular opacity with associated spiculation.   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  - Patient did not want to know about these results and the daughter is already aware of these results  - will need  outpatient pulm follow up

## 2021-02-13 NOTE — OCCUPATIONAL THERAPY INITIAL EVALUATION ADULT - PERTINENT HX OF CURRENT PROBLEM, REHAB EVAL
85 year old female with history of dementia, Diabetes type II (not on insulin), PPM, HTN, seizure presents s/p repeated falls with right UE swelling. Also found to be COVID-19+. Right UE x-ray negative for fracture, doppler negative for DVT, CT head/cervical spine negative for acute pathology. Awaiting further work up.

## 2021-02-13 NOTE — OCCUPATIONAL THERAPY INITIAL EVALUATION ADULT - GENERAL OBSERVATIONS, REHAB EVAL
Patient received semisupine in bed in NAD and agreeable to participate in OT evaluation. +Prima fit. +pulse ox. +2L oxygen via nasal cannula.

## 2021-02-13 NOTE — OCCUPATIONAL THERAPY INITIAL EVALUATION ADULT - ADDITIONAL COMMENTS
Patient is a questionable historian; social history confirmed via care coordination assessment. Patient reports living in a private home with her son with "a couple" of stairs. Patient reports having home health aide services 5 days/week for 8 hours/day (10 hours per care coordination) to assist with ADLs including dressing, bathing, and toileting. Patient reports using a rolling walker for ambulation for short distances.

## 2021-02-13 NOTE — PROGRESS NOTE ADULT - PROBLEM SELECTOR PLAN 10
in right popliteal vein seen, age undeterminate. Also has IVC filter in place per MRI tech. Unclear how long patient has had IVC filter.   - will discuss with daughter on obtaining hospital records  - will start pt on lovenox 1 mg/kg

## 2021-02-13 NOTE — OCCUPATIONAL THERAPY INITIAL EVALUATION ADULT - DIAGNOSIS, OT EVAL
s/p falls, s/p right UE swelling; decreased functional mobility, decreased ADL performance, decreased functional use of right UE

## 2021-02-13 NOTE — OCCUPATIONAL THERAPY INITIAL EVALUATION ADULT - RANGE OF MOTION EXAMINATION, UPPER EXTREMITY
Right UE active ROM of shoulder grossly 0-30 degrees flexion, elbow/wrist grossly WFL, hand with limited extension of digits 3-5, 3/4 full fist closure. Right UE active assisted/passive ROM WFL/Left UE Active ROM was WFL (within functional limits)/Right UE Passive ROM was WFL  (within functional limits)/Right UE Active Assistive ROM was WFL  (within functional limits)

## 2021-02-13 NOTE — OCCUPATIONAL THERAPY INITIAL EVALUATION ADULT - LEVEL OF INDEPENDENCE: SUPINE/SIT, REHAB EVAL
Attempted to perform with moderate assistance x 1, however, patient reporting nausea and wishing to return to supine./moderate assist (50% patients effort)

## 2021-02-14 LAB
ALBUMIN SERPL ELPH-MCNC: 2.7 G/DL — LOW (ref 3.3–5)
ALP SERPL-CCNC: 44 U/L — SIGNIFICANT CHANGE UP (ref 40–120)
ALT FLD-CCNC: 34 U/L — HIGH (ref 4–33)
ANION GAP SERPL CALC-SCNC: 10 MMOL/L — SIGNIFICANT CHANGE UP (ref 7–14)
AST SERPL-CCNC: 33 U/L — HIGH (ref 4–32)
BASOPHILS # BLD AUTO: 0.01 K/UL — SIGNIFICANT CHANGE UP (ref 0–0.2)
BASOPHILS NFR BLD AUTO: 0.2 % — SIGNIFICANT CHANGE UP (ref 0–2)
BILIRUB SERPL-MCNC: 0.3 MG/DL — SIGNIFICANT CHANGE UP (ref 0.2–1.2)
BUN SERPL-MCNC: 15 MG/DL — SIGNIFICANT CHANGE UP (ref 7–23)
CALCIUM SERPL-MCNC: 8.7 MG/DL — SIGNIFICANT CHANGE UP (ref 8.4–10.5)
CHLORIDE SERPL-SCNC: 112 MMOL/L — HIGH (ref 98–107)
CO2 SERPL-SCNC: 17 MMOL/L — LOW (ref 22–31)
CREAT SERPL-MCNC: 0.97 MG/DL — SIGNIFICANT CHANGE UP (ref 0.5–1.3)
EOSINOPHIL # BLD AUTO: 0 K/UL — SIGNIFICANT CHANGE UP (ref 0–0.5)
EOSINOPHIL NFR BLD AUTO: 0 % — SIGNIFICANT CHANGE UP (ref 0–6)
FERRITIN SERPL-MCNC: 489 NG/ML — HIGH (ref 15–150)
GLUCOSE BLDC GLUCOMTR-MCNC: 201 MG/DL — HIGH (ref 70–99)
GLUCOSE BLDC GLUCOMTR-MCNC: 247 MG/DL — HIGH (ref 70–99)
GLUCOSE BLDC GLUCOMTR-MCNC: 283 MG/DL — HIGH (ref 70–99)
GLUCOSE BLDC GLUCOMTR-MCNC: 287 MG/DL — HIGH (ref 70–99)
GLUCOSE SERPL-MCNC: 191 MG/DL — HIGH (ref 70–99)
HCT VFR BLD CALC: 34.1 % — LOW (ref 34.5–45)
HGB BLD-MCNC: 10.9 G/DL — LOW (ref 11.5–15.5)
IANC: 4.69 K/UL — SIGNIFICANT CHANGE UP (ref 1.5–8.5)
IMM GRANULOCYTES NFR BLD AUTO: 0.6 % — SIGNIFICANT CHANGE UP (ref 0–1.5)
LDH SERPL L TO P-CCNC: 455 U/L — HIGH (ref 135–225)
LYMPHOCYTES # BLD AUTO: 1.01 K/UL — SIGNIFICANT CHANGE UP (ref 1–3.3)
LYMPHOCYTES # BLD AUTO: 16.4 % — SIGNIFICANT CHANGE UP (ref 13–44)
MAGNESIUM SERPL-MCNC: 1.6 MG/DL — SIGNIFICANT CHANGE UP (ref 1.6–2.6)
MCHC RBC-ENTMCNC: 27 PG — SIGNIFICANT CHANGE UP (ref 27–34)
MCHC RBC-ENTMCNC: 32 GM/DL — SIGNIFICANT CHANGE UP (ref 32–36)
MCV RBC AUTO: 84.6 FL — SIGNIFICANT CHANGE UP (ref 80–100)
MONOCYTES # BLD AUTO: 0.42 K/UL — SIGNIFICANT CHANGE UP (ref 0–0.9)
MONOCYTES NFR BLD AUTO: 6.8 % — SIGNIFICANT CHANGE UP (ref 2–14)
NEUTROPHILS # BLD AUTO: 4.69 K/UL — SIGNIFICANT CHANGE UP (ref 1.8–7.4)
NEUTROPHILS NFR BLD AUTO: 76 % — SIGNIFICANT CHANGE UP (ref 43–77)
NRBC # BLD: 0 /100 WBCS — SIGNIFICANT CHANGE UP
NRBC # FLD: 0 K/UL — SIGNIFICANT CHANGE UP
PHOSPHATE SERPL-MCNC: 2.5 MG/DL — SIGNIFICANT CHANGE UP (ref 2.5–4.5)
PLATELET # BLD AUTO: 258 K/UL — SIGNIFICANT CHANGE UP (ref 150–400)
POTASSIUM SERPL-MCNC: 3.8 MMOL/L — SIGNIFICANT CHANGE UP (ref 3.5–5.3)
POTASSIUM SERPL-SCNC: 3.8 MMOL/L — SIGNIFICANT CHANGE UP (ref 3.5–5.3)
PROT SERPL-MCNC: 5.8 G/DL — LOW (ref 6–8.3)
RBC # BLD: 4.03 M/UL — SIGNIFICANT CHANGE UP (ref 3.8–5.2)
RBC # FLD: 12.3 % — SIGNIFICANT CHANGE UP (ref 10.3–14.5)
SODIUM SERPL-SCNC: 139 MMOL/L — SIGNIFICANT CHANGE UP (ref 135–145)
T PALLIDUM AB TITR SER: NEGATIVE — SIGNIFICANT CHANGE UP
WBC # BLD: 6.17 K/UL — SIGNIFICANT CHANGE UP (ref 3.8–10.5)
WBC # FLD AUTO: 6.17 K/UL — SIGNIFICANT CHANGE UP (ref 3.8–10.5)

## 2021-02-14 PROCEDURE — 99233 SBSQ HOSP IP/OBS HIGH 50: CPT | Mod: CS

## 2021-02-14 RX ORDER — REMDESIVIR 5 MG/ML
INJECTION INTRAVENOUS
Refills: 0 | Status: COMPLETED | OUTPATIENT
Start: 2021-02-14 | End: 2021-02-19

## 2021-02-14 RX ADMIN — AMLODIPINE BESYLATE 5 MILLIGRAM(S): 2.5 TABLET ORAL at 06:42

## 2021-02-14 RX ADMIN — Medication 2 UNIT(S): at 13:45

## 2021-02-14 RX ADMIN — SODIUM CHLORIDE 100 MILLILITER(S): 9 INJECTION INTRAMUSCULAR; INTRAVENOUS; SUBCUTANEOUS at 09:16

## 2021-02-14 RX ADMIN — DONEPEZIL HYDROCHLORIDE 10 MILLIGRAM(S): 10 TABLET, FILM COATED ORAL at 22:05

## 2021-02-14 RX ADMIN — Medication 200 MILLIGRAM(S): at 00:41

## 2021-02-14 RX ADMIN — Medication 2 UNIT(S): at 18:31

## 2021-02-14 RX ADMIN — Medication 6 MILLIGRAM(S): at 06:42

## 2021-02-14 RX ADMIN — DORZOLAMIDE HYDROCHLORIDE TIMOLOL MALEATE 1 DROP(S): 20; 5 SOLUTION/ DROPS OPHTHALMIC at 06:41

## 2021-02-14 RX ADMIN — INSULIN GLARGINE 5 UNIT(S): 100 INJECTION, SOLUTION SUBCUTANEOUS at 22:04

## 2021-02-14 RX ADMIN — Medication 3: at 18:30

## 2021-02-14 RX ADMIN — ENOXAPARIN SODIUM 100 MILLIGRAM(S): 100 INJECTION SUBCUTANEOUS at 06:41

## 2021-02-14 RX ADMIN — Medication 2: at 13:45

## 2021-02-14 RX ADMIN — ENOXAPARIN SODIUM 100 MILLIGRAM(S): 100 INJECTION SUBCUTANEOUS at 19:55

## 2021-02-14 RX ADMIN — ESCITALOPRAM OXALATE 5 MILLIGRAM(S): 10 TABLET, FILM COATED ORAL at 15:37

## 2021-02-14 RX ADMIN — Medication 5 MILLIGRAM(S): at 19:55

## 2021-02-14 RX ADMIN — LEVETIRACETAM 500 MILLIGRAM(S): 250 TABLET, FILM COATED ORAL at 19:55

## 2021-02-14 RX ADMIN — Medication 5 MILLIGRAM(S): at 06:42

## 2021-02-14 RX ADMIN — Medication 2 UNIT(S): at 10:15

## 2021-02-14 RX ADMIN — Medication 2: at 10:15

## 2021-02-14 RX ADMIN — LEVETIRACETAM 500 MILLIGRAM(S): 250 TABLET, FILM COATED ORAL at 06:42

## 2021-02-14 RX ADMIN — DORZOLAMIDE HYDROCHLORIDE TIMOLOL MALEATE 1 DROP(S): 20; 5 SOLUTION/ DROPS OPHTHALMIC at 19:55

## 2021-02-14 RX ADMIN — Medication 1: at 22:04

## 2021-02-14 RX ADMIN — ATORVASTATIN CALCIUM 20 MILLIGRAM(S): 80 TABLET, FILM COATED ORAL at 22:05

## 2021-02-14 NOTE — PROGRESS NOTE ADULT - PROBLEM SELECTOR PLAN 6
- FS significantly elevated on admission, not on insulin at home, possibly elevated in setting of COVID19 and rhabdomyolysis  - ISS  - FS better controlled  - monitor fingersticks  - A1C  - hold oral hypoglycemics

## 2021-02-14 NOTE — PROGRESS NOTE ADULT - SUBJECTIVE AND OBJECTIVE BOX
Patient is a 85y old  Female who presents with a chief complaint of COVID/Falls (13 Feb 2021 10:14)      SUBJECTIVE / OVERNIGHT EVENTS: CARI overnight, patient sleeping comfortably. RUE swelling stable. No other   ADDITIONAL REVIEW OF SYSTEMS: negative as per above    MEDICATIONS  (STANDING):  amLODIPine   Tablet 5 milliGRAM(s) Oral daily  atorvastatin 20 milliGRAM(s) Oral at bedtime  dexAMETHasone  Injectable 6 milliGRAM(s) IV Push daily  dextrose 40% Gel 15 Gram(s) Oral once  dextrose 5%. 1000 milliLiter(s) (50 mL/Hr) IV Continuous <Continuous>  dextrose 5%. 1000 milliLiter(s) (100 mL/Hr) IV Continuous <Continuous>  dextrose 50% Injectable 25 Gram(s) IV Push once  dextrose 50% Injectable 12.5 Gram(s) IV Push once  dextrose 50% Injectable 25 Gram(s) IV Push once  donepezil 10 milliGRAM(s) Oral at bedtime  dorzolamide 2%/timolol 0.5% Ophthalmic Solution 1 Drop(s) Both EYES every 12 hours  enoxaparin Injectable 100 milliGRAM(s) SubCutaneous two times a day  escitalopram 5 milliGRAM(s) Oral daily  glucagon  Injectable 1 milliGRAM(s) IntraMuscular once  insulin glargine Injectable (LANTUS) 5 Unit(s) SubCutaneous at bedtime  insulin lispro (ADMELOG) corrective regimen sliding scale   SubCutaneous three times a day before meals  insulin lispro (ADMELOG) corrective regimen sliding scale   SubCutaneous at bedtime  insulin lispro Injectable (ADMELOG) 2 Unit(s) SubCutaneous three times a day before meals  levETIRAcetam 500 milliGRAM(s) Oral two times a day  oxybutynin 5 milliGRAM(s) Oral two times a day  sodium chloride 0.9%. 1000 milliLiter(s) (100 mL/Hr) IV Continuous <Continuous>    MEDICATIONS  (PRN):  acetaminophen   Tablet .. 650 milliGRAM(s) Oral every 4 hours PRN Temp greater or equal to 38.5C (101.3F)  guaiFENesin   Syrup  (Sugar-Free) 200 milliGRAM(s) Oral every 6 hours PRN cough/congestion  morphine  - Injectable 2 milliGRAM(s) IV Push every 6 hours PRN Moderate Pain (4 - 6)      CAPILLARY BLOOD GLUCOSE      POCT Blood Glucose.: 247 mg/dL (14 Feb 2021 13:04)  POCT Blood Glucose.: 201 mg/dL (14 Feb 2021 09:23)  POCT Blood Glucose.: 182 mg/dL (13 Feb 2021 21:33)  POCT Blood Glucose.: 174 mg/dL (13 Feb 2021 17:33)    I&O's Summary      PHYSICAL EXAM:  Vital Signs Last 24 Hrs  T(C): 36.7 (14 Feb 2021 10:05), Max: 37.2 (13 Feb 2021 17:19)  T(F): 98.1 (14 Feb 2021 10:05), Max: 99 (13 Feb 2021 17:19)  HR: 70 (14 Feb 2021 10:05) (69 - 77)  BP: 130/72 (14 Feb 2021 10:05) (116/65 - 146/72)  BP(mean): --  RR: 18 (14 Feb 2021 10:05) (18 - 19)  SpO2: 94% (14 Feb 2021 10:05) (93% - 98%)  CONSTITUTIONAL: NAD, obese female,   EYES: PERRLA; conjunctiva and sclera clear  RESPIRATORY: Normal respiratory effort; lungs are clear to auscultation bilaterally  CARDIOVASCULAR: Regular rate and rhythm,   ABDOMEN: Nontender to palpation,   PSYCH: A+O to person, place, and time; affect appropriate  Ext: RUE with 2+ swelling, pulses intact.  No lower extremity edema; Peripheral pulses are 2+ bilaterally    LABS:                        10.9   6.17  )-----------( 258      ( 14 Feb 2021 07:18 )             34.1     02-14    139  |  112<H>  |  15  ----------------------------<  191<H>  3.8   |  17<L>  |  0.97    Ca    8.7      14 Feb 2021 07:18  Phos  2.5     02-14  Mg     1.6     02-14    TPro  5.8<L>  /  Alb  2.7<L>  /  TBili  0.3  /  DBili  x   /  AST  33<H>  /  ALT  34<H>  /  AlkPhos  44  02-14              Trend Procalcitonin  02-05-21 @ 19:18   -  0.10      C-Reactive Protein, Serum: 51.5 mg/L (02-11-21 @ 12:39)  C-Reactive Protein, Serum: 26.8 mg/L (02-09-21 @ 08:34)  C-Reactive Protein, Serum: 38.1 mg/L (02-08-21 @ 07:25)  C-Reactive Protein, Serum: 21.4 mg/L (02-07-21 @ 07:45)  C-Reactive Protein, Serum: 1.60 mg/dL (02-05-21 @ 19:18)    Ferritin, Serum: 489 ng/mL (02-14-21 @ 07:18)  Ferritin, Serum: 529 ng/mL (02-13-21 @ 05:34)  Ferritin, Serum: 517 ng/mL (02-12-21 @ 06:54)  Ferritin, Serum: 380 ng/mL (02-11-21 @ 12:39)  Ferritin, Serum: 344 ng/mL (02-09-21 @ 08:34)  Ferritin, Serum: 283 ng/mL (02-08-21 @ 07:25)  Ferritin, Serum: 206 ng/mL (02-07-21 @ 07:45)  Ferritin, Serum: 176 ng/mL (02-06-21 @ 03:49)    D-Dimer:  366 ng/mL DDU (02-11-21 @ 12:39)  470 ng/mL DDU (02-07-21 @ 07:45)  580 ng/mL DDU (02-05-21 @ 19:18)      RADIOLOGY & ADDITIONAL TESTS:  Results Reviewed:   Imaging Personally Reviewed:  Electrocardiogram Personally Reviewed:    COORDINATION OF CARE:  Care Discussed with Consultants/Other Providers [Y/N]:  Prior or Outpatient Records Reviewed [Y/N]:

## 2021-02-14 NOTE — PROGRESS NOTE ADULT - PROBLEM SELECTOR PLAN 1
Patient complaining of numbness, and pain, has elevated CK at [facility], concern for developing compartment syndrome in setting of rhabdomyolysis -> patient s/p IVF, pulses intact currently   - Shoulder, elbow, humerus, forearm, wrist xrays --> neg for fracture  - Neurovascular checks q4h for now  - neuro consult for further eval given concern for plexus injury though low suspicion at this time,   - surgery eval on pt 2/7 not concerned about compartment syndrome  - RUE dopplers negative for DVT  - pacemaker interrogated and MRI is conditional, consent obtained for MRI  - Patient was supposed to get MRI on 2/13 but was found to have an IVC filter, per MRI tech since some IVC filters are not compatible with the MRI, exam was cancelled. Patient w dementia does not know about IVC filter. Called daughter to obtain serial number for IVC filter but she does not have records. CK downtrending.  - RUE less swollen compared to 2 days ago on 2/13/2021  - CT right upper extremity IV contrast ordered per neuro recs  - CT will likely be low yield, continue efforts on obtaining MRI   - f/u labs

## 2021-02-14 NOTE — PROGRESS NOTE ADULT - PROBLEM SELECTOR PLAN 10
in right popliteal vein seen, age undeterminate. Also has IVC filter in place per MRI tech. Pt has had IVC filter for over 7 years.   - please obtain outside hospital records regarding IVC filter  - will start pt on lovenox 1 mg/kg

## 2021-02-14 NOTE — PROGRESS NOTE ADULT - PROBLEM SELECTOR PLAN 4
- CT chest at [facility] incidentally found: Irregular 1.9 cm left upper lobe nodular opacity with associated spiculation.   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  - Patient did not want to know about these results and the daughter is already aware of these results  - will need  outpatient pulm follow up

## 2021-02-14 NOTE — PROGRESS NOTE ADULT - PROBLEM SELECTOR PLAN 5
- Patient's arb was held given ?neptali v CKD at [facility]  - She was given IVF at [facility]  - Monitor Cr  - avoid nephrotoxic meds

## 2021-02-14 NOTE — PROGRESS NOTE ADULT - PROBLEM SELECTOR PLAN 2
Patient requiring 2L on 2/13  - started on remdesivir and decadron   -trend inflammatory markers  - wean O2 as tolerated

## 2021-02-15 LAB
ANION GAP SERPL CALC-SCNC: 11 MMOL/L — SIGNIFICANT CHANGE UP (ref 7–14)
BUN SERPL-MCNC: 20 MG/DL — SIGNIFICANT CHANGE UP (ref 7–23)
CALCIUM SERPL-MCNC: 9.1 MG/DL — SIGNIFICANT CHANGE UP (ref 8.4–10.5)
CHLORIDE SERPL-SCNC: 109 MMOL/L — HIGH (ref 98–107)
CO2 SERPL-SCNC: 20 MMOL/L — LOW (ref 22–31)
CREAT SERPL-MCNC: 0.97 MG/DL — SIGNIFICANT CHANGE UP (ref 0.5–1.3)
FERRITIN SERPL-MCNC: 490 NG/ML — HIGH (ref 15–150)
GLUCOSE BLDC GLUCOMTR-MCNC: 225 MG/DL — HIGH (ref 70–99)
GLUCOSE BLDC GLUCOMTR-MCNC: 259 MG/DL — HIGH (ref 70–99)
GLUCOSE BLDC GLUCOMTR-MCNC: 330 MG/DL — HIGH (ref 70–99)
GLUCOSE BLDC GLUCOMTR-MCNC: 339 MG/DL — HIGH (ref 70–99)
GLUCOSE SERPL-MCNC: 214 MG/DL — HIGH (ref 70–99)
HCT VFR BLD CALC: 34.2 % — LOW (ref 34.5–45)
HGB BLD-MCNC: 11.1 G/DL — LOW (ref 11.5–15.5)
LDH SERPL L TO P-CCNC: 666 U/L — HIGH (ref 135–225)
MAGNESIUM SERPL-MCNC: 1.7 MG/DL — SIGNIFICANT CHANGE UP (ref 1.6–2.6)
MCHC RBC-ENTMCNC: 27.3 PG — SIGNIFICANT CHANGE UP (ref 27–34)
MCHC RBC-ENTMCNC: 32.5 GM/DL — SIGNIFICANT CHANGE UP (ref 32–36)
MCV RBC AUTO: 84.2 FL — SIGNIFICANT CHANGE UP (ref 80–100)
NRBC # BLD: 0 /100 WBCS — SIGNIFICANT CHANGE UP
NRBC # FLD: 0 K/UL — SIGNIFICANT CHANGE UP
PHOSPHATE SERPL-MCNC: 1.7 MG/DL — LOW (ref 2.5–4.5)
PLATELET # BLD AUTO: 366 K/UL — SIGNIFICANT CHANGE UP (ref 150–400)
POTASSIUM SERPL-MCNC: 3.5 MMOL/L — SIGNIFICANT CHANGE UP (ref 3.5–5.3)
POTASSIUM SERPL-SCNC: 3.5 MMOL/L — SIGNIFICANT CHANGE UP (ref 3.5–5.3)
RBC # BLD: 4.06 M/UL — SIGNIFICANT CHANGE UP (ref 3.8–5.2)
RBC # FLD: 12.4 % — SIGNIFICANT CHANGE UP (ref 10.3–14.5)
SODIUM SERPL-SCNC: 140 MMOL/L — SIGNIFICANT CHANGE UP (ref 135–145)
WBC # BLD: 8.87 K/UL — SIGNIFICANT CHANGE UP (ref 3.8–10.5)
WBC # FLD AUTO: 8.87 K/UL — SIGNIFICANT CHANGE UP (ref 3.8–10.5)

## 2021-02-15 PROCEDURE — 99233 SBSQ HOSP IP/OBS HIGH 50: CPT | Mod: CS

## 2021-02-15 RX ORDER — REMDESIVIR 5 MG/ML
100 INJECTION INTRAVENOUS EVERY 24 HOURS
Refills: 0 | Status: COMPLETED | OUTPATIENT
Start: 2021-02-16 | End: 2021-02-19

## 2021-02-15 RX ORDER — INSULIN GLARGINE 100 [IU]/ML
7 INJECTION, SOLUTION SUBCUTANEOUS AT BEDTIME
Refills: 0 | Status: DISCONTINUED | OUTPATIENT
Start: 2021-02-15 | End: 2021-02-17

## 2021-02-15 RX ORDER — REMDESIVIR 5 MG/ML
200 INJECTION INTRAVENOUS EVERY 24 HOURS
Refills: 0 | Status: COMPLETED | OUTPATIENT
Start: 2021-02-14 | End: 2021-02-15

## 2021-02-15 RX ADMIN — Medication 2 UNIT(S): at 18:43

## 2021-02-15 RX ADMIN — Medication 2: at 21:46

## 2021-02-15 RX ADMIN — DONEPEZIL HYDROCHLORIDE 10 MILLIGRAM(S): 10 TABLET, FILM COATED ORAL at 23:31

## 2021-02-15 RX ADMIN — LEVETIRACETAM 500 MILLIGRAM(S): 250 TABLET, FILM COATED ORAL at 19:11

## 2021-02-15 RX ADMIN — ESCITALOPRAM OXALATE 5 MILLIGRAM(S): 10 TABLET, FILM COATED ORAL at 11:50

## 2021-02-15 RX ADMIN — ENOXAPARIN SODIUM 100 MILLIGRAM(S): 100 INJECTION SUBCUTANEOUS at 19:10

## 2021-02-15 RX ADMIN — ENOXAPARIN SODIUM 100 MILLIGRAM(S): 100 INJECTION SUBCUTANEOUS at 06:10

## 2021-02-15 RX ADMIN — REMDESIVIR 500 MILLIGRAM(S): 5 INJECTION INTRAVENOUS at 11:50

## 2021-02-15 RX ADMIN — Medication 2 UNIT(S): at 09:22

## 2021-02-15 RX ADMIN — Medication 5 MILLIGRAM(S): at 19:10

## 2021-02-15 RX ADMIN — INSULIN GLARGINE 7 UNIT(S): 100 INJECTION, SOLUTION SUBCUTANEOUS at 21:45

## 2021-02-15 RX ADMIN — Medication 6 MILLIGRAM(S): at 06:29

## 2021-02-15 RX ADMIN — Medication 2 UNIT(S): at 13:12

## 2021-02-15 RX ADMIN — LEVETIRACETAM 500 MILLIGRAM(S): 250 TABLET, FILM COATED ORAL at 06:11

## 2021-02-15 RX ADMIN — SODIUM CHLORIDE 100 MILLILITER(S): 9 INJECTION INTRAMUSCULAR; INTRAVENOUS; SUBCUTANEOUS at 23:32

## 2021-02-15 RX ADMIN — DORZOLAMIDE HYDROCHLORIDE TIMOLOL MALEATE 1 DROP(S): 20; 5 SOLUTION/ DROPS OPHTHALMIC at 19:11

## 2021-02-15 RX ADMIN — Medication 4: at 18:43

## 2021-02-15 RX ADMIN — DORZOLAMIDE HYDROCHLORIDE TIMOLOL MALEATE 1 DROP(S): 20; 5 SOLUTION/ DROPS OPHTHALMIC at 06:10

## 2021-02-15 RX ADMIN — Medication 2: at 09:22

## 2021-02-15 RX ADMIN — Medication 3: at 13:11

## 2021-02-15 RX ADMIN — Medication 5 MILLIGRAM(S): at 06:11

## 2021-02-15 RX ADMIN — AMLODIPINE BESYLATE 5 MILLIGRAM(S): 2.5 TABLET ORAL at 06:11

## 2021-02-15 RX ADMIN — ATORVASTATIN CALCIUM 20 MILLIGRAM(S): 80 TABLET, FILM COATED ORAL at 23:31

## 2021-02-15 NOTE — PROGRESS NOTE ADULT - PROBLEM SELECTOR PLAN 4
- CT chest at [facility] incidentally found: Irregular 1.9 cm left upper lobe nodular opacity with associated spiculation.   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  - Patient did not want to know about these results and the daughter is already aware of these results  - will need  outpatient pulm follow up

## 2021-02-15 NOTE — PROGRESS NOTE ADULT - SUBJECTIVE AND OBJECTIVE BOX
[facility] Division of Hospital Medicine  Raad Bonilla MD  In House Pager [phone]    Patient is a 85y old  Female who presents with a chief complaint of COVID/Falls (14 Feb 2021 15:40)      SUBJECTIVE / OVERNIGHT EVENTS:  No overnight events. Labs and vitals reviewed.  Patient seen and examined at bedside, no acute complaints.  No fever, no chills, no SOB, no CP, no n/v/d, no abd pain, no dysuria      MEDICATIONS  (STANDING):  amLODIPine   Tablet 5 milliGRAM(s) Oral daily  atorvastatin 20 milliGRAM(s) Oral at bedtime  dexAMETHasone  Injectable 6 milliGRAM(s) IV Push daily  dextrose 40% Gel 15 Gram(s) Oral once  dextrose 5%. 1000 milliLiter(s) (50 mL/Hr) IV Continuous <Continuous>  dextrose 5%. 1000 milliLiter(s) (100 mL/Hr) IV Continuous <Continuous>  dextrose 50% Injectable 25 Gram(s) IV Push once  dextrose 50% Injectable 12.5 Gram(s) IV Push once  dextrose 50% Injectable 25 Gram(s) IV Push once  donepezil 10 milliGRAM(s) Oral at bedtime  dorzolamide 2%/timolol 0.5% Ophthalmic Solution 1 Drop(s) Both EYES every 12 hours  enoxaparin Injectable 100 milliGRAM(s) SubCutaneous two times a day  escitalopram 5 milliGRAM(s) Oral daily  glucagon  Injectable 1 milliGRAM(s) IntraMuscular once  insulin glargine Injectable (LANTUS) 5 Unit(s) SubCutaneous at bedtime  insulin lispro (ADMELOG) corrective regimen sliding scale   SubCutaneous three times a day before meals  insulin lispro (ADMELOG) corrective regimen sliding scale   SubCutaneous at bedtime  insulin lispro Injectable (ADMELOG) 2 Unit(s) SubCutaneous three times a day before meals  levETIRAcetam 500 milliGRAM(s) Oral two times a day  oxybutynin 5 milliGRAM(s) Oral two times a day  remdesivir  IVPB   IV Intermittent   remdesivir  IVPB 200 milliGRAM(s) IV Intermittent every 24 hours  sodium chloride 0.9%. 1000 milliLiter(s) (100 mL/Hr) IV Continuous <Continuous>    MEDICATIONS  (PRN):  acetaminophen   Tablet .. 650 milliGRAM(s) Oral every 4 hours PRN Temp greater or equal to 38.5C (101.3F)  guaiFENesin   Syrup  (Sugar-Free) 200 milliGRAM(s) Oral every 6 hours PRN cough/congestion    CAPILLARY BLOOD GLUCOSE      POCT Blood Glucose.: 225 mg/dL (15 Feb 2021 09:05)  POCT Blood Glucose.: 287 mg/dL (14 Feb 2021 21:17)  POCT Blood Glucose.: 283 mg/dL (14 Feb 2021 17:53)  POCT Blood Glucose.: 247 mg/dL (14 Feb 2021 13:04)    I&O's Summary      PHYSICAL EXAM:  Vital Signs Last 24 Hrs  T(C): 36.4 (15 Feb 2021 05:58), Max: 36.5 (14 Feb 2021 22:00)  T(F): 97.5 (15 Feb 2021 05:58), Max: 97.7 (14 Feb 2021 22:00)  HR: 77 (15 Feb 2021 05:58) (64 - 89)  BP: 121/58 (15 Feb 2021 05:58) (121/58 - 132/55)  BP(mean): --  RR: 18 (15 Feb 2021 05:58) (18 - 18)  SpO2: 98% (15 Feb 2021 05:58) (88% - 98%)    Gen: NAD; resting in bed.  Pulm: no respiratory distress; CTA b/l; no wheezing  Cards: RRR, nl S1/S2; no obvious murmurs  Abd: soft; NT on exam  Ext: no cyanosis; no edema  Skin: no rash; no cyanosis    LABS:                        11.1   8.87  )-----------( 366      ( 15 Feb 2021 07:53 )             34.2     02-15    140  |  109<H>  |  20  ----------------------------<  214<H>  3.5   |  20<L>  |  0.97    Ca    9.1      15 Feb 2021 07:53  Phos  1.7     02-15  Mg     1.7     02-15    TPro  5.8<L>  /  Alb  2.7<L>  /  TBili  0.3  /  DBili  x   /  AST  33<H>  /  ALT  34<H>  /  AlkPhos  44  02-14                RADIOLOGY & ADDITIONAL TESTS:  Results Reviewed: Y  Imaging Personally Reviewed: Y  Electrocardiogram Personally Reviewed: Y    COORDINATION OF CARE:  Care Discussed with Consultants/Other Providers [Y/N]: Y  Prior or Outpatient Records Reviewed [Y/N]: Y   [facility] Division of Hospital Medicine  Raad Bonilla MD  In House Pager [phone]    Patient is a 85y old  Female who presents with a chief complaint of COVID/Falls (14 Feb 2021 15:40)      SUBJECTIVE / OVERNIGHT EVENTS:  No overnight events. Labs and vitals reviewed.  Patient seen and examined at bedside, no acute complaints. R arm swelling still present, but improving.   No fever, no chills, no SOB, no CP, no n/v/d, no abd pain, no dysuria      MEDICATIONS  (STANDING):  amLODIPine   Tablet 5 milliGRAM(s) Oral daily  atorvastatin 20 milliGRAM(s) Oral at bedtime  dexAMETHasone  Injectable 6 milliGRAM(s) IV Push daily  dextrose 40% Gel 15 Gram(s) Oral once  dextrose 5%. 1000 milliLiter(s) (50 mL/Hr) IV Continuous <Continuous>  dextrose 5%. 1000 milliLiter(s) (100 mL/Hr) IV Continuous <Continuous>  dextrose 50% Injectable 25 Gram(s) IV Push once  dextrose 50% Injectable 12.5 Gram(s) IV Push once  dextrose 50% Injectable 25 Gram(s) IV Push once  donepezil 10 milliGRAM(s) Oral at bedtime  dorzolamide 2%/timolol 0.5% Ophthalmic Solution 1 Drop(s) Both EYES every 12 hours  enoxaparin Injectable 100 milliGRAM(s) SubCutaneous two times a day  escitalopram 5 milliGRAM(s) Oral daily  glucagon  Injectable 1 milliGRAM(s) IntraMuscular once  insulin glargine Injectable (LANTUS) 5 Unit(s) SubCutaneous at bedtime  insulin lispro (ADMELOG) corrective regimen sliding scale   SubCutaneous three times a day before meals  insulin lispro (ADMELOG) corrective regimen sliding scale   SubCutaneous at bedtime  insulin lispro Injectable (ADMELOG) 2 Unit(s) SubCutaneous three times a day before meals  levETIRAcetam 500 milliGRAM(s) Oral two times a day  oxybutynin 5 milliGRAM(s) Oral two times a day  remdesivir  IVPB   IV Intermittent   remdesivir  IVPB 200 milliGRAM(s) IV Intermittent every 24 hours  sodium chloride 0.9%. 1000 milliLiter(s) (100 mL/Hr) IV Continuous <Continuous>    MEDICATIONS  (PRN):  acetaminophen   Tablet .. 650 milliGRAM(s) Oral every 4 hours PRN Temp greater or equal to 38.5C (101.3F)  guaiFENesin   Syrup  (Sugar-Free) 200 milliGRAM(s) Oral every 6 hours PRN cough/congestion    CAPILLARY BLOOD GLUCOSE      POCT Blood Glucose.: 225 mg/dL (15 Feb 2021 09:05)  POCT Blood Glucose.: 287 mg/dL (14 Feb 2021 21:17)  POCT Blood Glucose.: 283 mg/dL (14 Feb 2021 17:53)  POCT Blood Glucose.: 247 mg/dL (14 Feb 2021 13:04)    I&O's Summary      PHYSICAL EXAM:  Vital Signs Last 24 Hrs  T(C): 36.4 (15 Feb 2021 05:58), Max: 36.5 (14 Feb 2021 22:00)  T(F): 97.5 (15 Feb 2021 05:58), Max: 97.7 (14 Feb 2021 22:00)  HR: 77 (15 Feb 2021 05:58) (64 - 89)  BP: 121/58 (15 Feb 2021 05:58) (121/58 - 132/55)  BP(mean): --  RR: 18 (15 Feb 2021 05:58) (18 - 18)  SpO2: 98% (15 Feb 2021 05:58) (88% - 98%)    Gen: NAD; resting in bed.  Pulm: no respiratory distress; CTA b/l; no wheezing  Cards: RRR, nl S1/S2; no obvious murmurs  Abd: soft; NT on exam  Ext: no cyanosis; RUE swelling, non-pitting.   Skin: no rash; no cyanosis    LABS:                        11.1   8.87  )-----------( 366      ( 15 Feb 2021 07:53 )             34.2     02-15    140  |  109<H>  |  20  ----------------------------<  214<H>  3.5   |  20<L>  |  0.97    Ca    9.1      15 Feb 2021 07:53  Phos  1.7     02-15  Mg     1.7     02-15    TPro  5.8<L>  /  Alb  2.7<L>  /  TBili  0.3  /  DBili  x   /  AST  33<H>  /  ALT  34<H>  /  AlkPhos  44  02-14                RADIOLOGY & ADDITIONAL TESTS:  Results Reviewed: Y  Imaging Personally Reviewed: Y  Electrocardiogram Personally Reviewed: Y    COORDINATION OF CARE:  Care Discussed with Consultants/Other Providers [Y/N]: Y  Prior or Outpatient Records Reviewed [Y/N]: Y

## 2021-02-15 NOTE — PROGRESS NOTE ADULT - PROBLEM SELECTOR PLAN 5
- Patient's arb was held given ?neptali v CKD at [facility]  - She was given IVF at [facility]  - Monitor Cr  - avoid nephrotoxic meds

## 2021-02-15 NOTE — PROGRESS NOTE ADULT - PROBLEM SELECTOR PLAN 6
- FS significantly elevated on admission, not on insulin at home, possibly elevated in setting of COVID19 and rhabdomyolysis  - FS basal/bolus + ISS.   - FS better controlled  - monitor fingersticks  - A1C  - hold oral hypoglycemics

## 2021-02-15 NOTE — PROGRESS NOTE ADULT - PROBLEM SELECTOR PLAN 1
Patient complaining of numbness, and pain, has elevated CK at [facility], concern for developing compartment syndrome in setting of rhabdomyolysis -> patient s/p IVF, pulses intact currently   - Shoulder, elbow, humerus, forearm, wrist xrays --> neg for fracture  - Neurovascular checks q4h for now  - neuro consult for further eval given concern for plexus injury though low suspicion at this time,   - surgery eval on pt 2/7 not concerned about compartment syndrome  - RUE dopplers negative for DVT  - pacemaker interrogated and MRI is conditional, consent obtained for MRI  - Patient was supposed to get MRI on 2/13 but was found to have an IVC filter, per MRI tech since some IVC filters are not compatible with the MRI, exam was cancelled. Patient w dementia does not know about IVC filter. Called daughter to obtain serial number for IVC filter but she does not have records. CK downtrending.  - RUE less swollen compared to 2 days ago on 2/13/2021  - CT right upper extremity IV contrast ordered per neuro recs  - CT will likely be low yield, continue efforts on obtaining MRI   - f/u labs, monitor CK, electrolytes.

## 2021-02-15 NOTE — PROGRESS NOTE ADULT - PROBLEM SELECTOR PLAN 2
Patient requiring 2L on 2/13  - c/w remdesivir and decadron   -trend inflammatory markers  - wean O2 as tolerated

## 2021-02-15 NOTE — PROGRESS NOTE ADULT - ASSESSMENT
84 y/o F with hx of dementia (A&Ox2-3, forgetful at times), Diabetes type II (not on insulin), PPM, HTN, ?seizure on keppra, presents with falls 84 y/o F with hx of dementia (A&Ox2-3, forgetful at times), Diabetes type II (not on insulin), PPM, HTN, ?seizure on keppra, presents with falls. found to have RUE swelling concern for Plexitis. Would need MRI but has IVCF that's unclear if MRI compatible.

## 2021-02-16 ENCOUNTER — TRANSCRIPTION ENCOUNTER (OUTPATIENT)
Age: 86
End: 2021-02-16

## 2021-02-16 LAB
ANION GAP SERPL CALC-SCNC: 11 MMOL/L — SIGNIFICANT CHANGE UP (ref 7–14)
BUN SERPL-MCNC: 24 MG/DL — HIGH (ref 7–23)
CALCIUM SERPL-MCNC: 9.3 MG/DL — SIGNIFICANT CHANGE UP (ref 8.4–10.5)
CHLORIDE SERPL-SCNC: 107 MMOL/L — SIGNIFICANT CHANGE UP (ref 98–107)
CO2 SERPL-SCNC: 23 MMOL/L — SIGNIFICANT CHANGE UP (ref 22–31)
CREAT SERPL-MCNC: 1.01 MG/DL — SIGNIFICANT CHANGE UP (ref 0.5–1.3)
GLUCOSE BLDC GLUCOMTR-MCNC: 135 MG/DL — HIGH (ref 70–99)
GLUCOSE BLDC GLUCOMTR-MCNC: 172 MG/DL — HIGH (ref 70–99)
GLUCOSE BLDC GLUCOMTR-MCNC: 225 MG/DL — HIGH (ref 70–99)
GLUCOSE BLDC GLUCOMTR-MCNC: 235 MG/DL — HIGH (ref 70–99)
GLUCOSE SERPL-MCNC: 157 MG/DL — HIGH (ref 70–99)
HCT VFR BLD CALC: 37.9 % — SIGNIFICANT CHANGE UP (ref 34.5–45)
HGB BLD-MCNC: 12.3 G/DL — SIGNIFICANT CHANGE UP (ref 11.5–15.5)
MAGNESIUM SERPL-MCNC: 1.5 MG/DL — LOW (ref 1.6–2.6)
MCHC RBC-ENTMCNC: 27.1 PG — SIGNIFICANT CHANGE UP (ref 27–34)
MCHC RBC-ENTMCNC: 32.5 GM/DL — SIGNIFICANT CHANGE UP (ref 32–36)
MCV RBC AUTO: 83.5 FL — SIGNIFICANT CHANGE UP (ref 80–100)
NRBC # BLD: 0 /100 WBCS — SIGNIFICANT CHANGE UP
NRBC # FLD: 0 K/UL — SIGNIFICANT CHANGE UP
PHOSPHATE SERPL-MCNC: 1.8 MG/DL — LOW (ref 2.5–4.5)
PLATELET # BLD AUTO: 457 K/UL — HIGH (ref 150–400)
POTASSIUM SERPL-MCNC: 3.2 MMOL/L — LOW (ref 3.5–5.3)
POTASSIUM SERPL-SCNC: 3.2 MMOL/L — LOW (ref 3.5–5.3)
RBC # BLD: 4.54 M/UL — SIGNIFICANT CHANGE UP (ref 3.8–5.2)
RBC # FLD: 12.3 % — SIGNIFICANT CHANGE UP (ref 10.3–14.5)
SODIUM SERPL-SCNC: 141 MMOL/L — SIGNIFICANT CHANGE UP (ref 135–145)
WBC # BLD: 9.85 K/UL — SIGNIFICANT CHANGE UP (ref 3.8–10.5)
WBC # FLD AUTO: 9.85 K/UL — SIGNIFICANT CHANGE UP (ref 3.8–10.5)

## 2021-02-16 PROCEDURE — 99233 SBSQ HOSP IP/OBS HIGH 50: CPT

## 2021-02-16 RX ORDER — POTASSIUM CHLORIDE 20 MEQ
40 PACKET (EA) ORAL ONCE
Refills: 0 | Status: COMPLETED | OUTPATIENT
Start: 2021-02-16 | End: 2021-02-16

## 2021-02-16 RX ORDER — MAGNESIUM OXIDE 400 MG ORAL TABLET 241.3 MG
400 TABLET ORAL
Refills: 0 | Status: COMPLETED | OUTPATIENT
Start: 2021-02-16 | End: 2021-02-17

## 2021-02-16 RX ORDER — SODIUM CHLORIDE 0.65 %
1 AEROSOL, SPRAY (ML) NASAL
Refills: 0 | Status: DISCONTINUED | OUTPATIENT
Start: 2021-02-16 | End: 2021-03-02

## 2021-02-16 RX ORDER — POTASSIUM CHLORIDE 20 MEQ
40 PACKET (EA) ORAL EVERY 4 HOURS
Refills: 0 | Status: DISCONTINUED | OUTPATIENT
Start: 2021-02-16 | End: 2021-02-16

## 2021-02-16 RX ORDER — SODIUM,POTASSIUM PHOSPHATES 278-250MG
1 POWDER IN PACKET (EA) ORAL THREE TIMES A DAY
Refills: 0 | Status: COMPLETED | OUTPATIENT
Start: 2021-02-16 | End: 2021-02-17

## 2021-02-16 RX ADMIN — ENOXAPARIN SODIUM 100 MILLIGRAM(S): 100 INJECTION SUBCUTANEOUS at 18:37

## 2021-02-16 RX ADMIN — ESCITALOPRAM OXALATE 5 MILLIGRAM(S): 10 TABLET, FILM COATED ORAL at 11:23

## 2021-02-16 RX ADMIN — SODIUM CHLORIDE 100 MILLILITER(S): 9 INJECTION INTRAMUSCULAR; INTRAVENOUS; SUBCUTANEOUS at 13:47

## 2021-02-16 RX ADMIN — Medication 1: at 13:46

## 2021-02-16 RX ADMIN — Medication 1 SPRAY(S): at 08:47

## 2021-02-16 RX ADMIN — Medication 1 PACKET(S): at 22:39

## 2021-02-16 RX ADMIN — DORZOLAMIDE HYDROCHLORIDE TIMOLOL MALEATE 1 DROP(S): 20; 5 SOLUTION/ DROPS OPHTHALMIC at 06:59

## 2021-02-16 RX ADMIN — MAGNESIUM OXIDE 400 MG ORAL TABLET 400 MILLIGRAM(S): 241.3 TABLET ORAL at 18:36

## 2021-02-16 RX ADMIN — AMLODIPINE BESYLATE 5 MILLIGRAM(S): 2.5 TABLET ORAL at 06:58

## 2021-02-16 RX ADMIN — INSULIN GLARGINE 7 UNIT(S): 100 INJECTION, SOLUTION SUBCUTANEOUS at 22:39

## 2021-02-16 RX ADMIN — Medication 2 UNIT(S): at 13:47

## 2021-02-16 RX ADMIN — DONEPEZIL HYDROCHLORIDE 10 MILLIGRAM(S): 10 TABLET, FILM COATED ORAL at 22:38

## 2021-02-16 RX ADMIN — Medication 2 UNIT(S): at 09:50

## 2021-02-16 RX ADMIN — ATORVASTATIN CALCIUM 20 MILLIGRAM(S): 80 TABLET, FILM COATED ORAL at 22:38

## 2021-02-16 RX ADMIN — Medication 6 MILLIGRAM(S): at 07:00

## 2021-02-16 RX ADMIN — Medication 1 PACKET(S): at 12:37

## 2021-02-16 RX ADMIN — Medication 40 MILLIEQUIVALENT(S): at 11:23

## 2021-02-16 RX ADMIN — LEVETIRACETAM 500 MILLIGRAM(S): 250 TABLET, FILM COATED ORAL at 06:58

## 2021-02-16 RX ADMIN — Medication 2 UNIT(S): at 18:22

## 2021-02-16 RX ADMIN — Medication 5 MILLIGRAM(S): at 06:59

## 2021-02-16 RX ADMIN — REMDESIVIR 500 MILLIGRAM(S): 5 INJECTION INTRAVENOUS at 11:23

## 2021-02-16 RX ADMIN — DORZOLAMIDE HYDROCHLORIDE TIMOLOL MALEATE 1 DROP(S): 20; 5 SOLUTION/ DROPS OPHTHALMIC at 18:37

## 2021-02-16 RX ADMIN — Medication 2: at 18:21

## 2021-02-16 RX ADMIN — Medication 1 SPRAY(S): at 13:47

## 2021-02-16 RX ADMIN — ENOXAPARIN SODIUM 100 MILLIGRAM(S): 100 INJECTION SUBCUTANEOUS at 07:00

## 2021-02-16 RX ADMIN — LEVETIRACETAM 500 MILLIGRAM(S): 250 TABLET, FILM COATED ORAL at 18:37

## 2021-02-16 RX ADMIN — Medication 5 MILLIGRAM(S): at 18:37

## 2021-02-16 NOTE — DISCHARGE NOTE PROVIDER - NSDCMRMEDTOKEN_GEN_ALL_CORE_FT
amLODIPine 5 mg oral tablet: 1 tab(s) orally once a day  atorvastatin 20 mg oral tablet: 1 tab(s) orally once a day (at bedtime)    was on crestor at home    Cosopt 2.23%-0.68% ophthalmic solution: 1 drop(s) in the left eye 2 times a day  donepezil 10 mg oral tablet: 1 tab(s) orally once a day (at bedtime)  enoxaparin:  started at [facility]  guaiFENesin 100 mg/5 mL oral liquid: 5 milliliter(s) orally every 6 hours, As needed, Cough     started at [facility]  insulin lispro 100 units/mL injectable solution: sliding scale and started at [facility]  Keppra 500 mg oral tablet: 1 tab(s) orally 2 times a day  Lexapro 5 mg oral tablet: 1 tab(s) orally once a day  menthol-benzocaine 3.6 mg-15 mg mucous membrane lozenge:  mucous membrane   MYRBETRIQ ER 50 MG TABLET:   oxybutynin 5 mg oral tablet: 1 tab(s) orally 2 times a day  repaglinide 1 mg oral tablet: 1 tab(s) orally once a day   amLODIPine 5 mg oral tablet: 1 tab(s) orally once a day  apixaban 5 mg oral tablet: 1 tab(s) orally every 12 hours  atorvastatin 20 mg oral tablet: 1 tab(s) orally once a day (at bedtime)    was on crestor at home    calcium carbonate 500 mg (200 mg elemental calcium) oral tablet, chewable: 1 tab(s) orally every 6 hours, As needed, Heartburn  Cosopt 2.23%-0.68% ophthalmic solution: 1 drop(s) in the left eye 2 times a day  donepezil 10 mg oral tablet: 1 tab(s) orally once a day (at bedtime)  guaiFENesin 100 mg/5 mL oral liquid: 5 milliliter(s) orally every 6 hours, As needed, Cough     started at [facility]  insulin glargine: 24 unit(s) subcutaneous once a day (at bedtime)  Keppra 500 mg oral tablet: 1 tab(s) orally 2 times a day  Lexapro 5 mg oral tablet: 1 tab(s) orally once a day  MYRBETRIQ ER 50 MG TABLET:   oxybutynin 5 mg oral tablet: 1 tab(s) orally 2 times a day  sodium chloride 0.65% nasal spray: 1 spray(s) nasal 2 times a day

## 2021-02-16 NOTE — PROGRESS NOTE ADULT - PROBLEM SELECTOR PLAN 1
Patient complaining of numbness, and pain, has elevated CK at [facility], concern for developing compartment syndrome in setting of rhabdomyolysis -> patient s/p IVF, pulses intact currently   - Shoulder, elbow, humerus, forearm, wrist xrays --> neg for fracture  - Neurovascular checks q4h for now  - neuro consult for further eval given concern for plexus injury though low suspicion at this time,   - surgery eval on pt 2/7 not concerned about compartment syndrome  - RUE dopplers negative for DVT  - pacemaker interrogated and MRI is conditional, consent obtained for MRI  - Patient was supposed to get MRI on 2/13 but was found to have an IVC filter, per MRI tech since some IVC filters are not compatible with the MRI, exam was cancelled.  Pt had procedure at [facility]. request faxed to get med records.  CK downtrending.  - RUE less swollen compared to 2 days ago on 2/13/2021  - CT right upper extremity IV contrast ordered per neuro recs  - CT will likely be low yield, continue efforts on obtaining MRI   - f/u labs, monitor CK, electrolytes.

## 2021-02-16 NOTE — DISCHARGE NOTE PROVIDER - NSDCCPCAREPLAN_GEN_ALL_CORE_FT
PRINCIPAL DISCHARGE DIAGNOSIS  Diagnosis: Pain of right upper extremity  Assessment and Plan of Treatment: You were admitted for Right upper extremity pain. You had an MRI, which showed...????. You had a CT of the Right Upper Extremity which showed...???      SECONDARY DISCHARGE DIAGNOSES  Diagnosis: Lung nodule  Assessment and Plan of Treatment: You were found to have a lung nodule. You are to follow up with PCP and pulmonology for further management of lung nodule.    Diagnosis: ROBERTO (acute kidney injury)  Assessment and Plan of Treatment: Your kidney functions were found to be abnormal. You were given fluids. You are to follow up with PCP for further monitoring of kidney function as outpatient.    Diagnosis: Type 2 diabetes mellitus with hyperglycemia, without long-term current use of insulin  Assessment and Plan of Treatment: Monitor finger sticks pre-meal and bedtime, low salt, fat and carbohydrate diet, minimize glucose intake.  Exercise daily for at least 30 minutes and weight loss.  Follow up with primary care physician and endocrinologist for routine Hemoglobin A1C checks and management.  Follow up with your ophthalmologist for routine yearly vision exams.      Diagnosis: DVT (deep venous thrombosis)  Assessment and Plan of Treatment: Follow up with PCP for further management.     PRINCIPAL DISCHARGE DIAGNOSIS  Diagnosis: COVID-19  Assessment and Plan of Treatment: You have been diagnosed with the COVID-19 virus during your hospital stay. you have completed quarantine this admission. Followup with your PMD in 1-2 weeks   Wear a facemask.  Cover your coughs and sneezes.  Clean your hands often.  Avoid sharing personal household items.   Clean all “high-touch” surfaces everyday.  Monitor your symptoms.        SECONDARY DISCHARGE DIAGNOSES  Diagnosis: Pain of right upper extremity  Assessment and Plan of Treatment: You were admitted for Right upper extremity pain. You had an MRI, which showed rotator cuff tear and cervical spine degenerative disease followup with ortho in 1-2 weeks. No acute intervention needed    Diagnosis: Falls  Assessment and Plan of Treatment: likely due to weakness and decondition, continue PT/OT at rehab    Diagnosis: Type 2 diabetes mellitus with hyperglycemia, without long-term current use of insulin  Assessment and Plan of Treatment: Monitor finger sticks pre-meal and bedtime, low salt, fat and carbohydrate diet, minimize glucose intake.  Exercise daily for at least 30 minutes and weight loss.  Follow up with primary care physician and endocrinologist for routine Hemoglobin A1C checks and management.  Follow up with your ophthalmologist for routine yearly vision exams.      Diagnosis: ROBERTO (acute kidney injury)  Assessment and Plan of Treatment: Your kidney functions were found to be abnormal. You were given fluids. You are to follow up with PCP for further monitoring of kidney function as outpatient.    Diagnosis: Lung nodule  Assessment and Plan of Treatment: You were found to have a lung nodule on CT. please follow up with PCP and pulmonology for further management and workup of lung nodule  CT chest:  Some of the segmentaland subsegmental branches of pulmonary arteries are not evaluated secondary to motion. Otherwise, no pulmonary embolus.  Bilateral groundglass and reticular opacities within all lobes new from 2/6/2021 and can represent atypical/viral infection.  Approximately 2.2 x 1.2 cm spiculated nodule within left upper lobe is unchanged.      Diagnosis: DVT (deep venous thrombosis)  Assessment and Plan of Treatment: continue elqiuis. Follow up with PCP for further management.     PRINCIPAL DISCHARGE DIAGNOSIS  Diagnosis: COVID-19  Assessment and Plan of Treatment: You have been diagnosed with the COVID-19 virus during your hospital stay. you have completed quarantine this admission. Followup with your PMD in 1-2 weeks   Wear a facemask.  Cover your coughs and sneezes.  Clean your hands often.  Avoid sharing personal household items.   Clean all “high-touch” surfaces everyday.  Monitor your symptoms.        SECONDARY DISCHARGE DIAGNOSES  Diagnosis: Pain of right upper extremity  Assessment and Plan of Treatment: You were admitted for Right upper extremity pain. You had an MRI, which showed rotator cuff tear and cervical spine degenerative disease followup with ortho in 1-2 weeks. No acute intervention needed    Diagnosis: Falls  Assessment and Plan of Treatment: likely due to weakness and decondition, continue PT/OT at rehab    Diagnosis: Type 2 diabetes mellitus with hyperglycemia, without long-term current use of insulin  Assessment and Plan of Treatment: Monitor finger sticks pre-meal and bedtime, low salt, fat and carbohydrate diet, minimize glucose intake.  Exercise daily for at least 30 minutes and weight loss.  Follow up with primary care physician and endocrinologist for routine Hemoglobin A1C checks and management.  Follow up with your ophthalmologist for routine yearly vision exams.      Diagnosis: ROBERTO (acute kidney injury)  Assessment and Plan of Treatment: Your kidney functions were found to be abnormal. You were given fluids. You are to follow up with PCP for further monitoring of kidney function as outpatient.    Diagnosis: Lung nodule  Assessment and Plan of Treatment: You were found to have a lung nodule on CT. please follow up with PCP and pulmonology for further management and workup of lung nodule  CT chest:  Some of the segmentaland subsegmental branches of pulmonary arteries are not evaluated secondary to motion. Otherwise, no pulmonary embolus.  Bilateral groundglass and reticular opacities within all lobes new from 2/6/2021 and can represent atypical/viral infection.  Approximately 2.2 x 1.2 cm spiculated nodule within left upper lobe is unchanged.  CT A/P: Irregular 1.9 cm left upper lobe nodular opacity with associated spiculations. Follow-up with PET/CT and/or biopsy is recommended, as malignancy is a diagnostic consideration.  No CT evidence of acute traumatic sequelae within the chest, abdomen, or pelvis.  1.8 cm cystic pancreatic tail lesion without associated pancreatic ductal dilatation.  please followup with pulmonary provider within your insurance network within 2 months  and followup with PMD for further workup of pancreatic lesion    Diagnosis: DVT (deep venous thrombosis)  Assessment and Plan of Treatment: continue elqiuis. Follow up with PCP for further management.

## 2021-02-16 NOTE — PROGRESS NOTE ADULT - SUBJECTIVE AND OBJECTIVE BOX
Patient is a 85y old  Female who presents with a chief complaint of COVID/Falls (16 Feb 2021 07:55)      SUBJECTIVE / OVERNIGHT EVENTS:Pt doing well. no  complaints.    ADDITIONAL REVIEW OF SYSTEMS:    MEDICATIONS  (STANDING):  amLODIPine   Tablet 5 milliGRAM(s) Oral daily  atorvastatin 20 milliGRAM(s) Oral at bedtime  dexAMETHasone  Injectable 6 milliGRAM(s) IV Push daily  dextrose 40% Gel 15 Gram(s) Oral once  dextrose 5%. 1000 milliLiter(s) (50 mL/Hr) IV Continuous <Continuous>  dextrose 5%. 1000 milliLiter(s) (100 mL/Hr) IV Continuous <Continuous>  dextrose 50% Injectable 25 Gram(s) IV Push once  dextrose 50% Injectable 12.5 Gram(s) IV Push once  dextrose 50% Injectable 25 Gram(s) IV Push once  donepezil 10 milliGRAM(s) Oral at bedtime  dorzolamide 2%/timolol 0.5% Ophthalmic Solution 1 Drop(s) Both EYES every 12 hours  enoxaparin Injectable 100 milliGRAM(s) SubCutaneous two times a day  escitalopram 5 milliGRAM(s) Oral daily  glucagon  Injectable 1 milliGRAM(s) IntraMuscular once  insulin glargine Injectable (LANTUS) 7 Unit(s) SubCutaneous at bedtime  insulin lispro (ADMELOG) corrective regimen sliding scale   SubCutaneous three times a day before meals  insulin lispro (ADMELOG) corrective regimen sliding scale   SubCutaneous at bedtime  insulin lispro Injectable (ADMELOG) 2 Unit(s) SubCutaneous three times a day before meals  levETIRAcetam 500 milliGRAM(s) Oral two times a day  magnesium oxide 400 milliGRAM(s) Oral two times a day with meals  oxybutynin 5 milliGRAM(s) Oral two times a day  potassium phosphate / sodium phosphate Powder (PHOS-NaK) 1 Packet(s) Oral three times a day  remdesivir  IVPB 100 milliGRAM(s) IV Intermittent every 24 hours  remdesivir  IVPB   IV Intermittent   sodium chloride 0.9%. 1000 milliLiter(s) (100 mL/Hr) IV Continuous <Continuous>    MEDICATIONS  (PRN):  acetaminophen   Tablet .. 650 milliGRAM(s) Oral every 4 hours PRN Temp greater or equal to 38.5C (101.3F)  guaiFENesin   Syrup  (Sugar-Free) 200 milliGRAM(s) Oral every 6 hours PRN cough/congestion  sodium chloride 0.65% Nasal 1 Spray(s) Both Nostrils two times a day PRN Congestion      CAPILLARY BLOOD GLUCOSE      POCT Blood Glucose.: 172 mg/dL (16 Feb 2021 12:57)  POCT Blood Glucose.: 135 mg/dL (16 Feb 2021 09:19)  POCT Blood Glucose.: 339 mg/dL (15 Feb 2021 21:13)  POCT Blood Glucose.: 330 mg/dL (15 Feb 2021 17:55)    I&O's Summary    15 Feb 2021 07:01  -  16 Feb 2021 07:00  --------------------------------------------------------  IN: 0 mL / OUT: 700 mL / NET: -700 mL        PHYSICAL EXAM:  Vital Signs Last 24 Hrs  T(C): 36 (16 Feb 2021 09:53), Max: 37.1 (16 Feb 2021 06:50)  T(F): 96.8 (16 Feb 2021 09:53), Max: 98.8 (16 Feb 2021 06:50)  HR: 81 (16 Feb 2021 09:53) (72 - 81)  BP: 142/68 (16 Feb 2021 09:53) (142/68 - 155/76)  BP(mean): --  RR: 19 (16 Feb 2021 09:53) (19 - 19)  SpO2: 92% (16 Feb 2021 09:53) (92% - 93%)  CONSTITUTIONAL: NAD, well-developed  RESPIRATORY: Normal respiratory effort; lungs are clear to auscultation bilaterally  CARDIOVASCULAR: Regular rate and rhythm, normal S1 and S2, No lower extremity edema  ABDOMEN: Nontender to palpation, normoactive bowel sounds  MUSCLOSKELETAL: no clubbing or cyanosis of digits  PSYCH: A+O to person, place, and time; affect appropriate    LABS:                        12.3   9.85  )-----------( 457      ( 16 Feb 2021 09:05 )             37.9     02-16    141  |  107  |  24<H>  ----------------------------<  157<H>  3.2<L>   |  23  |  1.01    Ca    9.3      16 Feb 2021 09:05  Phos  1.8     02-16  Mg     1.5     02-16                  RADIOLOGY & ADDITIONAL TESTS:  Results Reviewed:   Imaging Personally Reviewed:  Electrocardiogram Personally Reviewed:    COORDINATION OF CARE:  Care Discussed with Consultants/Other Providers [Y/N]:  Prior or Outpatient Records Reviewed [Y/N]:

## 2021-02-16 NOTE — PROGRESS NOTE ADULT - PROBLEM SELECTOR PLAN 10
in right popliteal vein seen, age undeterminate. Also has IVC filter in place per MRI tech. Pt has had IVC filter for over 7 years.   started pt on lovenox 1 mg/kg in right popliteal vein seen, age undeterminate. Also has IVC filter in place per MRI tech. Pt has had IVC filter for over 7 years.   started pt on lovenox 1 mg/kg,    called her daughter.she had IVC  as she had fall and the physician were worried about ICH.  CT head neg.daughter ok with blood thinners.

## 2021-02-16 NOTE — PROGRESS NOTE ADULT - ASSESSMENT
86 y/o F with hx of dementia (A&Ox2-3, forgetful at times), Diabetes type II (not on insulin), PPM, HTN, ?seizure on keppra, presents with falls. found to have RUE swelling concern for Plexitis. Would need MRI but has IVCF that's unclear if MRI compatible.

## 2021-02-16 NOTE — PROGRESS NOTE ADULT - PROBLEM SELECTOR PLAN 5
- Patient's arb was held given ?roberto v CKD at [facility]  - She was given IVF at [facility]  ROBERTO resolved  - Monitor Cr  - avoid nephrotoxic meds

## 2021-02-16 NOTE — DISCHARGE NOTE PROVIDER - EXTENDED VTE YES NO FOR MLM ENOXAPARIN
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 She had a physical in April. She stated that some of it may not apply as she got it done at her school. Please call the patient when the form is completed or if you have questions. The form was scanned and placed in the mailbox. Thank you   , .

## 2021-02-16 NOTE — DISCHARGE NOTE PROVIDER - CARE PROVIDER_API CALL
Lexa Purdy)  Orthopaedic Surgery  [address], [address]  [place] [address]  Phone: [phone]  Fax: [phone]  Follow Up Time:

## 2021-02-16 NOTE — PROGRESS NOTE ADULT - PROBLEM SELECTOR PLAN 2
Patient requiring 2L on 2/13  - c/w remdesivir and decadron   -trend inflammatory markers  - wean O2 as tolerated  on 1 L oxygen

## 2021-02-16 NOTE — DISCHARGE NOTE PROVIDER - HOSPITAL COURSE
86 y/o F with hx of dementia (A&Ox2-3, forgetful at times), Diabetes type II (not on insulin), PPM, HTN, ?seizure on keppra, presents with falls. found to have RUE swelling concern for Plexitis.     Pain of right upper extremity.   - Patient complaining of numbness, and pain, has elevated CK at [facility], concern for developing compartment syndrome in setting of rhabdomyolysis -> patient s/p IVF, pulses intact currently   - Shoulder, elbow, humerus, forearm, wrist xrays --> neg for fracture  - Neurovascular checks q4h   - Neuro consult for further eval given concern for plexus injury though low suspicion at this time,   - Surgery eval on pt 2/7 not concerned about compartment syndrome  - RUE dopplers negative for DVT  - Pacemaker interrogated and MRI is conditional, consent obtained for MRI  - RUE less swollen compared to 2 days ago on 2/13/2021  - CT right upper extremity IV contrast ----------    COVID-19.   - Patient requiring 2L on 2/13  - Pt received remdesivir and decadron   - Pt placed on isolation precautions   - Inflammatory markers trended q 72 hours   - Oxygen saturation monitored, supplemental oxygen given as needed    Falls  - Patient with multiple falls which are unwitnessed but patient states that she falls due to weakness, denies any syncope, palpitations, LOC, chest pain, SOB prior to her falls  - CTH/CT spine: no acute findings  - Pelvis xray; neg    Lung nodule.    - CT chest at [facility] incidentally found: Irregular 1.9 cm left upper lobe nodular opacity with associated spiculation.   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  - Patient did not want to know about these results and the daughter is already aware of these results  - Pt to follow up with pulm and PCP as outpatient for further workup of these CT findings.    ROBERTO (acute kidney injury).     - ARB held in setting of ROBERTO   - SP IVF  - Cr monitored  - Nephrotoxic medications avoided  - Pt to follow up with PCP as outpatient for further monitoring of kidney function.     Type 2 diabetes mellitus with hyperglycemia, without long-term current use of insulin.   - A1c 10.6   - FS significantly elevated on admission, not on insulin at home, possibly elevated in setting of COVID19 and rhabdomyolysis  - FS basal/bolus + ISS.   - monitor fingersticks  - Oral hypoglycemics held during admission   - Pt to follow up with endocrinology as outpatient for further monitoring.     Seizure history   - Keppra continued   - Pt to follow up with PCP for further management.     Dementia.   - Aricept, lexapro continued  - Pt to follow up with PCP for further management.      Need for prophylactic measure.  - Lovenox for VTE px given COVID status.     DVT (deep venous thrombosis).  - in right popliteal vein seen, age undeterminate.   - Pt with IVC filter  - will start pt on lovenox 1 mg/kg.    On_________, discussed with __________, patient is medically cleared and optimized for discharge today. All medications were reviewed with attending, and sent to mutually agreed upon pharmacy.   84 y/o F with hx of dementia (A&Ox2-3, forgetful at times), Diabetes type II (not on insulin), PPM, HTN, ?seizure on keppra, presents with falls. found to have RUE swelling concern for Plexitis.     Hospital course:    Pt admitted with RUE pain and found on the floor with recurrent fall. Admission also noted with COVID. Elevated CK likely rhabdo, pt s/p IVF with improvement. Xray shoulder, elbow, humeral, forearm, wrist xray neg for fx. VA duplex 2/9 of RUE negative for DVT, noted significant edema throughout arm.  MRI shoulder noted supraspinatous tear. MRI C spine noted degenerative disc disease most pronounced at the C6-C7 level, where there is severe right-sided foraminal narrowing secondary to endplate osteophyte formation. Other multilevel degenerative changes, as outlined. Recommended outpatient followup. Ortho eval called with no needs for surgical intervention, mobility OT/PT.  Patient with multiple falls which are unwitnessed but patient states that she falls due to weakness, denies any syncope, palpitations, LOC, chest pain, SOB prior to her falls. CTH/ CT spine and plevic Xray no acute finding. Pt s/p remdisivir and decadone. Pt required 2L NC during admission, continue to wean as tolerates. [facility] CT chest noted irregular 1.9 cm left upper lobe nodular opacity with associated spiculation. Repeat CT chest with IV contrast noted some of the segmental and subsegmental branches of pulmonary arteries are not evaluated secondary to motion. Otherwise, no pulmonary embolus. Bilateral groundglass and reticular opacities within all lobes new from 2/6/2021 and can represent atypical/viral infection. Approximately 2.2 x 1.2 cm spiculated nodule within left upper lobe is unchanged. Recommended outpt pulmonary follow for further workup. Pt with + DVT of indeterminate age on RLE on elqiuis. Hospital course noted for ROBERTO now resolved. Pt's A1C elevated 10.6, insulin regimen tailored.     PT recommended rehab    Case discussed with Dr Colindres *****   84 y/o F with hx of dementia (A&Ox2-3, forgetful at times), Diabetes type II (not on insulin), PPM, HTN, ?seizure on keppra, presents with falls. found to have RUE swelling concern for Plexitis.     Hospital course:    Pt admitted with RUE pain and found on the floor with recurrent fall. Admission also noted with COVID. Elevated CK likely rhabdo, pt s/p IVF with improvement. Xray shoulder, elbow, humeral, forearm, wrist xray neg for fx. VA duplex 2/9 of RUE negative for DVT, noted significant edema throughout arm.  MRI shoulder noted supraspinatous tear. MRI C spine noted degenerative disc disease most pronounced at the C6-C7 level, where there is severe right-sided foraminal narrowing secondary to endplate osteophyte formation. Other multilevel degenerative changes, as outlined. Recommended outpatient followup. Ortho eval called with no needs for surgical intervention, mobility OT/PT.  Patient with multiple falls which are unwitnessed but patient states that she falls due to weakness, denies any syncope, palpitations, LOC, chest pain, SOB prior to her falls. CTH/ CT spine and plevic Xray no acute finding. Pt s/p remdisivir and decadone. Pt required 2L NC during admission, continue to wean as tolerates. [facility] CT chest noted irregular 1.9 cm left upper lobe nodular opacity with associated spiculation. Repeat CT chest with IV contrast noted some of the segmental and subsegmental branches of pulmonary arteries are not evaluated secondary to motion. Otherwise, no pulmonary embolus. Bilateral groundglass and reticular opacities within all lobes new from 2/6/2021 and can represent atypical/viral infection. Approximately 2.2 x 1.2 cm spiculated nodule within left upper lobe is unchanged. Recommended outpt pulmonary follow for further workup. Pt with + DVT of indeterminate age on RLE on elqiuis. Hospital course noted for ROBERTO now resolved. Pt's A1C elevated 10.6, insulin regimen tailored.     PT recommended rehab    Case discussed with Dr Colindres on 2/26 pt medically optimized for discharge to rehab.

## 2021-02-16 NOTE — PROGRESS NOTE ADULT - PROBLEM SELECTOR PLAN 4
- CT chest at [facility] incidentally found: Irregular 1.9 cm left upper lobe nodular opacity with associated spiculation.   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  - Patient did not want to know about these results and the daughter is already aware of these results  - will need  outpatient pulm follow up

## 2021-02-17 LAB
ALBUMIN SERPL ELPH-MCNC: 2.8 G/DL — LOW (ref 3.3–5)
ALP SERPL-CCNC: 61 U/L — SIGNIFICANT CHANGE UP (ref 40–120)
ALT FLD-CCNC: 43 U/L — HIGH (ref 4–33)
ANION GAP SERPL CALC-SCNC: 10 MMOL/L — SIGNIFICANT CHANGE UP (ref 7–14)
AST SERPL-CCNC: 47 U/L — HIGH (ref 4–32)
BILIRUB SERPL-MCNC: 0.4 MG/DL — SIGNIFICANT CHANGE UP (ref 0.2–1.2)
BUN SERPL-MCNC: 19 MG/DL — SIGNIFICANT CHANGE UP (ref 7–23)
CALCIUM SERPL-MCNC: 9.1 MG/DL — SIGNIFICANT CHANGE UP (ref 8.4–10.5)
CHLORIDE SERPL-SCNC: 106 MMOL/L — SIGNIFICANT CHANGE UP (ref 98–107)
CO2 SERPL-SCNC: 23 MMOL/L — SIGNIFICANT CHANGE UP (ref 22–31)
COPPER SERPL-MCNC: 148 UG/DL — SIGNIFICANT CHANGE UP (ref 80–158)
CREAT SERPL-MCNC: 0.87 MG/DL — SIGNIFICANT CHANGE UP (ref 0.5–1.3)
GLUCOSE BLDC GLUCOMTR-MCNC: 200 MG/DL — HIGH (ref 70–99)
GLUCOSE BLDC GLUCOMTR-MCNC: 217 MG/DL — HIGH (ref 70–99)
GLUCOSE BLDC GLUCOMTR-MCNC: 263 MG/DL — HIGH (ref 70–99)
GLUCOSE BLDC GLUCOMTR-MCNC: 288 MG/DL — HIGH (ref 70–99)
GLUCOSE SERPL-MCNC: 205 MG/DL — HIGH (ref 70–99)
HCT VFR BLD CALC: 38.2 % — SIGNIFICANT CHANGE UP (ref 34.5–45)
HGB BLD-MCNC: 12.3 G/DL — SIGNIFICANT CHANGE UP (ref 11.5–15.5)
MAGNESIUM SERPL-MCNC: 1.4 MG/DL — LOW (ref 1.6–2.6)
MCHC RBC-ENTMCNC: 27 PG — SIGNIFICANT CHANGE UP (ref 27–34)
MCHC RBC-ENTMCNC: 32.2 GM/DL — SIGNIFICANT CHANGE UP (ref 32–36)
MCV RBC AUTO: 83.8 FL — SIGNIFICANT CHANGE UP (ref 80–100)
NRBC # BLD: 0 /100 WBCS — SIGNIFICANT CHANGE UP
NRBC # FLD: 0 K/UL — SIGNIFICANT CHANGE UP
PHOSPHATE SERPL-MCNC: 2.5 MG/DL — SIGNIFICANT CHANGE UP (ref 2.5–4.5)
PLATELET # BLD AUTO: 442 K/UL — HIGH (ref 150–400)
POTASSIUM SERPL-MCNC: 3.2 MMOL/L — LOW (ref 3.5–5.3)
POTASSIUM SERPL-SCNC: 3.2 MMOL/L — LOW (ref 3.5–5.3)
PROT SERPL-MCNC: 6.2 G/DL — SIGNIFICANT CHANGE UP (ref 6–8.3)
RBC # BLD: 4.56 M/UL — SIGNIFICANT CHANGE UP (ref 3.8–5.2)
RBC # FLD: 12.4 % — SIGNIFICANT CHANGE UP (ref 10.3–14.5)
SODIUM SERPL-SCNC: 139 MMOL/L — SIGNIFICANT CHANGE UP (ref 135–145)
WBC # BLD: 11.2 K/UL — HIGH (ref 3.8–10.5)
WBC # FLD AUTO: 11.2 K/UL — HIGH (ref 3.8–10.5)
ZINC SERPL-MCNC: 46 UG/DL — SIGNIFICANT CHANGE UP (ref 44–115)

## 2021-02-17 PROCEDURE — 99232 SBSQ HOSP IP/OBS MODERATE 35: CPT

## 2021-02-17 RX ORDER — POTASSIUM CHLORIDE 20 MEQ
40 PACKET (EA) ORAL EVERY 4 HOURS
Refills: 0 | Status: DISCONTINUED | OUTPATIENT
Start: 2021-02-17 | End: 2021-02-17

## 2021-02-17 RX ORDER — MAGNESIUM SULFATE 500 MG/ML
1 VIAL (ML) INJECTION ONCE
Refills: 0 | Status: COMPLETED | OUTPATIENT
Start: 2021-02-17 | End: 2021-02-17

## 2021-02-17 RX ORDER — POTASSIUM CHLORIDE 20 MEQ
40 PACKET (EA) ORAL EVERY 6 HOURS
Refills: 0 | Status: COMPLETED | OUTPATIENT
Start: 2021-02-17 | End: 2021-02-17

## 2021-02-17 RX ORDER — INSULIN GLARGINE 100 [IU]/ML
12 INJECTION, SOLUTION SUBCUTANEOUS AT BEDTIME
Refills: 0 | Status: DISCONTINUED | OUTPATIENT
Start: 2021-02-17 | End: 2021-02-20

## 2021-02-17 RX ADMIN — ATORVASTATIN CALCIUM 20 MILLIGRAM(S): 80 TABLET, FILM COATED ORAL at 21:22

## 2021-02-17 RX ADMIN — AMLODIPINE BESYLATE 5 MILLIGRAM(S): 2.5 TABLET ORAL at 06:09

## 2021-02-17 RX ADMIN — Medication 100 GRAM(S): at 10:24

## 2021-02-17 RX ADMIN — Medication 3: at 13:22

## 2021-02-17 RX ADMIN — Medication 6 MILLIGRAM(S): at 06:14

## 2021-02-17 RX ADMIN — Medication 5 MILLIGRAM(S): at 17:57

## 2021-02-17 RX ADMIN — INSULIN GLARGINE 12 UNIT(S): 100 INJECTION, SOLUTION SUBCUTANEOUS at 22:26

## 2021-02-17 RX ADMIN — MAGNESIUM OXIDE 400 MG ORAL TABLET 400 MILLIGRAM(S): 241.3 TABLET ORAL at 09:11

## 2021-02-17 RX ADMIN — Medication 5 MILLIGRAM(S): at 06:14

## 2021-02-17 RX ADMIN — DORZOLAMIDE HYDROCHLORIDE TIMOLOL MALEATE 1 DROP(S): 20; 5 SOLUTION/ DROPS OPHTHALMIC at 17:57

## 2021-02-17 RX ADMIN — SODIUM CHLORIDE 100 MILLILITER(S): 9 INJECTION INTRAMUSCULAR; INTRAVENOUS; SUBCUTANEOUS at 10:24

## 2021-02-17 RX ADMIN — Medication 1 SPRAY(S): at 17:57

## 2021-02-17 RX ADMIN — Medication 2 UNIT(S): at 13:22

## 2021-02-17 RX ADMIN — Medication 40 MILLIEQUIVALENT(S): at 17:57

## 2021-02-17 RX ADMIN — SODIUM CHLORIDE 100 MILLILITER(S): 9 INJECTION INTRAMUSCULAR; INTRAVENOUS; SUBCUTANEOUS at 09:09

## 2021-02-17 RX ADMIN — DONEPEZIL HYDROCHLORIDE 10 MILLIGRAM(S): 10 TABLET, FILM COATED ORAL at 21:22

## 2021-02-17 RX ADMIN — Medication 2 UNIT(S): at 09:10

## 2021-02-17 RX ADMIN — DORZOLAMIDE HYDROCHLORIDE TIMOLOL MALEATE 1 DROP(S): 20; 5 SOLUTION/ DROPS OPHTHALMIC at 06:09

## 2021-02-17 RX ADMIN — LEVETIRACETAM 500 MILLIGRAM(S): 250 TABLET, FILM COATED ORAL at 17:58

## 2021-02-17 RX ADMIN — ESCITALOPRAM OXALATE 5 MILLIGRAM(S): 10 TABLET, FILM COATED ORAL at 10:26

## 2021-02-17 RX ADMIN — Medication 2: at 09:10

## 2021-02-17 RX ADMIN — ENOXAPARIN SODIUM 100 MILLIGRAM(S): 100 INJECTION SUBCUTANEOUS at 06:09

## 2021-02-17 RX ADMIN — Medication 1 PACKET(S): at 06:09

## 2021-02-17 RX ADMIN — Medication 3: at 17:58

## 2021-02-17 RX ADMIN — Medication 2 UNIT(S): at 17:58

## 2021-02-17 RX ADMIN — LEVETIRACETAM 500 MILLIGRAM(S): 250 TABLET, FILM COATED ORAL at 06:09

## 2021-02-17 RX ADMIN — Medication 40 MILLIEQUIVALENT(S): at 10:28

## 2021-02-17 RX ADMIN — REMDESIVIR 500 MILLIGRAM(S): 5 INJECTION INTRAVENOUS at 13:23

## 2021-02-17 RX ADMIN — ENOXAPARIN SODIUM 100 MILLIGRAM(S): 100 INJECTION SUBCUTANEOUS at 17:57

## 2021-02-17 NOTE — PROGRESS NOTE ADULT - SUBJECTIVE AND OBJECTIVE BOX
Patient is a 85y old  Female who presents with a chief complaint of COVID/Falls (16 Feb 2021 15:32)      SUBJECTIVE / OVERNIGHT EVENTS:Pt doing well. No new complaints, denies rt upper extr weakness,pain  ADDITIONAL REVIEW OF SYSTEMS:    MEDICATIONS  (STANDING):  amLODIPine   Tablet 5 milliGRAM(s) Oral daily  atorvastatin 20 milliGRAM(s) Oral at bedtime  dexAMETHasone  Injectable 6 milliGRAM(s) IV Push daily  dextrose 40% Gel 15 Gram(s) Oral once  dextrose 5%. 1000 milliLiter(s) (50 mL/Hr) IV Continuous <Continuous>  dextrose 5%. 1000 milliLiter(s) (100 mL/Hr) IV Continuous <Continuous>  dextrose 50% Injectable 25 Gram(s) IV Push once  dextrose 50% Injectable 12.5 Gram(s) IV Push once  dextrose 50% Injectable 25 Gram(s) IV Push once  donepezil 10 milliGRAM(s) Oral at bedtime  dorzolamide 2%/timolol 0.5% Ophthalmic Solution 1 Drop(s) Both EYES every 12 hours  enoxaparin Injectable 100 milliGRAM(s) SubCutaneous two times a day  escitalopram 5 milliGRAM(s) Oral daily  glucagon  Injectable 1 milliGRAM(s) IntraMuscular once  insulin glargine Injectable (LANTUS) 7 Unit(s) SubCutaneous at bedtime  insulin lispro (ADMELOG) corrective regimen sliding scale   SubCutaneous three times a day before meals  insulin lispro (ADMELOG) corrective regimen sliding scale   SubCutaneous at bedtime  insulin lispro Injectable (ADMELOG) 2 Unit(s) SubCutaneous three times a day before meals  levETIRAcetam 500 milliGRAM(s) Oral two times a day  oxybutynin 5 milliGRAM(s) Oral two times a day  potassium chloride   Powder 40 milliEquivalent(s) Oral every 6 hours  remdesivir  IVPB 100 milliGRAM(s) IV Intermittent every 24 hours  remdesivir  IVPB   IV Intermittent   sodium chloride 0.9%. 1000 milliLiter(s) (100 mL/Hr) IV Continuous <Continuous>    MEDICATIONS  (PRN):  acetaminophen   Tablet .. 650 milliGRAM(s) Oral every 4 hours PRN Temp greater or equal to 38.5C (101.3F)  guaiFENesin   Syrup  (Sugar-Free) 200 milliGRAM(s) Oral every 6 hours PRN cough/congestion  sodium chloride 0.65% Nasal 1 Spray(s) Both Nostrils two times a day PRN Congestion      CAPILLARY BLOOD GLUCOSE      POCT Blood Glucose.: 288 mg/dL (17 Feb 2021 13:05)  POCT Blood Glucose.: 217 mg/dL (17 Feb 2021 09:01)  POCT Blood Glucose.: 235 mg/dL (16 Feb 2021 22:11)  POCT Blood Glucose.: 225 mg/dL (16 Feb 2021 17:40)    I&O's Summary    16 Feb 2021 07:01  -  17 Feb 2021 07:00  --------------------------------------------------------  IN: 0 mL / OUT: 1000 mL / NET: -1000 mL        PHYSICAL EXAM:  Vital Signs Last 24 Hrs  T(C): 36.5 (17 Feb 2021 10:35), Max: 36.6 (16 Feb 2021 22:28)  T(F): 97.7 (17 Feb 2021 10:35), Max: 97.9 (16 Feb 2021 22:28)  HR: 94 (17 Feb 2021 10:35) (74 - 94)  BP: 131/73 (17 Feb 2021 10:35) (123/68 - 148/83)  BP(mean): --  RR: 18 (17 Feb 2021 10:35) (18 - 18)  SpO2: 93% (17 Feb 2021 10:35) (91% - 93%)  CONSTITUTIONAL: NAD, well-developed  RESPIRATORY: Normal respiratory effort; lungs are clear to auscultation bilaterally  CARDIOVASCULAR: Regular rate and rhythm, normal S1 and S2, No lower extremity edema  ABDOMEN: Nontender to palpation, normoactive bowel sounds  MUSCLOSKELETAL: no clubbing or cyanosis of digits  PSYCH: A+O to person, place, and time; affect appropriate  extr-swelling rt upper extr, janes on post side    LABS:                        12.3   11.20 )-----------( 442      ( 17 Feb 2021 08:23 )             38.2     02-17    139  |  106  |  19  ----------------------------<  205<H>  3.2<L>   |  23  |  0.87    Ca    9.1      17 Feb 2021 08:23  Phos  2.5     02-17  Mg     1.4     02-17    TPro  6.2  /  Alb  2.8<L>  /  TBili  0.4  /  DBili  x   /  AST  47<H>  /  ALT  43<H>  /  AlkPhos  61  02-17                RADIOLOGY & ADDITIONAL TESTS:  Results Reviewed:   Imaging Personally Reviewed:  Electrocardiogram Personally Reviewed:    COORDINATION OF CARE:  Care Discussed with Consultants/Other Providers [Y/N]:  Prior or Outpatient Records Reviewed [Y/N]:

## 2021-02-17 NOTE — PROGRESS NOTE ADULT - PROBLEM SELECTOR PLAN 5
- Patient's arb was held given ?roberto v CKD at [facility]  - She was given IVF at [facility]  ROBERTO resolved  - Monitor Cr  - avoid nephrotoxic meds

## 2021-02-17 NOTE — PROGRESS NOTE ADULT - PROBLEM SELECTOR PLAN 4
- CT chest at [facility] incidentally found: Irregular 1.9 cm left upper lobe nodular opacity with associated spiculation.   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  - Patient did not want to know about these results and the daughter is already aware of these results  - will need  outpatient pulm follow up

## 2021-02-17 NOTE — PROGRESS NOTE ADULT - PROBLEM SELECTOR PLAN 10
in right popliteal vein seen, age undeterminate. Also has IVC filter in place per MRI tech. Pt has had IVC filter for over 7 years.   started pt on lovenox 1 mg/kg,    called her daughter.she had IVC  as she had fall and the physician were worried about ICH.  CT head neg.daughter ok with blood thinners.

## 2021-02-17 NOTE — PROGRESS NOTE ADULT - PROBLEM SELECTOR PLAN 6
- FS significantly elevated on admission, not on insulin at home, possibly elevated in setting of COVID19 and rhabdomyolysis  - FS basal/bolus + ISS.   - FS better controlled  - monitor fingersticks  - A1C  - hold oral hypoglycemics  sugars high- increase lantus - FS significantly elevated on admission, not on insulin at home, possibly elevated in setting of COVID19 and rhabdomyolysis  - FS basal/bolus + ISS.   - FS better controlled  - monitor fingersticks  - A1C 10.6  - hold oral hypoglycemics  sugars high- increase lantus

## 2021-02-17 NOTE — PROGRESS NOTE ADULT - PROBLEM SELECTOR PLAN 1
Patient complaining of numbness, and pain, has elevated CK at [facility], concern for developing compartment syndrome in setting of rhabdomyolysis -> patient s/p IVF, pulses intact currently   - Shoulder, elbow, humerus, forearm, wrist xrays --> neg for fracture  - Neurovascular checks q4h for now  - neuro consult for further eval given concern for plexus injury though low suspicion at this time,   - surgery eval on pt 2/7 not concerned about compartment syndrome  - RUE dopplers negative for DVT  - pacemaker interrogated and MRI is conditional, consent obtained for MRI  - Patient was supposed to get MRI on 2/13 but was found to have an IVC filter, per MRI tech since some IVC filters are not compatible with the MRI, exam was cancelled.  Pt had procedure at [facility]. request faxed to get med records.  CK downtrending.  - RUE less swollen compared to 2 days ago on 2/13/2021  - CT right upper extremity IV contrast ordered per neuro recs  - CT will likely be low yield, continue efforts on obtaining MRI   - f/u labs, monitor CK, electrolytes.  Called [facility] regarding the med records,they will fax it today

## 2021-02-18 LAB
% ALBUMIN: 36.2 % — SIGNIFICANT CHANGE UP
% ALPHA 1: 9.4 % — SIGNIFICANT CHANGE UP
% ALPHA 2: 21.6 % — SIGNIFICANT CHANGE UP
% BETA: 16.6 % — SIGNIFICANT CHANGE UP
% GAMMA: 16.3 % — SIGNIFICANT CHANGE UP
ALBUMIN SERPL ELPH-MCNC: 2.21 G/DL — LOW (ref 3.3–4.4)
ALBUMIN SERPL ELPH-MCNC: 2.8 G/DL — LOW (ref 3.3–5)
ALBUMIN/GLOB SERPL ELPH: 0.6 RATIO — SIGNIFICANT CHANGE UP
ALP SERPL-CCNC: 63 U/L — SIGNIFICANT CHANGE UP (ref 40–120)
ALPHA1 GLOB SERPL ELPH-MCNC: 0.57 G/DL — HIGH (ref 0.1–0.3)
ALPHA2 GLOB SERPL ELPH-MCNC: 1.3 G/DL — HIGH (ref 0.6–1)
ALT FLD-CCNC: 63 U/L — HIGH (ref 4–33)
ANION GAP SERPL CALC-SCNC: 12 MMOL/L — SIGNIFICANT CHANGE UP (ref 7–14)
AST SERPL-CCNC: 68 U/L — HIGH (ref 4–32)
B-GLOBULIN SERPL ELPH-MCNC: 1.01 G/DL — SIGNIFICANT CHANGE UP (ref 0.6–1.1)
BILIRUB SERPL-MCNC: 0.4 MG/DL — SIGNIFICANT CHANGE UP (ref 0.2–1.2)
BUN SERPL-MCNC: 18 MG/DL — SIGNIFICANT CHANGE UP (ref 7–23)
CALCIUM SERPL-MCNC: 8.6 MG/DL — SIGNIFICANT CHANGE UP (ref 8.4–10.5)
CHLORIDE SERPL-SCNC: 107 MMOL/L — SIGNIFICANT CHANGE UP (ref 98–107)
CO2 SERPL-SCNC: 22 MMOL/L — SIGNIFICANT CHANGE UP (ref 22–31)
CREAT SERPL-MCNC: 0.83 MG/DL — SIGNIFICANT CHANGE UP (ref 0.5–1.3)
CRP SERPL-MCNC: 34.3 MG/L — HIGH
D DIMER BLD IA.RAPID-MCNC: 287 NG/ML DDU — HIGH
FERRITIN SERPL-MCNC: 391 NG/ML — HIGH (ref 15–150)
FERRITIN SERPL-MCNC: 428 NG/ML — HIGH (ref 15–150)
GAMMA GLOBULIN: 0.99 G/DL — SIGNIFICANT CHANGE UP (ref 0.7–1.7)
GLUCOSE BLDC GLUCOMTR-MCNC: 117 MG/DL — HIGH (ref 70–99)
GLUCOSE BLDC GLUCOMTR-MCNC: 182 MG/DL — HIGH (ref 70–99)
GLUCOSE BLDC GLUCOMTR-MCNC: 231 MG/DL — HIGH (ref 70–99)
GLUCOSE BLDC GLUCOMTR-MCNC: 248 MG/DL — HIGH (ref 70–99)
GLUCOSE SERPL-MCNC: 128 MG/DL — HIGH (ref 70–99)
HCT VFR BLD CALC: 39.8 % — SIGNIFICANT CHANGE UP (ref 34.5–45)
HGB BLD-MCNC: 12.8 G/DL — SIGNIFICANT CHANGE UP (ref 11.5–15.5)
HOMOCYSTEINE LEVEL: 7.7 UMOL/L — SIGNIFICANT CHANGE UP
LDH SERPL L TO P-CCNC: 397 U/L — HIGH (ref 135–225)
LDH SERPL L TO P-CCNC: 691 U/L — HIGH (ref 135–225)
MAGNESIUM SERPL-MCNC: 1.5 MG/DL — LOW (ref 1.6–2.6)
MCHC RBC-ENTMCNC: 27.1 PG — SIGNIFICANT CHANGE UP (ref 27–34)
MCHC RBC-ENTMCNC: 32.2 GM/DL — SIGNIFICANT CHANGE UP (ref 32–36)
MCV RBC AUTO: 84.3 FL — SIGNIFICANT CHANGE UP (ref 80–100)
METHYLMALONIC ACID LEVEL: 160 NMOL/L — SIGNIFICANT CHANGE UP (ref 87–318)
NRBC # BLD: 0 /100 WBCS — SIGNIFICANT CHANGE UP
NRBC # FLD: 0.02 K/UL — HIGH
PHOSPHATE SERPL-MCNC: 2.2 MG/DL — LOW (ref 2.5–4.5)
PLATELET # BLD AUTO: 445 K/UL — HIGH (ref 150–400)
POTASSIUM SERPL-MCNC: 3.2 MMOL/L — LOW (ref 3.5–5.3)
POTASSIUM SERPL-SCNC: 3.2 MMOL/L — LOW (ref 3.5–5.3)
PROCALCITONIN SERPL-MCNC: 0.07 NG/ML — SIGNIFICANT CHANGE UP (ref 0.02–0.1)
PROT PATTERN SERPL ELPH-IMP: SIGNIFICANT CHANGE UP
PROT SERPL-MCNC: 6 G/DL — SIGNIFICANT CHANGE UP (ref 6–8.3)
PROT SERPL-MCNC: 6.1 G/DL — SIGNIFICANT CHANGE UP
RBC # BLD: 4.72 M/UL — SIGNIFICANT CHANGE UP (ref 3.8–5.2)
RBC # FLD: 12.3 % — SIGNIFICANT CHANGE UP (ref 10.3–14.5)
SODIUM SERPL-SCNC: 141 MMOL/L — SIGNIFICANT CHANGE UP (ref 135–145)
WBC # BLD: 10.91 K/UL — HIGH (ref 3.8–10.5)
WBC # FLD AUTO: 10.91 K/UL — HIGH (ref 3.8–10.5)

## 2021-02-18 PROCEDURE — 99233 SBSQ HOSP IP/OBS HIGH 50: CPT

## 2021-02-18 RX ORDER — MAGNESIUM SULFATE 500 MG/ML
2 VIAL (ML) INJECTION ONCE
Refills: 0 | Status: COMPLETED | OUTPATIENT
Start: 2021-02-18 | End: 2021-02-18

## 2021-02-18 RX ORDER — POTASSIUM CHLORIDE 20 MEQ
40 PACKET (EA) ORAL EVERY 4 HOURS
Refills: 0 | Status: COMPLETED | OUTPATIENT
Start: 2021-02-18 | End: 2021-02-18

## 2021-02-18 RX ORDER — POTASSIUM CHLORIDE 20 MEQ
40 PACKET (EA) ORAL EVERY 4 HOURS
Refills: 0 | Status: DISCONTINUED | OUTPATIENT
Start: 2021-02-18 | End: 2021-02-18

## 2021-02-18 RX ORDER — SODIUM,POTASSIUM PHOSPHATES 278-250MG
1 POWDER IN PACKET (EA) ORAL
Refills: 0 | Status: COMPLETED | OUTPATIENT
Start: 2021-02-18 | End: 2021-02-19

## 2021-02-18 RX ORDER — APIXABAN 2.5 MG/1
5 TABLET, FILM COATED ORAL EVERY 12 HOURS
Refills: 0 | Status: DISCONTINUED | OUTPATIENT
Start: 2021-02-19 | End: 2021-03-02

## 2021-02-18 RX ORDER — APIXABAN 2.5 MG/1
10 TABLET, FILM COATED ORAL EVERY 12 HOURS
Refills: 0 | Status: COMPLETED | OUTPATIENT
Start: 2021-02-18 | End: 2021-02-19

## 2021-02-18 RX ADMIN — DONEPEZIL HYDROCHLORIDE 10 MILLIGRAM(S): 10 TABLET, FILM COATED ORAL at 23:01

## 2021-02-18 RX ADMIN — Medication 6 MILLIGRAM(S): at 05:08

## 2021-02-18 RX ADMIN — Medication 1 SPRAY(S): at 23:01

## 2021-02-18 RX ADMIN — Medication 2 UNIT(S): at 12:37

## 2021-02-18 RX ADMIN — Medication 40 MILLIEQUIVALENT(S): at 12:32

## 2021-02-18 RX ADMIN — Medication 2: at 17:56

## 2021-02-18 RX ADMIN — Medication 50 GRAM(S): at 08:33

## 2021-02-18 RX ADMIN — ATORVASTATIN CALCIUM 20 MILLIGRAM(S): 80 TABLET, FILM COATED ORAL at 23:01

## 2021-02-18 RX ADMIN — Medication 50 GRAM(S): at 15:51

## 2021-02-18 RX ADMIN — INSULIN GLARGINE 12 UNIT(S): 100 INJECTION, SOLUTION SUBCUTANEOUS at 23:01

## 2021-02-18 RX ADMIN — Medication 40 MILLIEQUIVALENT(S): at 15:51

## 2021-02-18 RX ADMIN — SODIUM CHLORIDE 100 MILLILITER(S): 9 INJECTION INTRAMUSCULAR; INTRAVENOUS; SUBCUTANEOUS at 15:52

## 2021-02-18 RX ADMIN — ENOXAPARIN SODIUM 100 MILLIGRAM(S): 100 INJECTION SUBCUTANEOUS at 05:08

## 2021-02-18 RX ADMIN — DORZOLAMIDE HYDROCHLORIDE TIMOLOL MALEATE 1 DROP(S): 20; 5 SOLUTION/ DROPS OPHTHALMIC at 05:08

## 2021-02-18 RX ADMIN — Medication 200 MILLIGRAM(S): at 18:03

## 2021-02-18 RX ADMIN — SODIUM CHLORIDE 100 MILLILITER(S): 9 INJECTION INTRAMUSCULAR; INTRAVENOUS; SUBCUTANEOUS at 08:29

## 2021-02-18 RX ADMIN — Medication 40 MILLIEQUIVALENT(S): at 17:57

## 2021-02-18 RX ADMIN — ESCITALOPRAM OXALATE 5 MILLIGRAM(S): 10 TABLET, FILM COATED ORAL at 17:57

## 2021-02-18 RX ADMIN — Medication 200 MILLIGRAM(S): at 12:38

## 2021-02-18 RX ADMIN — APIXABAN 10 MILLIGRAM(S): 2.5 TABLET, FILM COATED ORAL at 18:03

## 2021-02-18 RX ADMIN — AMLODIPINE BESYLATE 5 MILLIGRAM(S): 2.5 TABLET ORAL at 05:08

## 2021-02-18 RX ADMIN — DORZOLAMIDE HYDROCHLORIDE TIMOLOL MALEATE 1 DROP(S): 20; 5 SOLUTION/ DROPS OPHTHALMIC at 17:57

## 2021-02-18 RX ADMIN — Medication 1 PACKET(S): at 18:03

## 2021-02-18 RX ADMIN — Medication 2: at 12:37

## 2021-02-18 RX ADMIN — Medication 5 MILLIGRAM(S): at 05:08

## 2021-02-18 RX ADMIN — LEVETIRACETAM 500 MILLIGRAM(S): 250 TABLET, FILM COATED ORAL at 05:08

## 2021-02-18 RX ADMIN — REMDESIVIR 500 MILLIGRAM(S): 5 INJECTION INTRAVENOUS at 12:33

## 2021-02-18 RX ADMIN — LEVETIRACETAM 500 MILLIGRAM(S): 250 TABLET, FILM COATED ORAL at 17:58

## 2021-02-18 RX ADMIN — Medication 5 MILLIGRAM(S): at 17:58

## 2021-02-18 RX ADMIN — Medication 2 UNIT(S): at 17:56

## 2021-02-18 NOTE — PROGRESS NOTE ADULT - PROBLEM SELECTOR PLAN 4
- CT chest at [facility] incidentally found: Irregular 1.9 cm left upper lobe nodular opacity with associated spiculation.   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  - Patient did not want to know about these results and the daughter is already aware of these results  - will need  outpatient pulm follow up

## 2021-02-18 NOTE — PROGRESS NOTE ADULT - PROBLEM SELECTOR PLAN 5
- Patient's arb was held given ?roberto v CKD at [facility]  - She was given IVF at [facility]  ROBERTO resolved  - Monitor Cr  - avoid nephrotoxic meds

## 2021-02-18 NOTE — PROGRESS NOTE ADULT - SUBJECTIVE AND OBJECTIVE BOX
Patient is a 86y old  Female who presents with a chief complaint of COVID/Falls (17 Feb 2021 15:04)    SUBJECTIVE / OVERNIGHT EVENTS:Pt doing well.Denies any complaints.  ADDITIONAL REVIEW OF SYSTEMS:    MEDICATIONS  (STANDING):  amLODIPine   Tablet 5 milliGRAM(s) Oral daily  atorvastatin 20 milliGRAM(s) Oral at bedtime  dexAMETHasone  Injectable 6 milliGRAM(s) IV Push daily  dextrose 40% Gel 15 Gram(s) Oral once  dextrose 5%. 1000 milliLiter(s) (50 mL/Hr) IV Continuous <Continuous>  dextrose 5%. 1000 milliLiter(s) (100 mL/Hr) IV Continuous <Continuous>  dextrose 50% Injectable 25 Gram(s) IV Push once  dextrose 50% Injectable 12.5 Gram(s) IV Push once  dextrose 50% Injectable 25 Gram(s) IV Push once  donepezil 10 milliGRAM(s) Oral at bedtime  dorzolamide 2%/timolol 0.5% Ophthalmic Solution 1 Drop(s) Both EYES every 12 hours  enoxaparin Injectable 100 milliGRAM(s) SubCutaneous two times a day  escitalopram 5 milliGRAM(s) Oral daily  glucagon  Injectable 1 milliGRAM(s) IntraMuscular once  insulin glargine Injectable (LANTUS) 12 Unit(s) SubCutaneous at bedtime  insulin lispro (ADMELOG) corrective regimen sliding scale   SubCutaneous three times a day before meals  insulin lispro (ADMELOG) corrective regimen sliding scale   SubCutaneous at bedtime  insulin lispro Injectable (ADMELOG) 2 Unit(s) SubCutaneous three times a day before meals  levETIRAcetam 500 milliGRAM(s) Oral two times a day  magnesium sulfate  IVPB 2 Gram(s) IV Intermittent once  oxybutynin 5 milliGRAM(s) Oral two times a day  potassium chloride    Tablet ER 40 milliEquivalent(s) Oral every 4 hours  potassium phosphate / sodium phosphate Powder (PHOS-NaK) 1 Packet(s) Oral two times a day  remdesivir  IVPB   IV Intermittent   remdesivir  IVPB 100 milliGRAM(s) IV Intermittent every 24 hours  sodium chloride 0.9%. 1000 milliLiter(s) (100 mL/Hr) IV Continuous <Continuous>    MEDICATIONS  (PRN):  acetaminophen   Tablet .. 650 milliGRAM(s) Oral every 4 hours PRN Temp greater or equal to 38.5C (101.3F)  guaiFENesin   Syrup  (Sugar-Free) 200 milliGRAM(s) Oral every 6 hours PRN cough/congestion  sodium chloride 0.65% Nasal 1 Spray(s) Both Nostrils two times a day PRN Congestion      CAPILLARY BLOOD GLUCOSE      POCT Blood Glucose.: 248 mg/dL (18 Feb 2021 12:12)  POCT Blood Glucose.: 117 mg/dL (18 Feb 2021 08:33)  POCT Blood Glucose.: 200 mg/dL (17 Feb 2021 21:49)  POCT Blood Glucose.: 263 mg/dL (17 Feb 2021 17:37)    I&O's Summary    17 Feb 2021 07:01  -  18 Feb 2021 07:00  --------------------------------------------------------  IN: 180 mL / OUT: 800 mL / NET: -620 mL        PHYSICAL EXAM:  Vital Signs Last 24 Hrs  T(C): 36.8 (18 Feb 2021 12:30), Max: 36.9 (18 Feb 2021 05:05)  T(F): 98.3 (18 Feb 2021 12:30), Max: 98.5 (18 Feb 2021 05:05)  HR: 74 (18 Feb 2021 12:30) (73 - 76)  BP: 150/54 (18 Feb 2021 12:30) (142/69 - 172/77)  BP(mean): --  RR: 16 (18 Feb 2021 12:30) (16 - 18)  SpO2: 95% (18 Feb 2021 12:30) (92% - 95%)  CONSTITUTIONAL: NAD, well-developed  RESPIRATORY: Normal respiratory effort; lungs are clear to auscultation bilaterally  CARDIOVASCULAR: Regular rate and rhythm, normal S1 and S2, No lower extremity edema  ABDOMEN: Nontender to palpation, normoactive bowel sounds  MUSCLOSKELETAL: no clubbing or cyanosis of digits  PSYCH: A+O to person, place, and time; affect appropriate  Extr- edema of rt arm, pulses palpable    LABS:                        12.8   10.91 )-----------( 445      ( 18 Feb 2021 06:56 )             39.8     02-18    141  |  107  |  18  ----------------------------<  128<H>  3.2<L>   |  22  |  0.83    Ca    8.6      18 Feb 2021 06:56  Phos  2.2     02-18  Mg     1.5     02-18    TPro  6.0  /  Alb  2.8<L>  /  TBili  0.4  /  DBili  x   /  AST  68<H>  /  ALT  63<H>  /  AlkPhos  63  02-18                RADIOLOGY & ADDITIONAL TESTS:  Results Reviewed:   Imaging Personally Reviewed:  Electrocardiogram Personally Reviewed:    COORDINATION OF CARE:  Care Discussed with Consultants/Other Providers [Y/N]:  Prior or Outpatient Records Reviewed [Y/N]:

## 2021-02-18 NOTE — PROGRESS NOTE ADULT - PROBLEM SELECTOR PLAN 1
Patient complaining of numbness, and pain, has elevated CK at [facility], concern for developing compartment syndrome in setting of rhabdomyolysis -> patient s/p IVF, pulses intact currently   - Shoulder, elbow, humerus, forearm, wrist xrays --> neg for fracture  - Neurovascular checks q4h for now  - neuro consult for further eval given concern for plexus injury though low suspicion at this time,   - surgery eval on pt 2/7 not concerned about compartment syndrome  - RUE dopplers negative for DVT  - pacemaker interrogated and MRI is conditional, consent obtained for MRI  - Patient was supposed to get MRI on 2/13 but was found to have an IVC filter, per MRI tech since some IVC filters are not compatible with the MRI, exam was cancelled.  Pt had procedure at [facility]. request faxed to get med records.  CK downtrending.  - RUE less swollen compared to 2 days ago on 2/13/2021  -got information about IV filter from [facility].Documents sent to MRI dept, they will review it and also need to co ordinate with PPM tech for MRI.  will fu MRI

## 2021-02-18 NOTE — PROGRESS NOTE ADULT - PROBLEM SELECTOR PLAN 10
in right popliteal vein seen, age undeterminate. Also has IVC filter in place per MRI tech. Pt has had IVC filter for over 7 years.   called her daughter.she had IVC  as she had fall and the physician were worried about ICH.  CT head neg.daughter ok with blood thinners..DC lovenox and start on Eliquis

## 2021-02-18 NOTE — PROGRESS NOTE ADULT - PROBLEM SELECTOR PLAN 6
- FS significantly elevated on admission, not on insulin at home, possibly elevated in setting of COVID19 and rhabdomyolysis  - FS basal/bolus + ISS.   - FS better controlled  - monitor fingersticks  - A1C 10.6  - hold oral hypoglycemics  sugars high- increase lantus

## 2021-02-19 LAB
ALBUMIN SERPL ELPH-MCNC: 2.9 G/DL — LOW (ref 3.3–5)
ALP SERPL-CCNC: 69 U/L — SIGNIFICANT CHANGE UP (ref 40–120)
ALT FLD-CCNC: 79 U/L — HIGH (ref 4–33)
ANION GAP SERPL CALC-SCNC: 9 MMOL/L — SIGNIFICANT CHANGE UP (ref 7–14)
AST SERPL-CCNC: 76 U/L — HIGH (ref 4–32)
BILIRUB SERPL-MCNC: 0.4 MG/DL — SIGNIFICANT CHANGE UP (ref 0.2–1.2)
BUN SERPL-MCNC: 24 MG/DL — HIGH (ref 7–23)
CALCIUM SERPL-MCNC: 8.7 MG/DL — SIGNIFICANT CHANGE UP (ref 8.4–10.5)
CHLORIDE SERPL-SCNC: 106 MMOL/L — SIGNIFICANT CHANGE UP (ref 98–107)
CO2 SERPL-SCNC: 21 MMOL/L — LOW (ref 22–31)
CREAT SERPL-MCNC: 0.86 MG/DL — SIGNIFICANT CHANGE UP (ref 0.5–1.3)
GLUCOSE BLDC GLUCOMTR-MCNC: 204 MG/DL — HIGH (ref 70–99)
GLUCOSE BLDC GLUCOMTR-MCNC: 246 MG/DL — HIGH (ref 70–99)
GLUCOSE BLDC GLUCOMTR-MCNC: 258 MG/DL — HIGH (ref 70–99)
GLUCOSE BLDC GLUCOMTR-MCNC: 293 MG/DL — HIGH (ref 70–99)
GLUCOSE SERPL-MCNC: 296 MG/DL — HIGH (ref 70–99)
HCT VFR BLD CALC: 38.2 % — SIGNIFICANT CHANGE UP (ref 34.5–45)
HGB BLD-MCNC: 12.2 G/DL — SIGNIFICANT CHANGE UP (ref 11.5–15.5)
MAGNESIUM SERPL-MCNC: 2 MG/DL — SIGNIFICANT CHANGE UP (ref 1.6–2.6)
MCHC RBC-ENTMCNC: 27 PG — SIGNIFICANT CHANGE UP (ref 27–34)
MCHC RBC-ENTMCNC: 31.9 GM/DL — LOW (ref 32–36)
MCV RBC AUTO: 84.5 FL — SIGNIFICANT CHANGE UP (ref 80–100)
NRBC # BLD: 0 /100 WBCS — SIGNIFICANT CHANGE UP
NRBC # FLD: 0 K/UL — SIGNIFICANT CHANGE UP
PHOSPHATE SERPL-MCNC: 3.1 MG/DL — SIGNIFICANT CHANGE UP (ref 2.5–4.5)
PLATELET # BLD AUTO: 451 K/UL — HIGH (ref 150–400)
POTASSIUM SERPL-MCNC: 5.3 MMOL/L — SIGNIFICANT CHANGE UP (ref 3.5–5.3)
POTASSIUM SERPL-SCNC: 5.3 MMOL/L — SIGNIFICANT CHANGE UP (ref 3.5–5.3)
PROT SERPL-MCNC: 6.1 G/DL — SIGNIFICANT CHANGE UP (ref 6–8.3)
RBC # BLD: 4.52 M/UL — SIGNIFICANT CHANGE UP (ref 3.8–5.2)
RBC # FLD: 12.7 % — SIGNIFICANT CHANGE UP (ref 10.3–14.5)
SODIUM SERPL-SCNC: 136 MMOL/L — SIGNIFICANT CHANGE UP (ref 135–145)
WBC # BLD: 9.16 K/UL — SIGNIFICANT CHANGE UP (ref 3.8–10.5)
WBC # FLD AUTO: 9.16 K/UL — SIGNIFICANT CHANGE UP (ref 3.8–10.5)

## 2021-02-19 PROCEDURE — 99232 SBSQ HOSP IP/OBS MODERATE 35: CPT

## 2021-02-19 RX ADMIN — Medication 200 MILLIGRAM(S): at 21:48

## 2021-02-19 RX ADMIN — Medication 5 MILLIGRAM(S): at 18:17

## 2021-02-19 RX ADMIN — AMLODIPINE BESYLATE 5 MILLIGRAM(S): 2.5 TABLET ORAL at 05:04

## 2021-02-19 RX ADMIN — ESCITALOPRAM OXALATE 5 MILLIGRAM(S): 10 TABLET, FILM COATED ORAL at 18:13

## 2021-02-19 RX ADMIN — LEVETIRACETAM 500 MILLIGRAM(S): 250 TABLET, FILM COATED ORAL at 18:14

## 2021-02-19 RX ADMIN — DONEPEZIL HYDROCHLORIDE 10 MILLIGRAM(S): 10 TABLET, FILM COATED ORAL at 21:43

## 2021-02-19 RX ADMIN — Medication 3: at 18:10

## 2021-02-19 RX ADMIN — Medication 1: at 21:44

## 2021-02-19 RX ADMIN — Medication 2 UNIT(S): at 09:24

## 2021-02-19 RX ADMIN — Medication 2: at 13:00

## 2021-02-19 RX ADMIN — Medication 200 MILLIGRAM(S): at 11:47

## 2021-02-19 RX ADMIN — Medication 2 UNIT(S): at 13:00

## 2021-02-19 RX ADMIN — Medication 6 MILLIGRAM(S): at 05:04

## 2021-02-19 RX ADMIN — INSULIN GLARGINE 12 UNIT(S): 100 INJECTION, SOLUTION SUBCUTANEOUS at 21:43

## 2021-02-19 RX ADMIN — Medication 2: at 09:25

## 2021-02-19 RX ADMIN — REMDESIVIR 500 MILLIGRAM(S): 5 INJECTION INTRAVENOUS at 12:58

## 2021-02-19 RX ADMIN — APIXABAN 10 MILLIGRAM(S): 2.5 TABLET, FILM COATED ORAL at 05:04

## 2021-02-19 RX ADMIN — DORZOLAMIDE HYDROCHLORIDE TIMOLOL MALEATE 1 DROP(S): 20; 5 SOLUTION/ DROPS OPHTHALMIC at 18:12

## 2021-02-19 RX ADMIN — Medication 5 MILLIGRAM(S): at 05:04

## 2021-02-19 RX ADMIN — LEVETIRACETAM 500 MILLIGRAM(S): 250 TABLET, FILM COATED ORAL at 05:04

## 2021-02-19 RX ADMIN — Medication 2 UNIT(S): at 18:10

## 2021-02-19 RX ADMIN — Medication 200 MILLIGRAM(S): at 05:08

## 2021-02-19 RX ADMIN — DORZOLAMIDE HYDROCHLORIDE TIMOLOL MALEATE 1 DROP(S): 20; 5 SOLUTION/ DROPS OPHTHALMIC at 05:04

## 2021-02-19 RX ADMIN — Medication 1 PACKET(S): at 05:04

## 2021-02-19 RX ADMIN — ATORVASTATIN CALCIUM 20 MILLIGRAM(S): 80 TABLET, FILM COATED ORAL at 21:43

## 2021-02-19 RX ADMIN — APIXABAN 5 MILLIGRAM(S): 2.5 TABLET, FILM COATED ORAL at 18:13

## 2021-02-19 NOTE — PROGRESS NOTE ADULT - PROBLEM SELECTOR PLAN 5
- Patient's arb was held given ?roberto v CKD at [facility]  - She was given IVF at [facility]  ROBERTO resolved  - Monitor Cr  - avoid nephrotoxic meds

## 2021-02-19 NOTE — PROGRESS NOTE ADULT - PROBLEM SELECTOR PLAN 2
Chest xray-Multifocal pneumonia  Completed course of Remdisivir  cont decadron  on 2 L oxygen  will wean off  Ddimer,CRP coming down

## 2021-02-19 NOTE — PROGRESS NOTE ADULT - PROBLEM SELECTOR PLAN 4
- CT chest at [facility] incidentally found: Irregular 1.9 cm left upper lobe nodular opacity with associated spiculation.   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  - Patient did not want to know about these results and the daughter is already aware of these results  - will need  outpatient pulm follow up

## 2021-02-19 NOTE — PROGRESS NOTE ADULT - PROBLEM SELECTOR PLAN 1
Patient complaining of numbness, and pain, has elevated CK at [facility], concern for developing compartment syndrome in setting of rhabdomyolysis -> patient s/p IVF, pulses intact currently   - Shoulder, elbow, humerus, forearm, wrist xrays --> neg for fracture  - neuro consult for further eval given concern for plexus injury though low suspicion at this time,   - surgery eval on pt 2/7 not concerned about compartment syndrome  - RUE dopplers negative for DVT  - pacemaker interrogated and MRI is conditional, consent obtained for MRI  - Patient was supposed to get MRI on 2/13 but was found to have an IVC filter, per MRI tech since some IVC filters are not compatible with the MRI, exam was cancelled.  Pt had procedure at [facility]. request faxed to get med records.  CK downtrending.  - RUE less swollen compared to 2 days ago on 2/13/2021  -got information about IV filter from [facility].Documents sent to MRI dept, they will review it and also need to co ordinate with PPM tech for MRI.  will fu MRI

## 2021-02-19 NOTE — PROGRESS NOTE ADULT - ASSESSMENT
84 y/o F with hx of dementia (A&Ox2-3, forgetful at times), Diabetes type II (not on insulin), PPM, HTN, ?seizure on keppra, presents with falls. found to have RUE swelling concern for Plexitis. Would need MRI but has IVCF that's unclear if MRI compatible.

## 2021-02-19 NOTE — PROGRESS NOTE ADULT - SUBJECTIVE AND OBJECTIVE BOX
Patient is a 86y old  Female who presents with a chief complaint of COVID/Falls (18 Feb 2021 13:51)      SUBJECTIVE / OVERNIGHT EVENTS:Pt doing well.No complaints,  Denies shortness of breath  ADDITIONAL REVIEW OF SYSTEMS:    MEDICATIONS  (STANDING):  amLODIPine   Tablet 5 milliGRAM(s) Oral daily  apixaban 5 milliGRAM(s) Oral every 12 hours  atorvastatin 20 milliGRAM(s) Oral at bedtime  dexAMETHasone  Injectable 6 milliGRAM(s) IV Push daily  dextrose 40% Gel 15 Gram(s) Oral once  dextrose 5%. 1000 milliLiter(s) (50 mL/Hr) IV Continuous <Continuous>  dextrose 5%. 1000 milliLiter(s) (100 mL/Hr) IV Continuous <Continuous>  dextrose 50% Injectable 25 Gram(s) IV Push once  dextrose 50% Injectable 12.5 Gram(s) IV Push once  dextrose 50% Injectable 25 Gram(s) IV Push once  donepezil 10 milliGRAM(s) Oral at bedtime  dorzolamide 2%/timolol 0.5% Ophthalmic Solution 1 Drop(s) Both EYES every 12 hours  escitalopram 5 milliGRAM(s) Oral daily  glucagon  Injectable 1 milliGRAM(s) IntraMuscular once  insulin glargine Injectable (LANTUS) 12 Unit(s) SubCutaneous at bedtime  insulin lispro (ADMELOG) corrective regimen sliding scale   SubCutaneous at bedtime  insulin lispro (ADMELOG) corrective regimen sliding scale   SubCutaneous three times a day before meals  insulin lispro Injectable (ADMELOG) 2 Unit(s) SubCutaneous three times a day before meals  levETIRAcetam 500 milliGRAM(s) Oral two times a day  oxybutynin 5 milliGRAM(s) Oral two times a day  sodium chloride 0.9%. 1000 milliLiter(s) (100 mL/Hr) IV Continuous <Continuous>    MEDICATIONS  (PRN):  acetaminophen   Tablet .. 650 milliGRAM(s) Oral every 4 hours PRN Temp greater or equal to 38.5C (101.3F)  guaiFENesin   Syrup  (Sugar-Free) 200 milliGRAM(s) Oral every 6 hours PRN cough/congestion  sodium chloride 0.65% Nasal 1 Spray(s) Both Nostrils two times a day PRN Congestion      CAPILLARY BLOOD GLUCOSE      POCT Blood Glucose.: 204 mg/dL (19 Feb 2021 12:20)  POCT Blood Glucose.: 246 mg/dL (19 Feb 2021 08:51)  POCT Blood Glucose.: 182 mg/dL (18 Feb 2021 22:59)  POCT Blood Glucose.: 231 mg/dL (18 Feb 2021 17:34)    I&O's Summary    18 Feb 2021 07:01  -  19 Feb 2021 07:00  --------------------------------------------------------  IN: 200 mL / OUT: 800 mL / NET: -600 mL        PHYSICAL EXAM:  Vital Signs Last 24 Hrs  T(C): 36.5 (19 Feb 2021 11:25), Max: 37.1 (18 Feb 2021 22:55)  T(F): 97.7 (19 Feb 2021 11:25), Max: 98.8 (18 Feb 2021 22:55)  HR: 82 (19 Feb 2021 11:25) (77 - 82)  BP: 160/70 (19 Feb 2021 11:25) (147/64 - 160/70)  BP(mean): --  RR: 16 (19 Feb 2021 11:25) (16 - 16)  SpO2: 95% (19 Feb 2021 11:25) (95% - 98%)  CONSTITUTIONAL: NAD, well-developed  RESPIRATORY: Normal respiratory effort; lungs are clear to auscultation bilaterally  CARDIOVASCULAR: Regular rate and rhythm, normal S1 and S2, No lower extremity edema  ABDOMEN: Nontender to palpation, normoactive bowel sounds  MUSCLOSKELETAL: no clubbing or cyanosis of digits  PSYCH: A+O to person, place, and time; affect appropriate  Extr- rt upper extr-edema+ in rt hand    LABS:                        12.8   10.91 )-----------( 445      ( 18 Feb 2021 06:56 )             39.8     02-18    141  |  107  |  18  ----------------------------<  128<H>  3.2<L>   |  22  |  0.83    Ca    8.6      18 Feb 2021 06:56  Phos  2.2     02-18  Mg     1.5     02-18    TPro  6.0  /  Alb  2.8<L>  /  TBili  0.4  /  DBili  x   /  AST  68<H>  /  ALT  63<H>  /  AlkPhos  63  02-18                RADIOLOGY & ADDITIONAL TESTS:  Results Reviewed:   Imaging Personally Reviewed:  Electrocardiogram Personally Reviewed:    COORDINATION OF CARE:  Care Discussed with Consultants/Other Providers [Y/N]:  Prior or Outpatient Records Reviewed [Y/N]:

## 2021-02-20 LAB
ALBUMIN SERPL ELPH-MCNC: 2.7 G/DL — LOW (ref 3.3–5)
ALP SERPL-CCNC: 73 U/L — SIGNIFICANT CHANGE UP (ref 40–120)
ALT FLD-CCNC: 83 U/L — HIGH (ref 4–33)
ANION GAP SERPL CALC-SCNC: 10 MMOL/L — SIGNIFICANT CHANGE UP (ref 7–14)
AST SERPL-CCNC: 70 U/L — HIGH (ref 4–32)
BILIRUB SERPL-MCNC: 0.5 MG/DL — SIGNIFICANT CHANGE UP (ref 0.2–1.2)
BUN SERPL-MCNC: 23 MG/DL — SIGNIFICANT CHANGE UP (ref 7–23)
CALCIUM SERPL-MCNC: 9.3 MG/DL — SIGNIFICANT CHANGE UP (ref 8.4–10.5)
CHLORIDE SERPL-SCNC: 104 MMOL/L — SIGNIFICANT CHANGE UP (ref 98–107)
CO2 SERPL-SCNC: 22 MMOL/L — SIGNIFICANT CHANGE UP (ref 22–31)
CREAT SERPL-MCNC: 0.9 MG/DL — SIGNIFICANT CHANGE UP (ref 0.5–1.3)
GLUCOSE BLDC GLUCOMTR-MCNC: 203 MG/DL — HIGH (ref 70–99)
GLUCOSE BLDC GLUCOMTR-MCNC: 251 MG/DL — HIGH (ref 70–99)
GLUCOSE BLDC GLUCOMTR-MCNC: 281 MG/DL — HIGH (ref 70–99)
GLUCOSE BLDC GLUCOMTR-MCNC: 307 MG/DL — HIGH (ref 70–99)
GLUCOSE SERPL-MCNC: 163 MG/DL — HIGH (ref 70–99)
HCT VFR BLD CALC: 40.9 % — SIGNIFICANT CHANGE UP (ref 34.5–45)
HGB BLD-MCNC: 13.1 G/DL — SIGNIFICANT CHANGE UP (ref 11.5–15.5)
MCHC RBC-ENTMCNC: 27.1 PG — SIGNIFICANT CHANGE UP (ref 27–34)
MCHC RBC-ENTMCNC: 32 GM/DL — SIGNIFICANT CHANGE UP (ref 32–36)
MCV RBC AUTO: 84.5 FL — SIGNIFICANT CHANGE UP (ref 80–100)
NRBC # BLD: 0 /100 WBCS — SIGNIFICANT CHANGE UP
NRBC # FLD: 0 K/UL — SIGNIFICANT CHANGE UP
PLATELET # BLD AUTO: 417 K/UL — HIGH (ref 150–400)
POTASSIUM SERPL-MCNC: 4.2 MMOL/L — SIGNIFICANT CHANGE UP (ref 3.5–5.3)
POTASSIUM SERPL-SCNC: 4.2 MMOL/L — SIGNIFICANT CHANGE UP (ref 3.5–5.3)
PROT SERPL-MCNC: 6 G/DL — SIGNIFICANT CHANGE UP (ref 6–8.3)
RBC # BLD: 4.84 M/UL — SIGNIFICANT CHANGE UP (ref 3.8–5.2)
RBC # FLD: 12.9 % — SIGNIFICANT CHANGE UP (ref 10.3–14.5)
SODIUM SERPL-SCNC: 136 MMOL/L — SIGNIFICANT CHANGE UP (ref 135–145)
WBC # BLD: 11.67 K/UL — HIGH (ref 3.8–10.5)
WBC # FLD AUTO: 11.67 K/UL — HIGH (ref 3.8–10.5)

## 2021-02-20 PROCEDURE — 99232 SBSQ HOSP IP/OBS MODERATE 35: CPT

## 2021-02-20 RX ORDER — INSULIN GLARGINE 100 [IU]/ML
20 INJECTION, SOLUTION SUBCUTANEOUS AT BEDTIME
Refills: 0 | Status: DISCONTINUED | OUTPATIENT
Start: 2021-02-20 | End: 2021-02-25

## 2021-02-20 RX ADMIN — Medication 6 MILLIGRAM(S): at 06:18

## 2021-02-20 RX ADMIN — LEVETIRACETAM 500 MILLIGRAM(S): 250 TABLET, FILM COATED ORAL at 06:18

## 2021-02-20 RX ADMIN — INSULIN GLARGINE 20 UNIT(S): 100 INJECTION, SOLUTION SUBCUTANEOUS at 23:01

## 2021-02-20 RX ADMIN — Medication 2 UNIT(S): at 12:40

## 2021-02-20 RX ADMIN — APIXABAN 5 MILLIGRAM(S): 2.5 TABLET, FILM COATED ORAL at 06:18

## 2021-02-20 RX ADMIN — LEVETIRACETAM 500 MILLIGRAM(S): 250 TABLET, FILM COATED ORAL at 18:19

## 2021-02-20 RX ADMIN — AMLODIPINE BESYLATE 5 MILLIGRAM(S): 2.5 TABLET ORAL at 06:18

## 2021-02-20 RX ADMIN — Medication 2 UNIT(S): at 18:19

## 2021-02-20 RX ADMIN — Medication 2 UNIT(S): at 09:11

## 2021-02-20 RX ADMIN — Medication 3: at 12:39

## 2021-02-20 RX ADMIN — DONEPEZIL HYDROCHLORIDE 10 MILLIGRAM(S): 10 TABLET, FILM COATED ORAL at 23:01

## 2021-02-20 RX ADMIN — ESCITALOPRAM OXALATE 5 MILLIGRAM(S): 10 TABLET, FILM COATED ORAL at 12:40

## 2021-02-20 RX ADMIN — APIXABAN 5 MILLIGRAM(S): 2.5 TABLET, FILM COATED ORAL at 18:19

## 2021-02-20 RX ADMIN — Medication 5 MILLIGRAM(S): at 18:20

## 2021-02-20 RX ADMIN — Medication 2: at 09:11

## 2021-02-20 RX ADMIN — ATORVASTATIN CALCIUM 20 MILLIGRAM(S): 80 TABLET, FILM COATED ORAL at 23:01

## 2021-02-20 RX ADMIN — Medication 4: at 18:18

## 2021-02-20 RX ADMIN — Medication 5 MILLIGRAM(S): at 06:18

## 2021-02-20 RX ADMIN — DORZOLAMIDE HYDROCHLORIDE TIMOLOL MALEATE 1 DROP(S): 20; 5 SOLUTION/ DROPS OPHTHALMIC at 18:21

## 2021-02-20 RX ADMIN — Medication 1: at 23:03

## 2021-02-20 RX ADMIN — DORZOLAMIDE HYDROCHLORIDE TIMOLOL MALEATE 1 DROP(S): 20; 5 SOLUTION/ DROPS OPHTHALMIC at 06:18

## 2021-02-20 NOTE — PROGRESS NOTE ADULT - PROBLEM SELECTOR PLAN 1
Patient complaining of numbness, and pain, has elevated CK at [facility], concern for developing compartment syndrome in setting of rhabdomyolysis -> patient s/p IVF, pulses intact currently   - Shoulder, elbow, humerus, forearm, wrist xrays --> neg for fracture  - neuro consult for further eval given concern for plexus injury though low suspicion at this time,   - surgery eval on pt 2/7 not concerned about compartment syndrome  - RUE dopplers negative for DVT  - pacemaker interrogated and MRI is conditional, consent obtained for MRI  - Patient was supposed to get MRI on 2/13 but was found to have an IVC filter, per MRI tech since some IVC filters are not compatible with the MRI, exam was cancelled.  Pt had procedure at [facility]. request faxed to get med records.  CK downtrending.  - RUE less swollen compared to 2 days ago on 2/13/2021  -got information about IV filter from [facility].Documents sent to MRI dept, they will review it and also need to co ordinate with PPM tech for MRI.  -MRI scheduled for Monday 2/22 between 9.30 am to 1pm

## 2021-02-20 NOTE — PROGRESS NOTE ADULT - PROBLEM SELECTOR PLAN 10
in right popliteal vein seen, age undeterminate. Also has IVC filter in place per MRI tech. Pt has had IVC filter for over 7 years.   called her daughter.she had IVC  as she had fall and the physician were worried about ICH.  CT head neg.daughter ok with blood thinners..DC lovenox and started on Eliquis

## 2021-02-20 NOTE — PROGRESS NOTE ADULT - SUBJECTIVE AND OBJECTIVE BOX
Patient is a 86y old  Female who presents with a chief complaint of COVID/Falls (19 Feb 2021 14:26)      SUBJECTIVE / OVERNIGHT EVENTS:Pt doing well.No new complaints  ADDITIONAL REVIEW OF SYSTEMS:    MEDICATIONS  (STANDING):  amLODIPine   Tablet 5 milliGRAM(s) Oral daily  apixaban 5 milliGRAM(s) Oral every 12 hours  atorvastatin 20 milliGRAM(s) Oral at bedtime  dexAMETHasone  Injectable 6 milliGRAM(s) IV Push daily  dextrose 40% Gel 15 Gram(s) Oral once  dextrose 5%. 1000 milliLiter(s) (50 mL/Hr) IV Continuous <Continuous>  dextrose 5%. 1000 milliLiter(s) (100 mL/Hr) IV Continuous <Continuous>  dextrose 50% Injectable 25 Gram(s) IV Push once  dextrose 50% Injectable 12.5 Gram(s) IV Push once  dextrose 50% Injectable 25 Gram(s) IV Push once  donepezil 10 milliGRAM(s) Oral at bedtime  dorzolamide 2%/timolol 0.5% Ophthalmic Solution 1 Drop(s) Both EYES every 12 hours  escitalopram 5 milliGRAM(s) Oral daily  glucagon  Injectable 1 milliGRAM(s) IntraMuscular once  insulin glargine Injectable (LANTUS) 12 Unit(s) SubCutaneous at bedtime  insulin lispro (ADMELOG) corrective regimen sliding scale   SubCutaneous three times a day before meals  insulin lispro (ADMELOG) corrective regimen sliding scale   SubCutaneous at bedtime  insulin lispro Injectable (ADMELOG) 2 Unit(s) SubCutaneous three times a day before meals  levETIRAcetam 500 milliGRAM(s) Oral two times a day  oxybutynin 5 milliGRAM(s) Oral two times a day    MEDICATIONS  (PRN):  acetaminophen   Tablet .. 650 milliGRAM(s) Oral every 4 hours PRN Temp greater or equal to 38.5C (101.3F)  guaiFENesin   Syrup  (Sugar-Free) 200 milliGRAM(s) Oral every 6 hours PRN cough/congestion  sodium chloride 0.65% Nasal 1 Spray(s) Both Nostrils two times a day PRN Congestion      CAPILLARY BLOOD GLUCOSE      POCT Blood Glucose.: 281 mg/dL (20 Feb 2021 12:16)  POCT Blood Glucose.: 203 mg/dL (20 Feb 2021 08:30)  POCT Blood Glucose.: 258 mg/dL (19 Feb 2021 21:42)  POCT Blood Glucose.: 293 mg/dL (19 Feb 2021 17:41)    I&O's Summary      PHYSICAL EXAM:  Vital Signs Last 24 Hrs  T(C): 36.6 (20 Feb 2021 12:39), Max: 36.7 (19 Feb 2021 21:42)  T(F): 97.8 (20 Feb 2021 12:39), Max: 98 (19 Feb 2021 21:42)  HR: 78 (20 Feb 2021 12:39) (76 - 78)  BP: 148/76 (20 Feb 2021 12:39) (148/76 - 165/79)  BP(mean): --  RR: 16 (20 Feb 2021 12:39) (16 - 16)  SpO2: 95% (20 Feb 2021 12:39) (95% - 95%)  CONSTITUTIONAL: NAD, well-developed  RESPIRATORY: Normal respiratory effort; lungs are clear to auscultation bilaterally  CARDIOVASCULAR: Regular rate and rhythm, normal S1 and S2, No lower extremity edema  ABDOMEN: Nontender to palpation, normoactive bowel sounds  MUSCLOSKELETAL: no clubbing or cyanosis of digits  PSYCH: A+O to person, place, and time; affect appropriate    LABS:                        13.1   11.67 )-----------( 417      ( 20 Feb 2021 07:42 )             40.9     02-20    136  |  104  |  23  ----------------------------<  163<H>  4.2   |  22  |  0.90    Ca    9.3      20 Feb 2021 07:45  Phos  3.1     02-19  Mg     2.0     02-19    TPro  6.0  /  Alb  2.7<L>  /  TBili  0.5  /  DBili  x   /  AST  70<H>  /  ALT  83<H>  /  AlkPhos  73  02-20                RADIOLOGY & ADDITIONAL TESTS:  Results Reviewed:   Imaging Personally Reviewed:  Electrocardiogram Personally Reviewed:    COORDINATION OF CARE:  Care Discussed with Consultants/Other Providers [Y/N]:  Prior or Outpatient Records Reviewed [Y/N]:

## 2021-02-20 NOTE — PROGRESS NOTE ADULT - PROBLEM SELECTOR PLAN 4
- CT chest at [facility] incidentally found: Irregular 1.9 cm left upper lobe nodular opacity with associated spiculation.   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  - Patient did not want to know about these results and the daughter is already aware of these results  - will need  outpatient pulm follow up

## 2021-02-20 NOTE — PROGRESS NOTE ADULT - PROBLEM SELECTOR PLAN 5
- Patient's arb was held given ?roberto v CKD at [facility]  - She was given IVF at [facility]  ROBERTO resolved  - Monitor Cr  - avoid nephrotoxic meds

## 2021-02-20 NOTE — PROGRESS NOTE ADULT - PROBLEM SELECTOR PLAN 6
- FS significantly elevated on admission, not on insulin at home, possibly elevated in setting of COVID19 and rhabdomyolysis  - FS basal/bolus + ISS.   - FS better controlled  - monitor fingersticks  - A1C 10.6  - hold oral hypoglycemics  sugars high- increase lantus to 20units  sq HS

## 2021-02-21 LAB
ALBUMIN SERPL ELPH-MCNC: 2.4 G/DL — LOW (ref 3.3–5)
ALP SERPL-CCNC: 77 U/L — SIGNIFICANT CHANGE UP (ref 40–120)
ALT FLD-CCNC: 66 U/L — HIGH (ref 4–33)
ANION GAP SERPL CALC-SCNC: 9 MMOL/L — SIGNIFICANT CHANGE UP (ref 7–14)
AST SERPL-CCNC: 38 U/L — HIGH (ref 4–32)
BILIRUB SERPL-MCNC: 0.5 MG/DL — SIGNIFICANT CHANGE UP (ref 0.2–1.2)
BUN SERPL-MCNC: 24 MG/DL — HIGH (ref 7–23)
CALCIUM SERPL-MCNC: 9.4 MG/DL — SIGNIFICANT CHANGE UP (ref 8.4–10.5)
CHLORIDE SERPL-SCNC: 104 MMOL/L — SIGNIFICANT CHANGE UP (ref 98–107)
CO2 SERPL-SCNC: 22 MMOL/L — SIGNIFICANT CHANGE UP (ref 22–31)
CREAT SERPL-MCNC: 0.93 MG/DL — SIGNIFICANT CHANGE UP (ref 0.5–1.3)
GLUCOSE BLDC GLUCOMTR-MCNC: 157 MG/DL — HIGH (ref 70–99)
GLUCOSE BLDC GLUCOMTR-MCNC: 236 MG/DL — HIGH (ref 70–99)
GLUCOSE BLDC GLUCOMTR-MCNC: 273 MG/DL — HIGH (ref 70–99)
GLUCOSE BLDC GLUCOMTR-MCNC: 292 MG/DL — HIGH (ref 70–99)
GLUCOSE SERPL-MCNC: 167 MG/DL — HIGH (ref 70–99)
HCT VFR BLD CALC: 38.7 % — SIGNIFICANT CHANGE UP (ref 34.5–45)
HGB BLD-MCNC: 12.4 G/DL — SIGNIFICANT CHANGE UP (ref 11.5–15.5)
MAGNESIUM SERPL-MCNC: 1.8 MG/DL — SIGNIFICANT CHANGE UP (ref 1.6–2.6)
MCHC RBC-ENTMCNC: 27.3 PG — SIGNIFICANT CHANGE UP (ref 27–34)
MCHC RBC-ENTMCNC: 32 GM/DL — SIGNIFICANT CHANGE UP (ref 32–36)
MCV RBC AUTO: 85.1 FL — SIGNIFICANT CHANGE UP (ref 80–100)
NRBC # BLD: 0 /100 WBCS — SIGNIFICANT CHANGE UP
NRBC # FLD: 0 K/UL — SIGNIFICANT CHANGE UP
PHOSPHATE SERPL-MCNC: 2.8 MG/DL — SIGNIFICANT CHANGE UP (ref 2.5–4.5)
PLATELET # BLD AUTO: 400 K/UL — SIGNIFICANT CHANGE UP (ref 150–400)
POTASSIUM SERPL-MCNC: 3.9 MMOL/L — SIGNIFICANT CHANGE UP (ref 3.5–5.3)
POTASSIUM SERPL-SCNC: 3.9 MMOL/L — SIGNIFICANT CHANGE UP (ref 3.5–5.3)
PROT SERPL-MCNC: 5.9 G/DL — LOW (ref 6–8.3)
RBC # BLD: 4.55 M/UL — SIGNIFICANT CHANGE UP (ref 3.8–5.2)
RBC # FLD: 12.8 % — SIGNIFICANT CHANGE UP (ref 10.3–14.5)
SODIUM SERPL-SCNC: 135 MMOL/L — SIGNIFICANT CHANGE UP (ref 135–145)
WBC # BLD: 13.39 K/UL — HIGH (ref 3.8–10.5)
WBC # FLD AUTO: 13.39 K/UL — HIGH (ref 3.8–10.5)

## 2021-02-21 PROCEDURE — 99232 SBSQ HOSP IP/OBS MODERATE 35: CPT

## 2021-02-21 RX ADMIN — LEVETIRACETAM 500 MILLIGRAM(S): 250 TABLET, FILM COATED ORAL at 06:14

## 2021-02-21 RX ADMIN — ESCITALOPRAM OXALATE 5 MILLIGRAM(S): 10 TABLET, FILM COATED ORAL at 12:51

## 2021-02-21 RX ADMIN — Medication 1: at 08:56

## 2021-02-21 RX ADMIN — Medication 2 UNIT(S): at 08:56

## 2021-02-21 RX ADMIN — Medication 5 MILLIGRAM(S): at 06:14

## 2021-02-21 RX ADMIN — Medication 3: at 18:35

## 2021-02-21 RX ADMIN — Medication 2: at 12:52

## 2021-02-21 RX ADMIN — ATORVASTATIN CALCIUM 20 MILLIGRAM(S): 80 TABLET, FILM COATED ORAL at 21:36

## 2021-02-21 RX ADMIN — Medication 6 MILLIGRAM(S): at 06:14

## 2021-02-21 RX ADMIN — Medication 5 MILLIGRAM(S): at 18:38

## 2021-02-21 RX ADMIN — DORZOLAMIDE HYDROCHLORIDE TIMOLOL MALEATE 1 DROP(S): 20; 5 SOLUTION/ DROPS OPHTHALMIC at 06:14

## 2021-02-21 RX ADMIN — APIXABAN 5 MILLIGRAM(S): 2.5 TABLET, FILM COATED ORAL at 06:14

## 2021-02-21 RX ADMIN — Medication 2 UNIT(S): at 18:37

## 2021-02-21 RX ADMIN — INSULIN GLARGINE 20 UNIT(S): 100 INJECTION, SOLUTION SUBCUTANEOUS at 21:36

## 2021-02-21 RX ADMIN — LEVETIRACETAM 500 MILLIGRAM(S): 250 TABLET, FILM COATED ORAL at 18:53

## 2021-02-21 RX ADMIN — Medication 1: at 21:36

## 2021-02-21 RX ADMIN — Medication 2 UNIT(S): at 12:52

## 2021-02-21 RX ADMIN — DORZOLAMIDE HYDROCHLORIDE TIMOLOL MALEATE 1 DROP(S): 20; 5 SOLUTION/ DROPS OPHTHALMIC at 18:38

## 2021-02-21 RX ADMIN — AMLODIPINE BESYLATE 5 MILLIGRAM(S): 2.5 TABLET ORAL at 06:14

## 2021-02-21 RX ADMIN — DONEPEZIL HYDROCHLORIDE 10 MILLIGRAM(S): 10 TABLET, FILM COATED ORAL at 21:36

## 2021-02-21 RX ADMIN — APIXABAN 5 MILLIGRAM(S): 2.5 TABLET, FILM COATED ORAL at 18:37

## 2021-02-21 NOTE — PROGRESS NOTE ADULT - PROBLEM SELECTOR PLAN 5
- Patient's arb was held given ?roberto v CKD at [facility]  - She was given IVF at [facility]  ROBERTO resolved  - Monitor Cr  - avoid nephrotoxic meds

## 2021-02-21 NOTE — PROGRESS NOTE ADULT - PROBLEM SELECTOR PLAN 1
Patient complaining of numbness, and pain, has elevated CK at [facility], concern for developing compartment syndrome in setting of rhabdomyolysis -> patient s/p IVF, pulses intact currently   - Shoulder, elbow, humerus, forearm, wrist xrays --> neg for fracture  - neuro consult for further eval given concern for plexus injury though low suspicion at this time,   - surgery eval on pt 2/7 not concerned about compartment syndrome  - RUE dopplers negative for DVT  - pacemaker interrogated and MRI is conditional, consent obtained for MRI  - Patient was supposed to get MRI on 2/13 but was found to have an IVC filter, per MRI tech since some IVC filters are not compatible with the MRI, exam was cancelled.  Pt had procedure at [facility]. request faxed to get med records.  CK downtrending.  - RUE less swollen compared to 2 days ago on 2/13/2021  -got information about IV filter from [facility].Documents sent to MRI dept, they reviewed  it and also need to co ordinate with PPM tech for MRI.  -MRI scheduled for Monday 2/22 between 9.30 am to 1pm

## 2021-02-21 NOTE — PROGRESS NOTE ADULT - PROBLEM SELECTOR PLAN 4
- CT chest at [facility] incidentally found: Irregular 1.9 cm left upper lobe nodular opacity with associated spiculation.   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  - Patient did not want to know about these results and the daughter is already aware of these results  - will need  outpatient pulm follow up

## 2021-02-22 LAB
ALBUMIN SERPL ELPH-MCNC: 2.8 G/DL — LOW (ref 3.3–5)
ALP SERPL-CCNC: 81 U/L — SIGNIFICANT CHANGE UP (ref 40–120)
ALT FLD-CCNC: 56 U/L — HIGH (ref 4–33)
ANION GAP SERPL CALC-SCNC: 9 MMOL/L — SIGNIFICANT CHANGE UP (ref 7–14)
AST SERPL-CCNC: 34 U/L — HIGH (ref 4–32)
BILIRUB SERPL-MCNC: 0.6 MG/DL — SIGNIFICANT CHANGE UP (ref 0.2–1.2)
BUN SERPL-MCNC: 24 MG/DL — HIGH (ref 7–23)
CALCIUM SERPL-MCNC: 9.6 MG/DL — SIGNIFICANT CHANGE UP (ref 8.4–10.5)
CHLORIDE SERPL-SCNC: 105 MMOL/L — SIGNIFICANT CHANGE UP (ref 98–107)
CO2 SERPL-SCNC: 23 MMOL/L — SIGNIFICANT CHANGE UP (ref 22–31)
CREAT SERPL-MCNC: 0.91 MG/DL — SIGNIFICANT CHANGE UP (ref 0.5–1.3)
GLUCOSE BLDC GLUCOMTR-MCNC: 183 MG/DL — HIGH (ref 70–99)
GLUCOSE BLDC GLUCOMTR-MCNC: 311 MG/DL — HIGH (ref 70–99)
GLUCOSE BLDC GLUCOMTR-MCNC: 339 MG/DL — HIGH (ref 70–99)
GLUCOSE BLDC GLUCOMTR-MCNC: 371 MG/DL — HIGH (ref 70–99)
GLUCOSE SERPL-MCNC: 190 MG/DL — HIGH (ref 70–99)
HCT VFR BLD CALC: 41.5 % — SIGNIFICANT CHANGE UP (ref 34.5–45)
HGB BLD-MCNC: 12.8 G/DL — SIGNIFICANT CHANGE UP (ref 11.5–15.5)
MAGNESIUM SERPL-MCNC: 1.7 MG/DL — SIGNIFICANT CHANGE UP (ref 1.6–2.6)
MCHC RBC-ENTMCNC: 26.6 PG — LOW (ref 27–34)
MCHC RBC-ENTMCNC: 30.8 GM/DL — LOW (ref 32–36)
MCV RBC AUTO: 86.3 FL — SIGNIFICANT CHANGE UP (ref 80–100)
NRBC # BLD: 0 /100 WBCS — SIGNIFICANT CHANGE UP
NRBC # FLD: 0 K/UL — SIGNIFICANT CHANGE UP
PHOSPHATE SERPL-MCNC: 3.2 MG/DL — SIGNIFICANT CHANGE UP (ref 2.5–4.5)
PLATELET # BLD AUTO: 393 K/UL — SIGNIFICANT CHANGE UP (ref 150–400)
POTASSIUM SERPL-MCNC: 4.2 MMOL/L — SIGNIFICANT CHANGE UP (ref 3.5–5.3)
POTASSIUM SERPL-SCNC: 4.2 MMOL/L — SIGNIFICANT CHANGE UP (ref 3.5–5.3)
PROT SERPL-MCNC: 6.1 G/DL — SIGNIFICANT CHANGE UP (ref 6–8.3)
RBC # BLD: 4.81 M/UL — SIGNIFICANT CHANGE UP (ref 3.8–5.2)
RBC # FLD: 12.8 % — SIGNIFICANT CHANGE UP (ref 10.3–14.5)
SODIUM SERPL-SCNC: 137 MMOL/L — SIGNIFICANT CHANGE UP (ref 135–145)
WBC # BLD: 12.71 K/UL — HIGH (ref 3.8–10.5)
WBC # FLD AUTO: 12.71 K/UL — HIGH (ref 3.8–10.5)

## 2021-02-22 PROCEDURE — 99233 SBSQ HOSP IP/OBS HIGH 50: CPT

## 2021-02-22 RX ADMIN — Medication 4: at 17:45

## 2021-02-22 RX ADMIN — Medication 2 UNIT(S): at 17:45

## 2021-02-22 RX ADMIN — APIXABAN 5 MILLIGRAM(S): 2.5 TABLET, FILM COATED ORAL at 06:05

## 2021-02-22 RX ADMIN — ESCITALOPRAM OXALATE 5 MILLIGRAM(S): 10 TABLET, FILM COATED ORAL at 17:46

## 2021-02-22 RX ADMIN — INSULIN GLARGINE 20 UNIT(S): 100 INJECTION, SOLUTION SUBCUTANEOUS at 21:38

## 2021-02-22 RX ADMIN — Medication 2 UNIT(S): at 15:20

## 2021-02-22 RX ADMIN — LEVETIRACETAM 500 MILLIGRAM(S): 250 TABLET, FILM COATED ORAL at 17:46

## 2021-02-22 RX ADMIN — APIXABAN 5 MILLIGRAM(S): 2.5 TABLET, FILM COATED ORAL at 17:46

## 2021-02-22 RX ADMIN — ATORVASTATIN CALCIUM 20 MILLIGRAM(S): 80 TABLET, FILM COATED ORAL at 21:38

## 2021-02-22 RX ADMIN — Medication 5: at 15:21

## 2021-02-22 RX ADMIN — Medication 2: at 21:37

## 2021-02-22 RX ADMIN — Medication 5 MILLIGRAM(S): at 06:05

## 2021-02-22 RX ADMIN — DONEPEZIL HYDROCHLORIDE 10 MILLIGRAM(S): 10 TABLET, FILM COATED ORAL at 21:38

## 2021-02-22 RX ADMIN — DORZOLAMIDE HYDROCHLORIDE TIMOLOL MALEATE 1 DROP(S): 20; 5 SOLUTION/ DROPS OPHTHALMIC at 17:46

## 2021-02-22 RX ADMIN — Medication 6 MILLIGRAM(S): at 06:06

## 2021-02-22 RX ADMIN — LEVETIRACETAM 500 MILLIGRAM(S): 250 TABLET, FILM COATED ORAL at 06:05

## 2021-02-22 RX ADMIN — Medication 5 MILLIGRAM(S): at 17:46

## 2021-02-22 RX ADMIN — AMLODIPINE BESYLATE 5 MILLIGRAM(S): 2.5 TABLET ORAL at 06:05

## 2021-02-22 RX ADMIN — Medication 1: at 08:48

## 2021-02-22 RX ADMIN — Medication 2 UNIT(S): at 08:47

## 2021-02-22 RX ADMIN — DORZOLAMIDE HYDROCHLORIDE TIMOLOL MALEATE 1 DROP(S): 20; 5 SOLUTION/ DROPS OPHTHALMIC at 06:06

## 2021-02-22 NOTE — PROGRESS NOTE ADULT - PROBLEM SELECTOR PLAN 4
- CT chest at [facility] incidentally found: Irregular 1.9 cm left upper lobe nodular opacity with associated spiculation.   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  - Patient did not want to know about these results and the daughter is already aware of these results  - will need  outpatient pulm follow up - CT chest at [facility] incidentally found: Irregular 1.9 cm left upper lobe nodular opacity with associated spiculation.   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  - Patient did not want to know about these results and the daughter is already aware of these results  - will need  outpatient pulm follow up.

## 2021-02-22 NOTE — PROGRESS NOTE ADULT - PROBLEM SELECTOR PLAN 2
Chest xray-Multifocal pneumonia  Completed course of Remdisivir  cont decadron  on 2 L oxygen  will wean off  Ddimer,CRP coming down Chest xray-Multifocal pneumonia  Completed course of Remdisivir  cont decadron  will trial of O2  will wean off  Ddimer,CRP coming down.

## 2021-02-22 NOTE — PROGRESS NOTE ADULT - PROBLEM SELECTOR PLAN 10
in right popliteal vein seen, age undeterminate. Also has IVC filter in place per MRI tech. Pt has had IVC filter for over 7 years.   called her daughter.she had IVC  as she had fall and the physician were worried about ICH.  CT head neg.daughter ok with blood thinners..DC lovenox and started on Eliquis in right popliteal vein seen, age undeterminate. Also has IVC filter in place per MRI tech. Pt has had IVC filter for over 7 years, c/w Eliquis

## 2021-02-22 NOTE — PROGRESS NOTE ADULT - SUBJECTIVE AND OBJECTIVE BOX
Patient is a 86y old  Female who presents with a chief complaint of COVID/Falls (21 Feb 2021 13:53)      SUBJECTIVE / OVERNIGHT EVENTS:    Patient seen and examined at bedside. No dyspnea or chest pain overnight. Planned for MR c-spine and RUE today.    MEDICATIONS  (STANDING):  amLODIPine   Tablet 5 milliGRAM(s) Oral daily  apixaban 5 milliGRAM(s) Oral every 12 hours  atorvastatin 20 milliGRAM(s) Oral at bedtime  dextrose 40% Gel 15 Gram(s) Oral once  dextrose 5%. 1000 milliLiter(s) (50 mL/Hr) IV Continuous <Continuous>  dextrose 5%. 1000 milliLiter(s) (100 mL/Hr) IV Continuous <Continuous>  dextrose 50% Injectable 25 Gram(s) IV Push once  dextrose 50% Injectable 12.5 Gram(s) IV Push once  dextrose 50% Injectable 25 Gram(s) IV Push once  donepezil 10 milliGRAM(s) Oral at bedtime  dorzolamide 2%/timolol 0.5% Ophthalmic Solution 1 Drop(s) Both EYES every 12 hours  escitalopram 5 milliGRAM(s) Oral daily  glucagon  Injectable 1 milliGRAM(s) IntraMuscular once  insulin glargine Injectable (LANTUS) 20 Unit(s) SubCutaneous at bedtime  insulin lispro (ADMELOG) corrective regimen sliding scale   SubCutaneous three times a day before meals  insulin lispro (ADMELOG) corrective regimen sliding scale   SubCutaneous at bedtime  insulin lispro Injectable (ADMELOG) 2 Unit(s) SubCutaneous three times a day before meals  levETIRAcetam 500 milliGRAM(s) Oral two times a day  oxybutynin 5 milliGRAM(s) Oral two times a day    MEDICATIONS  (PRN):  acetaminophen   Tablet .. 650 milliGRAM(s) Oral every 4 hours PRN Temp greater or equal to 38.5C (101.3F)  guaiFENesin   Syrup  (Sugar-Free) 200 milliGRAM(s) Oral every 6 hours PRN cough/congestion  sodium chloride 0.65% Nasal 1 Spray(s) Both Nostrils two times a day PRN Congestion      Vital Signs Last 24 Hrs  T(C): 36.7 (22 Feb 2021 07:30), Max: 36.8 (21 Feb 2021 12:48)  T(F): 98.1 (22 Feb 2021 07:30), Max: 98.2 (21 Feb 2021 12:48)  HR: 73 (22 Feb 2021 07:30) (69 - 80)  BP: 111/61 (22 Feb 2021 07:30) (111/61 - 185/91)  BP(mean): --  RR: 18 (22 Feb 2021 07:30) (16 - 18)  SpO2: 95% (22 Feb 2021 07:30) (86% - 96%)  CAPILLARY BLOOD GLUCOSE      POCT Blood Glucose.: 183 mg/dL (22 Feb 2021 08:25)  POCT Blood Glucose.: 292 mg/dL (21 Feb 2021 21:17)  POCT Blood Glucose.: 273 mg/dL (21 Feb 2021 17:20)  POCT Blood Glucose.: 236 mg/dL (21 Feb 2021 12:29)    I&O's Summary      PHYSICAL EXAM:  Vital Signs Last 24 Hrs  T(C): 36.7 (02-22-21 @ 07:30)  T(F): 98.1 (02-22-21 @ 07:30), Max: 98.2 (02-21-21 @ 12:48)  HR: 73 (02-22-21 @ 07:30) (69 - 80)  BP: 111/61 (02-22-21 @ 07:30)  BP(mean): --  RR: 18 (02-22-21 @ 07:30) (16 - 18)  SpO2: 95% (02-22-21 @ 07:30) (86% - 96%)  Wt(kg): --    CONSTITUTIONAL: NAD, well-developed  RESPIRATORY: Normal respiratory effort; lungs are clear to auscultation bilaterally  CARDIOVASCULAR: Regular rate and rhythm, normal S1 and S2, No lower extremity edema  ABDOMEN: Nontender to palpation, normoactive bowel sounds  MUSCLOSKELETAL: no clubbing or cyanosis of digits  PSYCH: A+O to person, place, and time; affect appropriate  Extr R UE pitting edema, able lift both arms above head, denies numbness/tingling      LABS:                        12.8   12.71 )-----------( 393      ( 22 Feb 2021 07:13 )             41.5     02-22    137  |  105  |  24<H>  ----------------------------<  190<H>  4.2   |  23  |  0.91    Ca    9.6      22 Feb 2021 07:13  Phos  3.2     02-22  Mg     1.7     02-22    TPro  6.1  /  Alb  2.8<L>  /  TBili  0.6  /  DBili  x   /  AST  34<H>  /  ALT  56<H>  /  AlkPhos  81  02-22              RADIOLOGY & ADDITIONAL TESTS:    Imaging Personally Reviewed:    Consultant(s) Notes Reviewed:      Care Discussed with Consultants/Other Providers:

## 2021-02-22 NOTE — PROGRESS NOTE ADULT - ASSESSMENT
84 y/o F with hx of dementia (A&Ox2-3, forgetful at times), Diabetes type II (not on insulin), PPM, HTN, ?seizure on keppra, presents with falls. found to have RUE swelling concern for Plexitis plan for MR c spine and RUE today

## 2021-02-22 NOTE — PROGRESS NOTE ADULT - PROBLEM SELECTOR PLAN 5
- Patient's arb was held given ?roberto v CKD at [facility]  - She was given IVF at [facility]  ROBERTO resolved  - Monitor Cr  - avoid nephrotoxic meds - resolved at this time, continue to trend Cr

## 2021-02-22 NOTE — PROGRESS NOTE ADULT - PROBLEM SELECTOR PLAN 6
- FS significantly elevated on admission, not on insulin at home, possibly elevated in setting of COVID19 and rhabdomyolysis  - FS basal/bolus + ISS.   - FS better controlled  - monitor fingersticks  - A1C 10.6  - hold oral hypoglycemics  sugars high- increase lantus to 20units  sq HS - FS significantly elevated on admission, not on insulin at home, possibly elevated in setting of COVID19 and rhabdomyolysis  - FS basal/bolus + ISS.   - FS better controlled  - monitor fingersticks  - A1C 10.6  - hold oral hypoglycemics  sugars high- increase lantus to 20units  sq HS.

## 2021-02-22 NOTE — PROGRESS NOTE ADULT - PROBLEM SELECTOR PLAN 1
Patient complaining of numbness, and pain, has elevated CK at [facility], concern for developing compartment syndrome in setting of rhabdomyolysis -> patient s/p IVF, pulses intact currently   - Shoulder, elbow, humerus, forearm, wrist xrays --> neg for fracture  - neuro consult for further eval given concern for plexus injury though low suspicion at this time,   - surgery eval on pt 2/7 not concerned about compartment syndrome  - RUE dopplers negative for DVT  - pacemaker interrogated and MRI is conditional, consent obtained for MRI  - Patient was supposed to get MRI on 2/13 but was found to have an IVC filter, per MRI tech since some IVC filters are not compatible with the MRI, exam was cancelled.  Pt had procedure at [facility]. request faxed to get med records.  CK downtrending.  - RUE less swollen compared to 2 days ago on 2/13/2021  -got information about IV filter from [facility].Documents sent to MRI dept, they reviewed  it and also need to co ordinate with PPM tech for MRI.  -MRI scheduled for Monday 2/22 between 9.30 am to 1pm -presented to [facility] in setting of being found on floor w/ recurrent falls at home w/ RUE pain. Initially pt w/ elevated CK and RUE numbness tingling possible rhabdo  - shoulder, elbow, humeral, forearm, wrist xray neg for fx  - VA duplex 2/9 of RUE negative for DVT, noted significant edema throughout arm  - obtian information regarding prior IVC filter at [facility], given to MR dept, plan for MR C-spine/RUE today to r/o plexitis, told 11 AM - 1 PM today

## 2021-02-23 LAB
ALBUMIN SERPL ELPH-MCNC: 2.7 G/DL — LOW (ref 3.3–5)
ALP SERPL-CCNC: 83 U/L — SIGNIFICANT CHANGE UP (ref 40–120)
ALT FLD-CCNC: 47 U/L — HIGH (ref 4–33)
ANION GAP SERPL CALC-SCNC: 10 MMOL/L — SIGNIFICANT CHANGE UP (ref 7–14)
AST SERPL-CCNC: 23 U/L — SIGNIFICANT CHANGE UP (ref 4–32)
BILIRUB SERPL-MCNC: 0.5 MG/DL — SIGNIFICANT CHANGE UP (ref 0.2–1.2)
BUN SERPL-MCNC: 24 MG/DL — HIGH (ref 7–23)
CALCIUM SERPL-MCNC: 10 MG/DL — SIGNIFICANT CHANGE UP (ref 8.4–10.5)
CHLORIDE SERPL-SCNC: 103 MMOL/L — SIGNIFICANT CHANGE UP (ref 98–107)
CO2 SERPL-SCNC: 26 MMOL/L — SIGNIFICANT CHANGE UP (ref 22–31)
CREAT SERPL-MCNC: 1.05 MG/DL — SIGNIFICANT CHANGE UP (ref 0.5–1.3)
GLUCOSE BLDC GLUCOMTR-MCNC: 159 MG/DL — HIGH (ref 70–99)
GLUCOSE BLDC GLUCOMTR-MCNC: 197 MG/DL — HIGH (ref 70–99)
GLUCOSE BLDC GLUCOMTR-MCNC: 264 MG/DL — HIGH (ref 70–99)
GLUCOSE BLDC GLUCOMTR-MCNC: 96 MG/DL — SIGNIFICANT CHANGE UP (ref 70–99)
GLUCOSE SERPL-MCNC: 147 MG/DL — HIGH (ref 70–99)
HCT VFR BLD CALC: 40 % — SIGNIFICANT CHANGE UP (ref 34.5–45)
HGB BLD-MCNC: 12.6 G/DL — SIGNIFICANT CHANGE UP (ref 11.5–15.5)
MCHC RBC-ENTMCNC: 27 PG — SIGNIFICANT CHANGE UP (ref 27–34)
MCHC RBC-ENTMCNC: 31.5 GM/DL — LOW (ref 32–36)
MCV RBC AUTO: 85.7 FL — SIGNIFICANT CHANGE UP (ref 80–100)
NRBC # BLD: 0 /100 WBCS — SIGNIFICANT CHANGE UP
NRBC # FLD: 0 K/UL — SIGNIFICANT CHANGE UP
PLATELET # BLD AUTO: 349 K/UL — SIGNIFICANT CHANGE UP (ref 150–400)
POTASSIUM SERPL-MCNC: 4 MMOL/L — SIGNIFICANT CHANGE UP (ref 3.5–5.3)
POTASSIUM SERPL-SCNC: 4 MMOL/L — SIGNIFICANT CHANGE UP (ref 3.5–5.3)
PROT SERPL-MCNC: 6 G/DL — SIGNIFICANT CHANGE UP (ref 6–8.3)
RBC # BLD: 4.67 M/UL — SIGNIFICANT CHANGE UP (ref 3.8–5.2)
RBC # FLD: 13 % — SIGNIFICANT CHANGE UP (ref 10.3–14.5)
SODIUM SERPL-SCNC: 139 MMOL/L — SIGNIFICANT CHANGE UP (ref 135–145)
WBC # BLD: 13.68 K/UL — HIGH (ref 3.8–10.5)
WBC # FLD AUTO: 13.68 K/UL — HIGH (ref 3.8–10.5)

## 2021-02-23 PROCEDURE — 99233 SBSQ HOSP IP/OBS HIGH 50: CPT

## 2021-02-23 PROCEDURE — 72156 MRI NECK SPINE W/O & W/DYE: CPT | Mod: 26

## 2021-02-23 PROCEDURE — 71551 MRI CHEST W/DYE: CPT | Mod: 26,RT

## 2021-02-23 PROCEDURE — 99221 1ST HOSP IP/OBS SF/LOW 40: CPT

## 2021-02-23 RX ADMIN — ATORVASTATIN CALCIUM 20 MILLIGRAM(S): 80 TABLET, FILM COATED ORAL at 21:59

## 2021-02-23 RX ADMIN — Medication 5 MILLIGRAM(S): at 17:47

## 2021-02-23 RX ADMIN — Medication 1: at 21:59

## 2021-02-23 RX ADMIN — DORZOLAMIDE HYDROCHLORIDE TIMOLOL MALEATE 1 DROP(S): 20; 5 SOLUTION/ DROPS OPHTHALMIC at 17:47

## 2021-02-23 RX ADMIN — DORZOLAMIDE HYDROCHLORIDE TIMOLOL MALEATE 1 DROP(S): 20; 5 SOLUTION/ DROPS OPHTHALMIC at 05:50

## 2021-02-23 RX ADMIN — LEVETIRACETAM 500 MILLIGRAM(S): 250 TABLET, FILM COATED ORAL at 05:51

## 2021-02-23 RX ADMIN — LEVETIRACETAM 500 MILLIGRAM(S): 250 TABLET, FILM COATED ORAL at 17:47

## 2021-02-23 RX ADMIN — Medication 2 UNIT(S): at 08:58

## 2021-02-23 RX ADMIN — ESCITALOPRAM OXALATE 5 MILLIGRAM(S): 10 TABLET, FILM COATED ORAL at 11:53

## 2021-02-23 RX ADMIN — Medication 1: at 17:47

## 2021-02-23 RX ADMIN — APIXABAN 5 MILLIGRAM(S): 2.5 TABLET, FILM COATED ORAL at 05:51

## 2021-02-23 RX ADMIN — INSULIN GLARGINE 20 UNIT(S): 100 INJECTION, SOLUTION SUBCUTANEOUS at 21:59

## 2021-02-23 RX ADMIN — AMLODIPINE BESYLATE 5 MILLIGRAM(S): 2.5 TABLET ORAL at 05:51

## 2021-02-23 RX ADMIN — APIXABAN 5 MILLIGRAM(S): 2.5 TABLET, FILM COATED ORAL at 17:47

## 2021-02-23 RX ADMIN — Medication 2 UNIT(S): at 12:50

## 2021-02-23 RX ADMIN — Medication 1: at 12:50

## 2021-02-23 RX ADMIN — Medication 5 MILLIGRAM(S): at 05:51

## 2021-02-23 RX ADMIN — DONEPEZIL HYDROCHLORIDE 10 MILLIGRAM(S): 10 TABLET, FILM COATED ORAL at 21:59

## 2021-02-23 RX ADMIN — Medication 2 UNIT(S): at 17:47

## 2021-02-23 NOTE — PROGRESS NOTE ADULT - ASSESSMENT
84 y/o F with hx of dementia (A&Ox2-3, forgetful at times), Diabetes type II (not on insulin), PPM, HTN, ?seizure on keppra, presents with falls. found to have RUE swelling concern w/ MR yesterday showing supraspinatous tear and progressive consolidation of R lung

## 2021-02-23 NOTE — PROGRESS NOTE ADULT - PROBLEM SELECTOR PLAN 2
Chest xray-Multifocal pneumonia  Completed course of Remdisivir  cont decadron  will trial of O2  will wean off  Ddimer,CRP coming down.

## 2021-02-23 NOTE — PROGRESS NOTE ADULT - SUBJECTIVE AND OBJECTIVE BOX
Patient is a 86y old  Female who presents with a chief complaint of COVID/Falls (23 Feb 2021 11:39)      SUBJECTIVE / OVERNIGHT EVENTS:    Patient seen and examined at bedside, oriented to name location at this time. No dyspnea or chest pain, had MR completed yesterday showing concern for supraspinatus tendon repair.     MEDICATIONS  (STANDING):  amLODIPine   Tablet 5 milliGRAM(s) Oral daily  apixaban 5 milliGRAM(s) Oral every 12 hours  atorvastatin 20 milliGRAM(s) Oral at bedtime  dextrose 40% Gel 15 Gram(s) Oral once  dextrose 5%. 1000 milliLiter(s) (50 mL/Hr) IV Continuous <Continuous>  dextrose 5%. 1000 milliLiter(s) (100 mL/Hr) IV Continuous <Continuous>  dextrose 50% Injectable 25 Gram(s) IV Push once  dextrose 50% Injectable 12.5 Gram(s) IV Push once  dextrose 50% Injectable 25 Gram(s) IV Push once  donepezil 10 milliGRAM(s) Oral at bedtime  dorzolamide 2%/timolol 0.5% Ophthalmic Solution 1 Drop(s) Both EYES every 12 hours  escitalopram 5 milliGRAM(s) Oral daily  glucagon  Injectable 1 milliGRAM(s) IntraMuscular once  insulin glargine Injectable (LANTUS) 20 Unit(s) SubCutaneous at bedtime  insulin lispro (ADMELOG) corrective regimen sliding scale   SubCutaneous three times a day before meals  insulin lispro (ADMELOG) corrective regimen sliding scale   SubCutaneous at bedtime  insulin lispro Injectable (ADMELOG) 2 Unit(s) SubCutaneous three times a day before meals  levETIRAcetam 500 milliGRAM(s) Oral two times a day  oxybutynin 5 milliGRAM(s) Oral two times a day    MEDICATIONS  (PRN):  acetaminophen   Tablet .. 650 milliGRAM(s) Oral every 4 hours PRN Temp greater or equal to 38.5C (101.3F)  guaiFENesin   Syrup  (Sugar-Free) 200 milliGRAM(s) Oral every 6 hours PRN cough/congestion  sodium chloride 0.65% Nasal 1 Spray(s) Both Nostrils two times a day PRN Congestion      Vital Signs Last 24 Hrs  T(C): 36.8 (23 Feb 2021 11:52), Max: 36.8 (22 Feb 2021 17:52)  T(F): 98.2 (23 Feb 2021 11:52), Max: 98.3 (22 Feb 2021 17:52)  HR: 75 (23 Feb 2021 11:52) (67 - 83)  BP: 126/62 (23 Feb 2021 11:52) (124/61 - 133/62)  BP(mean): --  RR: 18 (23 Feb 2021 11:52) (18 - 20)  SpO2: 95% (23 Feb 2021 11:52) (95% - 98%)  CAPILLARY BLOOD GLUCOSE      POCT Blood Glucose.: 159 mg/dL (23 Feb 2021 12:45)  POCT Blood Glucose.: 96 mg/dL (23 Feb 2021 08:53)  POCT Blood Glucose.: 311 mg/dL (22 Feb 2021 21:02)  POCT Blood Glucose.: 339 mg/dL (22 Feb 2021 17:07)  POCT Blood Glucose.: 371 mg/dL (22 Feb 2021 15:15)    I&O's Summary      PHYSICAL EXAM:  Vital Signs Last 24 Hrs  T(C): 36.8 (02-23-21 @ 11:52)  T(F): 98.2 (02-23-21 @ 11:52), Max: 98.3 (02-22-21 @ 17:52)  HR: 75 (02-23-21 @ 11:52) (67 - 83)  BP: 126/62 (02-23-21 @ 11:52)  BP(mean): --  RR: 18 (02-23-21 @ 11:52) (18 - 20)  SpO2: 95% (02-23-21 @ 11:52) (95% - 98%)  Wt(kg): --    CONSTITUTIONAL: NAD, well-developed  RESPIRATORY: Normal respiratory effort; lungs are clear to auscultation bilaterally  CARDIOVASCULAR: Regular rate and rhythm, normal S1 and S2, No lower extremity edema  ABDOMEN: Nontender to palpation, normoactive bowel sounds  MUSCLOSKELETAL: no clubbing or cyanosis of digits  PSYCH: A+O to person, place, and time; affect appropriate  Extr R UE pitting edema, able lift both arms above head, denies numbness/tingling    LABS:                        12.6   13.68 )-----------( 349      ( 23 Feb 2021 07:04 )             40.0     02-23    139  |  103  |  24<H>  ----------------------------<  147<H>  4.0   |  26  |  1.05    Ca    10.0      23 Feb 2021 07:04  Phos  3.2     02-22  Mg     1.7     02-22    TPro  6.0  /  Alb  2.7<L>  /  TBili  0.5  /  DBili  x   /  AST  23  /  ALT  47<H>  /  AlkPhos  83  02-23              RADIOLOGY & ADDITIONAL TESTS:    Imaging Personally Reviewed:    Consultant(s) Notes Reviewed:      Care Discussed with Consultants/Other Providers:

## 2021-02-23 NOTE — PROGRESS NOTE ADULT - PROBLEM SELECTOR PLAN 1
-presented to [facility] in setting of being found on floor w/ recurrent falls at home w/ RUE pain. Initially pt w/ elevated CK and RUE numbness tingling possible rhabdo  - shoulder, elbow, humeral, forearm, wrist xray neg for fx  - VA duplex 2/9 of RUE negative for DVT, noted significant edema throughout arm  - MR completed 2/22 showing supraspinatous tear, ortho eval pending

## 2021-02-23 NOTE — PROGRESS NOTE ADULT - PROBLEM SELECTOR PLAN 6
- FS significantly elevated on admission, not on insulin at home, possibly elevated in setting of COVID19 and rhabdomyolysis  - FS basal/bolus + ISS.   - FS better controlled  - monitor fingersticks  - A1C 10.6  - hold oral hypoglycemics  sugars high- increase lantus to 20units  sq HS.

## 2021-02-23 NOTE — PROGRESS NOTE ADULT - PROBLEM SELECTOR PLAN 4
- CT chest at [facility] incidentally found: Irregular 1.9 cm left upper lobe nodular opacity with associated spiculation.   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  - Patient did not want to know about these results and the daughter is already aware of these results  - will need  outpatient pulm follow up ultimately  - given progressive consolidation on R lung on MR seen 2/22 will obtain CTA chest to eval if progression related to lung nodule? Will likely need biopsy outside hospital

## 2021-02-23 NOTE — CONSULT NOTE ADULT - SUBJECTIVE AND OBJECTIVE BOX
86y Female presents c/o R shoulder pain after multiple falls. Denies headstrike/LOC. Denies numbness, tingling paresthesias in affected extremity. Pt forgetful and A&O x 2-3. chart reviewed for further history    HPI:  86 y/o F with hx of dementia (A&Ox2-3, forgetful at times), Diabetes type II (not on insulin), s/p PPM, HTN, ?seizure on keppra, presents with falls. Patient states she has been falling multiple times over the week. Patient with hx of dementia. She is A&Ox2-3. She is forgetful at times. Patient also complains of pain and numbness in her RUE since the fall 2 days ago. Patient's son was diagnosed with COVID earlier this week. History obtained from daughter. As per daughter, patient has been falling multiple times every night. She would get up to use the bathroom overnight and she falls. She is then unable to get up from the floor. Patient denies head trauma or LOC.  Patient's son would then find her on the floor in AM. When she fell two days ago, she fell on her R arm and she has been having pain. She also complains of numbness in the R arm. She states she also has difficulty opening her hand. She also was complaining sore throat and cough at home. She was taken to an  urgent care on Friday where she tested positive for covid. She was then sent to hospital for further work up. Per daughter, patient would need more help at home for care. She currently only has aide 8 hours for 5 days a week. Denies fever, chills, LOC, chest pain, SOB, nausea, vomiting ,melena, hematochezia, or dysuria.     Patient was admitted to [facility]:  She was noted to have RUE edema pending RUE doppler r/o DVT, imaging neg for fx of r wrist/elbow/shoulder. Patient was awaiting PT eval. Pj was incidentally found to have lung nodule and pancreatic nodule. the providers at [facility] discussed findings of lung nodule and pancreatic nodule w/ daughter Emerson, who is aware that pt is to follow up closely with pulmolonogist and PCP outpt. Pt prefers not to know details of the nodules or CT findings.   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   COVID+ not requiring O2, not currently candidate for remdes/dexamethasone but may benefit from monoclonal antibody infusion is she is a candidate upon transfer to [facility].   (06 Feb 2021 20:56)      Vital Signs Last 24 Hrs  T(C): 36.7 (02-23-21 @ 05:49), Max: 36.8 (02-22-21 @ 17:52)  T(F): 98 (02-23-21 @ 05:49), Max: 98.3 (02-22-21 @ 17:52)  HR: 83 (02-23-21 @ 05:49) (67 - 83)  BP: 124/61 (02-23-21 @ 05:49) (124/61 - 133/62)  BP(mean): --  RR: 18 (02-23-21 @ 05:49) (18 - 20)  SpO2: 95% (02-23-21 @ 05:49) (95% - 98%)    PAST MEDICAL & SURGICAL HISTORY:  Diabetes    HTN (hypertension)    Obesity    Dementia    Pacemaker      MEDICATIONS  (STANDING):  amLODIPine   Tablet 5 milliGRAM(s) Oral daily  apixaban 5 milliGRAM(s) Oral every 12 hours  atorvastatin 20 milliGRAM(s) Oral at bedtime  dextrose 40% Gel 15 Gram(s) Oral once  dextrose 5%. 1000 milliLiter(s) IV Continuous <Continuous>  dextrose 5%. 1000 milliLiter(s) IV Continuous <Continuous>  dextrose 50% Injectable 25 Gram(s) IV Push once  dextrose 50% Injectable 12.5 Gram(s) IV Push once  dextrose 50% Injectable 25 Gram(s) IV Push once  donepezil 10 milliGRAM(s) Oral at bedtime  dorzolamide 2%/timolol 0.5% Ophthalmic Solution 1 Drop(s) Both EYES every 12 hours  escitalopram 5 milliGRAM(s) Oral daily  glucagon  Injectable 1 milliGRAM(s) IntraMuscular once  insulin glargine Injectable (LANTUS) 20 Unit(s) SubCutaneous at bedtime  insulin lispro (ADMELOG) corrective regimen sliding scale   SubCutaneous three times a day before meals  insulin lispro (ADMELOG) corrective regimen sliding scale   SubCutaneous at bedtime  insulin lispro Injectable (ADMELOG) 2 Unit(s) SubCutaneous three times a day before meals  levETIRAcetam 500 milliGRAM(s) Oral two times a day  oxybutynin 5 milliGRAM(s) Oral two times a day    Allergies    iodine (Hives)  penicillin (Hives)    Intolerances      EXAM:  Gen: NAD  RUE: Skin intact, +edema of arm throughout,   no TTP over glenohumeral joint/AC joint/along clavicle  FULL AROM painless,   +ain/pin/m/r/u function, SILT C5-T1, radial pulse intact, compartments soft, brisk cap refill                             12.6   13.68 )-----------( 349      ( 23 Feb 2021 07:04 )             40.0     23 Feb 2021 07:04    139    |  103    |  24     ----------------------------<  147    4.0     |  26     |  1.05     Ca    10.0       23 Feb 2021 07:04  Phos  3.2       22 Feb 2021 07:13  Mg     1.7       22 Feb 2021 07:13    TPro  6.0    /  Alb  2.7    /  TBili  0.5    /  DBili  x      /  AST  23     /  ALT  47     /  AlkPhos  83     23 Feb 2021 07:04            Imaging:   < from: MR Brachial Plexus w/ IV Cont, Right (02.23.21 @ 01:23) >  IMPRESSION:    Degenerative disc disease of the cervical spine that is partially imaged but appears most prominent at C5-C6 and C6-C7 with right-sided foraminal narrowing. Correlation with the cervical spine MRI performed on the same date is recommended.    Full-thickness retracted supraspinatus tendon tear of theright shoulder with moderate to severe AC joint arthrosis and nonspecific edema within the infraspinatus muscle. Dedicated imaging of the shoulder can be obtained as clinically indicated.    No brachial plexus lesion or abnormal brachial plexus enhancement.    Extensive consolidation noted throughout the right lung that may be infectious or from atelectasis. This appears to have progressed compared to prior CT scan.    < end of copied text >      < from: Xray Humerus, Right (02.05.21 @ 20:52) >  RIGHT WRIST, FOREARM, ELBOW, HUMERUS, SHOULDER RADIOGRAPHS  No fracture or dislocation.  No fat pad displacement at the elbow, however evaluation limited due to patient positioning.  Joint spaces are maintained.    < end of copied text >      A/P: 86y Female w R shoulder rotator cuff tear and AC joint arthrosis    Pain control  WBAT  Ice  Active movement of fingers/wrist/elbow/shoulder encouraged  OT   Follow up with Dr. Thurman within 1 week, call office for appointment  Ortho stable, no intervention at this time    Federico Welch MD 86y Female presents c/o R shoulder pain after multiple falls. Denies headstrike/LOC. Denies numbness, tingling paresthesias in affected extremity. Pt forgetful and A&O x 2-3. chart reviewed for further history    HPI:  84 y/o F with hx of dementia (A&Ox2-3, forgetful at times), Diabetes type II (not on insulin), s/p PPM, HTN, ?seizure on keppra, presents with falls. Patient states she has been falling multiple times over the week. Patient with hx of dementia. She is A&Ox2-3. She is forgetful at times. Patient also complains of pain and numbness in her RUE since the fall 2 days ago. Patient's son was diagnosed with COVID earlier this week. History obtained from daughter. As per daughter, patient has been falling multiple times every night. She would get up to use the bathroom overnight and she falls. She is then unable to get up from the floor. Patient denies head trauma or LOC.  Patient's son would then find her on the floor in AM. When she fell two days ago, she fell on her R arm and she has been having pain. She also complains of numbness in the R arm. She states she also has difficulty opening her hand. She also was complaining sore throat and cough at home. She was taken to an  urgent care on Friday where she tested positive for covid. She was then sent to hospital for further work up. Per daughter, patient would need more help at home for care. She currently only has aide 8 hours for 5 days a week. Denies fever, chills, LOC, chest pain, SOB, nausea, vomiting ,melena, hematochezia, or dysuria.     Patient was admitted to [facility]:  She was noted to have RUE edema pending RUE doppler r/o DVT, imaging neg for fx of r wrist/elbow/shoulder. Patient was awaiting PT eval. Pj was incidentally found to have lung nodule and pancreatic nodule. the providers at [facility] discussed findings of lung nodule and pancreatic nodule w/ daughter Emerson, who is aware that pt is to follow up closely with pulmolonogist and PCP outpt. Pt prefers not to know details of the nodules or CT findings.   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   COVID+ not requiring O2, not currently candidate for remdes/dexamethasone but may benefit from monoclonal antibody infusion is she is a candidate upon transfer to [facility].   (06 Feb 2021 20:56)      Vital Signs Last 24 Hrs  T(C): 36.7 (02-23-21 @ 05:49), Max: 36.8 (02-22-21 @ 17:52)  T(F): 98 (02-23-21 @ 05:49), Max: 98.3 (02-22-21 @ 17:52)  HR: 83 (02-23-21 @ 05:49) (67 - 83)  BP: 124/61 (02-23-21 @ 05:49) (124/61 - 133/62)  BP(mean): --  RR: 18 (02-23-21 @ 05:49) (18 - 20)  SpO2: 95% (02-23-21 @ 05:49) (95% - 98%)    PAST MEDICAL & SURGICAL HISTORY:  Diabetes    HTN (hypertension)    Obesity    Dementia    Pacemaker      MEDICATIONS  (STANDING):  amLODIPine   Tablet 5 milliGRAM(s) Oral daily  apixaban 5 milliGRAM(s) Oral every 12 hours  atorvastatin 20 milliGRAM(s) Oral at bedtime  dextrose 40% Gel 15 Gram(s) Oral once  dextrose 5%. 1000 milliLiter(s) IV Continuous <Continuous>  dextrose 5%. 1000 milliLiter(s) IV Continuous <Continuous>  dextrose 50% Injectable 25 Gram(s) IV Push once  dextrose 50% Injectable 12.5 Gram(s) IV Push once  dextrose 50% Injectable 25 Gram(s) IV Push once  donepezil 10 milliGRAM(s) Oral at bedtime  dorzolamide 2%/timolol 0.5% Ophthalmic Solution 1 Drop(s) Both EYES every 12 hours  escitalopram 5 milliGRAM(s) Oral daily  glucagon  Injectable 1 milliGRAM(s) IntraMuscular once  insulin glargine Injectable (LANTUS) 20 Unit(s) SubCutaneous at bedtime  insulin lispro (ADMELOG) corrective regimen sliding scale   SubCutaneous three times a day before meals  insulin lispro (ADMELOG) corrective regimen sliding scale   SubCutaneous at bedtime  insulin lispro Injectable (ADMELOG) 2 Unit(s) SubCutaneous three times a day before meals  levETIRAcetam 500 milliGRAM(s) Oral two times a day  oxybutynin 5 milliGRAM(s) Oral two times a day    Allergies    iodine (Hives)  penicillin (Hives)    Intolerances      EXAM:  Gen: NAD  RUE: Skin intact, +edema of arm throughout,   no TTP over glenohumeral joint/AC joint/along clavicle  FULL AROM painless,   +ain/pin/m/r/u function, SILT C5-T1, radial pulse intact, compartments soft, brisk cap refill                             12.6   13.68 )-----------( 349      ( 23 Feb 2021 07:04 )             40.0     23 Feb 2021 07:04    139    |  103    |  24     ----------------------------<  147    4.0     |  26     |  1.05     Ca    10.0       23 Feb 2021 07:04  Phos  3.2       22 Feb 2021 07:13  Mg     1.7       22 Feb 2021 07:13    TPro  6.0    /  Alb  2.7    /  TBili  0.5    /  DBili  x      /  AST  23     /  ALT  47     /  AlkPhos  83     23 Feb 2021 07:04            Imaging:   < from: MR Brachial Plexus w/ IV Cont, Right (02.23.21 @ 01:23) >  IMPRESSION:    Degenerative disc disease of the cervical spine that is partially imaged but appears most prominent at C5-C6 and C6-C7 with right-sided foraminal narrowing. Correlation with the cervical spine MRI performed on the same date is recommended.    Full-thickness retracted supraspinatus tendon tear of theright shoulder with moderate to severe AC joint arthrosis and nonspecific edema within the infraspinatus muscle. Dedicated imaging of the shoulder can be obtained as clinically indicated.    No brachial plexus lesion or abnormal brachial plexus enhancement.    Extensive consolidation noted throughout the right lung that may be infectious or from atelectasis. This appears to have progressed compared to prior CT scan.    < end of copied text >      < from: Xray Humerus, Right (02.05.21 @ 20:52) >  RIGHT WRIST, FOREARM, ELBOW, HUMERUS, SHOULDER RADIOGRAPHS  No fracture or dislocation.  No fat pad displacement at the elbow, however evaluation limited due to patient positioning.  Joint spaces are maintained.    < end of copied text >      A/P: 86y Female w R shoulder rotator cuff tear and AC joint arthrosis    Pain control  WBAT  Ice  Active movement of fingers/wrist/elbow/shoulder encouraged  OT   will discuss with Dr. Thurman  Follow up with Dr. Thurman within 1 week, call office for appointment  Ortho stable, no intervention at this time    Federico Welch MD 86y Female presents c/o R shoulder pain after multiple falls. Denies headstrike/LOC. Denies numbness, tingling paresthesias in affected extremity. Pt forgetful and A&O x 2-3. chart reviewed for further history    HPI:  84 y/o F with hx of dementia (A&Ox2-3, forgetful at times), Diabetes type II (not on insulin), s/p PPM, HTN, ?seizure on keppra, presents with falls. Patient states she has been falling multiple times over the week. Patient with hx of dementia. She is A&Ox2-3. She is forgetful at times. Patient also complains of pain and numbness in her RUE since the fall 2 days ago. Patient's son was diagnosed with COVID earlier this week. History obtained from daughter. As per daughter, patient has been falling multiple times every night. She would get up to use the bathroom overnight and she falls. She is then unable to get up from the floor. Patient denies head trauma or LOC.  Patient's son would then find her on the floor in AM. When she fell two days ago, she fell on her R arm and she has been having pain. She also complains of numbness in the R arm. She states she also has difficulty opening her hand. She also was complaining sore throat and cough at home. She was taken to an  urgent care on Friday where she tested positive for covid. She was then sent to hospital for further work up. Per daughter, patient would need more help at home for care. She currently only has aide 8 hours for 5 days a week. Denies fever, chills, LOC, chest pain, SOB, nausea, vomiting ,melena, hematochezia, or dysuria.     Patient was admitted to [facility]:  She was noted to have RUE edema pending RUE doppler r/o DVT, imaging neg for fx of r wrist/elbow/shoulder. Patient was awaiting PT eval. Pj was incidentally found to have lung nodule and pancreatic nodule. the providers at [facility] discussed findings of lung nodule and pancreatic nodule w/ daughter Emerson, who is aware that pt is to follow up closely with pulmolonogist and PCP outpt. Pt prefers not to know details of the nodules or CT findings.   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   COVID+ not requiring O2, not currently candidate for remdes/dexamethasone but may benefit from monoclonal antibody infusion is she is a candidate upon transfer to [facility].   (06 Feb 2021 20:56)      Vital Signs Last 24 Hrs  T(C): 36.7 (02-23-21 @ 05:49), Max: 36.8 (02-22-21 @ 17:52)  T(F): 98 (02-23-21 @ 05:49), Max: 98.3 (02-22-21 @ 17:52)  HR: 83 (02-23-21 @ 05:49) (67 - 83)  BP: 124/61 (02-23-21 @ 05:49) (124/61 - 133/62)  BP(mean): --  RR: 18 (02-23-21 @ 05:49) (18 - 20)  SpO2: 95% (02-23-21 @ 05:49) (95% - 98%)    PAST MEDICAL & SURGICAL HISTORY:  Diabetes    HTN (hypertension)    Obesity    Dementia    Pacemaker      MEDICATIONS  (STANDING):  amLODIPine   Tablet 5 milliGRAM(s) Oral daily  apixaban 5 milliGRAM(s) Oral every 12 hours  atorvastatin 20 milliGRAM(s) Oral at bedtime  dextrose 40% Gel 15 Gram(s) Oral once  dextrose 5%. 1000 milliLiter(s) IV Continuous <Continuous>  dextrose 5%. 1000 milliLiter(s) IV Continuous <Continuous>  dextrose 50% Injectable 25 Gram(s) IV Push once  dextrose 50% Injectable 12.5 Gram(s) IV Push once  dextrose 50% Injectable 25 Gram(s) IV Push once  donepezil 10 milliGRAM(s) Oral at bedtime  dorzolamide 2%/timolol 0.5% Ophthalmic Solution 1 Drop(s) Both EYES every 12 hours  escitalopram 5 milliGRAM(s) Oral daily  glucagon  Injectable 1 milliGRAM(s) IntraMuscular once  insulin glargine Injectable (LANTUS) 20 Unit(s) SubCutaneous at bedtime  insulin lispro (ADMELOG) corrective regimen sliding scale   SubCutaneous three times a day before meals  insulin lispro (ADMELOG) corrective regimen sliding scale   SubCutaneous at bedtime  insulin lispro Injectable (ADMELOG) 2 Unit(s) SubCutaneous three times a day before meals  levETIRAcetam 500 milliGRAM(s) Oral two times a day  oxybutynin 5 milliGRAM(s) Oral two times a day    Allergies    iodine (Hives)  penicillin (Hives)    Intolerances      EXAM:  Gen: NAD  RUE: Skin intact, +edema of arm throughout,   no TTP over glenohumeral joint/AC joint/along clavicle  FULL AROM painless,   +ain/pin/m/r/u function, SILT C5-T1, radial pulse intact, compartments soft, brisk cap refill                             12.6   13.68 )-----------( 349      ( 23 Feb 2021 07:04 )             40.0     23 Feb 2021 07:04    139    |  103    |  24     ----------------------------<  147    4.0     |  26     |  1.05     Ca    10.0       23 Feb 2021 07:04  Phos  3.2       22 Feb 2021 07:13  Mg     1.7       22 Feb 2021 07:13    TPro  6.0    /  Alb  2.7    /  TBili  0.5    /  DBili  x      /  AST  23     /  ALT  47     /  AlkPhos  83     23 Feb 2021 07:04            Imaging:   < from: MR Brachial Plexus w/ IV Cont, Right (02.23.21 @ 01:23) >  IMPRESSION:    Degenerative disc disease of the cervical spine that is partially imaged but appears most prominent at C5-C6 and C6-C7 with right-sided foraminal narrowing. Correlation with the cervical spine MRI performed on the same date is recommended.    Full-thickness retracted supraspinatus tendon tear of theright shoulder with moderate to severe AC joint arthrosis and nonspecific edema within the infraspinatus muscle. Dedicated imaging of the shoulder can be obtained as clinically indicated.    No brachial plexus lesion or abnormal brachial plexus enhancement.    Extensive consolidation noted throughout the right lung that may be infectious or from atelectasis. This appears to have progressed compared to prior CT scan.    < end of copied text >      < from: Xray Humerus, Right (02.05.21 @ 20:52) >  RIGHT WRIST, FOREARM, ELBOW, HUMERUS, SHOULDER RADIOGRAPHS  No fracture or dislocation.  No fat pad displacement at the elbow, however evaluation limited due to patient positioning.  Joint spaces are maintained.    < end of copied text >      A/P: 86y Female w R shoulder rotator cuff tear and AC joint arthrosis    Pain control  WBAT  Ice  Active movement of fingers/wrist/elbow/shoulder encouraged  OT   will discuss with Dr. Purdy  Follow up with Dr. Purdy within 1 week, call office for appointment  Ortho stable, no intervention at this time

## 2021-02-23 NOTE — PROGRESS NOTE ADULT - PROBLEM SELECTOR PLAN 10
in right popliteal vein seen, age undeterminate. Also has IVC filter in place per MRI tech. Pt has had IVC filter for over 7 years, c/w Eliquis

## 2021-02-24 LAB
ALBUMIN SERPL ELPH-MCNC: 2.7 G/DL — LOW (ref 3.3–5)
ALP SERPL-CCNC: 87 U/L — SIGNIFICANT CHANGE UP (ref 40–120)
ALT FLD-CCNC: 37 U/L — HIGH (ref 4–33)
ANION GAP SERPL CALC-SCNC: 10 MMOL/L — SIGNIFICANT CHANGE UP (ref 7–14)
AST SERPL-CCNC: 20 U/L — SIGNIFICANT CHANGE UP (ref 4–32)
BILIRUB SERPL-MCNC: 0.6 MG/DL — SIGNIFICANT CHANGE UP (ref 0.2–1.2)
BUN SERPL-MCNC: 26 MG/DL — HIGH (ref 7–23)
CALCIUM SERPL-MCNC: 9.9 MG/DL — SIGNIFICANT CHANGE UP (ref 8.4–10.5)
CHLORIDE SERPL-SCNC: 103 MMOL/L — SIGNIFICANT CHANGE UP (ref 98–107)
CO2 SERPL-SCNC: 23 MMOL/L — SIGNIFICANT CHANGE UP (ref 22–31)
CREAT SERPL-MCNC: 1.01 MG/DL — SIGNIFICANT CHANGE UP (ref 0.5–1.3)
GLUCOSE BLDC GLUCOMTR-MCNC: 170 MG/DL — HIGH (ref 70–99)
GLUCOSE BLDC GLUCOMTR-MCNC: 193 MG/DL — HIGH (ref 70–99)
GLUCOSE BLDC GLUCOMTR-MCNC: 227 MG/DL — HIGH (ref 70–99)
GLUCOSE BLDC GLUCOMTR-MCNC: 230 MG/DL — HIGH (ref 70–99)
GLUCOSE SERPL-MCNC: 167 MG/DL — HIGH (ref 70–99)
HCT VFR BLD CALC: 41.3 % — SIGNIFICANT CHANGE UP (ref 34.5–45)
HGB BLD-MCNC: 13.1 G/DL — SIGNIFICANT CHANGE UP (ref 11.5–15.5)
MCHC RBC-ENTMCNC: 27.2 PG — SIGNIFICANT CHANGE UP (ref 27–34)
MCHC RBC-ENTMCNC: 31.7 GM/DL — LOW (ref 32–36)
MCV RBC AUTO: 85.7 FL — SIGNIFICANT CHANGE UP (ref 80–100)
NRBC # BLD: 0 /100 WBCS — SIGNIFICANT CHANGE UP
NRBC # FLD: 0 K/UL — SIGNIFICANT CHANGE UP
PLATELET # BLD AUTO: 289 K/UL — SIGNIFICANT CHANGE UP (ref 150–400)
POTASSIUM SERPL-MCNC: 4.5 MMOL/L — SIGNIFICANT CHANGE UP (ref 3.5–5.3)
POTASSIUM SERPL-SCNC: 4.5 MMOL/L — SIGNIFICANT CHANGE UP (ref 3.5–5.3)
PROT SERPL-MCNC: 6.2 G/DL — SIGNIFICANT CHANGE UP (ref 6–8.3)
RBC # BLD: 4.82 M/UL — SIGNIFICANT CHANGE UP (ref 3.8–5.2)
RBC # FLD: 13 % — SIGNIFICANT CHANGE UP (ref 10.3–14.5)
SODIUM SERPL-SCNC: 136 MMOL/L — SIGNIFICANT CHANGE UP (ref 135–145)
WBC # BLD: 13.83 K/UL — HIGH (ref 3.8–10.5)
WBC # FLD AUTO: 13.83 K/UL — HIGH (ref 3.8–10.5)

## 2021-02-24 PROCEDURE — 71275 CT ANGIOGRAPHY CHEST: CPT | Mod: 26

## 2021-02-24 PROCEDURE — 99233 SBSQ HOSP IP/OBS HIGH 50: CPT

## 2021-02-24 RX ORDER — DIPHENHYDRAMINE HCL 50 MG
50 CAPSULE ORAL ONCE
Refills: 0 | Status: COMPLETED | OUTPATIENT
Start: 2021-02-24 | End: 2021-02-24

## 2021-02-24 RX ADMIN — Medication 1: at 12:29

## 2021-02-24 RX ADMIN — Medication 2 UNIT(S): at 09:22

## 2021-02-24 RX ADMIN — Medication 2: at 09:22

## 2021-02-24 RX ADMIN — LEVETIRACETAM 500 MILLIGRAM(S): 250 TABLET, FILM COATED ORAL at 05:54

## 2021-02-24 RX ADMIN — Medication 5 MILLIGRAM(S): at 05:54

## 2021-02-24 RX ADMIN — Medication 2 UNIT(S): at 12:28

## 2021-02-24 RX ADMIN — APIXABAN 5 MILLIGRAM(S): 2.5 TABLET, FILM COATED ORAL at 17:31

## 2021-02-24 RX ADMIN — ATORVASTATIN CALCIUM 20 MILLIGRAM(S): 80 TABLET, FILM COATED ORAL at 21:22

## 2021-02-24 RX ADMIN — DORZOLAMIDE HYDROCHLORIDE TIMOLOL MALEATE 1 DROP(S): 20; 5 SOLUTION/ DROPS OPHTHALMIC at 05:54

## 2021-02-24 RX ADMIN — Medication 2 UNIT(S): at 17:31

## 2021-02-24 RX ADMIN — INSULIN GLARGINE 20 UNIT(S): 100 INJECTION, SOLUTION SUBCUTANEOUS at 22:14

## 2021-02-24 RX ADMIN — LEVETIRACETAM 500 MILLIGRAM(S): 250 TABLET, FILM COATED ORAL at 17:31

## 2021-02-24 RX ADMIN — AMLODIPINE BESYLATE 5 MILLIGRAM(S): 2.5 TABLET ORAL at 05:54

## 2021-02-24 RX ADMIN — Medication 5 MILLIGRAM(S): at 17:31

## 2021-02-24 RX ADMIN — Medication 50 MILLIGRAM(S): at 19:16

## 2021-02-24 RX ADMIN — APIXABAN 5 MILLIGRAM(S): 2.5 TABLET, FILM COATED ORAL at 05:54

## 2021-02-24 RX ADMIN — Medication 1: at 17:31

## 2021-02-24 RX ADMIN — DONEPEZIL HYDROCHLORIDE 10 MILLIGRAM(S): 10 TABLET, FILM COATED ORAL at 21:22

## 2021-02-24 RX ADMIN — Medication 40 MILLIGRAM(S): at 15:11

## 2021-02-24 RX ADMIN — ESCITALOPRAM OXALATE 5 MILLIGRAM(S): 10 TABLET, FILM COATED ORAL at 12:28

## 2021-02-24 RX ADMIN — DORZOLAMIDE HYDROCHLORIDE TIMOLOL MALEATE 1 DROP(S): 20; 5 SOLUTION/ DROPS OPHTHALMIC at 17:31

## 2021-02-24 NOTE — PROGRESS NOTE ADULT - PROBLEM SELECTOR PLAN 1
-presented to [facility] in setting of being found on floor w/ recurrent falls at home w/ RUE pain. Initially pt w/ elevated CK and RUE numbness tingling possible rhabdo  - shoulder, elbow, humeral, forearm, wrist xray neg for fx  - VA duplex 2/9 of RUE negative for DVT, noted significant edema throughout arm  - MR completed 2/22 showing supraspinatous tear, ortho eval no surgical intervention, promote mobility w/ OT and PT

## 2021-02-24 NOTE — PROVIDER CONTACT NOTE (OTHER) - RECOMMENDATIONS
Administer AM amlodipine and order additional blood pressure medications
will encourage ice packs
Placed back on 2 L NC.
Patient need 2-3L of oxygen via NC

## 2021-02-24 NOTE — PROVIDER CONTACT NOTE (OTHER) - ASSESSMENT
patient noted desaturating to 85-86% while in 1L oxygen via NC
Patient febrile 102.3 oral, tylenol 650mg given
Patient desat to 88% when wean off to RA, denies SOB and chest pain.
Patient noted with elevated BP. Patient not in any distress, asymptomatic
Patient noted with elevated blood pressure. Patient not in any distress, asymptomatic.

## 2021-02-24 NOTE — PROGRESS NOTE ADULT - ASSESSMENT
84 y/o F with hx of dementia (A&Ox2-3, forgetful at times), Diabetes type II (not on insulin), PPM, HTN, ?seizure on keppra, presents with falls. found to have RUE swelling concern w/ MR yesterday showing supraspinatous tear and progressive consolidation of R lung pending CTA chest

## 2021-02-24 NOTE — PROVIDER CONTACT NOTE (OTHER) - BACKGROUND
Patient admitted 2/6 after fall, hist of seizures, DM2, COVID +, dementia, A&Ox3 forgetful at times

## 2021-02-24 NOTE — PROGRESS NOTE ADULT - PROBLEM SELECTOR PLAN 4
- CT chest at [facility] incidentally found: Irregular 1.9 cm left upper lobe nodular opacity with associated spiculation.   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  - Patient did not want to know about these results and the daughter is already aware of these results  - will need  outpatient pulm follow up ultimately  - given progressive consolidation on R lung on MR seen 2/22 will obtain CTA chest to eval if progression related to lung nodule? Will likely need biopsy outside hospital

## 2021-02-24 NOTE — PROGRESS NOTE ADULT - PROBLEM SELECTOR PLAN 2
Chest xray-Multifocal pneumonia  Completed course of Remdisivir  will trial of O2 today  will wean off  Ddimer,CRP coming down.

## 2021-02-24 NOTE — PROVIDER CONTACT NOTE (OTHER) - ACTION/TREATMENT ORDERED:
Charlotte Arana notified, patient placed back on 2L NC.
ordered manual BP and carried out. monitoring ongoing.
Ordered 3L oxygen via NC. and carried out. monitoring ongoing.
Will look at orders to determine if more antipyretics should be ordered
Provider notified, no further actions ordered at this time

## 2021-02-24 NOTE — PROVIDER CONTACT NOTE (OTHER) - REASON
Patient's blood pressure elevated
Patient febrile
patient noted desaturating to 85-86% while in 1L oxygen via NC
Patient desat to 88% when wean off to RA
Patient noted with elevated BP

## 2021-02-24 NOTE — PROGRESS NOTE ADULT - SUBJECTIVE AND OBJECTIVE BOX
Patient is a 86y old  Female who presents with a chief complaint of COVID/Falls (23 Feb 2021 13:46)      SUBJECTIVE / OVERNIGHT EVENTS:    Patient seen and examined at bedside, tolerating po intake, no chest pain and dyspnea. Pt moving RUE spontaneously, no worsening numbness/tingling. Titrating off oxygen.    MEDICATIONS  (STANDING):  amLODIPine   Tablet 5 milliGRAM(s) Oral daily  apixaban 5 milliGRAM(s) Oral every 12 hours  atorvastatin 20 milliGRAM(s) Oral at bedtime  dextrose 40% Gel 15 Gram(s) Oral once  dextrose 5%. 1000 milliLiter(s) (50 mL/Hr) IV Continuous <Continuous>  dextrose 5%. 1000 milliLiter(s) (100 mL/Hr) IV Continuous <Continuous>  dextrose 50% Injectable 25 Gram(s) IV Push once  dextrose 50% Injectable 12.5 Gram(s) IV Push once  dextrose 50% Injectable 25 Gram(s) IV Push once  donepezil 10 milliGRAM(s) Oral at bedtime  dorzolamide 2%/timolol 0.5% Ophthalmic Solution 1 Drop(s) Both EYES every 12 hours  escitalopram 5 milliGRAM(s) Oral daily  glucagon  Injectable 1 milliGRAM(s) IntraMuscular once  insulin glargine Injectable (LANTUS) 20 Unit(s) SubCutaneous at bedtime  insulin lispro (ADMELOG) corrective regimen sliding scale   SubCutaneous three times a day before meals  insulin lispro (ADMELOG) corrective regimen sliding scale   SubCutaneous at bedtime  insulin lispro Injectable (ADMELOG) 2 Unit(s) SubCutaneous three times a day before meals  levETIRAcetam 500 milliGRAM(s) Oral two times a day  oxybutynin 5 milliGRAM(s) Oral two times a day    MEDICATIONS  (PRN):  acetaminophen   Tablet .. 650 milliGRAM(s) Oral every 4 hours PRN Temp greater or equal to 38.5C (101.3F)  guaiFENesin   Syrup  (Sugar-Free) 200 milliGRAM(s) Oral every 6 hours PRN cough/congestion  sodium chloride 0.65% Nasal 1 Spray(s) Both Nostrils two times a day PRN Congestion      Vital Signs Last 24 Hrs  T(C): 36.9 (24 Feb 2021 12:27), Max: 36.9 (24 Feb 2021 12:27)  T(F): 98.5 (24 Feb 2021 12:27), Max: 98.5 (24 Feb 2021 12:27)  HR: 81 (24 Feb 2021 12:27) (80 - 82)  BP: 123/55 (24 Feb 2021 12:27) (118/65 - 123/68)  BP(mean): --  RR: 16 (24 Feb 2021 12:27) (16 - 16)  SpO2: 95% (24 Feb 2021 12:27) (94% - 95%)  CAPILLARY BLOOD GLUCOSE      POCT Blood Glucose.: 193 mg/dL (24 Feb 2021 12:21)  POCT Blood Glucose.: 230 mg/dL (24 Feb 2021 09:20)  POCT Blood Glucose.: 264 mg/dL (23 Feb 2021 21:20)  POCT Blood Glucose.: 197 mg/dL (23 Feb 2021 17:32)    I&O's Summary    23 Feb 2021 07:01  -  24 Feb 2021 07:00  --------------------------------------------------------  IN: 0 mL / OUT: 0 mL / NET: 0 mL        PHYSICAL EXAM:  Vital Signs Last 24 Hrs  T(C): 36.9 (02-24-21 @ 12:27)  T(F): 98.5 (02-24-21 @ 12:27), Max: 98.5 (02-24-21 @ 12:27)  HR: 81 (02-24-21 @ 12:27) (80 - 82)  BP: 123/55 (02-24-21 @ 12:27)  BP(mean): --  RR: 16 (02-24-21 @ 12:27) (16 - 16)  SpO2: 95% (02-24-21 @ 12:27) (94% - 95%)  Wt(kg): --    02-23 @ 07:01  -  02-24 @ 07:00  --------------------------------------------------------  IN: 0 mL / OUT: 0 mL / NET: 0 mL        CONSTITUTIONAL: NAD, well-developed  RESPIRATORY: Normal respiratory effort; lungs are clear to auscultation bilaterally  CARDIOVASCULAR: Regular rate and rhythm, normal S1 and S2, No lower extremity edema  ABDOMEN: Nontender to palpation, normoactive bowel sounds  MUSCLOSKELETAL: no clubbing or cyanosis of digits  PSYCH: A+O to person, place, and time; affect appropriate  Extr R UE pitting edema, able lift both arms above head, able to grasp objects w/ R arm, denies numbness/tingling    LABS:                        13.1   13.83 )-----------( 289      ( 24 Feb 2021 06:41 )             41.3     02-24    136  |  103  |  26<H>  ----------------------------<  167<H>  4.5   |  23  |  1.01    Ca    9.9      24 Feb 2021 06:44    TPro  6.2  /  Alb  2.7<L>  /  TBili  0.6  /  DBili  x   /  AST  20  /  ALT  37<H>  /  AlkPhos  87  02-24              RADIOLOGY & ADDITIONAL TESTS:    < from: MR Cervical Spine w/wo IV Cont (02.23.21 @ 01:23) >  IMPRESSION: Degenerative disc disease most pronounced at the C6-C7 level, where there is severe right-sided foraminal narrowing secondary to endplate osteophyte formation.    < end of copied text >      Imaging Personally Reviewed:    Consultant(s) Notes Reviewed:      Care Discussed with Consultants/Other Providers:

## 2021-02-24 NOTE — PROVIDER CONTACT NOTE (OTHER) - SITUATION
Patient desat to 88% when wean off to RA
Patient noted with elevated BP
Patient's blood pressure elevated, 185/91
Patient febrile 102.3 oral
patient noted desaturating to 85-86% while in 1L oxygen via NC

## 2021-02-25 LAB
ALBUMIN SERPL ELPH-MCNC: 2.6 G/DL — LOW (ref 3.3–5)
ALP SERPL-CCNC: 87 U/L — SIGNIFICANT CHANGE UP (ref 40–120)
ALT FLD-CCNC: 27 U/L — SIGNIFICANT CHANGE UP (ref 4–33)
ANION GAP SERPL CALC-SCNC: 10 MMOL/L — SIGNIFICANT CHANGE UP (ref 7–14)
AST SERPL-CCNC: 14 U/L — SIGNIFICANT CHANGE UP (ref 4–32)
BILIRUB SERPL-MCNC: 0.5 MG/DL — SIGNIFICANT CHANGE UP (ref 0.2–1.2)
BUN SERPL-MCNC: 29 MG/DL — HIGH (ref 7–23)
CALCIUM SERPL-MCNC: 9.7 MG/DL — SIGNIFICANT CHANGE UP (ref 8.4–10.5)
CHLORIDE SERPL-SCNC: 102 MMOL/L — SIGNIFICANT CHANGE UP (ref 98–107)
CO2 SERPL-SCNC: 23 MMOL/L — SIGNIFICANT CHANGE UP (ref 22–31)
CREAT SERPL-MCNC: 1.15 MG/DL — SIGNIFICANT CHANGE UP (ref 0.5–1.3)
GLUCOSE BLDC GLUCOMTR-MCNC: 209 MG/DL — HIGH (ref 70–99)
GLUCOSE SERPL-MCNC: 260 MG/DL — HIGH (ref 70–99)
HCT VFR BLD CALC: 40.3 % — SIGNIFICANT CHANGE UP (ref 34.5–45)
HGB BLD-MCNC: 12.4 G/DL — SIGNIFICANT CHANGE UP (ref 11.5–15.5)
MCHC RBC-ENTMCNC: 27.1 PG — SIGNIFICANT CHANGE UP (ref 27–34)
MCHC RBC-ENTMCNC: 30.8 GM/DL — LOW (ref 32–36)
MCV RBC AUTO: 88 FL — SIGNIFICANT CHANGE UP (ref 80–100)
NRBC # BLD: 0 /100 WBCS — SIGNIFICANT CHANGE UP
NRBC # FLD: 0 K/UL — SIGNIFICANT CHANGE UP
PLATELET # BLD AUTO: 280 K/UL — SIGNIFICANT CHANGE UP (ref 150–400)
POTASSIUM SERPL-MCNC: 4.6 MMOL/L — SIGNIFICANT CHANGE UP (ref 3.5–5.3)
POTASSIUM SERPL-SCNC: 4.6 MMOL/L — SIGNIFICANT CHANGE UP (ref 3.5–5.3)
PROT SERPL-MCNC: 6.2 G/DL — SIGNIFICANT CHANGE UP (ref 6–8.3)
RBC # BLD: 4.58 M/UL — SIGNIFICANT CHANGE UP (ref 3.8–5.2)
RBC # FLD: 13 % — SIGNIFICANT CHANGE UP (ref 10.3–14.5)
SARS-COV-2 RNA SPEC QL NAA+PROBE: DETECTED
SODIUM SERPL-SCNC: 135 MMOL/L — SIGNIFICANT CHANGE UP (ref 135–145)
WBC # BLD: 11.72 K/UL — HIGH (ref 3.8–10.5)
WBC # FLD AUTO: 11.72 K/UL — HIGH (ref 3.8–10.5)

## 2021-02-25 PROCEDURE — 99233 SBSQ HOSP IP/OBS HIGH 50: CPT

## 2021-02-25 RX ORDER — CALCIUM CARBONATE 500(1250)
1 TABLET ORAL EVERY 6 HOURS
Refills: 0 | Status: DISCONTINUED | OUTPATIENT
Start: 2021-02-25 | End: 2021-03-02

## 2021-02-25 RX ORDER — INSULIN GLARGINE 100 [IU]/ML
24 INJECTION, SOLUTION SUBCUTANEOUS AT BEDTIME
Refills: 0 | Status: DISCONTINUED | OUTPATIENT
Start: 2021-02-25 | End: 2021-03-02

## 2021-02-25 RX ADMIN — Medication 1: at 17:04

## 2021-02-25 RX ADMIN — Medication 2: at 08:55

## 2021-02-25 RX ADMIN — Medication 5 MILLIGRAM(S): at 17:03

## 2021-02-25 RX ADMIN — APIXABAN 5 MILLIGRAM(S): 2.5 TABLET, FILM COATED ORAL at 05:40

## 2021-02-25 RX ADMIN — AMLODIPINE BESYLATE 5 MILLIGRAM(S): 2.5 TABLET ORAL at 05:40

## 2021-02-25 RX ADMIN — LEVETIRACETAM 500 MILLIGRAM(S): 250 TABLET, FILM COATED ORAL at 17:03

## 2021-02-25 RX ADMIN — INSULIN GLARGINE 24 UNIT(S): 100 INJECTION, SOLUTION SUBCUTANEOUS at 21:41

## 2021-02-25 RX ADMIN — Medication 200 MILLIGRAM(S): at 10:17

## 2021-02-25 RX ADMIN — LEVETIRACETAM 500 MILLIGRAM(S): 250 TABLET, FILM COATED ORAL at 05:39

## 2021-02-25 RX ADMIN — Medication 2 UNIT(S): at 12:35

## 2021-02-25 RX ADMIN — DORZOLAMIDE HYDROCHLORIDE TIMOLOL MALEATE 1 DROP(S): 20; 5 SOLUTION/ DROPS OPHTHALMIC at 17:03

## 2021-02-25 RX ADMIN — Medication 2 UNIT(S): at 08:55

## 2021-02-25 RX ADMIN — Medication 2 UNIT(S): at 17:04

## 2021-02-25 RX ADMIN — ESCITALOPRAM OXALATE 5 MILLIGRAM(S): 10 TABLET, FILM COATED ORAL at 11:58

## 2021-02-25 RX ADMIN — DONEPEZIL HYDROCHLORIDE 10 MILLIGRAM(S): 10 TABLET, FILM COATED ORAL at 21:41

## 2021-02-25 RX ADMIN — Medication 5 MILLIGRAM(S): at 05:40

## 2021-02-25 RX ADMIN — DORZOLAMIDE HYDROCHLORIDE TIMOLOL MALEATE 1 DROP(S): 20; 5 SOLUTION/ DROPS OPHTHALMIC at 05:40

## 2021-02-25 RX ADMIN — Medication 4: at 12:35

## 2021-02-25 RX ADMIN — ATORVASTATIN CALCIUM 20 MILLIGRAM(S): 80 TABLET, FILM COATED ORAL at 21:40

## 2021-02-25 RX ADMIN — Medication 1 TABLET(S): at 17:38

## 2021-02-25 RX ADMIN — APIXABAN 5 MILLIGRAM(S): 2.5 TABLET, FILM COATED ORAL at 17:03

## 2021-02-25 NOTE — PROGRESS NOTE ADULT - PROBLEM SELECTOR PLAN 4
- CT chest at [facility] incidentally found: Irregular 1.9 cm left upper lobe nodular opacity with associated spiculation.   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  - Patient did not want to know about these results and the daughter is already aware of these results  - will need  outpatient pulm follow up ultimately  - CTA 2/24 shows unchanged spiculated nodule, b/l opacities c/w sequalae of COVID PNA

## 2021-02-25 NOTE — PROGRESS NOTE ADULT - PROBLEM SELECTOR PLAN 1
-presented to [facility] in setting of being found on floor w/ recurrent falls at home w/ RUE pain. Initially pt w/ elevated CK and RUE numbness tingling possible rhabdo  - shoulder, elbow, humeral, forearm, wrist xray neg for fx  - VA duplex 2/9 of RUE negative for DVT, noted significant edema throughout arm  - MR completed 2/22 showing supraspinatous tear, ortho eval no surgical intervention, promote mobility w/ OT and PT

## 2021-02-25 NOTE — PROGRESS NOTE ADULT - PROBLEM SELECTOR PLAN 2
Chest xray-Multifocal pneumonia  Completed course of Remdisivir/dexamethasone   sating 88% on RA, c/w 2L NC O2 PRN

## 2021-02-25 NOTE — PROGRESS NOTE ADULT - SUBJECTIVE AND OBJECTIVE BOX
Patient is a 86y old  Female who presents with a chief complaint of COVID/Falls (24 Feb 2021 13:07)        SUBJECTIVE / OVERNIGHT EVENTS:    no acute events o/n  pt resting comfortably   denies complaints at this time      MEDICATIONS  (STANDING):  amLODIPine   Tablet 5 milliGRAM(s) Oral daily  apixaban 5 milliGRAM(s) Oral every 12 hours  atorvastatin 20 milliGRAM(s) Oral at bedtime  dextrose 40% Gel 15 Gram(s) Oral once  dextrose 5%. 1000 milliLiter(s) (50 mL/Hr) IV Continuous <Continuous>  dextrose 5%. 1000 milliLiter(s) (100 mL/Hr) IV Continuous <Continuous>  dextrose 50% Injectable 25 Gram(s) IV Push once  dextrose 50% Injectable 12.5 Gram(s) IV Push once  dextrose 50% Injectable 25 Gram(s) IV Push once  donepezil 10 milliGRAM(s) Oral at bedtime  dorzolamide 2%/timolol 0.5% Ophthalmic Solution 1 Drop(s) Both EYES every 12 hours  escitalopram 5 milliGRAM(s) Oral daily  glucagon  Injectable 1 milliGRAM(s) IntraMuscular once  insulin glargine Injectable (LANTUS) 20 Unit(s) SubCutaneous at bedtime  insulin lispro (ADMELOG) corrective regimen sliding scale   SubCutaneous three times a day before meals  insulin lispro (ADMELOG) corrective regimen sliding scale   SubCutaneous at bedtime  insulin lispro Injectable (ADMELOG) 2 Unit(s) SubCutaneous three times a day before meals  levETIRAcetam 500 milliGRAM(s) Oral two times a day  oxybutynin 5 milliGRAM(s) Oral two times a day    MEDICATIONS  (PRN):  acetaminophen   Tablet .. 650 milliGRAM(s) Oral every 4 hours PRN Temp greater or equal to 38.5C (101.3F)  guaiFENesin   Syrup  (Sugar-Free) 200 milliGRAM(s) Oral every 6 hours PRN cough/congestion  sodium chloride 0.65% Nasal 1 Spray(s) Both Nostrils two times a day PRN Congestion      Vital Signs Last 24 Hrs  T(C): 36.5 (25 Feb 2021 11:56), Max: 37 (24 Feb 2021 17:33)  T(F): 97.7 (25 Feb 2021 11:56), Max: 98.6 (24 Feb 2021 17:33)  HR: 78 (25 Feb 2021 11:56) (78 - 89)  BP: 106/55 (25 Feb 2021 11:56) (106/55 - 123/63)  BP(mean): --  RR: 18 (25 Feb 2021 11:56) (16 - 18)  SpO2: 96% (25 Feb 2021 11:56) (88% - 98%)  CAPILLARY BLOOD GLUCOSE      POCT Blood Glucose.: 333 mg/dL (25 Feb 2021 12:28)  POCT Blood Glucose.: 209 mg/dL (25 Feb 2021 08:40)  POCT Blood Glucose.: 227 mg/dL (24 Feb 2021 21:45)  POCT Blood Glucose.: 170 mg/dL (24 Feb 2021 17:16)    I&O's Summary        PHYSICAL EXAM  CONSTITUTIONAL: NAD, well-developed  RESPIRATORY: Normal respiratory effort; lungs are clear to auscultation bilaterally  CARDIOVASCULAR: Regular rate and rhythm, normal S1 and S2, No lower extremity edema  ABDOMEN: Nontender to palpation, normoactive bowel sounds  MUSCLOSKELETAL: no clubbing or cyanosis of digits  EXTR: RUE pitting edema     LABS:                        12.4   11.72 )-----------( 280      ( 25 Feb 2021 06:59 )             40.3     02-25    135  |  102  |  29<H>  ----------------------------<  260<H>  4.6   |  23  |  1.15    Ca    9.7      25 Feb 2021 06:45    TPro  6.2  /  Alb  2.6<L>  /  TBili  0.5  /  DBili  x   /  AST  14  /  ALT  27  /  AlkPhos  87  02-25              RADIOLOGY & ADDITIONAL TESTS:    Imaging Personally Reviewed:  Consultant(s) Notes Reviewed:    Care Discussed with Consultants/Other Providers:

## 2021-02-25 NOTE — PROGRESS NOTE ADULT - PROBLEM SELECTOR PLAN 6
- FS significantly elevated on admission, not on insulin at home, possibly elevated in setting of COVID19 and rhabdomyolysis  - FS basal/bolus + ISS.   - FS better controlled  - monitor fingersticks  - A1C 10.6  - hold oral hypoglycemics  - sugar elevated, increase lantus to 24 u qhs

## 2021-02-25 NOTE — PROGRESS NOTE ADULT - ASSESSMENT
86 y/o F with hx of dementia (A&Ox2-3, forgetful at times), Diabetes type II (not on insulin), PPM, HTN, ?seizure on keppra, presents with falls. found to have RUE swelling concern w/ MRI showing supraspinatous tear and progressive consolidation of R lung -> b/l opacities c/w sequelae of COVID PNA, pending COVID reswab, TARYN placement

## 2021-02-26 LAB
ALBUMIN SERPL ELPH-MCNC: 2.7 G/DL — LOW (ref 3.3–5)
ALP SERPL-CCNC: 82 U/L — SIGNIFICANT CHANGE UP (ref 40–120)
ALT FLD-CCNC: 23 U/L — SIGNIFICANT CHANGE UP (ref 4–33)
ANION GAP SERPL CALC-SCNC: 9 MMOL/L — SIGNIFICANT CHANGE UP (ref 7–14)
AST SERPL-CCNC: 16 U/L — SIGNIFICANT CHANGE UP (ref 4–32)
BILIRUB SERPL-MCNC: 0.4 MG/DL — SIGNIFICANT CHANGE UP (ref 0.2–1.2)
BUN SERPL-MCNC: 31 MG/DL — HIGH (ref 7–23)
CALCIUM SERPL-MCNC: 10 MG/DL — SIGNIFICANT CHANGE UP (ref 8.4–10.5)
CHLORIDE SERPL-SCNC: 106 MMOL/L — SIGNIFICANT CHANGE UP (ref 98–107)
CO2 SERPL-SCNC: 25 MMOL/L — SIGNIFICANT CHANGE UP (ref 22–31)
CREAT SERPL-MCNC: 1.14 MG/DL — SIGNIFICANT CHANGE UP (ref 0.5–1.3)
GLUCOSE SERPL-MCNC: 136 MG/DL — HIGH (ref 70–99)
HCT VFR BLD CALC: 40.1 % — SIGNIFICANT CHANGE UP (ref 34.5–45)
HGB BLD-MCNC: 12.4 G/DL — SIGNIFICANT CHANGE UP (ref 11.5–15.5)
MCHC RBC-ENTMCNC: 27.2 PG — SIGNIFICANT CHANGE UP (ref 27–34)
MCHC RBC-ENTMCNC: 30.9 GM/DL — LOW (ref 32–36)
MCV RBC AUTO: 87.9 FL — SIGNIFICANT CHANGE UP (ref 80–100)
NRBC # BLD: 0 /100 WBCS — SIGNIFICANT CHANGE UP
NRBC # FLD: 0 K/UL — SIGNIFICANT CHANGE UP
PLATELET # BLD AUTO: 280 K/UL — SIGNIFICANT CHANGE UP (ref 150–400)
POTASSIUM SERPL-MCNC: 4.1 MMOL/L — SIGNIFICANT CHANGE UP (ref 3.5–5.3)
POTASSIUM SERPL-SCNC: 4.1 MMOL/L — SIGNIFICANT CHANGE UP (ref 3.5–5.3)
PROT SERPL-MCNC: 5.9 G/DL — LOW (ref 6–8.3)
RBC # BLD: 4.56 M/UL — SIGNIFICANT CHANGE UP (ref 3.8–5.2)
RBC # FLD: 13.2 % — SIGNIFICANT CHANGE UP (ref 10.3–14.5)
SODIUM SERPL-SCNC: 140 MMOL/L — SIGNIFICANT CHANGE UP (ref 135–145)
WBC # BLD: 12.42 K/UL — HIGH (ref 3.8–10.5)
WBC # FLD AUTO: 12.42 K/UL — HIGH (ref 3.8–10.5)

## 2021-02-26 PROCEDURE — 99233 SBSQ HOSP IP/OBS HIGH 50: CPT

## 2021-02-26 RX ORDER — APIXABAN 2.5 MG/1
1 TABLET, FILM COATED ORAL
Qty: 0 | Refills: 0 | DISCHARGE
Start: 2021-02-26

## 2021-02-26 RX ORDER — CALCIUM CARBONATE 500(1250)
1 TABLET ORAL
Qty: 0 | Refills: 0 | DISCHARGE
Start: 2021-02-26

## 2021-02-26 RX ORDER — INSULIN GLARGINE 100 [IU]/ML
24 INJECTION, SOLUTION SUBCUTANEOUS
Qty: 0 | Refills: 0 | DISCHARGE
Start: 2021-02-26

## 2021-02-26 RX ORDER — REPAGLINIDE 1 MG/1
1 TABLET ORAL
Qty: 0 | Refills: 0 | DISCHARGE

## 2021-02-26 RX ORDER — SODIUM CHLORIDE 0.65 %
1 AEROSOL, SPRAY (ML) NASAL
Qty: 0 | Refills: 0 | DISCHARGE
Start: 2021-02-26

## 2021-02-26 RX ADMIN — ATORVASTATIN CALCIUM 20 MILLIGRAM(S): 80 TABLET, FILM COATED ORAL at 22:52

## 2021-02-26 RX ADMIN — Medication 2: at 12:53

## 2021-02-26 RX ADMIN — APIXABAN 5 MILLIGRAM(S): 2.5 TABLET, FILM COATED ORAL at 05:35

## 2021-02-26 RX ADMIN — INSULIN GLARGINE 24 UNIT(S): 100 INJECTION, SOLUTION SUBCUTANEOUS at 22:52

## 2021-02-26 RX ADMIN — ESCITALOPRAM OXALATE 5 MILLIGRAM(S): 10 TABLET, FILM COATED ORAL at 12:53

## 2021-02-26 RX ADMIN — DONEPEZIL HYDROCHLORIDE 10 MILLIGRAM(S): 10 TABLET, FILM COATED ORAL at 22:52

## 2021-02-26 RX ADMIN — APIXABAN 5 MILLIGRAM(S): 2.5 TABLET, FILM COATED ORAL at 18:20

## 2021-02-26 RX ADMIN — LEVETIRACETAM 500 MILLIGRAM(S): 250 TABLET, FILM COATED ORAL at 18:20

## 2021-02-26 RX ADMIN — DORZOLAMIDE HYDROCHLORIDE TIMOLOL MALEATE 1 DROP(S): 20; 5 SOLUTION/ DROPS OPHTHALMIC at 18:20

## 2021-02-26 RX ADMIN — LEVETIRACETAM 500 MILLIGRAM(S): 250 TABLET, FILM COATED ORAL at 05:36

## 2021-02-26 RX ADMIN — AMLODIPINE BESYLATE 5 MILLIGRAM(S): 2.5 TABLET ORAL at 05:36

## 2021-02-26 RX ADMIN — Medication 2 UNIT(S): at 08:41

## 2021-02-26 RX ADMIN — Medication 2 UNIT(S): at 12:53

## 2021-02-26 RX ADMIN — Medication 2 UNIT(S): at 18:19

## 2021-02-26 RX ADMIN — DORZOLAMIDE HYDROCHLORIDE TIMOLOL MALEATE 1 DROP(S): 20; 5 SOLUTION/ DROPS OPHTHALMIC at 05:36

## 2021-02-26 RX ADMIN — Medication 200 MILLIGRAM(S): at 14:04

## 2021-02-26 RX ADMIN — Medication 5 MILLIGRAM(S): at 05:36

## 2021-02-26 RX ADMIN — Medication 2: at 18:19

## 2021-02-26 RX ADMIN — Medication 5 MILLIGRAM(S): at 18:21

## 2021-02-26 NOTE — PROGRESS NOTE ADULT - PROBLEM SELECTOR PLAN 1
-presented to [facility] in setting of being found on floor w/ recurrent falls at home w/ RUE pain. Initially pt w/ elevated CK and RUE numbness tingling possible rhabdo  - shoulder, elbow, humeral, forearm, wrist xray neg for fx  - VA duplex 2/9 of RUE negative for DVT, noted significant edema throughout arm  - MR completed 2/22 showing supraspinatous tear, ortho eval no surgical intervention, promote mobility w/ OT and PT

## 2021-02-26 NOTE — PROGRESS NOTE ADULT - PROBLEM SELECTOR PLAN 4
- CT chest at [facility] incidentally found: Irregular 1.9 cm left upper lobe nodular opacity with associated spiculation.   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  - Patient did not want to know about these results and the daughter is already aware of these results  - will need  outpatient pulm follow up ultimately  - CTA 2/24 shows unchanged spiculated nodule, b/l opacities c/w sequalae of COVID PNA

## 2021-02-26 NOTE — PROGRESS NOTE ADULT - SUBJECTIVE AND OBJECTIVE BOX
Patient is a 86y old  Female who presents with a chief complaint of COVID/Falls (25 Feb 2021 13:48)        SUBJECTIVE / OVERNIGHT EVENTS:  no acute events o/n  denies all complaints      MEDICATIONS  (STANDING):  amLODIPine   Tablet 5 milliGRAM(s) Oral daily  apixaban 5 milliGRAM(s) Oral every 12 hours  atorvastatin 20 milliGRAM(s) Oral at bedtime  dextrose 40% Gel 15 Gram(s) Oral once  dextrose 5%. 1000 milliLiter(s) (50 mL/Hr) IV Continuous <Continuous>  dextrose 5%. 1000 milliLiter(s) (100 mL/Hr) IV Continuous <Continuous>  dextrose 50% Injectable 25 Gram(s) IV Push once  dextrose 50% Injectable 12.5 Gram(s) IV Push once  dextrose 50% Injectable 25 Gram(s) IV Push once  donepezil 10 milliGRAM(s) Oral at bedtime  dorzolamide 2%/timolol 0.5% Ophthalmic Solution 1 Drop(s) Both EYES every 12 hours  escitalopram 5 milliGRAM(s) Oral daily  glucagon  Injectable 1 milliGRAM(s) IntraMuscular once  insulin glargine Injectable (LANTUS) 24 Unit(s) SubCutaneous at bedtime  insulin lispro (ADMELOG) corrective regimen sliding scale   SubCutaneous at bedtime  insulin lispro (ADMELOG) corrective regimen sliding scale   SubCutaneous three times a day before meals  insulin lispro Injectable (ADMELOG) 2 Unit(s) SubCutaneous three times a day before meals  levETIRAcetam 500 milliGRAM(s) Oral two times a day  oxybutynin 5 milliGRAM(s) Oral two times a day    MEDICATIONS  (PRN):  acetaminophen   Tablet .. 650 milliGRAM(s) Oral every 4 hours PRN Temp greater or equal to 38.5C (101.3F)  calcium carbonate    500 mG (Tums) Chewable 1 Tablet(s) Chew every 6 hours PRN Heartburn  guaiFENesin   Syrup  (Sugar-Free) 200 milliGRAM(s) Oral every 6 hours PRN cough/congestion  sodium chloride 0.65% Nasal 1 Spray(s) Both Nostrils two times a day PRN Congestion      Vital Signs Last 24 Hrs  T(C): 36.7 (26 Feb 2021 11:45), Max: 36.7 (25 Feb 2021 20:22)  T(F): 98 (26 Feb 2021 11:45), Max: 98 (25 Feb 2021 20:22)  HR: 77 (26 Feb 2021 11:45) (77 - 83)  BP: 125/63 (26 Feb 2021 11:45) (121/55 - 140/69)  BP(mean): --  RR: 18 (26 Feb 2021 11:45) (16 - 18)  SpO2: 98% (26 Feb 2021 11:45) (98% - 99%)  CAPILLARY BLOOD GLUCOSE      POCT Blood Glucose.: 219 mg/dL (26 Feb 2021 12:18)  POCT Blood Glucose.: 135 mg/dL (26 Feb 2021 08:33)  POCT Blood Glucose.: 236 mg/dL (25 Feb 2021 21:13)  POCT Blood Glucose.: 151 mg/dL (25 Feb 2021 17:03)    I&O's Summary            PHYSICAL EXAM  CONSTITUTIONAL: NAD, well-developed  RESPIRATORY: Normal respiratory effort; lungs are clear to auscultation bilaterally  CARDIOVASCULAR: Regular rate and rhythm, normal S1 and S2, No lower extremity edema  ABDOMEN: Nontender to palpation, normoactive bowel sounds  MUSCLOSKELETAL: no clubbing or cyanosis of digits  EXTR: RUE pitting edema       LABS:                        12.4   12.42 )-----------( 280      ( 26 Feb 2021 07:23 )             40.1     02-26    140  |  106  |  31<H>  ----------------------------<  136<H>  4.1   |  25  |  1.14    Ca    10.0      26 Feb 2021 07:23    TPro  5.9<L>  /  Alb  2.7<L>  /  TBili  0.4  /  DBili  x   /  AST  16  /  ALT  23  /  AlkPhos  82  02-26              RADIOLOGY & ADDITIONAL TESTS:    Imaging Personally Reviewed:  Consultant(s) Notes Reviewed:    Care Discussed with Consultants/Other Providers:

## 2021-02-26 NOTE — PROGRESS NOTE ADULT - PROBLEM SELECTOR PLAN 6
- FS significantly elevated on admission, not on insulin at home, possibly elevated in setting of COVID19 and rhabdomyolysis  - FS basal/bolus + ISS.   - FS better controlled  - monitor fingersticks  - A1C 10.6  - hold oral hypoglycemics  - sugar elevated, increased lantus to 24 u qhs

## 2021-02-27 LAB
ALBUMIN SERPL ELPH-MCNC: 2.7 G/DL — LOW (ref 3.3–5)
ALP SERPL-CCNC: 79 U/L — SIGNIFICANT CHANGE UP (ref 40–120)
ALT FLD-CCNC: 20 U/L — SIGNIFICANT CHANGE UP (ref 4–33)
ANION GAP SERPL CALC-SCNC: 9 MMOL/L — SIGNIFICANT CHANGE UP (ref 7–14)
AST SERPL-CCNC: 15 U/L — SIGNIFICANT CHANGE UP (ref 4–32)
BILIRUB SERPL-MCNC: 0.5 MG/DL — SIGNIFICANT CHANGE UP (ref 0.2–1.2)
BUN SERPL-MCNC: 22 MG/DL — SIGNIFICANT CHANGE UP (ref 7–23)
CALCIUM SERPL-MCNC: 9.8 MG/DL — SIGNIFICANT CHANGE UP (ref 8.4–10.5)
CHLORIDE SERPL-SCNC: 103 MMOL/L — SIGNIFICANT CHANGE UP (ref 98–107)
CO2 SERPL-SCNC: 25 MMOL/L — SIGNIFICANT CHANGE UP (ref 22–31)
CREAT SERPL-MCNC: 1.06 MG/DL — SIGNIFICANT CHANGE UP (ref 0.5–1.3)
GLUCOSE SERPL-MCNC: 134 MG/DL — HIGH (ref 70–99)
HCT VFR BLD CALC: 36.7 % — SIGNIFICANT CHANGE UP (ref 34.5–45)
HGB BLD-MCNC: 11.3 G/DL — LOW (ref 11.5–15.5)
MCHC RBC-ENTMCNC: 27.2 PG — SIGNIFICANT CHANGE UP (ref 27–34)
MCHC RBC-ENTMCNC: 30.8 GM/DL — LOW (ref 32–36)
MCV RBC AUTO: 88.4 FL — SIGNIFICANT CHANGE UP (ref 80–100)
NRBC # BLD: 0 /100 WBCS — SIGNIFICANT CHANGE UP
NRBC # FLD: 0 K/UL — SIGNIFICANT CHANGE UP
PLATELET # BLD AUTO: 237 K/UL — SIGNIFICANT CHANGE UP (ref 150–400)
POTASSIUM SERPL-MCNC: 4 MMOL/L — SIGNIFICANT CHANGE UP (ref 3.5–5.3)
POTASSIUM SERPL-SCNC: 4 MMOL/L — SIGNIFICANT CHANGE UP (ref 3.5–5.3)
PROT SERPL-MCNC: 5.8 G/DL — LOW (ref 6–8.3)
RBC # BLD: 4.15 M/UL — SIGNIFICANT CHANGE UP (ref 3.8–5.2)
RBC # FLD: 13.3 % — SIGNIFICANT CHANGE UP (ref 10.3–14.5)
SODIUM SERPL-SCNC: 137 MMOL/L — SIGNIFICANT CHANGE UP (ref 135–145)
WBC # BLD: 11.68 K/UL — HIGH (ref 3.8–10.5)
WBC # FLD AUTO: 11.68 K/UL — HIGH (ref 3.8–10.5)

## 2021-02-27 PROCEDURE — 99233 SBSQ HOSP IP/OBS HIGH 50: CPT

## 2021-02-27 RX ADMIN — DORZOLAMIDE HYDROCHLORIDE TIMOLOL MALEATE 1 DROP(S): 20; 5 SOLUTION/ DROPS OPHTHALMIC at 05:18

## 2021-02-27 RX ADMIN — DONEPEZIL HYDROCHLORIDE 10 MILLIGRAM(S): 10 TABLET, FILM COATED ORAL at 22:02

## 2021-02-27 RX ADMIN — DORZOLAMIDE HYDROCHLORIDE TIMOLOL MALEATE 1 DROP(S): 20; 5 SOLUTION/ DROPS OPHTHALMIC at 17:54

## 2021-02-27 RX ADMIN — AMLODIPINE BESYLATE 5 MILLIGRAM(S): 2.5 TABLET ORAL at 05:18

## 2021-02-27 RX ADMIN — LEVETIRACETAM 500 MILLIGRAM(S): 250 TABLET, FILM COATED ORAL at 17:55

## 2021-02-27 RX ADMIN — Medication 1: at 17:54

## 2021-02-27 RX ADMIN — LEVETIRACETAM 500 MILLIGRAM(S): 250 TABLET, FILM COATED ORAL at 05:18

## 2021-02-27 RX ADMIN — Medication 5 MILLIGRAM(S): at 05:18

## 2021-02-27 RX ADMIN — Medication 5 MILLIGRAM(S): at 17:55

## 2021-02-27 RX ADMIN — Medication 2 UNIT(S): at 09:29

## 2021-02-27 RX ADMIN — Medication 1: at 13:02

## 2021-02-27 RX ADMIN — ATORVASTATIN CALCIUM 20 MILLIGRAM(S): 80 TABLET, FILM COATED ORAL at 22:02

## 2021-02-27 RX ADMIN — Medication 2 UNIT(S): at 17:54

## 2021-02-27 RX ADMIN — APIXABAN 5 MILLIGRAM(S): 2.5 TABLET, FILM COATED ORAL at 05:18

## 2021-02-27 RX ADMIN — ESCITALOPRAM OXALATE 5 MILLIGRAM(S): 10 TABLET, FILM COATED ORAL at 12:34

## 2021-02-27 RX ADMIN — APIXABAN 5 MILLIGRAM(S): 2.5 TABLET, FILM COATED ORAL at 17:55

## 2021-02-27 RX ADMIN — INSULIN GLARGINE 24 UNIT(S): 100 INJECTION, SOLUTION SUBCUTANEOUS at 22:02

## 2021-02-27 RX ADMIN — Medication 2 UNIT(S): at 13:02

## 2021-02-27 NOTE — PROGRESS NOTE ADULT - PROBLEM SELECTOR PLAN 6
- FS significantly elevated on admission, not on insulin at home, possibly elevated in setting of COVID19 and rhabdomyolysis  - FS basal/bolus + ISS.   - FS better controlled  - monitor fingersticks  - A1C 10.6  - hold oral hypoglycemics  - sugar elevated, increased lantus to 24 u qhs now FS controlled 106-220

## 2021-02-27 NOTE — PROGRESS NOTE ADULT - ASSESSMENT
84 y/o F with hx of dementia (A&Ox2-3, forgetful at times), Diabetes type II (not on insulin), PPM, HTN, ?seizure on keppra, presents with falls. found to have RUE swelling concern w/ MRI showing supraspinatous tear and progressive consolidation of R lung -> b/l opacities c/w sequelae of COVID PNA, pending COVID reswab, TARYN placement

## 2021-02-27 NOTE — PROGRESS NOTE ADULT - PROBLEM SELECTOR PLAN 4
- CT chest at [facility] incidentally found: Irregular 1.9 cm left upper lobe nodular opacity with associated spiculation.   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  - Patient did not want to know about these results and the daughter is already aware of these results  - will need  outpatient pulm follow up ultimately  - CTA 2/24 shows unchanged spiculated nodule, b/l opacities c/w sequalae of COVID PNA

## 2021-02-27 NOTE — PROGRESS NOTE ADULT - SUBJECTIVE AND OBJECTIVE BOX
Patient is a 86y old  Female who presents with a chief complaint of COVID/Falls (26 Feb 2021 14:02)      SUBJECTIVE / OVERNIGHT EVENTS:    Patient seen and examined at bedside, no new RUE motor or sensory deficits, no worsening dyspnea or chest pain, pending rehab placement    MEDICATIONS  (STANDING):  amLODIPine   Tablet 5 milliGRAM(s) Oral daily  apixaban 5 milliGRAM(s) Oral every 12 hours  atorvastatin 20 milliGRAM(s) Oral at bedtime  dextrose 40% Gel 15 Gram(s) Oral once  dextrose 5%. 1000 milliLiter(s) (50 mL/Hr) IV Continuous <Continuous>  dextrose 5%. 1000 milliLiter(s) (100 mL/Hr) IV Continuous <Continuous>  dextrose 50% Injectable 25 Gram(s) IV Push once  dextrose 50% Injectable 12.5 Gram(s) IV Push once  dextrose 50% Injectable 25 Gram(s) IV Push once  donepezil 10 milliGRAM(s) Oral at bedtime  dorzolamide 2%/timolol 0.5% Ophthalmic Solution 1 Drop(s) Both EYES every 12 hours  escitalopram 5 milliGRAM(s) Oral daily  glucagon  Injectable 1 milliGRAM(s) IntraMuscular once  insulin glargine Injectable (LANTUS) 24 Unit(s) SubCutaneous at bedtime  insulin lispro (ADMELOG) corrective regimen sliding scale   SubCutaneous three times a day before meals  insulin lispro (ADMELOG) corrective regimen sliding scale   SubCutaneous at bedtime  insulin lispro Injectable (ADMELOG) 2 Unit(s) SubCutaneous three times a day before meals  levETIRAcetam 500 milliGRAM(s) Oral two times a day  oxybutynin 5 milliGRAM(s) Oral two times a day    MEDICATIONS  (PRN):  acetaminophen   Tablet .. 650 milliGRAM(s) Oral every 4 hours PRN Temp greater or equal to 38.5C (101.3F)  calcium carbonate    500 mG (Tums) Chewable 1 Tablet(s) Chew every 6 hours PRN Heartburn  guaiFENesin   Syrup  (Sugar-Free) 200 milliGRAM(s) Oral every 6 hours PRN cough/congestion  sodium chloride 0.65% Nasal 1 Spray(s) Both Nostrils two times a day PRN Congestion      Vital Signs Last 24 Hrs  T(C): 37.1 (27 Feb 2021 12:41), Max: 37.1 (27 Feb 2021 12:41)  T(F): 98.8 (27 Feb 2021 12:41), Max: 98.8 (27 Feb 2021 12:41)  HR: 79 (27 Feb 2021 12:41) (79 - 83)  BP: 128/56 (27 Feb 2021 12:41) (122/61 - 135/70)  BP(mean): --  RR: 18 (27 Feb 2021 12:41) (16 - 18)  SpO2: 96% (27 Feb 2021 12:41) (96% - 99%)  CAPILLARY BLOOD GLUCOSE      POCT Blood Glucose.: 196 mg/dL (27 Feb 2021 12:47)  POCT Blood Glucose.: 105 mg/dL (27 Feb 2021 09:01)  POCT Blood Glucose.: 248 mg/dL (26 Feb 2021 21:20)  POCT Blood Glucose.: 213 mg/dL (26 Feb 2021 17:28)    I&O's Summary      PHYSICAL EXAM:  Vital Signs Last 24 Hrs  T(C): 37.1 (02-27-21 @ 12:41)  T(F): 98.8 (02-27-21 @ 12:41), Max: 98.8 (02-27-21 @ 12:41)  HR: 79 (02-27-21 @ 12:41) (79 - 83)  BP: 128/56 (02-27-21 @ 12:41)  BP(mean): --  RR: 18 (02-27-21 @ 12:41) (16 - 18)  SpO2: 96% (02-27-21 @ 12:41) (96% - 99%)  Wt(kg): --    PHYSICAL EXAM  CONSTITUTIONAL: NAD, well-developed  RESPIRATORY: Normal respiratory effort; lungs are clear to auscultation bilaterally  CARDIOVASCULAR: Regular rate and rhythm, normal S1 and S2, No lower extremity edema  ABDOMEN: Nontender to palpation, normoactive bowel sounds  MUSCLOSKELETAL: no clubbing or cyanosis of digits, RUE 4/5 strength, no sensory deficits   EXTR: RUE pitting edema       LABS:                        11.3   11.68 )-----------( 237      ( 27 Feb 2021 07:10 )             36.7     02-27    137  |  103  |  22  ----------------------------<  134<H>  4.0   |  25  |  1.06    Ca    9.8      27 Feb 2021 07:10    TPro  5.8<L>  /  Alb  2.7<L>  /  TBili  0.5  /  DBili  x   /  AST  15  /  ALT  20  /  AlkPhos  79  02-27              RADIOLOGY & ADDITIONAL TESTS:    Imaging Personally Reviewed:    < from: CT Angio Chest w/ IV Cont (02.24.21 @ 20:58) >  FINDINGS:    PULMONARYVESSELS: Some of the segmental and subsegmental branches of pulmonary arteries are not evaluated secondary to motion. Otherwise, no pulmonary embolus.    HEART AND VASCULATURE: Heart size is normal. No pericardial effusion. No aortic aneurysm or dissection. The left pacer is present.    LUNGS, AIRWAYS, PLEURA: Patent central airways. Approximately 2.2 x 1.2 cm spiculated nodule within left upper lobe is unchanged. Bilateral groundglass and reticular opacities within all lobes new from prior. No pleural effusions or pneumothorax.    MEDIASTINUM: No mass or lymphadenopathy.    UPPER ABDOMEN: Within normal limits.    BONES AND SOFT TISSUES: No aggressive osseous lesion.    LOWER NECK: Within normal limits.    IMPRESSION:    Some of the segmentaland subsegmental branches of pulmonary arteries are not evaluated secondary to motion. Otherwise, no pulmonary embolus.    Bilateral groundglass and reticular opacities within all lobes new from 2/6/2021 and can represent atypical/viral infection.    Approximately 2.2 x 1.2 cm spiculated nodule within left upper lobe is unchanged.    < end of copied text >      Consultant(s) Notes Reviewed:      Care Discussed with Consultants/Other Providers:

## 2021-02-27 NOTE — PROGRESS NOTE ADULT - PROBLEM SELECTOR PLAN 1
-presented to [facility] in setting of being found on floor w/ recurrent falls at home w/ RUE pain. Initially pt w/ elevated CK and RUE numbness tingling possible rhabdo  - shoulder, elbow, humeral, forearm, wrist xray neg for fx  - VA duplex 2/9 of RUE negative for DVT, noted significant edema throughout arm  - MR completed 2/22 showing supraspinatous tear, ortho eval no surgical intervention, promote mobility w/ OT and PT

## 2021-02-28 LAB
ALBUMIN SERPL ELPH-MCNC: 2.5 G/DL — LOW (ref 3.3–5)
ALP SERPL-CCNC: 79 U/L — SIGNIFICANT CHANGE UP (ref 40–120)
ALT FLD-CCNC: 19 U/L — SIGNIFICANT CHANGE UP (ref 4–33)
ANION GAP SERPL CALC-SCNC: 8 MMOL/L — SIGNIFICANT CHANGE UP (ref 7–14)
ANION GAP SERPL CALC-SCNC: 8 MMOL/L — SIGNIFICANT CHANGE UP (ref 7–14)
AST SERPL-CCNC: 34 U/L — HIGH (ref 4–32)
BILIRUB SERPL-MCNC: 0.6 MG/DL — SIGNIFICANT CHANGE UP (ref 0.2–1.2)
BUN SERPL-MCNC: 15 MG/DL — SIGNIFICANT CHANGE UP (ref 7–23)
BUN SERPL-MCNC: 16 MG/DL — SIGNIFICANT CHANGE UP (ref 7–23)
CALCIUM SERPL-MCNC: 9.7 MG/DL — SIGNIFICANT CHANGE UP (ref 8.4–10.5)
CALCIUM SERPL-MCNC: 9.9 MG/DL — SIGNIFICANT CHANGE UP (ref 8.4–10.5)
CHLORIDE SERPL-SCNC: 100 MMOL/L — SIGNIFICANT CHANGE UP (ref 98–107)
CHLORIDE SERPL-SCNC: 105 MMOL/L — SIGNIFICANT CHANGE UP (ref 98–107)
CO2 SERPL-SCNC: 24 MMOL/L — SIGNIFICANT CHANGE UP (ref 22–31)
CO2 SERPL-SCNC: 25 MMOL/L — SIGNIFICANT CHANGE UP (ref 22–31)
CREAT SERPL-MCNC: 0.95 MG/DL — SIGNIFICANT CHANGE UP (ref 0.5–1.3)
CREAT SERPL-MCNC: 0.99 MG/DL — SIGNIFICANT CHANGE UP (ref 0.5–1.3)
GLUCOSE SERPL-MCNC: 133 MG/DL — HIGH (ref 70–99)
GLUCOSE SERPL-MCNC: 134 MG/DL — HIGH (ref 70–99)
HCT VFR BLD CALC: 40 % — SIGNIFICANT CHANGE UP (ref 34.5–45)
HGB BLD-MCNC: 12.5 G/DL — SIGNIFICANT CHANGE UP (ref 11.5–15.5)
MCHC RBC-ENTMCNC: 27.2 PG — SIGNIFICANT CHANGE UP (ref 27–34)
MCHC RBC-ENTMCNC: 31.3 GM/DL — LOW (ref 32–36)
MCV RBC AUTO: 87 FL — SIGNIFICANT CHANGE UP (ref 80–100)
NRBC # BLD: 0 /100 WBCS — SIGNIFICANT CHANGE UP
NRBC # FLD: 0 K/UL — SIGNIFICANT CHANGE UP
PLATELET # BLD AUTO: 230 K/UL — SIGNIFICANT CHANGE UP (ref 150–400)
POTASSIUM SERPL-MCNC: 4.2 MMOL/L — SIGNIFICANT CHANGE UP (ref 3.5–5.3)
POTASSIUM SERPL-MCNC: 5.9 MMOL/L — HIGH (ref 3.5–5.3)
POTASSIUM SERPL-SCNC: 4.2 MMOL/L — SIGNIFICANT CHANGE UP (ref 3.5–5.3)
POTASSIUM SERPL-SCNC: 5.9 MMOL/L — HIGH (ref 3.5–5.3)
PROT SERPL-MCNC: 6.6 G/DL — SIGNIFICANT CHANGE UP (ref 6–8.3)
RBC # BLD: 4.6 M/UL — SIGNIFICANT CHANGE UP (ref 3.8–5.2)
RBC # FLD: 13.4 % — SIGNIFICANT CHANGE UP (ref 10.3–14.5)
SARS-COV-2 RNA SPEC QL NAA+PROBE: DETECTED
SODIUM SERPL-SCNC: 132 MMOL/L — LOW (ref 135–145)
SODIUM SERPL-SCNC: 138 MMOL/L — SIGNIFICANT CHANGE UP (ref 135–145)
WBC # BLD: 11.68 K/UL — HIGH (ref 3.8–10.5)
WBC # FLD AUTO: 11.68 K/UL — HIGH (ref 3.8–10.5)

## 2021-02-28 PROCEDURE — 99232 SBSQ HOSP IP/OBS MODERATE 35: CPT

## 2021-02-28 RX ADMIN — Medication 2 UNIT(S): at 08:58

## 2021-02-28 RX ADMIN — LEVETIRACETAM 500 MILLIGRAM(S): 250 TABLET, FILM COATED ORAL at 17:38

## 2021-02-28 RX ADMIN — Medication 5 MILLIGRAM(S): at 04:59

## 2021-02-28 RX ADMIN — Medication 2 UNIT(S): at 12:31

## 2021-02-28 RX ADMIN — Medication 1 TABLET(S): at 15:33

## 2021-02-28 RX ADMIN — DONEPEZIL HYDROCHLORIDE 10 MILLIGRAM(S): 10 TABLET, FILM COATED ORAL at 21:17

## 2021-02-28 RX ADMIN — Medication 2 UNIT(S): at 17:39

## 2021-02-28 RX ADMIN — APIXABAN 5 MILLIGRAM(S): 2.5 TABLET, FILM COATED ORAL at 04:59

## 2021-02-28 RX ADMIN — DORZOLAMIDE HYDROCHLORIDE TIMOLOL MALEATE 1 DROP(S): 20; 5 SOLUTION/ DROPS OPHTHALMIC at 04:59

## 2021-02-28 RX ADMIN — ESCITALOPRAM OXALATE 5 MILLIGRAM(S): 10 TABLET, FILM COATED ORAL at 11:28

## 2021-02-28 RX ADMIN — Medication 5 MILLIGRAM(S): at 17:38

## 2021-02-28 RX ADMIN — Medication 1: at 12:31

## 2021-02-28 RX ADMIN — LEVETIRACETAM 500 MILLIGRAM(S): 250 TABLET, FILM COATED ORAL at 04:59

## 2021-02-28 RX ADMIN — INSULIN GLARGINE 24 UNIT(S): 100 INJECTION, SOLUTION SUBCUTANEOUS at 21:17

## 2021-02-28 RX ADMIN — APIXABAN 5 MILLIGRAM(S): 2.5 TABLET, FILM COATED ORAL at 17:38

## 2021-02-28 RX ADMIN — DORZOLAMIDE HYDROCHLORIDE TIMOLOL MALEATE 1 DROP(S): 20; 5 SOLUTION/ DROPS OPHTHALMIC at 17:38

## 2021-02-28 RX ADMIN — ATORVASTATIN CALCIUM 20 MILLIGRAM(S): 80 TABLET, FILM COATED ORAL at 21:17

## 2021-02-28 RX ADMIN — AMLODIPINE BESYLATE 5 MILLIGRAM(S): 2.5 TABLET ORAL at 04:59

## 2021-02-28 NOTE — PROGRESS NOTE ADULT - PROBLEM SELECTOR PLAN 1
-presented to [facility] in setting of being found on floor w/ recurrent falls at home w/ RUE pain. Initially pt w/ elevated CK and RUE numbness tingling possible rhabdo  - shoulder, elbow, humeral, forearm, wrist xray neg for fx  - VA duplex 2/9 of RUE negative for DVT, noted significant edema throughout arm  - MR completed 2/22 showing supraspinatous tear, ortho eval no surgical intervention, promote mobility w/ OT and PT

## 2021-02-28 NOTE — PROGRESS NOTE ADULT - SUBJECTIVE AND OBJECTIVE BOX
Patient is a 86y old  Female who presents with a chief complaint of COVID/Falls (27 Feb 2021 14:22)      SUBJECTIVE / OVERNIGHT EVENTS:    Patient seen and examined at bedside, Resting comfortably in bed, denies chest pain, dyspnea. No new RUE motor deficits    MEDICATIONS  (STANDING):  amLODIPine   Tablet 5 milliGRAM(s) Oral daily  apixaban 5 milliGRAM(s) Oral every 12 hours  atorvastatin 20 milliGRAM(s) Oral at bedtime  dextrose 40% Gel 15 Gram(s) Oral once  dextrose 5%. 1000 milliLiter(s) (50 mL/Hr) IV Continuous <Continuous>  dextrose 5%. 1000 milliLiter(s) (100 mL/Hr) IV Continuous <Continuous>  dextrose 50% Injectable 25 Gram(s) IV Push once  dextrose 50% Injectable 12.5 Gram(s) IV Push once  dextrose 50% Injectable 25 Gram(s) IV Push once  donepezil 10 milliGRAM(s) Oral at bedtime  dorzolamide 2%/timolol 0.5% Ophthalmic Solution 1 Drop(s) Both EYES every 12 hours  escitalopram 5 milliGRAM(s) Oral daily  glucagon  Injectable 1 milliGRAM(s) IntraMuscular once  insulin glargine Injectable (LANTUS) 24 Unit(s) SubCutaneous at bedtime  insulin lispro (ADMELOG) corrective regimen sliding scale   SubCutaneous at bedtime  insulin lispro (ADMELOG) corrective regimen sliding scale   SubCutaneous three times a day before meals  insulin lispro Injectable (ADMELOG) 2 Unit(s) SubCutaneous three times a day before meals  levETIRAcetam 500 milliGRAM(s) Oral two times a day  oxybutynin 5 milliGRAM(s) Oral two times a day    MEDICATIONS  (PRN):  acetaminophen   Tablet .. 650 milliGRAM(s) Oral every 4 hours PRN Temp greater or equal to 38.5C (101.3F)  calcium carbonate    500 mG (Tums) Chewable 1 Tablet(s) Chew every 6 hours PRN Heartburn  guaiFENesin   Syrup  (Sugar-Free) 200 milliGRAM(s) Oral every 6 hours PRN cough/congestion  sodium chloride 0.65% Nasal 1 Spray(s) Both Nostrils two times a day PRN Congestion      Vital Signs Last 24 Hrs  T(C): 36.7 (28 Feb 2021 12:47), Max: 37.3 (27 Feb 2021 21:59)  T(F): 98 (28 Feb 2021 12:47), Max: 99.1 (27 Feb 2021 21:59)  HR: 80 (28 Feb 2021 12:47) (78 - 90)  BP: 122/58 (28 Feb 2021 12:47) (120/59 - 129/53)  BP(mean): --  RR: 16 (28 Feb 2021 12:47) (16 - 18)  SpO2: 93% (28 Feb 2021 12:47) (93% - 96%)  CAPILLARY BLOOD GLUCOSE      POCT Blood Glucose.: 166 mg/dL (28 Feb 2021 12:28)  POCT Blood Glucose.: 117 mg/dL (28 Feb 2021 08:27)  POCT Blood Glucose.: 214 mg/dL (27 Feb 2021 21:10)  POCT Blood Glucose.: 183 mg/dL (27 Feb 2021 17:13)    I&O's Summary      PHYSICAL EXAM:  Vital Signs Last 24 Hrs  T(C): 36.7 (02-28-21 @ 12:47)  T(F): 98 (02-28-21 @ 12:47), Max: 99.1 (02-27-21 @ 21:59)  HR: 80 (02-28-21 @ 12:47) (78 - 90)  BP: 122/58 (02-28-21 @ 12:47)  BP(mean): --  RR: 16 (02-28-21 @ 12:47) (16 - 18)  SpO2: 93% (02-28-21 @ 12:47) (93% - 96%)  Wt(kg): --    PHYSICAL EXAM  CONSTITUTIONAL: NAD, well-developed  RESPIRATORY: Normal respiratory effort; lungs are clear to auscultation bilaterally  CARDIOVASCULAR: Regular rate and rhythm, normal S1 and S2, No lower extremity edema  ABDOMEN: Nontender to palpation, normoactive bowel sounds  MUSCLOSKELETAL: no clubbing or cyanosis of digits, RUE 4/5 strength, no sensory deficits   EXTR: RUE pitting edema       LABS:                        12.5   11.68 )-----------( 230      ( 28 Feb 2021 06:35 )             40.0     02-28    138  |  105  |  15  ----------------------------<  133<H>  4.2   |  25  |  0.99    Ca    9.7      28 Feb 2021 08:24    TPro  6.6  /  Alb  2.5<L>  /  TBili  0.6  /  DBili  x   /  AST  34<H>  /  ALT  19  /  AlkPhos  79  02-28              RADIOLOGY & ADDITIONAL TESTS:    Imaging Personally Reviewed:    Consultant(s) Notes Reviewed:      Care Discussed with Consultants/Other Providers:

## 2021-02-28 NOTE — PROGRESS NOTE ADULT - PROBLEM SELECTOR PLAN 6
- FS significantly elevated on admission, not on insulin at home, possibly elevated in setting of COVID19 and rhabdomyolysis  - FS basal/bolus + ISS.   - FS better controlled  - monitor fingersticks  - A1C 10.6  - hold oral hypoglycemics  - sugar elevated, increased lantus to 24 u qhs now FS controlled 117-214

## 2021-02-28 NOTE — PROGRESS NOTE ADULT - PROBLEM SELECTOR PLAN 4
- CT chest at [facility] incidentally found: Irregular 1.9 cm left upper lobe nodular opacity with associated spiculation.   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  - Patient did not want to know about these results and the daughter is already aware of these results  - will need  outpatient pulm follow up ultimately  - CTA 2/24 shows unchanged spiculated nodule, b/l opacities c/w sequalae of COVID PNA

## 2021-03-01 LAB
ALBUMIN SERPL ELPH-MCNC: 2.5 G/DL — LOW (ref 3.3–5)
ALP SERPL-CCNC: 76 U/L — SIGNIFICANT CHANGE UP (ref 40–120)
ALT FLD-CCNC: 13 U/L — SIGNIFICANT CHANGE UP (ref 4–33)
ANION GAP SERPL CALC-SCNC: 8 MMOL/L — SIGNIFICANT CHANGE UP (ref 7–14)
AST SERPL-CCNC: 14 U/L — SIGNIFICANT CHANGE UP (ref 4–32)
BILIRUB SERPL-MCNC: 0.6 MG/DL — SIGNIFICANT CHANGE UP (ref 0.2–1.2)
BUN SERPL-MCNC: 18 MG/DL — SIGNIFICANT CHANGE UP (ref 7–23)
CALCIUM SERPL-MCNC: 9.7 MG/DL — SIGNIFICANT CHANGE UP (ref 8.4–10.5)
CHLORIDE SERPL-SCNC: 106 MMOL/L — SIGNIFICANT CHANGE UP (ref 98–107)
CO2 SERPL-SCNC: 24 MMOL/L — SIGNIFICANT CHANGE UP (ref 22–31)
CREAT SERPL-MCNC: 1.12 MG/DL — SIGNIFICANT CHANGE UP (ref 0.5–1.3)
GLUCOSE SERPL-MCNC: 93 MG/DL — SIGNIFICANT CHANGE UP (ref 70–99)
HCT VFR BLD CALC: 42.4 % — SIGNIFICANT CHANGE UP (ref 34.5–45)
HGB BLD-MCNC: 12.7 G/DL — SIGNIFICANT CHANGE UP (ref 11.5–15.5)
MAGNESIUM SERPL-MCNC: 1.8 MG/DL — SIGNIFICANT CHANGE UP (ref 1.6–2.6)
MCHC RBC-ENTMCNC: 26.7 PG — LOW (ref 27–34)
MCHC RBC-ENTMCNC: 30 GM/DL — LOW (ref 32–36)
MCV RBC AUTO: 89.1 FL — SIGNIFICANT CHANGE UP (ref 80–100)
NRBC # BLD: 0 /100 WBCS — SIGNIFICANT CHANGE UP
NRBC # FLD: 0 K/UL — SIGNIFICANT CHANGE UP
PHOSPHATE SERPL-MCNC: 3.4 MG/DL — SIGNIFICANT CHANGE UP (ref 2.5–4.5)
PLATELET # BLD AUTO: 214 K/UL — SIGNIFICANT CHANGE UP (ref 150–400)
POTASSIUM SERPL-MCNC: 4.4 MMOL/L — SIGNIFICANT CHANGE UP (ref 3.5–5.3)
POTASSIUM SERPL-SCNC: 4.4 MMOL/L — SIGNIFICANT CHANGE UP (ref 3.5–5.3)
PROT SERPL-MCNC: 5.9 G/DL — LOW (ref 6–8.3)
RBC # BLD: 4.76 M/UL — SIGNIFICANT CHANGE UP (ref 3.8–5.2)
RBC # FLD: 13.3 % — SIGNIFICANT CHANGE UP (ref 10.3–14.5)
SODIUM SERPL-SCNC: 138 MMOL/L — SIGNIFICANT CHANGE UP (ref 135–145)
WBC # BLD: 10.65 K/UL — HIGH (ref 3.8–10.5)
WBC # FLD AUTO: 10.65 K/UL — HIGH (ref 3.8–10.5)

## 2021-03-01 PROCEDURE — 99232 SBSQ HOSP IP/OBS MODERATE 35: CPT

## 2021-03-01 RX ADMIN — Medication 5 MILLIGRAM(S): at 17:21

## 2021-03-01 RX ADMIN — ESCITALOPRAM OXALATE 5 MILLIGRAM(S): 10 TABLET, FILM COATED ORAL at 11:35

## 2021-03-01 RX ADMIN — LEVETIRACETAM 500 MILLIGRAM(S): 250 TABLET, FILM COATED ORAL at 05:08

## 2021-03-01 RX ADMIN — Medication 2: at 12:33

## 2021-03-01 RX ADMIN — DORZOLAMIDE HYDROCHLORIDE TIMOLOL MALEATE 1 DROP(S): 20; 5 SOLUTION/ DROPS OPHTHALMIC at 05:08

## 2021-03-01 RX ADMIN — INSULIN GLARGINE 24 UNIT(S): 100 INJECTION, SOLUTION SUBCUTANEOUS at 22:23

## 2021-03-01 RX ADMIN — Medication 1: at 17:20

## 2021-03-01 RX ADMIN — Medication 2 UNIT(S): at 17:20

## 2021-03-01 RX ADMIN — Medication 200 MILLIGRAM(S): at 11:37

## 2021-03-01 RX ADMIN — APIXABAN 5 MILLIGRAM(S): 2.5 TABLET, FILM COATED ORAL at 05:07

## 2021-03-01 RX ADMIN — DONEPEZIL HYDROCHLORIDE 10 MILLIGRAM(S): 10 TABLET, FILM COATED ORAL at 22:26

## 2021-03-01 RX ADMIN — DORZOLAMIDE HYDROCHLORIDE TIMOLOL MALEATE 1 DROP(S): 20; 5 SOLUTION/ DROPS OPHTHALMIC at 17:21

## 2021-03-01 RX ADMIN — LEVETIRACETAM 500 MILLIGRAM(S): 250 TABLET, FILM COATED ORAL at 17:20

## 2021-03-01 RX ADMIN — ATORVASTATIN CALCIUM 20 MILLIGRAM(S): 80 TABLET, FILM COATED ORAL at 22:26

## 2021-03-01 RX ADMIN — AMLODIPINE BESYLATE 5 MILLIGRAM(S): 2.5 TABLET ORAL at 05:07

## 2021-03-01 RX ADMIN — Medication 5 MILLIGRAM(S): at 05:07

## 2021-03-01 RX ADMIN — Medication 2 UNIT(S): at 08:41

## 2021-03-01 RX ADMIN — APIXABAN 5 MILLIGRAM(S): 2.5 TABLET, FILM COATED ORAL at 17:20

## 2021-03-01 RX ADMIN — Medication 2 UNIT(S): at 12:33

## 2021-03-01 NOTE — PROGRESS NOTE ADULT - PROBLEM SELECTOR PLAN 4
- CT chest at [facility] incidentally found: Irregular 1.9 cm left upper lobe nodular opacity with associated spiculation.   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  - Patient did not want to know about these results and the daughter is already aware of these results  - will need  outpatient pulm follow up ultimately  - CTA 2/24 shows unchanged spiculated nodule, b/l opacities c/w sequelae of COVID PNA

## 2021-03-01 NOTE — PROGRESS NOTE ADULT - PROBLEM SELECTOR PLAN 1
-presented to [facility] in setting of being found on floor w/ recurrent falls at home w/ RUE pain. Initially pt w/ elevated CK and RUE numbness tingling possible rhabdo  - shoulder, elbow, humeral, forearm, wrist xray neg for fx  - VA duplex 2/9 of RUE negative for DVT, noted significant edema throughout arm  - MR completed 2/22 showing supraspinatous tear, ortho eval no surgical intervention, promote mobility w/ OT and PT

## 2021-03-01 NOTE — PROGRESS NOTE ADULT - ASSESSMENT
84 y/o F with hx of dementia (A&Ox2-3, forgetful at times), Diabetes type II (not on insulin), PPM, HTN, ?seizure on keppra, presents with falls. found to have RUE swelling concern w/ MRI showing supraspinatous tear and progressive consolidation of R lung -> b/l opacities c/w sequelae of COVID PNA, pending  TARYN placement

## 2021-03-01 NOTE — PROGRESS NOTE ADULT - PROBLEM SELECTOR PLAN 2
Chest xray-Multifocal pneumonia  Completed course of Remdisivir/dexamethasone   sating 96% on RA now

## 2021-03-01 NOTE — CHART NOTE - NSCHARTNOTEFT_GEN_A_CORE
Briefly, 86F presented after fall w/ RUE weakness.     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     Impression: RUE weakness likely secondary to MSK pathology (supraspinatous tendon tear)    Plan  [] Ortho consult   [] defer further imaging to ortho     No further inpatient neurological workup   [id number]
Faxed medical records request for IVC filter records to [facility] medical records [phone]. Office is open today.    Will follow up IVC filter records and expedite MRI as appropriate.    Sabrina Chambers PA-C  Department of Medicine  Pager [phone]
Please order MR brachial plexus w/w/o contrast; MR c spine w/w/o contrast since the PPM is MRI compatible.
Attempted to call [facility] for further information in regards to MRI compatibility of IVC filter. Medical records office closed for the weekend. Daughter Ceci [phone] denies having any records of IVC filter. Will attempt to retry [facility] tomorrow.     Sabrina Chambers PA-C  Department of Medicine  Pager [phone]
Briefly this is an 84yo R-handed woman with PMH of dementia (baseline AAOx2-3), T2DM, PPM, HTN, and ?seizures (on Keppra) admitted with recent diagnosis of COVID-19 and recurrent falls x 1week. Her most recent fall was 2 days ago where she fell on her R arm. Neurology consulted for R arm weakness and numbness worse distally. Physical exam notable for decreased finger and wrist extension with mild hypoesthesia, inconsistently medial vs. lateral. CTH showed no acute pathology and CT c-spine showed degenerative changes. Currently treated for COVID and workup pursued for R arm weakness and numbness.   PPM noted to be MRI compatible.  Patient with noted IVC filter and unknown if it is compatible with MRI. Primary team is working towards determining if the patient has contraindications.    Today neurology team contacted for further recommendation.    Recs:  [] advise for CT of R upper extremity with focus on brachial plexus region in meantime  [] please obtain B12, folate, MMA, homocysteine, TSH, syphilis screen, SPEP, UPEP, zinc, copper, AN  [] continue to attempt to obtain MR of brachial plexus and cervical spine    Will continue to follow up.    Case discussed with neurology attending, Dr. Cornelius
RD follow-up for length of stay.      Patient an 84 y/o F with hx of dementia (A&Ox2-3, forgetful at times), Diabetes type II, PPM, HTN, presented with falls. Patient found to have progressive consolidation of R lung -> b/l opacities c/w sequelae of COVID PNA, pending COVID reswab. Per chart, plan is for TARYN placement.  Per RN, patient eating well, no reported nutritional concerns or issues. Patient denies any nutritional concerns, acknowledges that she has a good appetite and consuming meals.  No GI distress i.e. nausea, vomiting, diarrhea.  No reported chewing or swallowing difficulties. Patient appears well nourished.      Source: Patient [ X ]    Family [ ]     other [ X ] RN, Chart    Diet, Regular:   Consistent Carbohydrate {Evening Snack} (CSTCHOSN)  DASH/TLC {Sodium & Cholesterol Restricted} (DASH) (02-06-21 @ 20:06)      Current Weight:  No new weights; Adm weight 2/7/21 - 103.8kgs      Pertinent Medications:   amLODIPine   Tablet  atorvastatin  dextrose 40% Gel  dextrose 5%.  dextrose 5%.  dextrose 50% Injectable  dextrose 50% Injectable  dextrose 50% Injectable  donepezil  escitalopram  glucagon  Injectable  insulin glargine Injectable (LANTUS)  insulin lispro (ADMELOG) corrective regimen sliding scale  insulin lispro (ADMELOG) corrective regimen sliding scale  insulin lispro Injectable (ADMELOG)  levETIRAcetam    Pertinent Labs:  03-01 Na138 mmol/L Glu 93 mg/dL K+ 4.4 mmol/L Cr  1.12 mg/dL BUN 18 mg/dL 03-01 Phos 3.4 mg/dL 03-01 Alb 2.5 g/dL<L> 02-07 Chol 171 mg/dL LDL --    HDL 38 mg/dL<L> Trig 175 mg/dL<H>    CAPILLARY BLOOD GLUCOSE    POCT Blood Glucose.: 233 mg/dL (01 Mar 2021 12:24)  POCT Blood Glucose.: 89 mg/dL (01 Mar 2021 08:40)  POCT Blood Glucose.: 117 mg/dL (28 Feb 2021 20:52)  POCT Blood Glucose.: 144 mg/dL (28 Feb 2021 17:22)    A1C with Estimated Average Glucose in AM (02.07.21 @ 07:45)    A1C with Estimated Average Glucose Result: 10.6: High Risk (prediabetic)    5.7 - 6.4 %  Diabetic, diagnostic           > 6.5 %  ADA diabetic treatment goal    < 7.0 %  HbA1C values may not accurately reflect mean blood glucose in patients  with Hb variants.  Suggest clinical correlation. %    Estimated Average Glucose: 258 mg/dL      Skin: No pressure injuries; 3+ edema noted to rt arm per flowsheets    Estimated Needs:   [ X ] no change since previous assessment  [ ] recalculated:       Previous Nutrition Diagnosis: Altered nutrition related lab values    Nutrition Diagnosis is [X] ongoing  [ ] resolved [ ] not applicable      Additional Recommendations:  1) Continue diet order - CCHO with evening snack, DASH/TLC (cholesterol and sodium restricted).  2) Honor food preferences as requested.  3) Please obtain and record current weights to best assess for acute changes in nutritional status.

## 2021-03-01 NOTE — PROGRESS NOTE ADULT - SUBJECTIVE AND OBJECTIVE BOX
Patient is a 86y old  Female who presents with a chief complaint of COVID/Falls (28 Feb 2021 13:39)        SUBJECTIVE / OVERNIGHT EVENTS:  no acute events o/n  sating well on RA  denies all complaints      MEDICATIONS  (STANDING):  amLODIPine   Tablet 5 milliGRAM(s) Oral daily  apixaban 5 milliGRAM(s) Oral every 12 hours  atorvastatin 20 milliGRAM(s) Oral at bedtime  dextrose 40% Gel 15 Gram(s) Oral once  dextrose 5%. 1000 milliLiter(s) (50 mL/Hr) IV Continuous <Continuous>  dextrose 5%. 1000 milliLiter(s) (100 mL/Hr) IV Continuous <Continuous>  dextrose 50% Injectable 25 Gram(s) IV Push once  dextrose 50% Injectable 12.5 Gram(s) IV Push once  dextrose 50% Injectable 25 Gram(s) IV Push once  donepezil 10 milliGRAM(s) Oral at bedtime  dorzolamide 2%/timolol 0.5% Ophthalmic Solution 1 Drop(s) Both EYES every 12 hours  escitalopram 5 milliGRAM(s) Oral daily  glucagon  Injectable 1 milliGRAM(s) IntraMuscular once  insulin glargine Injectable (LANTUS) 24 Unit(s) SubCutaneous at bedtime  insulin lispro (ADMELOG) corrective regimen sliding scale   SubCutaneous three times a day before meals  insulin lispro (ADMELOG) corrective regimen sliding scale   SubCutaneous at bedtime  insulin lispro Injectable (ADMELOG) 2 Unit(s) SubCutaneous three times a day before meals  levETIRAcetam 500 milliGRAM(s) Oral two times a day  oxybutynin 5 milliGRAM(s) Oral two times a day    MEDICATIONS  (PRN):  acetaminophen   Tablet .. 650 milliGRAM(s) Oral every 4 hours PRN Temp greater or equal to 38.5C (101.3F)  calcium carbonate    500 mG (Tums) Chewable 1 Tablet(s) Chew every 6 hours PRN Heartburn  guaiFENesin   Syrup  (Sugar-Free) 200 milliGRAM(s) Oral every 6 hours PRN cough/congestion  sodium chloride 0.65% Nasal 1 Spray(s) Both Nostrils two times a day PRN Congestion      Vital Signs Last 24 Hrs  T(C): 36.8 (01 Mar 2021 11:34), Max: 36.9 (01 Mar 2021 05:07)  T(F): 98.2 (01 Mar 2021 11:34), Max: 98.5 (01 Mar 2021 05:07)  HR: 85 (01 Mar 2021 11:34) (84 - 87)  BP: 110/55 (01 Mar 2021 11:34) (110/55 - 130/51)  BP(mean): --  RR: 16 (01 Mar 2021 11:34) (16 - 18)  SpO2: 95% (01 Mar 2021 11:34) (95% - 96%)  CAPILLARY BLOOD GLUCOSE      POCT Blood Glucose.: 233 mg/dL (01 Mar 2021 12:24)  POCT Blood Glucose.: 89 mg/dL (01 Mar 2021 08:40)  POCT Blood Glucose.: 117 mg/dL (28 Feb 2021 20:52)  POCT Blood Glucose.: 144 mg/dL (28 Feb 2021 17:22)    I&O's Summary        PHYSICAL EXAM  CONSTITUTIONAL: NAD, well-developed  RESPIRATORY: Normal respiratory effort; lungs are clear to auscultation bilaterally  CARDIOVASCULAR: Regular rate and rhythm, normal S1 and S2, No lower extremity edema  ABDOMEN: Nontender to palpation, normoactive bowel sounds  MUSCLOSKELETAL: no clubbing or cyanosis of digits, RUE 4/5 strength, no sensory deficits   EXTR: RUE pitting edema       LABS:                        12.7   10.65 )-----------( 214      ( 01 Mar 2021 04:49 )             42.4     03-01    138  |  106  |  18  ----------------------------<  93  4.4   |  24  |  1.12    Ca    9.7      01 Mar 2021 04:49  Phos  3.4     03-01  Mg     1.8     03-01    TPro  5.9<L>  /  Alb  2.5<L>  /  TBili  0.6  /  DBili  x   /  AST  14  /  ALT  13  /  AlkPhos  76  03-01              RADIOLOGY & ADDITIONAL TESTS:    Imaging Personally Reviewed:  Consultant(s) Notes Reviewed:    Care Discussed with Consultants/Other Providers:

## 2021-03-01 NOTE — CHART NOTE - NSCHARTNOTESELECT_GEN_ALL_CORE
Follow-Up/Nutrition Services
IVC filter/Event Note
Neurology
IVC Filter/Event Note
Neurology/Event Note
Neurology/Event Note

## 2021-03-01 NOTE — PROGRESS NOTE ADULT - PROBLEM SELECTOR PLAN 6
- FS significantly elevated on admission, not on insulin at home, possibly elevated in setting of COVID19 and rhabdomyolysis  - FS basal/bolus + ISS.   - FS better controlled  - monitor fingersticks  - A1C 10.6  - hold oral hypoglycemics  - c/w Lantus 24 u QHS

## 2021-03-02 ENCOUNTER — TRANSCRIPTION ENCOUNTER (OUTPATIENT)
Age: 86
End: 2021-03-02

## 2021-03-02 VITALS
RESPIRATION RATE: 18 BRPM | HEART RATE: 88 BPM | SYSTOLIC BLOOD PRESSURE: 122 MMHG | OXYGEN SATURATION: 96 % | DIASTOLIC BLOOD PRESSURE: 64 MMHG | TEMPERATURE: 98 F

## 2021-03-02 LAB
ANION GAP SERPL CALC-SCNC: 11 MMOL/L — SIGNIFICANT CHANGE UP (ref 7–14)
BASOPHILS # BLD AUTO: 0.06 K/UL — SIGNIFICANT CHANGE UP (ref 0–0.2)
BASOPHILS NFR BLD AUTO: 0.6 % — SIGNIFICANT CHANGE UP (ref 0–2)
BUN SERPL-MCNC: 22 MG/DL — SIGNIFICANT CHANGE UP (ref 7–23)
CALCIUM SERPL-MCNC: 10 MG/DL — SIGNIFICANT CHANGE UP (ref 8.4–10.5)
CHLORIDE SERPL-SCNC: 103 MMOL/L — SIGNIFICANT CHANGE UP (ref 98–107)
CO2 SERPL-SCNC: 24 MMOL/L — SIGNIFICANT CHANGE UP (ref 22–31)
CREAT SERPL-MCNC: 1.1 MG/DL — SIGNIFICANT CHANGE UP (ref 0.5–1.3)
EOSINOPHIL # BLD AUTO: 0.42 K/UL — SIGNIFICANT CHANGE UP (ref 0–0.5)
EOSINOPHIL NFR BLD AUTO: 4.3 % — SIGNIFICANT CHANGE UP (ref 0–6)
GLUCOSE SERPL-MCNC: 159 MG/DL — HIGH (ref 70–99)
HCT VFR BLD CALC: 39.5 % — SIGNIFICANT CHANGE UP (ref 34.5–45)
HGB BLD-MCNC: 12.4 G/DL — SIGNIFICANT CHANGE UP (ref 11.5–15.5)
IANC: 6.2 K/UL — SIGNIFICANT CHANGE UP (ref 1.5–8.5)
IMM GRANULOCYTES NFR BLD AUTO: 0.4 % — SIGNIFICANT CHANGE UP (ref 0–1.5)
LYMPHOCYTES # BLD AUTO: 2.35 K/UL — SIGNIFICANT CHANGE UP (ref 1–3.3)
LYMPHOCYTES # BLD AUTO: 24.1 % — SIGNIFICANT CHANGE UP (ref 13–44)
MAGNESIUM SERPL-MCNC: 1.8 MG/DL — SIGNIFICANT CHANGE UP (ref 1.6–2.6)
MCHC RBC-ENTMCNC: 27.1 PG — SIGNIFICANT CHANGE UP (ref 27–34)
MCHC RBC-ENTMCNC: 31.4 GM/DL — LOW (ref 32–36)
MCV RBC AUTO: 86.4 FL — SIGNIFICANT CHANGE UP (ref 80–100)
MONOCYTES # BLD AUTO: 0.7 K/UL — SIGNIFICANT CHANGE UP (ref 0–0.9)
MONOCYTES NFR BLD AUTO: 7.2 % — SIGNIFICANT CHANGE UP (ref 2–14)
NEUTROPHILS # BLD AUTO: 6.2 K/UL — SIGNIFICANT CHANGE UP (ref 1.8–7.4)
NEUTROPHILS NFR BLD AUTO: 63.4 % — SIGNIFICANT CHANGE UP (ref 43–77)
NRBC # BLD: 0 /100 WBCS — SIGNIFICANT CHANGE UP
NRBC # FLD: 0 K/UL — SIGNIFICANT CHANGE UP
PLATELET # BLD AUTO: 211 K/UL — SIGNIFICANT CHANGE UP (ref 150–400)
POTASSIUM SERPL-MCNC: 4 MMOL/L — SIGNIFICANT CHANGE UP (ref 3.5–5.3)
POTASSIUM SERPL-SCNC: 4 MMOL/L — SIGNIFICANT CHANGE UP (ref 3.5–5.3)
RBC # BLD: 4.57 M/UL — SIGNIFICANT CHANGE UP (ref 3.8–5.2)
RBC # FLD: 13.6 % — SIGNIFICANT CHANGE UP (ref 10.3–14.5)
SODIUM SERPL-SCNC: 138 MMOL/L — SIGNIFICANT CHANGE UP (ref 135–145)
WBC # BLD: 9.77 K/UL — SIGNIFICANT CHANGE UP (ref 3.8–10.5)
WBC # FLD AUTO: 9.77 K/UL — SIGNIFICANT CHANGE UP (ref 3.8–10.5)

## 2021-03-02 PROCEDURE — 99239 HOSP IP/OBS DSCHRG MGMT >30: CPT

## 2021-03-02 RX ADMIN — AMLODIPINE BESYLATE 5 MILLIGRAM(S): 2.5 TABLET ORAL at 05:48

## 2021-03-02 RX ADMIN — Medication 2 UNIT(S): at 08:39

## 2021-03-02 RX ADMIN — LEVETIRACETAM 500 MILLIGRAM(S): 250 TABLET, FILM COATED ORAL at 05:48

## 2021-03-02 RX ADMIN — DORZOLAMIDE HYDROCHLORIDE TIMOLOL MALEATE 1 DROP(S): 20; 5 SOLUTION/ DROPS OPHTHALMIC at 05:49

## 2021-03-02 RX ADMIN — APIXABAN 5 MILLIGRAM(S): 2.5 TABLET, FILM COATED ORAL at 05:48

## 2021-03-02 RX ADMIN — Medication 5 MILLIGRAM(S): at 05:48

## 2021-03-02 RX ADMIN — ESCITALOPRAM OXALATE 5 MILLIGRAM(S): 10 TABLET, FILM COATED ORAL at 11:07

## 2021-03-02 RX ADMIN — Medication 1: at 08:39

## 2021-03-02 NOTE — PROGRESS NOTE ADULT - PROBLEM SELECTOR PROBLEM 2
COVID-19
Falls
COVID-19
Falls
COVID-19
Falls
COVID-19
COVID-19
Falls
COVID-19
Falls

## 2021-03-02 NOTE — PROGRESS NOTE ADULT - PROBLEM SELECTOR PLAN 7
- cont keppra  - per daughter, patient has been on keppra for years. Has not seizures in years. She was having seizure/syncope years ago. The symptoms improved after placing PPM and being on keppra.
Problem: Patient Care Overview  Goal: Plan of Care Review  Outcome: Ongoing (interventions implemented as appropriate)   12/04/18 2980   Coping/Psychosocial   Plan of Care Reviewed With patient   Plan of Care Review   Progress improving   OTHER   Outcome Summary Patient demonstrated ability to maintain nwb while using walker. She is impulsive and unsafe at times. Recommend SNF at discharge.         
- cont keppra  - per daughter, patient has been on keppra for years. Has not seizures in years. She was having seizure/syncope years ago. The symptoms improved after placing PPM and being on keppra.
- cont keppra  - Requires EEG  - keppra levels Pending  - per daughter, patient has been on keppra for years. Has not seizures in years. She was having seizure/syncope years ago. The symptoms improved after placing PPM and being on keppra.
- cont keppra  - Requires EEG  - keppra levels Pending  - per daughter, patient has been on keppra for years. Has not seizures in years. She was having seizure/syncope years ago. The symptoms improved after placing PPM and being on keppra.
- cont keppra  - per daughter, patient has been on keppra for years. Has not seizures in years. She was having seizure/syncope years ago. The symptoms improved after placing PPM and being on keppra.

## 2021-03-02 NOTE — DISCHARGE NOTE NURSING/CASE MANAGEMENT/SOCIAL WORK - PATIENT PORTAL LINK FT
You can access the FollowMyHealth Patient Portal offered by [facility] by registering at the following website: [email or web address]. By joining [facility]’s FollowMyHealth portal, you will also be able to view your health information using other applications (apps) compatible with our system.

## 2021-03-02 NOTE — PROGRESS NOTE ADULT - PROBLEM SELECTOR PROBLEM 10
DVT (deep venous thrombosis)
Discharge planning issues
DVT (deep venous thrombosis)
Discharge planning issues
DVT (deep venous thrombosis)
Discharge planning issues
Discharge planning issues
DVT (deep venous thrombosis)
Discharge planning issues
DVT (deep venous thrombosis)

## 2021-03-02 NOTE — PROGRESS NOTE ADULT - PROBLEM SELECTOR PROBLEM 7
Seizure

## 2021-03-02 NOTE — PROGRESS NOTE ADULT - PROBLEM SELECTOR PLAN 9
Lovenox for VTE px given COVID status
already on Eliquis
Lovenox for VTE px given COVID status
Lovenox for VTE px given COVID status
already on Eliquis
Lovenox for VTE px given COVID status
already on Eliquis
Lovenox for VTE px given COVID status
Lovenox for VTE px given COVID status
already on Eliquis
Lovenox for VTE px given COVID status
already on Eliquis
Lovenox for VTE px given COVID status
already on Eliquis

## 2021-03-02 NOTE — PROGRESS NOTE ADULT - PROVIDER SPECIALTY LIST ADULT
Hospitalist

## 2021-03-02 NOTE — PROGRESS NOTE ADULT - PROBLEM SELECTOR PROBLEM 5
ROBERTO (acute kidney injury)

## 2021-03-02 NOTE — PROGRESS NOTE ADULT - PROBLEM SELECTOR PROBLEM 3
Falls
Pain of right upper extremity
Pain of right upper extremity
Falls
Pain of right upper extremity
Falls
Pain of right upper extremity
Falls
Pain of right upper extremity
Falls
Falls

## 2021-03-02 NOTE — PROGRESS NOTE ADULT - PROBLEM SELECTOR PLAN 8
- cont aricept, lexapro

## 2021-03-02 NOTE — PROGRESS NOTE ADULT - PROBLEM SELECTOR PROBLEM 6
Type 2 diabetes mellitus with hyperglycemia, without long-term current use of insulin

## 2021-03-02 NOTE — PROGRESS NOTE ADULT - PROBLEM SELECTOR PLAN 3
- Patient with multiple falls which are unwitnessed but patient states that she falls due to weakness, denies any syncope, palpitations, LOC, chest pain, SOB prior to her falls  - CTH/CT spine: no acute findings  - Pelvis xray; neg  - PT consult  - s/p PPM interrogation by EP. PM is MRI conditional  PPM functioning well. No events.  PT evaluated her- recommended DC to rehab.
- Patient with multiple falls which are unwitnessed but patient states that she falls due to weakness, denies any syncope, palpitations, LOC, chest pain, SOB prior to her falls  - CTH/CT spine: no acute findings  - Pelvis xray; neg  - PT consult and OT evaluation  PT evaluated her- recommended DC to rehab.
- Shoulder, elbow, humerus, forearm, wrist xrays --> neg for fractures  - Patient complaining of numbness, and pain, has elevated CK at [facility], concern for developing compartment syndrome in setting of rhabdomyolysis -> would start patient on IVF, gen surgery consult called for eval, currently with intact pulses  - Neurovascular checks q4h for now  - neuro consult appreciated  -Will confirm if PPM is MRI compatible, then get MR R brachial plexus w/w/o contrast per neuro recs   - RUE dopplers ordered to r/o DVT  -Morphine PRN
- Patient with multiple falls which are unwitnessed but patient states that she falls due to weakness, denies any syncope, palpitations, LOC, chest pain, SOB prior to her falls  - CTH/CT spine: no acute findings  - Pelvis xray; neg  - PT consult and OT evaluation  PT evaluated her- recommended DC to rehab.
- Patient with multiple falls which are unwitnessed but patient states that she falls due to weakness, denies any syncope, palpitations, LOC, chest pain, SOB prior to her falls  - CTH/CT spine: no acute findings  - Pelvis xray; neg  - PT consult  - s/p PPM interrogation by EP. PM is MRI conditional
- Patient with multiple falls which are unwitnessed but patient states that she falls due to weakness, denies any syncope, palpitations, LOC, chest pain, SOB prior to her falls  - CTH/CT spine: no acute findings  - Pelvis xray; neg  - PT consult  - s/p PPM interrogation by EP. PM is MRI conditional
- Patient with multiple falls which are unwitnessed but patient states that she falls due to weakness, denies any syncope, palpitations, LOC, chest pain, SOB prior to her falls  - CTH/CT spine: no acute findings  - Pelvis xray; neg  - PT consult and OT evaluation  PT evaluated her- recommended DC to rehab.
- Shoulder, elbow, humerus, forearm, wrist xrays --> neg for fractures  - Patient complaining of numbness, and pain, has elevated CK at [facility], concern for developing compartment syndrome in setting of rhabdomyolysis -> patient s/p IVF, pulses intact currently  - recheck CK in the AM  - Neurovascular checks q4h for now  - neuro consult for further eval given concern for plexus injury though low suspicion at this time, likely has symptoms from rhabdomyolysis/?compartment syndrome  - RUE dopplers ordered to r/o DVT, negative  - unclear if surgery consulted, no note. Will consult surgery in the am tomorrow  - pacemaker is MRI conditional, card obtained left in chart, CXR done per MRI tech recs  - consent to be obtained by patient's daughter and MRI RUE order with IV contrast
- Patient with multiple falls which are unwitnessed but patient states that she falls due to weakness, denies any syncope, palpitations, LOC, chest pain, SOB prior to her falls  - CTH/CT spine: no acute findings  - Pelvis xray; neg  - PT consult  - s/p PPM interrogation by EP. PM is MRI conditional  PPM functioning well. No events.  PT evaluated her- recommended DC to rehab.
- Patient with multiple falls which are unwitnessed but patient states that she falls due to weakness, denies any syncope, palpitations, LOC, chest pain, SOB prior to her falls  - CTH/CT spine: no acute findings  - Pelvis xray; neg  - PT consult and OT evaluation  PT evaluated her- recommended DC to rehab.
- Shoulder, elbow, humerus, forearm, wrist xrays --> neg for fractures  - Patient complaining of numbness, and pain, has elevated CK at [facility], concern for developing compartment syndrome in setting of rhabdomyolysis -> would start patient on IVF, gen surgery consult called for eval, currently with intact pulses  - Neurovascular checks q4h for now  - neuro consult appreciated  -Will confirm if PPM is MRI compatible, then get MR R brachial plexus w/w/o contrast per neuro recs   - RUE dopplers ordered to r/o DVT  -Morphine PRN
- Patient with multiple falls which are unwitnessed but patient states that she falls due to weakness, denies any syncope, palpitations, LOC, chest pain, SOB prior to her falls  - CTH/CT spine: no acute findings  - Pelvis xray; neg  - PT consult  - s/p PPM interrogation by EP. PM is MRI conditional
- Patient with multiple falls which are unwitnessed but patient states that she falls due to weakness, denies any syncope, palpitations, LOC, chest pain, SOB prior to her falls  - CTH/CT spine: no acute findings  - Pelvis xray; neg  - PT consult and OT evaluation  PT evaluated her- recommended DC to rehab.
- Patient with multiple falls which are unwitnessed but patient states that she falls due to weakness, denies any syncope, palpitations, LOC, chest pain, SOB prior to her falls  - CTH/CT spine: no acute findings  - Pelvis xray; neg  - PT consult  - s/p PPM interrogation by EP. PM is MRI conditional
- Patient with multiple falls which are unwitnessed but patient states that she falls due to weakness, denies any syncope, palpitations, LOC, chest pain, SOB prior to her falls  - CTH/CT spine: no acute findings  - Pelvis xray; neg  - PT consult  - s/p PPM interrogation by EP. PM is MRI conditional
- Shoulder, elbow, humerus, forearm, wrist xrays --> neg for fractures  - Patient complaining of numbness, and pain, has elevated CK at [facility], concern for developing compartment syndrome in setting of rhabdomyolysis -> would start patient on IVF, gen surgery consult called for eval, currently with intact pulses  - Neurovascular checks q4h for now  - neuro consult appreciated  -Will confirm if PPM is MRI compatible, then get MR R brachial plexus w/w/o contrast per neuro recs   - RUE dopplers ordered to r/o DVT  -Morphine PRN
- Patient with multiple falls which are unwitnessed but patient states that she falls due to weakness, denies any syncope, palpitations, LOC, chest pain, SOB prior to her falls  - CTH/CT spine: no acute findings  - Pelvis xray; neg  - PT consult and OT evaluation  PT evaluated her- recommended DC to rehab.
- Shoulder, elbow, humerus, forearm, wrist xrays --> neg for fractures  - Patient complaining of numbness, and pain, has elevated CK at [facility], concern for developing compartment syndrome in setting of rhabdomyolysis -> would start patient on IVF, gen surgery consult called for eval, currently with intact pulses  - Neurovascular checks q4h for now  - neuro consult appreciated  -PPM MRI compatible,  MR R brachial plexus w/w/o contrast per neuro recs to be ordered once CXR and form my EP and patient's card received per MRI tech   - RUE dopplers ordered to r/o DVT  -Morphine PRN
- Patient with multiple falls which are unwitnessed but patient states that she falls due to weakness, denies any syncope, palpitations, LOC, chest pain, SOB prior to her falls  - CTH/CT spine: no acute findings  - Pelvis xray; neg  - PT consult and OT evaluation  PT evaluated her- recommended DC to rehab.
- Patient with multiple falls which are unwitnessed but patient states that she falls due to weakness, denies any syncope, palpitations, LOC, chest pain, SOB prior to her falls  - CTH/CT spine: no acute findings  - Pelvis xray; neg  - PT consult  - s/p PPM interrogation by EP. PM is MRI conditional  PPM functioning well. No events.  PT evaluated her- recommended DC to rehab.
- Patient with multiple falls which are unwitnessed but patient states that she falls due to weakness, denies any syncope, palpitations, LOC, chest pain, SOB prior to her falls  - CTH/CT spine: no acute findings  - Pelvis xray; neg  - PT consult  - s/p PPM interrogation by EP. PM is MRI conditional
- Patient with multiple falls which are unwitnessed but patient states that she falls due to weakness, denies any syncope, palpitations, LOC, chest pain, SOB prior to her falls  - CTH/CT spine: no acute findings  - Pelvis xray; neg  - PT consult  - s/p PPM interrogation by EP. PM is MRI conditional  PPM functioning well. No events.  PT evaluated her- recommended DC to rehab.
- Patient with multiple falls which are unwitnessed but patient states that she falls due to weakness, denies any syncope, palpitations, LOC, chest pain, SOB prior to her falls  - CTH/CT spine: no acute findings  - Pelvis xray; neg  - PT consult  - s/p PPM interrogation by EP. PM is MRI conditional
- Patient with multiple falls which are unwitnessed but patient states that she falls due to weakness, denies any syncope, palpitations, LOC, chest pain, SOB prior to her falls  - CTH/CT spine: no acute findings  - Pelvis xray; neg  - PT consult and OT evaluation  PT evaluated her- recommended DC to rehab.

## 2021-03-02 NOTE — PROGRESS NOTE ADULT - PROBLEM SELECTOR PLAN 4
- CT chest at [facility] incidentally found: Irregular 1.9 cm left upper lobe nodular opacity with associated spiculation.   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  - Patient did not want to know about these results and the daughter is already aware of these results  - will need  outpatient pulm follow up ultimately  - CTA 2/24 shows unchanged spiculated nodule, b/l opacities c/w sequelae of COVID PNA

## 2021-03-02 NOTE — PROGRESS NOTE ADULT - PROBLEM SELECTOR PROBLEM 8
Dementia

## 2021-03-02 NOTE — PROGRESS NOTE ADULT - SUBJECTIVE AND OBJECTIVE BOX
Patient is a 86y old  Female who presents with a chief complaint of COVID/Falls (01 Mar 2021 14:34)        SUBJECTIVE / OVERNIGHT EVENTS:  no acute events o/n  sating well on RA  denies all complaints  awaiting dc to [facility] ([place])     MEDICATIONS  (STANDING):  amLODIPine   Tablet 5 milliGRAM(s) Oral daily  apixaban 5 milliGRAM(s) Oral every 12 hours  atorvastatin 20 milliGRAM(s) Oral at bedtime  dextrose 40% Gel 15 Gram(s) Oral once  dextrose 5%. 1000 milliLiter(s) (50 mL/Hr) IV Continuous <Continuous>  dextrose 5%. 1000 milliLiter(s) (100 mL/Hr) IV Continuous <Continuous>  dextrose 50% Injectable 25 Gram(s) IV Push once  dextrose 50% Injectable 12.5 Gram(s) IV Push once  dextrose 50% Injectable 25 Gram(s) IV Push once  donepezil 10 milliGRAM(s) Oral at bedtime  dorzolamide 2%/timolol 0.5% Ophthalmic Solution 1 Drop(s) Both EYES every 12 hours  escitalopram 5 milliGRAM(s) Oral daily  glucagon  Injectable 1 milliGRAM(s) IntraMuscular once  insulin glargine Injectable (LANTUS) 24 Unit(s) SubCutaneous at bedtime  insulin lispro (ADMELOG) corrective regimen sliding scale   SubCutaneous three times a day before meals  insulin lispro (ADMELOG) corrective regimen sliding scale   SubCutaneous at bedtime  insulin lispro Injectable (ADMELOG) 2 Unit(s) SubCutaneous three times a day before meals  levETIRAcetam 500 milliGRAM(s) Oral two times a day  oxybutynin 5 milliGRAM(s) Oral two times a day    MEDICATIONS  (PRN):  acetaminophen   Tablet .. 650 milliGRAM(s) Oral every 4 hours PRN Temp greater or equal to 38.5C (101.3F)  calcium carbonate    500 mG (Tums) Chewable 1 Tablet(s) Chew every 6 hours PRN Heartburn  guaiFENesin   Syrup  (Sugar-Free) 200 milliGRAM(s) Oral every 6 hours PRN cough/congestion  sodium chloride 0.65% Nasal 1 Spray(s) Both Nostrils two times a day PRN Congestion      Vital Signs Last 24 Hrs  T(C): 36.7 (02 Mar 2021 12:28), Max: 36.9 (01 Mar 2021 22:24)  T(F): 98 (02 Mar 2021 12:28), Max: 98.5 (01 Mar 2021 22:24)  HR: 88 (02 Mar 2021 12:28) (87 - 98)  BP: 122/64 (02 Mar 2021 12:28) (116/66 - 122/64)  BP(mean): --  RR: 18 (02 Mar 2021 12:28) (16 - 18)  SpO2: 96% (02 Mar 2021 12:28) (96% - 96%)  CAPILLARY BLOOD GLUCOSE      POCT Blood Glucose.: 218 mg/dL (02 Mar 2021 12:05)  POCT Blood Glucose.: 178 mg/dL (02 Mar 2021 08:33)  POCT Blood Glucose.: 246 mg/dL (01 Mar 2021 21:26)  POCT Blood Glucose.: 199 mg/dL (01 Mar 2021 17:19)    I&O's Summary        PHYSICAL EXAM    CONSTITUTIONAL: NAD, well-developed  RESPIRATORY: Normal respiratory effort; lungs are clear to auscultation bilaterally  CARDIOVASCULAR: Regular rate and rhythm, normal S1 and S2, No lower extremity edema  ABDOMEN: Nontender to palpation, normoactive bowel sounds  MUSCLOSKELETAL: no clubbing or cyanosis of digits, RUE 4/5 strength, no sensory deficits   EXTR: RUE pitting edema       LABS:                        12.4   9.77  )-----------( 211      ( 02 Mar 2021 07:51 )             39.5     03-02    138  |  103  |  22  ----------------------------<  159<H>  4.0   |  24  |  1.10    Ca    10.0      02 Mar 2021 07:51  Phos  3.4     03-01  Mg     1.8     03-02    TPro  5.9<L>  /  Alb  2.5<L>  /  TBili  0.6  /  DBili  x   /  AST  14  /  ALT  13  /  AlkPhos  76  03-01              RADIOLOGY & ADDITIONAL TESTS:    Imaging Personally Reviewed:  Consultant(s) Notes Reviewed:    Care Discussed with Consultants/Other Providers:

## 2021-03-02 NOTE — PROGRESS NOTE ADULT - PROBLEM SELECTOR PROBLEM 4
Lung nodule

## 2021-03-02 NOTE — PROGRESS NOTE ADULT - PROBLEM SELECTOR PROBLEM 1
Pain of right upper extremity
COVID-19
Pain of right upper extremity
COVID-19
Pain of right upper extremity
COVID-19
Pain of right upper extremity
COVID-19
COVID-19
Pain of right upper extremity

## 2021-03-02 NOTE — PROGRESS NOTE ADULT - REASON FOR ADMISSION
COVID/Falls

## 2021-03-02 NOTE — PROGRESS NOTE ADULT - ATTENDING COMMENTS
COVID reswab 2/25 - positive  Medically stable for DC to COVID positive Rehab, likely [facility], awaiting placement  35 min spent on DC planning
Updated daughter Emerson turner today, if remains positive, daughter would like pt to go to [facility] planning to [facility]
Medically stable for DC to COVID + [facility]  Awaiting placement   35 min spent on DC planning
Medically stable for DC to COVID + [facility]  Awaiting placement   35 min spent on DC planning

## 2021-05-24 NOTE — ED ADULT NURSE NOTE - CAS TRG GEN SKIN COLOR
Follow Up Appointment      PATIENT: Alecia Mcduffie    MEDICAL RECORD NUMBER: [id number]    YOB: 1972  AGE: 48 year old    DATE: 5/24/2021   Time: 5:30 PM    This visit was performed via live two-way video with patient's verbal consent.     Clinician Location: Clinic.  Patient Location: Home.    Alecia is in [place] and identity has been established.     She was informed that consent to treat includes permission to submit charges to the applicable insurance on file. Alecia was advised regarding the potential risk inherent in video visits, as the assessment may be limited due to what can be seen on the screen which potentially results in an incomplete assessment; as well as either of us may discontinue the video visit if it is. Patient understands that we are doing limited office visits due to coronavirus pandemic and patient consents to a virtual visit with charges submitted to insurance. It is established that the patient is currently in [place].      TOTAL TIME SPENT:    30 min    IDENTIFICATION:  Alecia Mcduffie is a 48 year old, White, female, from [place], [place].    CHIEF COMPLAINT:  \"Anxiety, panic\"    HISTORY OF PRESENTING ILLNESS:  The patient reports a history of depression starting  at age 12 (I remember crying a lot - pacing in my room).  Through the years symptoms have included dysphoria, irritability, poor appetite, increased appetite, low energy, poor sleep, hypersomnia, poor concentration, anhedonia and suicidal ideation, lasting constantly to present.     Psychotic Symptoms: saw bugs around 2005 or 2006 - went on for a whole summer.  Says her psychiatrist started her on Adderall and kept increasing the dose when she said it wasn't doing anything. When she started seeing the bugs her MD stopped Adderall  (doesn't remember dose) and started Lithium and the bugs disappeared but her depression didn't improve.   When she was on high dose Adderall she was only sleeping 2-4 hours/night;  \"I repainted the entire house, I went crazy\".    Manic symptoms: patient has had an abnormally and persistently elevated or irritable mood and at least 3 of the following symptoms including: denies recently - see above for symptoms she experienced when she was started on Lithium    Generalized anxiety: patient has had excessive anxiety/worry for at least 6 months, which is difficult to control, causes distress or impairment and is associated with at least 3 symptoms including: restlessness, feeling keyed up or on edge, easily fatigued, difficulty concentrating, irritability, muscle tension and sleep disturbance.  On a scale from one to ten (10 being the worst), the patient rates anxiety at a 3/10.    Panic disorder: had panic in the past but hasn't had in at least the past 8 or 9 years    Social anxiety: patient has had marked fear/anxiety about social situations in which exposed to possible scrutiny by others.    Since our last visit Alecia reports her mood has been up/down.  She feels more depressed and is supposed to start her new job next month but doesn't know if she can even do that.  She doesn't know if she will be able to function on the job.  She did receive her necessary state credentials in the mail over the weekend to start the position and says, when asked, that it was a good feeling to receive them after completing her studies for [employer].  She still needs 3,000 supervised hours to become licensed which her new position will provide.     Her relationship isn't going well.  Her boyfriend tells her he can tell in the AM if its going to be a good day or a bad day depending on the mood she is in when she wakes up.  She feels like her depression is making her relationship worse.  They are exploring couple's counseling.    Currently working with Josette Jacome [employer] for therapy.  Had been engaged in DBT prior to the pandemic but that was interrupted during Covid 19.  She doesn't think DBT is exactly what she needs  anyway in terms of therapy.  Regarding mood dysregulation, states, \"I don't want to have borderline personality disorder\". We did process her feelings and I acknowledged her frustrations that there doesn't seem to be an easy solution in terms of pharmacotherapy to address her symptoms.       Says she isn't at all sure that she is feeling better on the levomilnacipran ER 80 mg daily.  Her mood was considerably better at our last appointment but she can't say the same about her mood now.  She is understandably very frustrated and tearful during today's visit.     Notes daughter was in the ER in [place] over the weekend for appendicitis.  She was given the option to try a course of antibiotics rather than immediate surgery and daughter opted for that.        Recently, in the present, mood has been \"much worse - my depression is worse - I'm not sure I can even start my new job in June\"  Appetite has been \"normal.\"  Energy has been \"low.\"  Sleep has been \"OK\"  Concentration has been better but pain is a complicating factor - I still struggle to find a word sometimes.\"      REVIEW OF SYSTEMS:  Constitutional: Denies fever.              Integumentary: Denies rash.   Ears, Nose, Throat: Denies rhinorrhea and sore throat.                     Respiratory: Denies cough.  Gastrointestinal: Denies nausea, vomiting and diarrhea.      Cardiovascular: Denies chest pain, palpitations and shortness of breath.    Musculoskeletal: Pain and stiffness since age 37 or 38.  High level pain for the past year and a half - some days can't even walk through a store.  Diagnosis - fibromyalgia     Neurological: Denies headache and weakness.       Urological: Denies painful urination.         ALLERGIES:   Allergen Reactions   • Ragweed Other (See Comments)       MEDICATIONS:  Current Outpatient Medications   Medication Sig   • naltrexone 4.5 mg capsule Take 1 capsule by mouth at bedtime.   • topiramate (TOPAMAX) 50 MG tablet Take 1 tablet by  mouth 2 times daily.   • Levomilnacipran HCl ER 80 MG CAPSULE SR 24 HR Take 1 capsule by mouth daily.   • Fetzima 80 MG CAPSULE SR 24 HR TAKE 1 CAPSULE BY MOUTH DAILY   • busPIRone (BUSPAR) 30 MG tablet Take 1 tablet by mouth 2 times daily.   • meloxicam (MOBIC) 15 MG tablet TAKE 1 TABLET BY MOUTH DAILY   • QUEtiapine (SEROquel) 100 MG tablet Take 1 tablet by mouth at bedtime as needed (sleep).   • LORazepam (ATIVAN) 0.5 MG tablet Take 1 tablet by mouth 1 day a week as needed for Anxiety (TAKE 1 HOUR PRIOR TO PROCEEDURE - MAY REPEAT X 1 IF NEEDED FOR ANXIETY/PANIC).   • methylpheniDATE (RITALIN) 5 MG tablet Take 1 tablet by mouth 2 times daily.   • methylpheniDATE (RITALIN) 5 MG tablet Take 1 tablet by mouth 2 times daily. Do not start before April 3, 2021.   • ADALimumab (Humira Pen) 40 MG/0.4ML citrate free Inject 1 pen into the skin every 14 days.   • cyclobenzaprine (FLEXERIL) 5 MG tablet Take 1 tablet by mouth at bedtime as needed for Muscle spasms.   • linaclotide (Linzess) 290 MCG Take 1 capsule by mouth daily.   • hydrOXYzine (ATARAX) 10 MG tablet Take 1 - 2 tabs every 4-6 hours if needed for anxiety   • triamcinolone (ARISTOCORT) 0.1 % cream Apply sparingly to affected area twice daily.   • rOPINIRole (REQUIP) 0.5 MG tablet Take 3 tablets by mouth nightly.   • polyethylene glycol (MIRALAX) powder Take 17 g by mouth daily.     No current facility-administered medications for this visit.       VITAL SIGNS:  There were no vitals taken for this visit.    LABS:  TSH (mcUnits/mL)   Date Value   07/02/2019 2.657     No results found for: T4FREE    MEDICAL HISTORY:  Patient Active Problem List    Diagnosis Date Noted   • Need for vaccination 11/13/2020     Priority: Low   • Cervical myofascial pain syndrome 10/28/2020     Priority: Low   • Fibromyalgia 10/28/2020     Priority: Low   • Psoriatic arthritis (CMS/HCC) 10/10/2019     Priority: Low   • Encounter for long-term (current) use of NSAIDs 10/10/2019      Priority: Low   • Psychophysiological insomnia 09/04/2019     Priority: Low   • Chronic fatigue 07/04/2019     Priority: Low   • Scalp psoriasis 07/04/2019     Priority: Low   • Vitamin D deficiency 05/13/2019     Priority: Low   • Multiple joint pain 05/13/2019     Priority: Low   • Myalgia 05/13/2019     Priority: Low   • Anxiety and depression 01/20/2015     Priority: Low   • Restless legs 01/20/2015     Priority: Low   • Acne 01/20/2015     Priority: Low     Past Medical History:   Diagnosis Date   • Acne    • Anxiety    • Depressive disorder    • Restless legs      Past Surgical History:   Procedure Laterality Date   • Iud mirena  12/2012    needs to be removed 05/2023       PRIMARY CARE PROVIDER:  OLU Bearden    PSYCHIATRIC HISTORY:  Previous Diagnosis: 1 manic episode possibly induced by a stimulant - labeled bipolar and was on Lithium for 2 years (2005 - 2006) - MDD, panic disorder    Therapist: Josette Jacome LPC; previously Celeste Laguerre PsyD - was in DBT prior to the start of the pandemic    Previous Psychiatrist: [facility]  Previous Psychiatric Hospitalizations: denies.    Previous Suicide Attempts: \"maybe\" - drug OD - \"slept it off - never reported\" - last episode 13 years ago  Self-Injurious Behavior: denies.  AODA Treatment: none     Psychiatric Medication Trials: (per patient recollection and review of electronic record)        Lexapro        Celexa        Wellbutrin        Effexor        Lithium        Prozac  - worked for a long time but stopped        desvenlafaxine         Invega - hasn't started        Hydroxyzine        Lamotrigine        Gabapentin - 500 mg q 4 hours        Duloxetine        Paxil        levomilnacipran - start 2/4/2021; current 80 mg/day        Quetiapine 25 mg - 1 or 2 hs prn sleep        topiramate - ordered by neurology            Buspirone 20 mg bid       Trileptal 300 mg po bid - start 5/25/2021    Level of Education: masters in counseling   (DRE) - completed spring 2021; hired by [employer] for 3,000 hrs supervised therapy     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  They said she wasn't catching on to her job duties.  Says she doesn't think she had adequate orientation as the person who was supposed to orient her was out on quarantine.  She forgot about a meeting she was supposed to be at and that seemed to have been the final straw.    HABITS:  Alcohol: \"zero\" - never a problem.  Drugs: denies currently or in the past.  Smoking: none.  Caffeine 2-4 cups of coffee/day and a couple sodas.    ACCESS TO FIREARMS:  Denies.    SUICIDE RISK ASSESSMENT  Risk Factors: mood disorder, thwarted belongingness, previous suicide attempt, medical illness, severe chronic pain and current stressor.  Protective Factors: evidence of past coping strategies, future-oriented and goal planning, frustration tolerance, responsibility to children or beloved pets, good social supports, access to healthcare and able to make needs known.  Risk Level: low - moderate.    MENTAL STATUS EXAM:   Cooperativity:  Cooperative, forthcoming, appears reliable.    Speech:  Normal rate, tone, and volume.   Language:  No abnormality noted.    Mood: \"depressed\"  Affect:  Mood-congruent, stable, normal range, euthymic.  Thought Process:  Pessimistic about ability to start position at [employer] in June - thinks her depression may be a barrier to success.  Unhappy in current relationship.  Feels depression is a barrier to contentment in personal life as well.     Thought Content:  No overt delusions or abnormality noted. No hallucinations, does not appear to be responding to internal stimuli.   Consciousness:  Awake and alert.   Orientation:  Oriented to person, place and time.   Memory:  Good, able to demonstrate accurate historical recall for recent and more remote events and  Normal for race discussions.  Attention:  Good, no fluctuation or obvious deficit noted and able to follow the conversation.   Fund of Knowledge:  Consistent with education and experiences as evidenced by  vocabulary.   Insight:  Good, based on appropriate recognition of impact of psychiatric symptoms and need for treatment.   Judgment:  Good, based on appropriate recent decisions.  Safety:  denies suicidal ideation, intent and plan; denies homicidal ideation, intent and plan.  Motivation to pursue treatment:  Good.    ASSESSMENT AND PLAN:  1. MDD, moderate, recurrent (F33.1) - (vs Dysthymia) -  Currently on levomilnacipran 80 mg and says the early improvement in depression has not been sustained and she no longer feels like the medication is controlling her symptoms.  She doesn't really want to increase the dose at this point and is conflicted about weaning off as well.      In the past patient has been on escitalopram, citalopram, venlafaxine, desvenlafaxine, Prozac, duloxetine, Paxil and Fetzima (current).      I do not believe patient has been on tricyclics or MAOIs in the past - however, neither category of medication appears to be a good choice metabolically, at least not per "[employer]ight results.      We have not tried Viibryd which is listed in the \"use as directed\" column in [facility] - we could consider switch to Viibryd at next visit.    For now continue levomilnacipran 80 mg po daily.    2. ANNALEE (F41.1) -  levomilnacipran as above;  Continue buspirone 30 mg bid   Continue Seroquel 25-50 mg po q 6 hour prn anxiety    3. Mood disorder (F39) (h/o bipolar diagnosis following psychosis while on stimulant - was d/c'd and antipsychotic was started).  Patient has been off treatment for bipolar for over a decade without another episode of psychosis.      She has been on Abilify, Lithium, lamotrigine, Topamex (current low dose for headaches) and gabapentin in the past.  None have provided benefit for her mood per patient.    Suggest  adding Trileptal 300 mg po bid to see whether we can gain some benefit in terms of her depression and patient is agreeable.  Begin Trileptal 300 mg po bid.      4.  ADHD (F90.0) - had been on stimulant in the past but high dose led to psychosis.  Believes at that time she might have been abusing it (requesting higher dosing).  Fired from a job for failure to attend a mtg she was supposed to be at last December - says she has a difficulty time staying on task without the stimulant.    Continue methylphenidate 5 mg po bid - will place order for May and June 2021 refills    4.  Call or return to clinic as needed if these symptoms worsen, fail to improve as anticipated, or if new symptoms develop.      F/U appointment 7/19/2021      PATIENT EDUCATION  Ready to learn, no apparent learning barriers were identified.  Explained diagnosis and treatment plan; patient expressed understanding of the content. Patient signed treatment consent, medication consent and treatment plan.    OLU Gomes

## 2021-05-25 ENCOUNTER — APPOINTMENT (OUTPATIENT)
Dept: PULMONOLOGY | Facility: CLINIC | Age: 86
End: 2021-05-25
Payer: MEDICARE

## 2021-05-25 VITALS
BODY MASS INDEX: 36.91 KG/M2 | OXYGEN SATURATION: 98 % | HEART RATE: 85 BPM | HEIGHT: 60 IN | SYSTOLIC BLOOD PRESSURE: 124 MMHG | TEMPERATURE: 97.4 F | DIASTOLIC BLOOD PRESSURE: 78 MMHG | WEIGHT: 188 LBS

## 2021-05-25 DIAGNOSIS — R91.8 OTHER NONSPECIFIC ABNORMAL FINDING OF LUNG FIELD: ICD-10-CM

## 2021-05-25 DIAGNOSIS — Z87.42 PERSONAL HISTORY OF OTHER DISEASES OF THE FEMALE GENITAL TRACT: ICD-10-CM

## 2021-05-25 DIAGNOSIS — K21.9 GASTRO-ESOPHAGEAL REFLUX DISEASE W/OUT ESOPHAGITIS: ICD-10-CM

## 2021-05-25 DIAGNOSIS — E66.3 OVERWEIGHT: ICD-10-CM

## 2021-05-25 DIAGNOSIS — Z86.79 PERSONAL HISTORY OF OTHER DISEASES OF THE CIRCULATORY SYSTEM: ICD-10-CM

## 2021-05-25 DIAGNOSIS — R06.83 SNORING: ICD-10-CM

## 2021-05-25 DIAGNOSIS — U07.1 COVID-19: ICD-10-CM

## 2021-05-25 DIAGNOSIS — Z63.4 DISAPPEARANCE AND DEATH OF FAMILY MEMBER: ICD-10-CM

## 2021-05-25 DIAGNOSIS — Z86.69 PERSONAL HISTORY OF OTHER DISEASES OF THE NERVOUS SYSTEM AND SENSE ORGANS: ICD-10-CM

## 2021-05-25 DIAGNOSIS — R09.82 POSTNASAL DRIP: ICD-10-CM

## 2021-05-25 DIAGNOSIS — I82.409 ACUTE EMBOLISM AND THROMBOSIS OF UNSPECIFIED DEEP VEINS OF UNSPECIFIED LOWER EXTREMITY: ICD-10-CM

## 2021-05-25 DIAGNOSIS — Z86.39 PERSONAL HISTORY OF OTHER ENDOCRINE, NUTRITIONAL AND METABOLIC DISEASE: ICD-10-CM

## 2021-05-25 DIAGNOSIS — Z87.19 PERSONAL HISTORY OF OTHER DISEASES OF THE DIGESTIVE SYSTEM: ICD-10-CM

## 2021-05-25 DIAGNOSIS — Z86.718 PERSONAL HISTORY OF OTHER VENOUS THROMBOSIS AND EMBOLISM: ICD-10-CM

## 2021-05-25 DIAGNOSIS — Z87.898 PERSONAL HISTORY OF OTHER SPECIFIED CONDITIONS: ICD-10-CM

## 2021-05-25 PROCEDURE — ZZZZZ: CPT

## 2021-05-25 PROCEDURE — 94729 DIFFUSING CAPACITY: CPT

## 2021-05-25 PROCEDURE — 99204 OFFICE O/P NEW MOD 45 MIN: CPT | Mod: CS,25

## 2021-05-25 PROCEDURE — 71046 X-RAY EXAM CHEST 2 VIEWS: CPT

## 2021-05-25 PROCEDURE — 94010 BREATHING CAPACITY TEST: CPT

## 2021-05-25 PROCEDURE — 94618 PULMONARY STRESS TESTING: CPT

## 2021-05-25 RX ORDER — DONEPEZIL HYDROCHLORIDE 10 MG/1
10 TABLET ORAL
Refills: 0 | Status: ACTIVE | COMMUNITY

## 2021-05-25 RX ORDER — ESCITALOPRAM OXALATE 5 MG/1
5 TABLET ORAL
Refills: 0 | Status: ACTIVE | COMMUNITY

## 2021-05-25 RX ORDER — LEVETIRACETAM 500 MG/1
500 TABLET, FILM COATED ORAL
Refills: 0 | Status: ACTIVE | COMMUNITY

## 2021-05-25 RX ORDER — ATORVASTATIN CALCIUM 40 MG/1
40 TABLET, FILM COATED ORAL
Refills: 0 | Status: ACTIVE | COMMUNITY

## 2021-05-25 RX ORDER — AMLODIPINE BESYLATE 5 MG/1
5 TABLET ORAL
Refills: 0 | Status: ACTIVE | COMMUNITY

## 2021-05-25 RX ORDER — OXYBUTYNIN CHLORIDE 5 MG/1
5 TABLET ORAL
Refills: 0 | Status: ACTIVE | COMMUNITY

## 2021-05-25 RX ORDER — APIXABAN 5 MG/1
5 TABLET, FILM COATED ORAL
Refills: 0 | Status: ACTIVE | COMMUNITY

## 2021-05-25 RX ORDER — DORZOLAMIDE HYDROCHLORIDE AND TIMOLOL MALEATE 20; 5 MG/ML; MG/ML
2-0.5 SOLUTION/ DROPS OPHTHALMIC
Refills: 0 | Status: ACTIVE | COMMUNITY

## 2021-05-25 SDOH — SOCIAL STABILITY - SOCIAL INSECURITY: DISSAPEARANCE AND DEATH OF FAMILY MEMBER: Z63.4

## 2021-05-25 NOTE — PHYSICAL EXAM
[No Acute Distress] : no acute distress [Normal Oropharynx] : normal oropharynx [Normal Appearance] : normal appearance [No Neck Mass] : no neck mass [Normal Rate/Rhythm] : normal rate/rhythm [Normal S1, S2] : normal s1, s2 [No Murmurs] : no murmurs [No Resp Distress] : no resp distress [No Abnormalities] : no abnormalities [Benign] : benign [Normal Gait] : normal gait [No Clubbing] : no clubbing [No Cyanosis] : no cyanosis [No Edema] : no edema [FROM] : FROM [Normal Color/ Pigmentation] : normal color/ pigmentation [No Focal Deficits] : no focal deficits [Oriented x3] : oriented x3 [Normal Affect] : normal affect [III] : Mallampati Class: III [TextBox_2] : OW [TextBox_68] : I:E ratio 1:3; inspiratory crackles at bases

## 2021-05-25 NOTE — ASSESSMENT
[FreeTextEntry1] : Ms. MEJIA is a 86 year old female with a history of nonsmoker, CAD s/p stents, permanent pacemaker, bradycardia, glaucoma, ovarian cyst, prior MI, Diabetes, Diabetic retinopathy, memory disturbances, Seizures, s/p SDH residual to fall and trauma, GERD, s/p COVID 19 infection (2/2021) complicated by DVT who now comes to the office for an initial pulmonary evaluation.\par \par Her shortness of breath is multifactorial due to:\par -poor mechanics of breathing \par -out of shape / overweight\par -pulmonary disease\par -s/p COVID 19 infection, allergies, GERD, Poor sleep/ ?CHIKI, abnormal CT, prior DVT\par -?cardiac disease \par -?Anemia \par \par problem 1: s/p COVID 19 infection (residual RADS?)\par COVID-19 precautionary Immune Support Recommendations:\par -OTC Vitamin C 1000mg BID \par -OTC Quercetin 500mg BID \par -OTC Zinc 50 mg per day \par -OTC Melatonin 5 mg a night \par -OTC Vitamin D 2000mg per day \par -OTC Tonic Water 8oz per day\par -Water 0.5-1 gallon per day\par \par Additional Support Supplements: \par -Liposomal Glutathione 500 mg BID\par -SPM 1 tablet BID \par -Berberine 1000 mg BID  \par \par Problem 2: Allergy/ Sinus/ PND\par -get Blood work to include: asthma panel, food IgE panel, IgE level, eosinophil level, vitamin D level \par -add Flonase 1 sniff/nostril BID\par Environmental measures for allergies were encouraged including mattress and pillow covers, air purifier, and environmental controls. \par \par Problem 3: GERD\par -add Nexium 40 mg QAM\par -Rule of 2s: avoid eating too much, eating too late, eating too spicy, eating two hours before bed.\par -Things to avoid including overeating, spicy foods, tight clothing, eating within three hours of bed, this list is not all inclusive. \par -For treatment of reflux, possible options discussed including diet control, H2 blockers, PPIs, as well as coating motility agents discussed as treatment options. Timing of meals and proximity of last meal to sleep were discussed. If symptoms persist, a formal gastrointestinal evaluation is needed.\par \par Problem 4: snore/ ?CHIKI\par -complete Home sleep study\par Sleep apnea is associated with adverse clinical consequences which can affect most organ systems. Cardiovascular disease risk includes arrhythmias, atrial fibrillation, hypertension, coronary artery disease, and stroke. Metabolic disorders include diabetes type 2, non-alcoholic fatty liver disease. Mood disorder especially depression; and cognitive decline especially in the elderly. Associations with chronic reflux/Hamm’s esophagus some but not all inclusive. \par -Reasons include arousal consistent with hypopnea; respiratory events most prominent in REM sleep or supine position; therefore sleep staging and body position are important for accurate diagnosis and estimation of AHI. \par \par Treatment options discussed including CPAP/BiPAP machine, oral appliance, ProVent therapy, Oxy-Aid by [employer], new technologies, or positional sleep.Recommended use of the CPAP machine for moderate (AHI >15), moderate to severe (AHI 15-30) and severe patients (AHI > 30). Recommended weight loss which can reduce AHI especially in weight loss of greater than 5% of BMI. Positional sleep is recommended in those with low AHI, low-moderate BMI, and younger age. For severe sleep apnea, the hypoglossal nerve stimulator was recommended as well. \par \par Problem 5: Abnormal CT \par DDX: \par -primary lung CA\par -inflammation/ inflammatory Dz\par -hamartoma\par -last 2/2021\par -complete PET CT\par -based on PET CT findings: consider next steps including- bronchoscopy, thoracentesis, CTS evaluation\par CAT scans are the only radiological modality to identify abnormalities w/in the lungs with regards to nodules/masses/lymph nodes. Risks, benefits were reviewed in detail. The guidelines for abnormalities include follow up CT scans at various intervals which could range from 6 weeks to 1 year intervals. If there is a change for the worse then consideration for a biopsy will be considered if you are a candidate. Second opinion evaluation with a thoracic surgeon or an interventional radiologist could be offered. \par \par Problem 6: Prior DVT\par -on Eliquis Blood thinner Rx\par -off Rx pending Ultrasound Follow up\par \par Problem 7: ?Iron Deficiency Anemia\par -complete Blood work to check Iron studies and CBC \par \par problem 8: cardiac disease, CAD\par -recommended to continue to follow up with Cardiologist \par \par problem 9: poor breathing mechanics (poor balance)\par -Proper breathing techniques were reviewed with an emphasis of exhalation. Patient instructed to breath in for 1 second and out for four seconds. Patient was encouraged to not talk while walking. \par \par problem 10: out of shape / overweight\par -Weight loss, exercise, and diet control were discussed and are highly encouraged. Treatment options were given such as, aqua therapy, and contacting a nutritionist. Recommended to use the elliptical, stationary bike, less use of treadmill. Mindful eating was explained to the patient Obesity is associated with worsening asthma, shortness of breath, and potential for cardiac disease, diabetes, and other underlying medical conditions\par \par problem 11: health maintenance \par -recommended yearly flu shot after October 15\par -recommended strep pneumonia vaccines: Prevnar-13 vaccine, followed by Pneumo vaccine 23 one year following after 65\par -recommended early intervention for Upper Respiratory Infections (URIs)\par -recommended regular osteoporosis evaluations\par -recommended early dermatological evaluations\par -recommended after the age of 50 to the age of 70, colonoscopy every 5 years\par \par F/U in 6-8 weeks.\par She is encouraged to call with any changes, concerns, or questions\par

## 2021-05-25 NOTE — HISTORY OF PRESENT ILLNESS
[TextBox_4] : Ms. MEJIA is a 86 year old female presenting to the office today for initial pulmonary evaluation. Her chief complaint is\par \par -she notes s/p COVID 19 infection 2/2021 \par -she notes COVID 19 symptoms onset 1 week prior to ER visit, initially with poor balance with residual fall, ER visit for fall, tested due to known exposure to son who tested positive, hospitalized x 4 weeks, treated at rehabilitation for 2-3 weeks\par -she notes recent move to new apartment in [place]\par -she notes intermittent knee arthralgias \par -she notes intermittent SOB on exertion\par -she notes wheeze triggered by anxiety\par -she notes intermittent cough residual to COVID 19 infection, improving\par -she notes intermittent heartburn and reflux\par -she notes intermittent itchy eyes\par -she notes intermittent ankle swelling\par -she notes weight stable\par -she denies palpitations\par -she notes sleep pattern shifted later\par -she notes snores\par -she notes issues staying asleep\par -she notes regular bowel movements\par -she notes incontinence at night residual to COVID 19 infection\par -she notes intermittent groin myalgias/ arthralgias\par -she notes her memory and concentration are 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

## 2021-05-25 NOTE — PROCEDURE
[FreeTextEntry1] : Full PFT revealed normal flows, with a FEV1 of  1.60L, which is  107% of predicted, normal lung volumes, and a moderate reduced diffusion of  9.5, which is  53% of predicted, with a normal flow volume loop\par \par CT Chest, CT Abdomen and pelvis (2.6.2021) reveals irregular 1.9 cm left upper lobe nodular opacity with associated spiculations. Follow up with PET/ CT and or biopsy is recommended, as malignancy is a diagnostic consideration. no CT evidence of acute traumatic sequelae within the chest, abdomen, or pelvis. 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

## 2021-05-25 NOTE — ADDENDUM
[FreeTextEntry1] : Documented by Federico Goodman acting as a scribe for Dr. Tony Villegas on 05/25/2021.\par \par All medical record entries made by the Scribe were at my, Dr. Tony Villegas's, direction and personally dictated by me on 05/25/2021 . I have reviewed the chart and agree that the record accurately reflects my personal performance of the history, physical exam, assessment and plan. I have also personally directed, reviewed, and agree with the discharge instructions. \par

## 2021-05-25 NOTE — REASON FOR VISIT
[Initial] : an initial visit [TextBox_44] : s/p COVID 19 infection, allergies, GERD, Poor sleep/ ?CHIKI, abnormal CT, prior DVT

## 2021-06-03 ENCOUNTER — APPOINTMENT (OUTPATIENT)
Dept: RADIOLOGY | Facility: IMAGING CENTER | Age: 86
End: 2021-06-03

## 2021-06-11 ENCOUNTER — OUTPATIENT (OUTPATIENT)
Dept: OUTPATIENT SERVICES | Facility: HOSPITAL | Age: 86
LOS: 1 days | End: 2021-06-11
Payer: MEDICARE

## 2021-06-11 ENCOUNTER — APPOINTMENT (OUTPATIENT)
Dept: RADIOLOGY | Facility: IMAGING CENTER | Age: 86
End: 2021-06-11
Payer: MEDICARE

## 2021-06-11 ENCOUNTER — APPOINTMENT (OUTPATIENT)
Dept: NUCLEAR MEDICINE | Facility: IMAGING CENTER | Age: 86
End: 2021-06-11
Payer: MEDICARE

## 2021-06-11 DIAGNOSIS — Z95.0 PRESENCE OF CARDIAC PACEMAKER: Chronic | ICD-10-CM

## 2021-06-11 DIAGNOSIS — R91.8 OTHER NONSPECIFIC ABNORMAL FINDING OF LUNG FIELD: ICD-10-CM

## 2021-06-11 PROCEDURE — 78815 PET IMAGE W/CT SKULL-THIGH: CPT | Mod: 26,PI,MH

## 2021-06-11 PROCEDURE — 71046 X-RAY EXAM CHEST 2 VIEWS: CPT | Mod: 26

## 2021-06-11 PROCEDURE — A9552: CPT

## 2021-06-11 PROCEDURE — 78815 PET IMAGE W/CT SKULL-THIGH: CPT

## 2021-06-11 PROCEDURE — 71046 X-RAY EXAM CHEST 2 VIEWS: CPT

## 2021-06-14 ENCOUNTER — NON-APPOINTMENT (OUTPATIENT)
Age: 86
End: 2021-06-14

## 2021-06-17 ENCOUNTER — RX CHANGE (OUTPATIENT)
Age: 86
End: 2021-06-17

## 2021-06-17 RX ORDER — FLUTICASONE PROPIONATE 50 UG/1
50 SPRAY, METERED NASAL
Qty: 48 | Refills: 1 | Status: ACTIVE | COMMUNITY
Start: 2021-06-17 | End: 1900-01-01

## 2021-06-17 RX ORDER — FLUTICASONE PROPIONATE 50 UG/1
50 SPRAY, METERED NASAL TWICE DAILY
Qty: 16 | Refills: 1 | Status: DISCONTINUED | COMMUNITY
Start: 2021-05-25 | End: 2021-06-17

## 2021-07-22 ENCOUNTER — APPOINTMENT (OUTPATIENT)
Dept: PULMONOLOGY | Facility: CLINIC | Age: 86
End: 2021-07-22

## 2022-01-09 ENCOUNTER — INPATIENT (INPATIENT)
Facility: HOSPITAL | Age: 87
LOS: 6 days | Discharge: HOME | DRG: 181 | End: 2022-01-16
Attending: INTERNAL MEDICINE | Admitting: HOSPITALIST
Payer: MEDICARE

## 2022-01-09 VITALS
DIASTOLIC BLOOD PRESSURE: 77 MMHG | OXYGEN SATURATION: 100 % | HEART RATE: 81 BPM | SYSTOLIC BLOOD PRESSURE: 129 MMHG | HEIGHT: 63 IN | RESPIRATION RATE: 16 BRPM | TEMPERATURE: 98 F

## 2022-01-09 DIAGNOSIS — F32.9 MAJOR DEPRESSIVE DISORDER, SINGLE EPISODE, UNSPECIFIED: ICD-10-CM

## 2022-01-09 DIAGNOSIS — N17.9 ACUTE KIDNEY FAILURE, UNSPECIFIED: ICD-10-CM

## 2022-01-09 DIAGNOSIS — Z29.9 ENCOUNTER FOR PROPHYLACTIC MEASURES, UNSPECIFIED: ICD-10-CM

## 2022-01-09 DIAGNOSIS — I82.422 ACUTE EMBOLISM AND THROMBOSIS OF LEFT ILIAC VEIN: ICD-10-CM

## 2022-01-09 DIAGNOSIS — E11.9 TYPE 2 DIABETES MELLITUS WITHOUT COMPLICATIONS: ICD-10-CM

## 2022-01-09 DIAGNOSIS — I82.402 ACUTE EMBOLISM AND THROMBOSIS OF UNSPECIFIED DEEP VEINS OF LEFT LOWER EXTREMITY: ICD-10-CM

## 2022-01-09 DIAGNOSIS — R01.1 CARDIAC MURMUR, UNSPECIFIED: ICD-10-CM

## 2022-01-09 DIAGNOSIS — I82.409 ACUTE EMBOLISM AND THROMBOSIS OF UNSPECIFIED DEEP VEINS OF UNSPECIFIED LOWER EXTREMITY: ICD-10-CM

## 2022-01-09 DIAGNOSIS — N18.4 CHRONIC KIDNEY DISEASE, STAGE 4 (SEVERE): ICD-10-CM

## 2022-01-09 DIAGNOSIS — D64.9 ANEMIA, UNSPECIFIED: ICD-10-CM

## 2022-01-09 DIAGNOSIS — K21.9 GASTRO-ESOPHAGEAL REFLUX DISEASE WITHOUT ESOPHAGITIS: ICD-10-CM

## 2022-01-09 DIAGNOSIS — I80.10 PHLEBITIS AND THROMBOPHLEBITIS OF UNSPECIFIED FEMORAL VEIN: ICD-10-CM

## 2022-01-09 DIAGNOSIS — Z95.0 PRESENCE OF CARDIAC PACEMAKER: Chronic | ICD-10-CM

## 2022-01-09 LAB
ALBUMIN SERPL ELPH-MCNC: 3.7 G/DL — SIGNIFICANT CHANGE UP (ref 3.3–5)
ALP SERPL-CCNC: 73 U/L — SIGNIFICANT CHANGE UP (ref 40–120)
ALT FLD-CCNC: 5 U/L — LOW (ref 10–45)
ANION GAP SERPL CALC-SCNC: 15 MMOL/L — SIGNIFICANT CHANGE UP (ref 5–17)
APPEARANCE UR: ABNORMAL
APTT BLD: 29.3 SEC — SIGNIFICANT CHANGE UP (ref 27.5–35.5)
AST SERPL-CCNC: 9 U/L — LOW (ref 10–40)
BACTERIA # UR AUTO: ABNORMAL
BASOPHILS # BLD AUTO: 0.05 K/UL — SIGNIFICANT CHANGE UP (ref 0–0.2)
BASOPHILS NFR BLD AUTO: 0.5 % — SIGNIFICANT CHANGE UP (ref 0–2)
BILIRUB SERPL-MCNC: 0.4 MG/DL — SIGNIFICANT CHANGE UP (ref 0.2–1.2)
BILIRUB UR-MCNC: NEGATIVE — SIGNIFICANT CHANGE UP
BLD GP AB SCN SERPL QL: NEGATIVE — SIGNIFICANT CHANGE UP
BUN SERPL-MCNC: 46 MG/DL — HIGH (ref 7–23)
CALCIUM SERPL-MCNC: 10.1 MG/DL — SIGNIFICANT CHANGE UP (ref 8.4–10.5)
CHLORIDE SERPL-SCNC: 103 MMOL/L — SIGNIFICANT CHANGE UP (ref 96–108)
CO2 SERPL-SCNC: 17 MMOL/L — LOW (ref 22–31)
COLOR SPEC: YELLOW — SIGNIFICANT CHANGE UP
CREAT ?TM UR-MCNC: 115 MG/DL — SIGNIFICANT CHANGE UP
CREAT SERPL-MCNC: 2.3 MG/DL — HIGH (ref 0.5–1.3)
DIFF PNL FLD: NEGATIVE — SIGNIFICANT CHANGE UP
EOSINOPHIL # BLD AUTO: 0.14 K/UL — SIGNIFICANT CHANGE UP (ref 0–0.5)
EOSINOPHIL NFR BLD AUTO: 1.3 % — SIGNIFICANT CHANGE UP (ref 0–6)
EPI CELLS # UR: 1 /HPF — SIGNIFICANT CHANGE UP
GLUCOSE BLDC GLUCOMTR-MCNC: 177 MG/DL — HIGH (ref 70–99)
GLUCOSE SERPL-MCNC: 160 MG/DL — HIGH (ref 70–99)
GLUCOSE UR QL: NEGATIVE — SIGNIFICANT CHANGE UP
HCT VFR BLD CALC: 30.6 % — LOW (ref 34.5–45)
HGB BLD-MCNC: 9.8 G/DL — LOW (ref 11.5–15.5)
HYALINE CASTS # UR AUTO: 0 /LPF — SIGNIFICANT CHANGE UP (ref 0–2)
IMM GRANULOCYTES NFR BLD AUTO: 0.8 % — SIGNIFICANT CHANGE UP (ref 0–1.5)
INR BLD: 1.13 RATIO — SIGNIFICANT CHANGE UP (ref 0.88–1.16)
KETONES UR-MCNC: NEGATIVE — SIGNIFICANT CHANGE UP
LEUKOCYTE ESTERASE UR-ACNC: ABNORMAL
LYMPHOCYTES # BLD AUTO: 1.84 K/UL — SIGNIFICANT CHANGE UP (ref 1–3.3)
LYMPHOCYTES # BLD AUTO: 17.2 % — SIGNIFICANT CHANGE UP (ref 13–44)
MCHC RBC-ENTMCNC: 28.5 PG — SIGNIFICANT CHANGE UP (ref 27–34)
MCHC RBC-ENTMCNC: 32 GM/DL — SIGNIFICANT CHANGE UP (ref 32–36)
MCV RBC AUTO: 89 FL — SIGNIFICANT CHANGE UP (ref 80–100)
MONOCYTES # BLD AUTO: 0.74 K/UL — SIGNIFICANT CHANGE UP (ref 0–0.9)
MONOCYTES NFR BLD AUTO: 6.9 % — SIGNIFICANT CHANGE UP (ref 2–14)
NEUTROPHILS # BLD AUTO: 7.86 K/UL — HIGH (ref 1.8–7.4)
NEUTROPHILS NFR BLD AUTO: 73.3 % — SIGNIFICANT CHANGE UP (ref 43–77)
NITRITE UR-MCNC: POSITIVE
NRBC # BLD: 0 /100 WBCS — SIGNIFICANT CHANGE UP (ref 0–0)
PH UR: 5.5 — SIGNIFICANT CHANGE UP (ref 5–8)
PLATELET # BLD AUTO: 187 K/UL — SIGNIFICANT CHANGE UP (ref 150–400)
POTASSIUM SERPL-MCNC: 4.1 MMOL/L — SIGNIFICANT CHANGE UP (ref 3.5–5.3)
POTASSIUM SERPL-SCNC: 4.1 MMOL/L — SIGNIFICANT CHANGE UP (ref 3.5–5.3)
PROT SERPL-MCNC: 6.9 G/DL — SIGNIFICANT CHANGE UP (ref 6–8.3)
PROT UR-MCNC: ABNORMAL
PROTHROM AB SERPL-ACNC: 13.5 SEC — SIGNIFICANT CHANGE UP (ref 10.6–13.6)
RBC # BLD: 3.44 M/UL — LOW (ref 3.8–5.2)
RBC # FLD: 12.5 % — SIGNIFICANT CHANGE UP (ref 10.3–14.5)
RBC CASTS # UR COMP ASSIST: 3 /HPF — SIGNIFICANT CHANGE UP (ref 0–4)
RH IG SCN BLD-IMP: POSITIVE — SIGNIFICANT CHANGE UP
SARS-COV-2 RNA SPEC QL NAA+PROBE: SIGNIFICANT CHANGE UP
SODIUM SERPL-SCNC: 135 MMOL/L — SIGNIFICANT CHANGE UP (ref 135–145)
SODIUM UR-SCNC: 32 MMOL/L — SIGNIFICANT CHANGE UP
SP GR SPEC: 1.02 — SIGNIFICANT CHANGE UP (ref 1.01–1.02)
UROBILINOGEN FLD QL: NEGATIVE — SIGNIFICANT CHANGE UP
UUN UR-MCNC: 709 MG/DL — SIGNIFICANT CHANGE UP
WBC # BLD: 10.72 K/UL — HIGH (ref 3.8–10.5)
WBC # FLD AUTO: 10.72 K/UL — HIGH (ref 3.8–10.5)
WBC UR QL: 317 /HPF — HIGH (ref 0–5)

## 2022-01-09 PROCEDURE — 99285 EMERGENCY DEPT VISIT HI MDM: CPT

## 2022-01-09 PROCEDURE — 99223 1ST HOSP IP/OBS HIGH 75: CPT | Mod: GC

## 2022-01-09 PROCEDURE — 99221 1ST HOSP IP/OBS SF/LOW 40: CPT

## 2022-01-09 PROCEDURE — 93970 EXTREMITY STUDY: CPT | Mod: 26

## 2022-01-09 RX ORDER — ESCITALOPRAM OXALATE 10 MG/1
5 TABLET, FILM COATED ORAL DAILY
Refills: 0 | Status: DISCONTINUED | OUTPATIENT
Start: 2022-01-09 | End: 2022-01-16

## 2022-01-09 RX ORDER — DEXTROSE 50 % IN WATER 50 %
25 SYRINGE (ML) INTRAVENOUS ONCE
Refills: 0 | Status: DISCONTINUED | OUTPATIENT
Start: 2022-01-09 | End: 2022-01-16

## 2022-01-09 RX ORDER — FAMOTIDINE 10 MG/ML
20 INJECTION INTRAVENOUS DAILY
Refills: 0 | Status: DISCONTINUED | OUTPATIENT
Start: 2022-01-09 | End: 2022-01-16

## 2022-01-09 RX ORDER — HEPARIN SODIUM 5000 [USP'U]/ML
INJECTION INTRAVENOUS; SUBCUTANEOUS
Qty: 25000 | Refills: 0 | Status: DISCONTINUED | OUTPATIENT
Start: 2022-01-09 | End: 2022-01-13

## 2022-01-09 RX ORDER — LANOLIN ALCOHOL/MO/W.PET/CERES
3 CREAM (GRAM) TOPICAL AT BEDTIME
Refills: 0 | Status: DISCONTINUED | OUTPATIENT
Start: 2022-01-09 | End: 2022-01-12

## 2022-01-09 RX ORDER — INSULIN LISPRO 100/ML
VIAL (ML) SUBCUTANEOUS
Refills: 0 | Status: DISCONTINUED | OUTPATIENT
Start: 2022-01-09 | End: 2022-01-16

## 2022-01-09 RX ORDER — SODIUM CHLORIDE 9 MG/ML
1000 INJECTION, SOLUTION INTRAVENOUS
Refills: 0 | Status: DISCONTINUED | OUTPATIENT
Start: 2022-01-09 | End: 2022-01-16

## 2022-01-09 RX ORDER — GLUCAGON INJECTION, SOLUTION 0.5 MG/.1ML
1 INJECTION, SOLUTION SUBCUTANEOUS ONCE
Refills: 0 | Status: DISCONTINUED | OUTPATIENT
Start: 2022-01-09 | End: 2022-01-16

## 2022-01-09 RX ORDER — HEPARIN SODIUM 5000 [USP'U]/ML
7000 INJECTION INTRAVENOUS; SUBCUTANEOUS ONCE
Refills: 0 | Status: COMPLETED | OUTPATIENT
Start: 2022-01-09 | End: 2022-01-09

## 2022-01-09 RX ORDER — INSULIN LISPRO 100/ML
VIAL (ML) SUBCUTANEOUS AT BEDTIME
Refills: 0 | Status: DISCONTINUED | OUTPATIENT
Start: 2022-01-09 | End: 2022-01-16

## 2022-01-09 RX ORDER — ACETAMINOPHEN 500 MG
650 TABLET ORAL EVERY 6 HOURS
Refills: 0 | Status: DISCONTINUED | OUTPATIENT
Start: 2022-01-09 | End: 2022-01-12

## 2022-01-09 RX ORDER — LEVETIRACETAM 250 MG/1
1 TABLET, FILM COATED ORAL
Qty: 0 | Refills: 0 | DISCHARGE

## 2022-01-09 RX ORDER — DONEPEZIL HYDROCHLORIDE 10 MG/1
10 TABLET, FILM COATED ORAL AT BEDTIME
Refills: 0 | Status: DISCONTINUED | OUTPATIENT
Start: 2022-01-09 | End: 2022-01-16

## 2022-01-09 RX ORDER — HEPARIN SODIUM 5000 [USP'U]/ML
7000 INJECTION INTRAVENOUS; SUBCUTANEOUS EVERY 6 HOURS
Refills: 0 | Status: DISCONTINUED | OUTPATIENT
Start: 2022-01-09 | End: 2022-01-13

## 2022-01-09 RX ORDER — MIRABEGRON 50 MG/1
0 TABLET, EXTENDED RELEASE ORAL
Qty: 0 | Refills: 0 | DISCHARGE

## 2022-01-09 RX ORDER — ATORVASTATIN CALCIUM 80 MG/1
20 TABLET, FILM COATED ORAL AT BEDTIME
Refills: 0 | Status: DISCONTINUED | OUTPATIENT
Start: 2022-01-09 | End: 2022-01-16

## 2022-01-09 RX ORDER — HEPARIN SODIUM 5000 [USP'U]/ML
3500 INJECTION INTRAVENOUS; SUBCUTANEOUS EVERY 6 HOURS
Refills: 0 | Status: DISCONTINUED | OUTPATIENT
Start: 2022-01-09 | End: 2022-01-13

## 2022-01-09 RX ORDER — DORZOLAMIDE HYDROCHLORIDE TIMOLOL MALEATE 20; 5 MG/ML; MG/ML
1 SOLUTION/ DROPS OPHTHALMIC
Qty: 0 | Refills: 0 | DISCHARGE

## 2022-01-09 RX ORDER — DEXTROSE 50 % IN WATER 50 %
12.5 SYRINGE (ML) INTRAVENOUS ONCE
Refills: 0 | Status: DISCONTINUED | OUTPATIENT
Start: 2022-01-09 | End: 2022-01-16

## 2022-01-09 RX ORDER — DEXTROSE 50 % IN WATER 50 %
15 SYRINGE (ML) INTRAVENOUS ONCE
Refills: 0 | Status: DISCONTINUED | OUTPATIENT
Start: 2022-01-09 | End: 2022-01-16

## 2022-01-09 RX ADMIN — ATORVASTATIN CALCIUM 20 MILLIGRAM(S): 80 TABLET, FILM COATED ORAL at 22:10

## 2022-01-09 RX ADMIN — DONEPEZIL HYDROCHLORIDE 10 MILLIGRAM(S): 10 TABLET, FILM COATED ORAL at 22:10

## 2022-01-09 RX ADMIN — HEPARIN SODIUM 1600 UNIT(S)/HR: 5000 INJECTION INTRAVENOUS; SUBCUTANEOUS at 18:46

## 2022-01-09 RX ADMIN — HEPARIN SODIUM 7000 UNIT(S): 5000 INJECTION INTRAVENOUS; SUBCUTANEOUS at 18:46

## 2022-01-09 NOTE — ED ADULT NURSE NOTE - NSIMPLEMENTINTERV_GEN_ALL_ED
Implemented All Fall Risk Interventions:  [place] to call system. Call bell, personal items and telephone within reach. Instruct patient to call for assistance. Room bathroom lighting operational. Non-slip footwear when patient is off stretcher. Physically safe environment: no spills, clutter or unnecessary equipment. Stretcher in lowest position, wheels locked, appropriate side rails in place. Provide visual cue, wrist band, yellow gown, etc. Monitor gait and stability. Monitor for mental status changes and reorient to person, place, and time. Review medications for side effects contributing to fall risk. Reinforce activity limits and safety measures with patient and family.

## 2022-01-09 NOTE — ED ADULT NURSE REASSESSMENT NOTE - NS ED NURSE REASSESS COMMENT FT1
Spoke to  at this time to ask them to nancy as received pt/ptt specimen at this time and to make lab aware that the specimen result is needed so that an important medication can be started.

## 2022-01-09 NOTE — CONSULT NOTE ADULT - PROBLEM SELECTOR RECOMMENDATION 2
KYLE Mena MD performed a history and physical exam of the patient and discussed  the findings and plan with the house officer. I reviewed the resident note and agree with the findings and plan   I Reilly Mena MD have personally seen and examined the patient at bedside today at  7pm

## 2022-01-09 NOTE — H&P ADULT - PROBLEM SELECTOR PLAN 4
-home prandin 1mg TID  -ISS inpatient, A1C in AM -Noted 2/6 systolic murmur at L upper sternal border  -patient states she has never been told of history of murmur  -will order echo -Noted 2/6 systolic murmur at L upper sternal border  -patient states she has never been told of history of murmur  -given murmur as well as recent syncope, possible c/f RH strain    Plan:  obtain echo

## 2022-01-09 NOTE — ED ADULT NURSE NOTE - OBJECTIVE STATEMENT
86F to ED c/o swelling to LLE. Patient is bib EMS. Patient fall on thursday and c/o pain to her L shoulder because of that fall. Patient states that at the time she did not notice if she hurt her LLE. Patient c/o worse swelling to the LLE since yesterday. Patient has hx of lung CA and HTN. Patient is alert and oriented x2, patient is not sure of today's date. Patient has 20 gauge IV placed to LAC.

## 2022-01-09 NOTE — PATIENT PROFILE ADULT - FALL HARM RISK - RISK INTERVENTIONS
Assistance OOB with selected safe patient handling equipment/Communicate Fall Risk and Risk Factors to all staff, patient, and family/Reinforce activity limits and safety measures with patient and family/Visual Cue: Yellow wristband/Bed in lowest position, wheels locked, appropriate side rails in place/Call bell, personal items and telephone in reach/Instruct patient to call for assistance before getting out of bed or chair/Non-slip footwear when patient is out of bed/[place] to call system/Physically safe environment - no spills, clutter or unnecessary equipment/Purposeful Proactive Rounding/Room/bathroom lighting operational, light cord in reach Assistance OOB with selected safe patient handling equipment/Assistance with ambulation/Communicate Fall Risk and Risk Factors to all staff, patient, and family/Monitor for mental status changes/Monitor gait and stability/Reinforce activity limits and safety measures with patient and family/Sit up slowly, dangle for a short time, stand at bedside before walking/Use of alarms - bed, chair and/or voice tab/Visual Cue: Yellow wristband/Bed in lowest position, wheels locked, appropriate side rails in place/Call bell, personal items and telephone in reach/Instruct patient to call for assistance before getting out of bed or chair/Non-slip footwear when patient is out of bed/[place] to call system/Physically safe environment - no spills, clutter or unnecessary equipment/Purposeful Proactive Rounding/Room/bathroom lighting operational, light cord in reach

## 2022-01-09 NOTE — H&P ADULT - PROBLEM SELECTOR PLAN 7
DVT Prophylaxis: DVT on admission, patient on heparin drip  Diet: Consistent Carb/DASH  Dispo: likely home, pending PT eval -continue famotidine 20mg daily

## 2022-01-09 NOTE — ED ADULT NURSE REASSESSMENT NOTE - NS ED NURSE REASSESS COMMENT FT1
Patient A&Ox4, breathing spontaneous and unlabored, palpable pulses, left lower extremity swollen in comparison to right, patient on heparin with 2 IVs, stage 2 on right glute. Bed locked and in lowest position for safety.

## 2022-01-09 NOTE — H&P ADULT - NSHPREVIEWOFSYSTEMS_GEN_ALL_CORE
CONSTITUTIONAL: No fever, weight loss, or fatigue  EYES: No eye pain, visual disturbances, or discharge  ENMT:  No difficulty hearing, tinnitus, vertigo; No sinus or throat pain  RESPIRATORY: No cough, wheezing, chills or hemoptysis; No shortness of breath  CARDIOVASCULAR: No chest pain, palpitations, dizziness  GASTROINTESTINAL: No abdominal or epigastric pain. No nausea, vomiting, or hematemesis; No diarrhea or constipation. No melena or hematochezia.  GENITOURINARY: No frequency, hematuria, or incontinence  NEUROLOGICAL: No headaches, loss of strength, numbness, or tremors  SKIN: No itching, burning, rashes, or lesions   LYMPH NODES: No enlarged glands  ENDOCRINE: No heat or cold intolerance; No polydipsia or polyuria  MUSCULOSKELETAL: No joint pain or swelling; LLE swelling/pain  PSYCHIATRIC: Denies depression, anxiety  HEME/LYMPH: No easy bruising, or bleeding gums  ALLERGY AND IMMUNOLOGIC: No hives or eczema

## 2022-01-09 NOTE — H&P ADULT - NSHPLABSRESULTS_GEN_ALL_CORE
LABS:                        9.8    10.72 )-----------( 187      ( 09 Jan 2022 16:04 )             30.6     09 Jan 2022 16:04    135    |  103    |  46     ----------------------------<  160    4.1     |  17     |  2.30     Ca    10.1       09 Jan 2022 16:04    TPro  6.9    /  Alb  3.7    /  TBili  0.4    /  DBili  x      /  AST  9      /  ALT  5      /  AlkPhos  73     09 Jan 2022 16:04    PT/INR - ( 09 Jan 2022 18:08 )   PT: 13.5 sec;   INR: 1.13 ratio         PTT - ( 09 Jan 2022 18:08 )  PTT:29.3 sec  CAPILLARY BLOOD GLUCOSE        BLOOD CULTURE    RADIOLOGY & ADDITIONAL TESTS:    Imaging Personally Reviewed:  [ ] YES LABS:                        9.8    10.72 )-----------( 187      ( 09 Jan 2022 16:04 )             30.6     09 Jan 2022 16:04    135    |  103    |  46     ----------------------------<  160    4.1     |  17     |  2.30     Ca    10.1       09 Jan 2022 16:04    TPro  6.9    /  Alb  3.7    /  TBili  0.4    /  DBili  x      /  AST  9      /  ALT  5      /  AlkPhos  73     09 Jan 2022 16:04    PT/INR - ( 09 Jan 2022 18:08 )   PT: 13.5 sec;   INR: 1.13 ratio         PTT - ( 09 Jan 2022 18:08 )  PTT:29.3 sec  CAPILLARY BLOOD GLUCOSE    Duplex US LE:    LEFT:  Extensive left lower extremity deep vein thrombi involving the external   iliac vein, common femoral vein, femoral vein, and posterior trunk. The   popliteal and posterior tibial veins appear appear to be patent.    IMPRESSION:  Extensive left lower extremity DVT.  Acute deep venous thrombosis: above and below the knee.      BLOOD CULTURE    RADIOLOGY & ADDITIONAL TESTS:    Imaging Personally Reviewed:  [ ] YES

## 2022-01-09 NOTE — CONSULT NOTE ADULT - SUBJECTIVE AND OBJECTIVE BOX
General Surgery Consult  Consulting surgical team: Vascular Surgery  Consulting attending: Reilly Mena    HPI:  86F with PMH of lung cancer on radiation, R popliteal DVT s/p IVC filter, T2DM, HTN, obesity, remote MI s/p stent, multiple falls presents with 1 day of LLE swelling/pain. Patient states she woke up this morning and had significant swelling of her Left LE w/ associated pain. Patient had a recent hospitalization at [facility] for syncope which was though to be due to dehydration.  (09 Jan 2022 18:08)    Patient unable to provide many details regarding her medical history. Unaware she has an IVC filter. Denies any deficits in motor/sensation of the lower extremities.       PAST MEDICAL HISTORY:  Dementia    Obesity    Mild HTN    HTN (hypertension)    Diabetes        PAST SURGICAL HISTORY:  No significant past surgical history    History of implantable cardiac defibrillator (ICD)    Pacemaker        MEDICATIONS:  acetaminophen     Tablet .. 650 milliGRAM(s) Oral every 6 hours PRN  dextrose 40% Gel 15 Gram(s) Oral once  dextrose 5%. 1000 milliLiter(s) IV Continuous <Continuous>  dextrose 5%. 1000 milliLiter(s) IV Continuous <Continuous>  dextrose 50% Injectable 25 Gram(s) IV Push once  dextrose 50% Injectable 12.5 Gram(s) IV Push once  dextrose 50% Injectable 25 Gram(s) IV Push once  glucagon  Injectable 1 milliGRAM(s) IntraMuscular once  heparin   Injectable 7000 Unit(s) IV Push once  heparin   Injectable 7000 Unit(s) IV Push every 6 hours PRN  heparin   Injectable 3500 Unit(s) IV Push every 6 hours PRN  heparin  Infusion.  Unit(s)/Hr IV Continuous <Continuous>  insulin lispro (ADMELOG) corrective regimen sliding scale   SubCutaneous three times a day before meals  insulin lispro (ADMELOG) corrective regimen sliding scale   SubCutaneous at bedtime  melatonin 3 milliGRAM(s) Oral at bedtime PRN      ALLERGIES:  iodine (Hives)  penicillin (Hives)      VITALS & I/Os:  Vital Signs Last 24 Hrs  T(C): 36.9 (09 Jan 2022 18:15), Max: 36.9 (09 Jan 2022 18:15)  T(F): 98.4 (09 Jan 2022 18:15), Max: 98.4 (09 Jan 2022 18:15)  HR: 72 (09 Jan 2022 18:15) (72 - 81)  BP: 123/54 (09 Jan 2022 18:15) (123/54 - 129/77)  BP(mean): --  RR: 16 (09 Jan 2022 18:15) (16 - 16)  SpO2: 100% (09 Jan 2022 18:15) (100% - 100%)    I&O's Summary      PHYSICAL EXAM:  General: No acute distress  Respiratory: Nonlabored  Cardiovascular: RRR  Abdominal: Soft, nondistended, nontender  Extremities: Warm, L>>R edema, mild left thigh erythema, no wounds, +motor/sensation  2+ DP on right, 1+ DP on left  Dry skin    LABS:                        9.8    10.72 )-----------( 187      ( 09 Jan 2022 16:04 )             30.6     01-09    135  |  103  |  46<H>  ----------------------------<  160<H>  4.1   |  17<L>  |  2.30<H>    Ca    10.1      09 Jan 2022 16:04    TPro  6.9  /  Alb  3.7  /  TBili  0.4  /  DBili  x   /  AST  9<L>  /  ALT  5<L>  /  AlkPhos  73  01-09    Lactate:    PT/INR - ( 09 Jan 2022 18:08 )   PT: 13.5 sec;   INR: 1.13 ratio         PTT - ( 09 Jan 2022 18:08 )  PTT:29.3 sec      IMAGING:  < from: [facility] Duplex Lower Ext Vein Scan, Bilat (01.09.22 @ 16:16) >  ACC: [id number] EXAM:  DUPLEX SCAN EXT VEINS LOWER BI                          PROCEDURE DATE:  01/09/2022          INTERPRETATION:  CLINICAL INFORMATION: Leg swelling, evaluate for DVT.    COMPARISON: None available.    TECHNIQUE: Duplex sonography of the BILATERAL LOWER extremity veins with   color and spectral Doppler, with and without compression.    FINDINGS:    RIGHT:  Normal compressibility of the RIGHT common femoral, femoral and popliteal   veins.  Doppler examination shows normal spontaneous and phasic flow.  No RIGHT calf vein thrombosis is detected.    LEFT:  Extensive left lower extremity deep vein thrombi involving the external   iliac vein, common femoral vein, femoral vein, and posterior trunk. The   popliteal and posterior tibial veins appear appear to be patent.    IMPRESSION:  Extensive left lower extremity DVT.  Acute deep venous thrombosis: above and below the knee.    These findings were discussed with Dr. LINDSAY LIVINGSTON on 1/9/2022 at 4:18 PM   by Dr. Celaya with read back confirmation.    --- End of Report ---           EUN CELAYA MD; Resident Radiology  This document has been electronically signed.  MIGUEL TOLBERT MD; Attending Radiologist  This document has been electronically signed. Jan 9 2022  4:30PM    < end of copied text >                                                                                               General Surgery Consult  Consulting surgical team: Vascular Surgery  Consulting attending: Reilly Mena    HPI:  86F with PMH of lung cancer on radiation, R popliteal DVT s/p IVC filter, T2DM, HTN, obesity, remote MI s/p stent, multiple falls presents with 1 day of LLE swelling/pain. Patient states she woke up this morning and had significant swelling of her Left LE w/ associated pain. Patient had a recent hospitalization at [facility] for syncope which was though to be due to dehydration.  (09 Jan 2022 18:08)    Patient unable to provide many details regarding her medical history. Unaware she has an IVC filter. Denies any deficits in motor/sensation of the lower extremities.   VASCULAR SURG ATT ADDENDUM  Pt  was recently malik for 2nd lung bx for further w/u of lng ca  pt  states she received  one  RT       PAST MEDICAL HISTORY:  Dementia    Obesity    Mild HTN    HTN (hypertension)    Diabetes      PAST SURGICAL HISTORY:  No significant past surgical history    History of implantable cardiac defibrillator (ICD)    Pacemaker      MEDICATIONS:  acetaminophen     Tablet .. 650 milliGRAM(s) Oral every 6 hours PRN  dextrose 40% Gel 15 Gram(s) Oral once  dextrose 5%. 1000 milliLiter(s) IV Continuous <Continuous>  dextrose 5%. 1000 milliLiter(s) IV Continuous <Continuous>  dextrose 50% Injectable 25 Gram(s) IV Push once  dextrose 50% Injectable 12.5 Gram(s) IV Push once  dextrose 50% Injectable 25 Gram(s) IV Push once  glucagon  Injectable 1 milliGRAM(s) IntraMuscular once  heparin   Injectable 7000 Unit(s) IV Push once  heparin   Injectable 7000 Unit(s) IV Push every 6 hours PRN  heparin   Injectable 3500 Unit(s) IV Push every 6 hours PRN  heparin  Infusion.  Unit(s)/Hr IV Continuous <Continuous>  insulin lispro (ADMELOG) corrective regimen sliding scale   SubCutaneous three times a day before meals  insulin lispro (ADMELOG) corrective regimen sliding scale   SubCutaneous at bedtime  melatonin 3 milliGRAM(s) Oral at bedtime PRN      ALLERGIES:  iodine (Hives)  penicillin (Hives)      VITALS & I/Os:  Vital Signs Last 24 Hrs  T(C): 36.9 (09 Jan 2022 18:15), Max: 36.9 (09 Jan 2022 18:15)  T(F): 98.4 (09 Jan 2022 18:15), Max: 98.4 (09 Jan 2022 18:15)  HR: 72 (09 Jan 2022 18:15) (72 - 81)  BP: 123/54 (09 Jan 2022 18:15) (123/54 - 129/77)  BP(mean): --  RR: 16 (09 Jan 2022 18:15) (16 - 16)  SpO2: 100% (09 Jan 2022 18:15) (100% - 100%)    I&O's Summary      PHYSICAL EXAM:  General: No acute distress  Respiratory: Nonlabored  Cardiovascular: RRR  Abdominal: Soft, nondistended, nontender  Extremities: Warm, L>>R edema, mild left thigh erythema, no wounds, +motor/sensation  2+ DP on right, 1+ DP on left  Dry skin    LABS:                        9.8    10.72 )-----------( 187      ( 09 Jan 2022 16:04 )             30.6     01-09    135  |  103  |  46<H>  ----------------------------<  160<H>  4.1   |  17<L>  |  2.30<H>    Ca    10.1      09 Jan 2022 16:04    TPro  6.9  /  Alb  3.7  /  TBili  0.4  /  DBili  x   /  AST  9<L>  /  ALT  5<L>  /  AlkPhos  73  01-09    Lactate:    PT/INR - ( 09 Jan 2022 18:08 )   PT: 13.5 sec;   INR: 1.13 ratio         PTT - ( 09 Jan 2022 18:08 )  PTT:29.3 sec      IMAGING:  < from: [facility] Duplex Lower Ext Vein Scan, Bilat (01.09.22 @ 16:16) >  ACC: [id number] EXAM:  DUPLEX SCAN EXT VEINS LOWER BI                          PROCEDURE DATE:  01/09/2022          INTERPRETATION:  CLINICAL INFORMATION: Leg swelling, evaluate for DVT.    COMPARISON: None available.    TECHNIQUE: Duplex sonography of the BILATERAL LOWER extremity veins with   color and spectral Doppler, with and without compression.    FINDINGS:    RIGHT:  Normal compressibility of the RIGHT common femoral, femoral and popliteal   veins.  Doppler examination shows normal spontaneous and phasic flow.  No RIGHT calf vein thrombosis is detected.    LEFT:  Extensive left lower extremity deep vein thrombi involving the external   iliac vein, common femoral vein, femoral vein, and posterior trunk. The   popliteal and posterior tibial veins appear appear to be patent.    IMPRESSION:  Extensive left lower extremity DVT.  Acute deep venous thrombosis: above and below the knee.    These findings were discussed with Dr. LINDSAY LIVINGSTON on 1/9/2022 at 4:18 PM   by Dr. Celaya with read back confirmation.    --- End of Report ---           EUN CELAYA MD; Resident Radiology  This document has been electronically signed.  MIGUEL TOLBERT MD; Attending Radiologist  This document has been electronically signed. Jan 9 2022  4:30PM    < end of copied text >

## 2022-01-09 NOTE — H&P ADULT - NSICDXPASTMEDICALHX_GEN_ALL_CORE_FT
PAST MEDICAL HISTORY:  Dementia     Diabetes     DVT, lower extremity     HTN (hypertension)     MI (myocardial infarction)     Obesity

## 2022-01-09 NOTE — H&P ADULT - PROBLEM SELECTOR PLAN 2
-Patient's Cr 2.3 on admission, up from last known 1.1 in March   -patient was recently hospitalized for syncope at [facility] which was though to be from dehydration, and she was treated w/ IVF  -could be prerenal in the setting of dehydration, but will r/o other causes    Plan: IVF 50cc/hr LR overnight trial and repeat BMP in morning given unknown cardiac function  -obtain urine lytes

## 2022-01-09 NOTE — H&P ADULT - PROBLEM SELECTOR PLAN 8
DVT Prophylaxis: DVT on admission, patient on heparin drip  Diet: Consistent Carb/DASH  Dispo: likely home, pending PT eval

## 2022-01-09 NOTE — CONSULT NOTE ADULT - EXTREMITIES COMMENTS
lle from groin to the tips of the toes sig for mod edema   no evid of phlegmasia  grossly intact velasquez le motor and sensory fx  lle good cap refill and perfusion

## 2022-01-09 NOTE — ED PROVIDER NOTE - PROGRESS NOTE DETAILS
Endorsed to Dr YULIA Gonzalez MD, [place], PGY3 - pt w/ LLE DVT to common femoral vein. heparin gtt ordered. no sob / cp. CTA ordered r/o PE given extent of clot burden. endorsed to hospitalist, agree admit

## 2022-01-09 NOTE — H&P ADULT - ASSESSMENT
85F with PMH of lung cancer on radiation, DVT w/ IVC filter previously on eliquis (not currently), T2DM not on insulin, HTN, obesity, Covid in February, remote MI s/p stent presents with 1 day of LLE swelling/pain found to have acute LLE DVT.

## 2022-01-09 NOTE — CONSULT NOTE ADULT - ASSESSMENT
86F with PMH of lung cancer on radiation, R popliteal DVT s/p IVC filter, T2DM, HTN, obesity, remote MI s/p stent, multiple falls presents with 1 day of LLE swelling/pain found to have extensive LLE DVT (external iliac to femoral, posterior trunk) with IVCF in place    - No acute vascular intervention indicated  - Therapeutic anticoagulation  - Hematology evaluation for lung cancer, provoked DVT and known history of DVT  - LE elevation and compression  - Monitor skin changes, neurovascular status and alert vascular surgery team of any acute changes    d/w Dr. Kj Mora, PGY-4  Vascular Surgery  [id number] with questions 86F with PMH of lung cancer on radiation, R popliteal DVT s/p IVC filter, T2DM, HTN, obesity, remote MI s/p stent, multiple falls presents with 1 day of LLE swelling/pain found to have extensive LLE DVT (external iliac to femoral, posterior trunk) with IVCF in place    - No acute vascular intervention indicated  - Therapeutic anticoagulation  - Hematology evaluation for lung cancer, provoked DVT and known history of DVT  - LE elevation  - CT venogram to assess proximal extent of thrombus  - CT head to assess for intracranial bleeding as patient has had multiple falls and is now on hep gtt  - Carotid duplex as part of syncopal workup  - Monitor skin changes, neurovascular status and alert vascular surgery team of any acute changes    d/w Dr. Kj Mora, PGY-4  Vascular Surgery  [id number] with questions 86F with PMH of lung cancer on radiation, R popliteal DVT s/p IVC filter, T2DM, HTN, obesity, remote MI s/p stent, multiple falls presents with 1 day of LLE swelling/pain found to have extensive LLE DVT (external iliac to femoral, posterior trunk) with IVCF in place    - No acute vascular intervention indicated  no evidence of lle  phlegmasia  - Therapeutic anticoagulation  - Hematology evaluation for lung cancer, provoked DVT and known history of DVT  - LE elevation  - CT venogram to assess proximal extent of thrombus  - CT head to assess for intracranial bleeding as patient has had multiple falls and is now on hep gtt  - Carotid duplex as part of syncopal workup  - Monitor skin changes, neurovascular status and alert vascular surgery team of any acute changes  d/w pt that if there are any changes in the lle vasc exam she may need a lle thrombectomy but at this time this is not indicated   will follow     d/w Dr. Kj Mora, PGY-4  Vascular Surgery  [id number] with questions

## 2022-01-09 NOTE — H&P ADULT - HISTORY OF PRESENT ILLNESS
85F with PMH of lung cancer on radiation, DVT w/ IVC filter, T2DM, HTN, obesity, remote MI s/p stent presents with 1 day of LLE swelling/pain. Patient states she woke up this morning and had significant swelling of her ANTWAN w/ associated pain. Patient had a recent hospitalization at [facility] for syncope which was though to be due to dehydration.  85F with PMH of lung cancer on radiation, DVT w/ IVC filter previously on eliquis (not currently), T2DM not on insulin, HTN, obesity, Covid in February, remote MI s/p stent presents with 1 day of LLE swelling/pain. Patient states she woke up this morning and had significant swelling of her LLE w/ associated pain. Patient had a recent hospitalization at [facility] for syncope which was though to be due to dehydration. Per patient she was there for two days and was sent home. Denies any difficulty walking below her baseline. At baseline, she has had reduced mobility since Covid in February and has been receiving physical therapy where she has recently progressed to walking with a cane. Generally, she has reduced mobility and spends much of her time lying down watching TV or in bed. Per patient/daughter/records, she has a remote history of DVT about 10 years ago for which she had an IVC filter palced and was on eliquis. Eliquis 5mg BID was stopped in May by PCP.     For her lung cancer, patient is treated at [facility] and has only received radiation therapy. Has bad biopsy but family is not sure of the type of lung cancer. Recently had PET scan and showed "new areas lung lighting up", but still no extrapulmonary involvement of the cancer.

## 2022-01-09 NOTE — ED ADULT NURSE REASSESSMENT NOTE - NS ED NURSE REASSESS COMMENT FT1
Spoke to pharmacy at this time to request heparin gtt infusion. Pyxis in [facility] do not have heparin gtts.

## 2022-01-09 NOTE — PATIENT PROFILE ADULT - LOCATION #1
Claudia agreeable and if she doesn't do well on those she will discuss it with you at her next appt.   R buttock

## 2022-01-09 NOTE — H&P ADULT - PROBLEM SELECTOR PLAN 1
-Woke up this morning w/ swelling and pain of LLE  -Found to have acute LLE DVT involving the external iliac vein, common femoral vein, femoral vein, and posterior trunk  -likely due to combination of recent hospitalization for syncope, lung cancer, and immobility    Plan:  -start heparin drip  -monitor for neurovascular changes  -no acute vascular surgery intervention

## 2022-01-09 NOTE — H&P ADULT - NSHPSOCIALHISTORY_GEN_ALL_CORE
No alcohol use. Never has smoked in the past. No recreational drug use. Lives with her son and has home health aide 7 days a week and help from her son when he returns from work. Needs assistance w/ ADLs.

## 2022-01-09 NOTE — ED PROVIDER NOTE - CLINICAL SUMMARY MEDICAL DECISION MAKING FREE TEXT BOX
87 y/o F PMH lung cancer getting radiation, GERD, HTN HLD, recent fall onto shoulder and admission to [facility] for syncope presents to ED c/o LLE swelling and pain x2 days. no new falls or injuries. no blood thinners. +LLE edema > R. concern for DVT, lower suspicion cellulitis, lymphangitis, PE. plan labs US

## 2022-01-09 NOTE — ED PROVIDER NOTE - ATTENDING CONTRIBUTION TO CARE
Private Physician DENNY HUSTON/Pulmonary.  86y female pmh PPM, Dementia, DM, HTN, Covid 2/2021, Seizure d/o, DVT w ivc filter. Pt comes to ed c/o lle swelling onset yesterday. Pt was recently admitted for syncope [facility] for syncope/dehydration, DC two days ago. No chest pain shortness of breath, nvdc, abd pain. Private Physician DENNY HUSTON/Pulmonary.  86y female pmh PPM, Dementia, DM, HTN, Covid 2/2021, Seizure d/o, DVT w ivc filter. Pt comes to ed c/o lle swelling onset yesterday. Pt was recently admitted for syncope [facility] for syncope/dehydration, DC two days ago. No chest pain shortness of breath, nvdc, abd pain. PE WDWN female awake alert normocephalic atraumatic neck supple chest clear anterior & posterior cv no rubs, gallops or murmurs abd soft +bs no mass guarding neuro gcs 15 speech fluent power 5/5 all extr, MSK mod swelling lle,   Jeff Gonzalez MD, Facep

## 2022-01-09 NOTE — H&P ADULT - NSHPPHYSICALEXAM_GEN_ALL_CORE
Vital Signs Last 24 Hrs  T(C): 36.9 (09 Jan 2022 18:15), Max: 36.9 (09 Jan 2022 18:15)  T(F): 98.4 (09 Jan 2022 18:15), Max: 98.4 (09 Jan 2022 18:15)  HR: 72 (09 Jan 2022 18:15) (72 - 81)  BP: 123/54 (09 Jan 2022 18:15) (123/54 - 129/77)  BP(mean): --  RR: 16 (09 Jan 2022 18:15) (16 - 16)  SpO2: 100% (09 Jan 2022 18:15) (100% - 100%)    PHYSICAL EXAM:  GENERAL: NAD, well-groomed, well-developed  HEAD:  Atraumatic, Normocephalic  EYES: EOMI, PERRLA, conjunctiva and sclera clear  ENMT: No tonsillar erythema, exudates, or enlargement; Moist mucous membranes, Good dentition  NECK: Supple, No JVD  NERVOUS SYSTEM: AOX3, motor and sensation grossly intact in b/l UE and b/l LE  PSYCHIATRIC: Appropriate affect and mood  CHEST/LUNG: Clear to auscultation bilaterally; No rales, rhonchi, wheezing, or rubs  HEART: Regular rate and rhythm; 2/6 systolic murmur at L upper sternal border  ABDOMEN: Soft, Nontender, Nondistended; Bowel sounds present  EXTREMITIES:  2+ Peripheral Pulses, significant non pitting edema of LLE, no RLE edema  SKIN: No rashes or lesions

## 2022-01-09 NOTE — ED PROVIDER NOTE - PHYSICAL EXAMINATION
Gen: chronically ill appearing female   HEENT: NCAT, EOMI, no nasal discharge, mucous membranes moist  CV: RRR, +S1/S2, no M/R/G  Resp: CTAB, no W/R/R  GI: Abdomen soft non-distended, NTTP, no masses  MSK: +LLE edema > R. +TTP b/l calves   Neuro: A&Ox4, following commands, moving all four extremities spontaneously  Psych: appropriate mood

## 2022-01-09 NOTE — H&P ADULT - PROBLEM SELECTOR PLAN 3
-Noted 2/6 systolic murmur at L upper sternal border  -patient states she has never been told of history of murmur  -will order echo -Hgb 9.8, down from 12-13 in previous admission in March  -no signs or symptoms of bleeding  -ACD vs iron deficiency vs other    Plan:  -monitor for bleeding given starting heparin drip for acute DVT  -trend CBC  -iron studies, B12, folate

## 2022-01-09 NOTE — ED PROVIDER NOTE - OBJECTIVE STATEMENT
87 y/o F PMH lung cancer getting radiation, GERD, HTN HLD, recent fall onto shoulder and admission to [facility] for syncope presents to ED c/o LLE swelling and pain x2 days. Denies dyspnea, sob, palpitations, cp, abd pain, n/v/d. +covid vaccinated

## 2022-01-09 NOTE — PATIENT PROFILE ADULT - NSPROPOAPRESSUREINJURY_GEN_A_NUR
provided information on [employer] Directives.  completed initial visit with patient and offered Pastoral care. Chaplains will continue to follow and will provide pastoral care  as needed or requested. [address] [facility] Gabe Boles M.Div.   Resident   [phone] - Office no yes

## 2022-01-09 NOTE — H&P ADULT - ATTENDING COMMENTS
Annabella Posey MD  Division of Hospital Medicine  [facility]   Pager: [phone]    Patient seen and examined today with Team 2 Resident and Interns. Agree with above findings, assessment, and plan with the following additions/exceptions:    Overall, 85 yo female with PMH of recent diagnosis of lung ca undergoing RT, CAD s/p stents, bradycardia s/p PPM placement, T2DM c/b diabetic retinopathy, s/p SDH 2/2 to fall on Keppra for seizure ppx, memory disturbances, COVID-19 infection in 2021, hx of RLE DVT s/p IVC filter recently admitted to OSH for syncopal (told because of “dehydration” per patient) who p/w worsening LLE swelling and pain found to have acute extensive LLE DVT.    Pertinent exam findings: LLE with significant swelling to level of groin, warm, well-perfused.  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 Initial episode was approx 10 years ago and she was on Eliquis and had IVC filter placed. 2nd episode presumed COVID related during her last admission. Recently taken off Eliquis by PCP as repeat ultrasound with resolution.  - start hep gtt for a/c  - check EKG and TTE to evaluate for RV strain given extent and proximity of DVT. TTE would also be appropriate given recent syncopal episode  - vascular surgery team to discuss re: possible CT venogram given extent of DVT but would hold off until renal function improved first. Also need clarification re: iodine allergy from family as well though it appears she has had IV contrast studies previously  - ACE bandaging to LLE for compression, elevate as LLE as much as tolerated  - new ROBERTO likely pre-renal in etiology 2/2 to admitted poor intake of fluids (states she was told at OSH her fall related to dehydration as well)  - check urine studies, bladder scan to r/o obstruction  - start maintenance IVF  - New anemia on admission (baseline 11-12 in 2021). No evidence of acute bleed. Check iron studies, b12, folate. Monitor H/H closely on hep gtt.    Rest as detailed in note above.    Plan discussed with patient, vascular surgery resident bedside, and team 2 intern Dr. Odonnell.

## 2022-01-10 LAB
A1C WITH ESTIMATED AVERAGE GLUCOSE RESULT: 7.4 % — HIGH (ref 4–5.6)
ALBUMIN SERPL ELPH-MCNC: 3.5 G/DL — SIGNIFICANT CHANGE UP (ref 3.3–5)
ALP SERPL-CCNC: 66 U/L — SIGNIFICANT CHANGE UP (ref 40–120)
ALT FLD-CCNC: 8 U/L — LOW (ref 10–45)
ANION GAP SERPL CALC-SCNC: 14 MMOL/L — SIGNIFICANT CHANGE UP (ref 5–17)
ANION GAP SERPL CALC-SCNC: 14 MMOL/L — SIGNIFICANT CHANGE UP (ref 5–17)
APTT BLD: 35.7 SEC — HIGH (ref 27.5–35.5)
APTT BLD: >200 SEC — CRITICAL HIGH (ref 27.5–35.5)
APTT BLD: >200 SEC — CRITICAL HIGH (ref 27.5–35.5)
AST SERPL-CCNC: 13 U/L — SIGNIFICANT CHANGE UP (ref 10–40)
BILIRUB SERPL-MCNC: 0.4 MG/DL — SIGNIFICANT CHANGE UP (ref 0.2–1.2)
BUN SERPL-MCNC: 39 MG/DL — HIGH (ref 7–23)
BUN SERPL-MCNC: 41 MG/DL — HIGH (ref 7–23)
CALCIUM SERPL-MCNC: 10 MG/DL — SIGNIFICANT CHANGE UP (ref 8.4–10.5)
CALCIUM SERPL-MCNC: 9.9 MG/DL — SIGNIFICANT CHANGE UP (ref 8.4–10.5)
CHLORIDE SERPL-SCNC: 104 MMOL/L — SIGNIFICANT CHANGE UP (ref 96–108)
CHLORIDE SERPL-SCNC: 105 MMOL/L — SIGNIFICANT CHANGE UP (ref 96–108)
CO2 SERPL-SCNC: 17 MMOL/L — LOW (ref 22–31)
CO2 SERPL-SCNC: 18 MMOL/L — LOW (ref 22–31)
CREAT SERPL-MCNC: 2.06 MG/DL — HIGH (ref 0.5–1.3)
CREAT SERPL-MCNC: 2.18 MG/DL — HIGH (ref 0.5–1.3)
ESTIMATED AVERAGE GLUCOSE: 166 MG/DL — HIGH (ref 68–114)
FERRITIN SERPL-MCNC: 229 NG/ML — HIGH (ref 15–150)
FOLATE SERPL-MCNC: 7.2 NG/ML — SIGNIFICANT CHANGE UP
GLUCOSE BLDC GLUCOMTR-MCNC: 145 MG/DL — HIGH (ref 70–99)
GLUCOSE BLDC GLUCOMTR-MCNC: 208 MG/DL — HIGH (ref 70–99)
GLUCOSE BLDC GLUCOMTR-MCNC: 209 MG/DL — HIGH (ref 70–99)
GLUCOSE BLDC GLUCOMTR-MCNC: 211 MG/DL — HIGH (ref 70–99)
GLUCOSE BLDC GLUCOMTR-MCNC: 212 MG/DL — HIGH (ref 70–99)
GLUCOSE SERPL-MCNC: 147 MG/DL — HIGH (ref 70–99)
GLUCOSE SERPL-MCNC: 197 MG/DL — HIGH (ref 70–99)
HCT VFR BLD CALC: 28.9 % — LOW (ref 34.5–45)
HCT VFR BLD CALC: 29.7 % — LOW (ref 34.5–45)
HCT VFR BLD CALC: 29.9 % — LOW (ref 34.5–45)
HCT VFR BLD CALC: 30.3 % — LOW (ref 34.5–45)
HCT VFR BLD CALC: 31.2 % — LOW (ref 34.5–45)
HGB BLD-MCNC: 10.3 G/DL — LOW (ref 11.5–15.5)
HGB BLD-MCNC: 9.4 G/DL — LOW (ref 11.5–15.5)
HGB BLD-MCNC: 9.5 G/DL — LOW (ref 11.5–15.5)
HGB BLD-MCNC: 9.6 G/DL — LOW (ref 11.5–15.5)
HGB BLD-MCNC: 9.8 G/DL — LOW (ref 11.5–15.5)
INR BLD: 1.23 RATIO — HIGH (ref 0.88–1.16)
IRON SATN MFR SERPL: 17 % — SIGNIFICANT CHANGE UP (ref 14–50)
IRON SATN MFR SERPL: 40 UG/DL — SIGNIFICANT CHANGE UP (ref 30–160)
MAGNESIUM SERPL-MCNC: 2.2 MG/DL — SIGNIFICANT CHANGE UP (ref 1.6–2.6)
MAGNESIUM SERPL-MCNC: 2.2 MG/DL — SIGNIFICANT CHANGE UP (ref 1.6–2.6)
MCHC RBC-ENTMCNC: 28.2 PG — SIGNIFICANT CHANGE UP (ref 27–34)
MCHC RBC-ENTMCNC: 28.5 PG — SIGNIFICANT CHANGE UP (ref 27–34)
MCHC RBC-ENTMCNC: 28.5 PG — SIGNIFICANT CHANGE UP (ref 27–34)
MCHC RBC-ENTMCNC: 28.9 PG — SIGNIFICANT CHANGE UP (ref 27–34)
MCHC RBC-ENTMCNC: 29 PG — SIGNIFICANT CHANGE UP (ref 27–34)
MCHC RBC-ENTMCNC: 31.7 GM/DL — LOW (ref 32–36)
MCHC RBC-ENTMCNC: 32 GM/DL — SIGNIFICANT CHANGE UP (ref 32–36)
MCHC RBC-ENTMCNC: 32.5 GM/DL — SIGNIFICANT CHANGE UP (ref 32–36)
MCHC RBC-ENTMCNC: 32.8 GM/DL — SIGNIFICANT CHANGE UP (ref 32–36)
MCHC RBC-ENTMCNC: 33 GM/DL — SIGNIFICANT CHANGE UP (ref 32–36)
MCV RBC AUTO: 86.9 FL — SIGNIFICANT CHANGE UP (ref 80–100)
MCV RBC AUTO: 87.9 FL — SIGNIFICANT CHANGE UP (ref 80–100)
MCV RBC AUTO: 88.9 FL — SIGNIFICANT CHANGE UP (ref 80–100)
MCV RBC AUTO: 89.1 FL — SIGNIFICANT CHANGE UP (ref 80–100)
MCV RBC AUTO: 89.2 FL — SIGNIFICANT CHANGE UP (ref 80–100)
NRBC # BLD: 0 /100 WBCS — SIGNIFICANT CHANGE UP (ref 0–0)
PHOSPHATE SERPL-MCNC: 3.3 MG/DL — SIGNIFICANT CHANGE UP (ref 2.5–4.5)
PHOSPHATE SERPL-MCNC: 3.4 MG/DL — SIGNIFICANT CHANGE UP (ref 2.5–4.5)
PLATELET # BLD AUTO: 189 K/UL — SIGNIFICANT CHANGE UP (ref 150–400)
PLATELET # BLD AUTO: 196 K/UL — SIGNIFICANT CHANGE UP (ref 150–400)
PLATELET # BLD AUTO: 205 K/UL — SIGNIFICANT CHANGE UP (ref 150–400)
PLATELET # BLD AUTO: 212 K/UL — SIGNIFICANT CHANGE UP (ref 150–400)
PLATELET # BLD AUTO: 228 K/UL — SIGNIFICANT CHANGE UP (ref 150–400)
POTASSIUM SERPL-MCNC: 3.8 MMOL/L — SIGNIFICANT CHANGE UP (ref 3.5–5.3)
POTASSIUM SERPL-MCNC: 3.8 MMOL/L — SIGNIFICANT CHANGE UP (ref 3.5–5.3)
POTASSIUM SERPL-SCNC: 3.8 MMOL/L — SIGNIFICANT CHANGE UP (ref 3.5–5.3)
POTASSIUM SERPL-SCNC: 3.8 MMOL/L — SIGNIFICANT CHANGE UP (ref 3.5–5.3)
PROT SERPL-MCNC: 6.5 G/DL — SIGNIFICANT CHANGE UP (ref 6–8.3)
PROTHROM AB SERPL-ACNC: 14.6 SEC — HIGH (ref 10.6–13.6)
RBC # BLD: 3.25 M/UL — LOW (ref 3.8–5.2)
RBC # BLD: 3.25 M/UL — LOW (ref 3.8–5.2)
RBC # BLD: 3.33 M/UL — LOW (ref 3.8–5.2)
RBC # BLD: 3.4 M/UL — LOW (ref 3.8–5.2)
RBC # BLD: 3.44 M/UL — LOW (ref 3.8–5.2)
RBC # BLD: 3.55 M/UL — LOW (ref 3.8–5.2)
RBC # FLD: 12.5 % — SIGNIFICANT CHANGE UP (ref 10.3–14.5)
RBC # FLD: 12.6 % — SIGNIFICANT CHANGE UP (ref 10.3–14.5)
RBC # FLD: 12.7 % — SIGNIFICANT CHANGE UP (ref 10.3–14.5)
RETICS #: 66.3 K/UL — SIGNIFICANT CHANGE UP (ref 25–125)
RETICS/RBC NFR: 2 % — SIGNIFICANT CHANGE UP (ref 0.5–2.5)
SODIUM SERPL-SCNC: 136 MMOL/L — SIGNIFICANT CHANGE UP (ref 135–145)
SODIUM SERPL-SCNC: 136 MMOL/L — SIGNIFICANT CHANGE UP (ref 135–145)
TIBC SERPL-MCNC: 232 UG/DL — SIGNIFICANT CHANGE UP (ref 220–430)
TRANSFERRIN SERPL-MCNC: 182 MG/DL — LOW (ref 200–360)
UIBC SERPL-MCNC: 192 UG/DL — SIGNIFICANT CHANGE UP (ref 110–370)
VIT B12 SERPL-MCNC: 436 PG/ML — SIGNIFICANT CHANGE UP (ref 232–1245)
WBC # BLD: 8.56 K/UL — SIGNIFICANT CHANGE UP (ref 3.8–10.5)
WBC # BLD: 8.9 K/UL — SIGNIFICANT CHANGE UP (ref 3.8–10.5)
WBC # BLD: 9.3 K/UL — SIGNIFICANT CHANGE UP (ref 3.8–10.5)
WBC # BLD: 9.41 K/UL — SIGNIFICANT CHANGE UP (ref 3.8–10.5)
WBC # BLD: 9.99 K/UL — SIGNIFICANT CHANGE UP (ref 3.8–10.5)
WBC # FLD AUTO: 8.56 K/UL — SIGNIFICANT CHANGE UP (ref 3.8–10.5)
WBC # FLD AUTO: 8.9 K/UL — SIGNIFICANT CHANGE UP (ref 3.8–10.5)
WBC # FLD AUTO: 9.3 K/UL — SIGNIFICANT CHANGE UP (ref 3.8–10.5)
WBC # FLD AUTO: 9.41 K/UL — SIGNIFICANT CHANGE UP (ref 3.8–10.5)
WBC # FLD AUTO: 9.99 K/UL — SIGNIFICANT CHANGE UP (ref 3.8–10.5)

## 2022-01-10 PROCEDURE — 93306 TTE W/DOPPLER COMPLETE: CPT | Mod: 26

## 2022-01-10 PROCEDURE — 93880 EXTRACRANIAL BILAT STUDY: CPT | Mod: 26

## 2022-01-10 PROCEDURE — 99232 SBSQ HOSP IP/OBS MODERATE 35: CPT

## 2022-01-10 PROCEDURE — 99233 SBSQ HOSP IP/OBS HIGH 50: CPT | Mod: GC

## 2022-01-10 RX ORDER — CEFTRIAXONE 500 MG/1
1000 INJECTION, POWDER, FOR SOLUTION INTRAMUSCULAR; INTRAVENOUS EVERY 24 HOURS
Refills: 0 | Status: DISCONTINUED | OUTPATIENT
Start: 2022-01-11 | End: 2022-01-12

## 2022-01-10 RX ORDER — CEFTRIAXONE 500 MG/1
INJECTION, POWDER, FOR SOLUTION INTRAMUSCULAR; INTRAVENOUS
Refills: 0 | Status: DISCONTINUED | OUTPATIENT
Start: 2022-01-10 | End: 2022-01-12

## 2022-01-10 RX ORDER — SODIUM CHLORIDE 9 MG/ML
1000 INJECTION, SOLUTION INTRAVENOUS
Refills: 0 | Status: DISCONTINUED | OUTPATIENT
Start: 2022-01-10 | End: 2022-01-11

## 2022-01-10 RX ORDER — CEFTRIAXONE 500 MG/1
1000 INJECTION, POWDER, FOR SOLUTION INTRAMUSCULAR; INTRAVENOUS ONCE
Refills: 0 | Status: COMPLETED | OUTPATIENT
Start: 2022-01-10 | End: 2022-01-10

## 2022-01-10 RX ORDER — CEFTRIAXONE 500 MG/1
INJECTION, POWDER, FOR SOLUTION INTRAMUSCULAR; INTRAVENOUS
Refills: 0 | Status: DISCONTINUED | OUTPATIENT
Start: 2022-01-10 | End: 2022-01-10

## 2022-01-10 RX ADMIN — HEPARIN SODIUM 0 UNIT(S)/HR: 5000 INJECTION INTRAVENOUS; SUBCUTANEOUS at 22:36

## 2022-01-10 RX ADMIN — HEPARIN SODIUM 1700 UNIT(S)/HR: 5000 INJECTION INTRAVENOUS; SUBCUTANEOUS at 15:05

## 2022-01-10 RX ADMIN — HEPARIN SODIUM 0 UNIT(S)/HR: 5000 INJECTION INTRAVENOUS; SUBCUTANEOUS at 04:02

## 2022-01-10 RX ADMIN — HEPARIN SODIUM 1300 UNIT(S)/HR: 5000 INJECTION INTRAVENOUS; SUBCUTANEOUS at 07:21

## 2022-01-10 RX ADMIN — FAMOTIDINE 20 MILLIGRAM(S): 10 INJECTION INTRAVENOUS at 13:11

## 2022-01-10 RX ADMIN — HEPARIN SODIUM 7000 UNIT(S): 5000 INJECTION INTRAVENOUS; SUBCUTANEOUS at 15:15

## 2022-01-10 RX ADMIN — SODIUM CHLORIDE 50 MILLILITER(S): 9 INJECTION, SOLUTION INTRAVENOUS at 15:05

## 2022-01-10 RX ADMIN — Medication 4: at 13:11

## 2022-01-10 RX ADMIN — DONEPEZIL HYDROCHLORIDE 10 MILLIGRAM(S): 10 TABLET, FILM COATED ORAL at 21:50

## 2022-01-10 RX ADMIN — HEPARIN SODIUM 1700 UNIT(S)/HR: 5000 INJECTION INTRAVENOUS; SUBCUTANEOUS at 19:18

## 2022-01-10 RX ADMIN — ESCITALOPRAM OXALATE 5 MILLIGRAM(S): 10 TABLET, FILM COATED ORAL at 13:11

## 2022-01-10 RX ADMIN — ATORVASTATIN CALCIUM 20 MILLIGRAM(S): 80 TABLET, FILM COATED ORAL at 21:50

## 2022-01-10 RX ADMIN — CEFTRIAXONE 1000 MILLIGRAM(S): 500 INJECTION, POWDER, FOR SOLUTION INTRAMUSCULAR; INTRAVENOUS at 16:54

## 2022-01-10 RX ADMIN — HEPARIN SODIUM 1300 UNIT(S)/HR: 5000 INJECTION INTRAVENOUS; SUBCUTANEOUS at 05:07

## 2022-01-10 RX ADMIN — Medication 4: at 08:53

## 2022-01-10 NOTE — PROGRESS NOTE ADULT - ATTENDING COMMENTS
86F with PMH of lung cancer on radiation, R popliteal DVT s/p IVC filter,  Left Popliteal V DVT (  2/2021), DM2 , HTN, obesity, remote MI s/p stent, multiple falls presents with 1 day of LLE swelling/pain.  Patient had a recent hospitalization at [facility] for syncope which was though to be due to dehydration.  AS reported, Pt was on Eliquis which was recently DC in 5/2021.  Patient had lower ext doppler and  found to have extensive LLE DVT (external iliac to femoral, posterior trunk).  Pt was placed on Heparin drip which will cont and monitor INR and will request Hem/onc evaluation as patient has malignancy and DVT.  Will need to get more info from family .  It is reported that pt ws on Eliqis for distant DVT but Doppler of lower ext done on 2/9/21 with Left Lower ext non occlusive DVT of the Left Popliteal vessel and was cont on Eliquis .  So with extensive lower ext DVT in pt with previous DVT and has malignancy , will consult Hem for long term AC.  I am not sure if the DVT in 2/21 was a DOAC failure .           Clara Vieyra   Hospitalist   [phone] 86F with PMH of lung cancer on radiation, R popliteal DVT s/p IVC filter,  Left Popliteal V DVT (  2/2021), DM2 , HTN, obesity, remote MI s/p stent, multiple falls presents with 1 day of LLE swelling/pain.  Patient had a recent hospitalization at [facility] for syncope which was though to be due to dehydration.  AS reported, Pt was on Eliquis which was recently DC in 5/2021.  Patient had lower ext doppler and  found to have extensive LLE DVT (external iliac to femoral, posterior trunk).  Pt was placed on Heparin drip which will cont and monitor INR and will request Hem/onc evaluation as patient has malignancy and DVT.  Will need to get more info from family .  It is reported that pt ws on Eliqis for distant DVT but Doppler of lower ext done on 2/9/21 with Left Lower ext non occlusive DVT of the Left Popliteal vessel and was cont on Eliquis .  So with extensive lower ext DVT in pt with previous DVT and has malignancy , will consult Hem for long term AC.  I am not sure if the DVT in 2/21 was a DOAC failure Vs COVID related Vs hypercoabl states from malignancy          Clara Vieyra   Hospitalist   [phone]

## 2022-01-10 NOTE — PROGRESS NOTE ADULT - SUBJECTIVE AND OBJECTIVE BOX
VASCULAR SURGERY PROGRESS NOTE:    ========================================  TEAM PAGER [phone]  ========================================    Subjective:  Pt w/o new c/o this AM. Denies pain, numbness, tingling, or weakness in L leg. Has not ambulated yet.      Objective:    PHYSICAL EXAM:  General: No acute distress  Respiratory: Nonlabored  Cardiovascular: RRR  Abdominal: Soft, nondistended, nontender  Extremities: Warm, L>>R edema, mild left thigh erythema, no wounds, +motor/sensation  2+ DP on right, 1+ DP on left  Dry skin    Vital Signs Last 24 Hrs  T(C): 36.4 (10 Stefan 2022 05:25), Max: 36.9 (2022 18:15)  T(F): 97.5 (10 Stefan 2022 05:25), Max: 98.4 (2022 18:15)  HR: 64 (10 Stefan 2022 05:25) (64 - 81)  BP: 135/65 (10 Stefan 2022 05:25) (119/72 - 150/64)  BP(mean): --  RR: 18 (10 Stefan 2022 05:25) (16 - 18)  SpO2: 100% (10 Stefan 2022 05:25) (98% - 100%)    I&O's Detail    2022 07:01  -  10 Stefan 2022 07:00  --------------------------------------------------------  IN:    Heparin Infusion: 55 mL    Oral Fluid: 120 mL  Total IN: 175 mL    OUT:    Blood Loss (mL): 0 mL    Voided (mL): 200 mL  Total OUT: 200 mL    Total NET: -25 mL          Daily Height in cm: 160.02 (2022 14:09)    Daily     MEDICATIONS  (STANDING):  atorvastatin 20 milliGRAM(s) Oral at bedtime  dextrose 40% Gel 15 Gram(s) Oral once  dextrose 5%. 1000 milliLiter(s) (50 mL/Hr) IV Continuous <Continuous>  dextrose 5%. 1000 milliLiter(s) (100 mL/Hr) IV Continuous <Continuous>  dextrose 50% Injectable 25 Gram(s) IV Push once  dextrose 50% Injectable 12.5 Gram(s) IV Push once  dextrose 50% Injectable 25 Gram(s) IV Push once  donepezil 10 milliGRAM(s) Oral at bedtime  escitalopram 5 milliGRAM(s) Oral daily  famotidine    Tablet 20 milliGRAM(s) Oral daily  glucagon  Injectable 1 milliGRAM(s) IntraMuscular once  heparin  Infusion.  Unit(s)/Hr (16 mL/Hr) IV Continuous <Continuous>  insulin lispro (ADMELOG) corrective regimen sliding scale   SubCutaneous three times a day before meals  insulin lispro (ADMELOG) corrective regimen sliding scale   SubCutaneous at bedtime    MEDICATIONS  (PRN):  acetaminophen     Tablet .. 650 milliGRAM(s) Oral every 6 hours PRN Temp greater or equal to 38C (100.4F), Mild Pain (1 - 3)  heparin   Injectable 7000 Unit(s) IV Push every 6 hours PRN For aPTT less than 40  heparin   Injectable 3500 Unit(s) IV Push every 6 hours PRN For aPTT between 40 - 57  melatonin 3 milliGRAM(s) Oral at bedtime PRN Insomnia      LABS:                        9.6    8.56  )-----------( 196      ( 10 Stefan 2022 06:10 )             30.3     01-10    136  |  104  |  39<H>  ----------------------------<  197<H>  3.8   |  18<L>  |  2.06<H>    Ca    9.9      10 Stefan 2022 06:10  Phos  3.3     01-10  Mg     2.2     -10    TPro  6.5  /  Alb  3.5  /  TBili  0.4  /  DBili  x   /  AST  13  /  ALT  8<L>  /  AlkPhos  66  01-10    PT/INR - ( 10 Stefan 2022 02:58 )   PT: 14.6 sec;   INR: 1.23 ratio         PTT - ( 10 Stefan 2022 02:58 )  PTT:>200.0 sec  Urinalysis Basic - ( 2022 18:46 )    Color: Yellow / Appearance: Slightly Turbid / S.016 / pH: x  Gluc: x / Ketone: Negative  / Bili: Negative / Urobili: Negative   Blood: x / Protein: 30 mg/dL / Nitrite: Positive   Leuk Esterase: Large / RBC: 3 /hpf /  /HPF   Sq Epi: x / Non Sq Epi: 1 /hpf / Bacteria: Moderate        RADIOLOGY & ADDITIONAL STUDIES:             VASCULAR SURGERY PROGRESS NOTE:    ========================================  TEAM PAGER [phone]  ========================================    Subjective:  Pt w/o new c/o this AM. Denies pain, numbness, tingling, or weakness in L leg. Has not ambulated yet.  pt states left leg is feeling better       Objective:    PHYSICAL EXAM:  General: No acute distress  Respiratory: Nonlabored  Cardiovascular: RRR  Abdominal: Soft, nondistended, nontender  Extremities: Warm, L>>R edema, mild to mod  left thigh  and calf edema , no wounds, +motor/sensation grossly intact   2+ DP on right, 1+ DP on left  Dry skin    Vital Signs Last 24 Hrs  T(C): 36.4 (10 Stefan 2022 05:25), Max: 36.9 (2022 18:15)  T(F): 97.5 (10 Stefan 2022 05:25), Max: 98.4 (2022 18:15)  HR: 64 (10 Stefan 2022 05:25) (64 - 81)  BP: 135/65 (10 Stefan 2022 05:25) (119/72 - 150/64)  BP(mean): --  RR: 18 (10 Stefan 2022 05:25) (16 - 18)  SpO2: 100% (10 Stefan 2022 05:25) (98% - 100%)    I&O's Detail    2022 07:01  -  10 Stefan 2022 07:00  --------------------------------------------------------  IN:    Heparin Infusion: 55 mL    Oral Fluid: 120 mL  Total IN: 175 mL    OUT:    Blood Loss (mL): 0 mL    Voided (mL): 200 mL  Total OUT: 200 mL    Total NET: -25 mL    Daily Height in cm: 160.02 (2022 14:09)        MEDICATIONS  (STANDING):  atorvastatin 20 milliGRAM(s) Oral at bedtime  dextrose 40% Gel 15 Gram(s) Oral once  dextrose 5%. 1000 milliLiter(s) (50 mL/Hr) IV Continuous <Continuous>  dextrose 5%. 1000 milliLiter(s) (100 mL/Hr) IV Continuous <Continuous>  dextrose 50% Injectable 25 Gram(s) IV Push once  dextrose 50% Injectable 12.5 Gram(s) IV Push once  dextrose 50% Injectable 25 Gram(s) IV Push once  donepezil 10 milliGRAM(s) Oral at bedtime  escitalopram 5 milliGRAM(s) Oral daily  famotidine    Tablet 20 milliGRAM(s) Oral daily  glucagon  Injectable 1 milliGRAM(s) IntraMuscular once  heparin  Infusion.  Unit(s)/Hr (16 mL/Hr) IV Continuous <Continuous>  insulin lispro (ADMELOG) corrective regimen sliding scale   SubCutaneous three times a day before meals  insulin lispro (ADMELOG) corrective regimen sliding scale   SubCutaneous at bedtime    MEDICATIONS  (PRN):  acetaminophen     Tablet .. 650 milliGRAM(s) Oral every 6 hours PRN Temp greater or equal to 38C (100.4F), Mild Pain (1 - 3)  heparin   Injectable 7000 Unit(s) IV Push every 6 hours PRN For aPTT less than 40  heparin   Injectable 3500 Unit(s) IV Push every 6 hours PRN For aPTT between 40 - 57  melatonin 3 milliGRAM(s) Oral at bedtime PRN Insomnia      LABS:                        9.6    8.56  )-----------( 196      ( 10 Stefan 2022 06:10 )             30.3     01-10    136  |  104  |  39<H>  ----------------------------<  197<H>  3.8   |  18<L>  |  2.06<H>    Ca    9.9      10 Stefan 2022 06:10  Phos  3.3     -10  Mg     2.2     01-10    TPro  6.5  /  Alb  3.5  /  TBili  0.4  /  DBili  x   /  AST  13  /  ALT  8<L>  /  AlkPhos  66  01-10    PT/INR - ( 10 Stefan 2022 02:58 )   PT: 14.6 sec;   INR: 1.23 ratio         PTT - ( 10 Stefan 2022 02:58 )  PTT:>200.0 sec  Urinalysis Basic - ( 2022 18:46 )    Color: Yellow / Appearance: Slightly Turbid / S.016 / pH: x  Gluc: x / Ketone: Negative  / Bili: Negative / Urobili: Negative   Blood: x / Protein: 30 mg/dL / Nitrite: Positive   Leuk Esterase: Large / RBC: 3 /hpf /  /HPF   Sq Epi: x / Non Sq Epi: 1 /hpf / Bacteria: Moderate      carotid duplex reviewed

## 2022-01-10 NOTE — PROGRESS NOTE ADULT - PROBLEM SELECTOR PLAN 2
I Reilly Mena MD have seen and examined the patient today and agree with  the  evaluation, assessment and plan of the surgical house officer  KYLE Mena MD have personally seen and examined the patient at bedside today at  5 pm

## 2022-01-10 NOTE — CONSULT NOTE ADULT - SUBJECTIVE AND OBJECTIVE BOX
Reason for consult: DVT, Lung cancer    HPI:  85F with PMH of lung cancer on radiation, DVT w/ IVC filter previously on eliquis (not currently), T2DM not on insulin, HTN, obesity, Covid in February, remote MI s/p stent presents with 1 day of LLE swelling/pain. Patient states she woke up this morning and had significant swelling of her LLE w/ associated pain. Patient had a recent hospitalization at [facility] for syncope which was though to be due to dehydration. Per patient she was there for two days and was sent home. Denies any difficulty walking below her baseline. At baseline, she has had reduced mobility since Covid in February and has been receiving physical therapy where she has recently progressed to walking with a cane. Generally, she has reduced mobility and spends much of her time lying down watching TV or in bed. Per patient/daughter/records, she has a remote history of DVT about 10 years ago for which she had an IVC filter pad and was on eliquis. Eliquis 5mg BID was stopped in May by PCP.     For her lung cancer, patient is treated at [facility] and has only received radiation therapy. Has bad biopsy but family is not sure of the type of lung cancer. Recently had PET scan and showed "new areas lung lighting up", but still no extrapulmonary involvement of the cancer.   (09 Jan 2022 18:08)      Hematology/Oncology consulted on this patient with history of Lung cancer, currently undergoing care at [facility] with Dr Cisse. She has a history of HTN, PTCA, CAD, PM and NIDDM. As per daughter, patient stayed overnight  at [facility] on 1/6/21 after " passing out" She also missed an [facility] IR Lung biopsy originally scheduled on 1/6 at [facility]. She is an 86 year old female with history of  left lung Adenocarcinoma S/P SBRT to the DONNA. 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 Pt with history of IVC filter placed. She presented to [facility] with LLE swelling and pain, confirmed for a positive DVT.      PAST MEDICAL & SURGICAL HISTORY:  Dementia    Obesity    HTN (hypertension)    Diabetes    DVT, lower extremity    MI (myocardial infarction)    Pacemaker        FAMILY HISTORY:  Unknown status of immunity to COVID-19 virus        Alochol: Denied  Smoking: Nonsmoker  Drug Use: Denied  Marital Status:         Allergies    iodine (Hives)  penicillin (Hives)    Intolerances        MEDICATIONS  (STANDING):  atorvastatin 20 milliGRAM(s) Oral at bedtime  cefTRIAXone   IVPB      cefTRIAXone   IVPB 1000 milliGRAM(s) IV Intermittent once  dextrose 40% Gel 15 Gram(s) Oral once  dextrose 5%. 1000 milliLiter(s) (50 mL/Hr) IV Continuous <Continuous>  dextrose 5%. 1000 milliLiter(s) (100 mL/Hr) IV Continuous <Continuous>  dextrose 50% Injectable 25 Gram(s) IV Push once  dextrose 50% Injectable 12.5 Gram(s) IV Push once  dextrose 50% Injectable 25 Gram(s) IV Push once  donepezil 10 milliGRAM(s) Oral at bedtime  escitalopram 5 milliGRAM(s) Oral daily  famotidine    Tablet 20 milliGRAM(s) Oral daily  glucagon  Injectable 1 milliGRAM(s) IntraMuscular once  heparin  Infusion.  Unit(s)/Hr (16 mL/Hr) IV Continuous <Continuous>  insulin lispro (ADMELOG) corrective regimen sliding scale   SubCutaneous three times a day before meals  insulin lispro (ADMELOG) corrective regimen sliding scale   SubCutaneous at bedtime  lactated ringers. 1000 milliLiter(s) (50 mL/Hr) IV Continuous <Continuous>    MEDICATIONS  (PRN):  acetaminophen     Tablet .. 650 milliGRAM(s) Oral every 6 hours PRN Temp greater or equal to 38C (100.4F), Mild Pain (1 - 3)  heparin   Injectable 7000 Unit(s) IV Push every 6 hours PRN For aPTT less than 40  heparin   Injectable 3500 Unit(s) IV Push every 6 hours PRN For aPTT between 40 - 57  melatonin 3 milliGRAM(s) Oral at bedtime PRN Insomnia      ROS  No fever, sweats, chills  No epistaxis, HA, sore throat  No CP, SOB, cough, sputum  No n/v/d, abd pain, melena, hematochezia  No edema  No rash  No anxiety  No back pain, joint pain  No bleeding, bruising  No dysuria, hematuria    T(C): 36.7 (01-10-22 @ 12:47), Max: 36.9 (01-09-22 @ 18:15)  HR: 82 (01-10-22 @ 12:47) (64 - 82)  BP: 148/73 (01-10-22 @ 12:47) (119/72 - 150/64)  RR: 20 (01-10-22 @ 12:47) (16 - 20)  SpO2: 99% (01-10-22 @ 12:47) (98% - 100%)  Wt(kg): --    PE  NAD  Awake, alert  Anicteric, MMM  RRR  CTAB  Abd soft, NT, ND  No c/c/e  No rash grossly                            10.3   8.90  )-----------( 212      ( 10 Stefan 2022 13:51 )             31.2       01-10    136  |  104  |  39<H>  ----------------------------<  197<H>  3.8   |  18<L>  |  2.06<H>    Ca    9.9      10 Stefan 2022 06:10  Phos  3.3     01-10  Mg     2.2     01-10    TPro  6.5  /  Alb  3.5  /  TBili  0.4  /  DBili  x   /  AST  13  /  ALT  8<L>  /  AlkPhos  66  01-10  ACC: [id number] EXAM:  DUPLEX SCAN EXT VEINS LOWER BI                          PROCEDURE DATE:  01/09/2022          INTERPRETATION:  CLINICAL INFORMATION: Leg swelling, evaluate for DVT.    COMPARISON: None available.    TECHNIQUE: Duplex sonography of the BILATERAL LOWER extremity veins with   color and spectral Doppler, with and without compression.    FINDINGS:    RIGHT:  Normal compressibility of the RIGHT common femoral, femoral and popliteal   veins.  Doppler examination shows normal spontaneous and phasic flow.  No RIGHT calf vein thrombosis is detected.    LEFT:  Extensive left lower extremity deep vein thrombi involving the external   iliac vein, common femoral vein, femoral vein, and posterior trunk. The   popliteal and posterior tibial veins appear appear to be patent.    IMPRESSION:  Extensive left lower extremity DVT.  Acute deep venous thrombosis: above and below the knee.

## 2022-01-10 NOTE — PROVIDER CONTACT NOTE (OTHER) - SITUATION
Pt hep gtt PTT lab generated at lab 10:39am - pt went down for test, currently waiting for pt to return to the floor from the test

## 2022-01-10 NOTE — CONSULT NOTE ADULT - ASSESSMENT
Hematology/Oncology consulted on this patient with history of Lung cancer, currently undergoing care at [facility] with Dr Cisse. She has a history of HTN, PTCA, CAD, PM and NIDDM. As per daughter, patient stayed overnight  at [facility] on 1/6/21 after " passing out" She also missed an [facility] IR Lung biopsy originally scheduled on 1/6 at [facility]. She is an 86 year old female with history of  left lung Adenocarcinoma S/P SBRT to the DONNA. 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 Pt with history of IVC filter placed. She presented to [facility] with LLE swelling and pain, confirmed for a positive DVT.    Lung Adenocarcinoma  --treated with RT only within the last 2 months to [facility]  --She is currently undergoing care with Dr Cisse at [facility]  --will follow up after discharge at [facility]  --CT abd/pelvis pending  --Rec CT chest  --If possible, [facility] oncologist Dr Cisse is requesting lung biopsy of FDG avid subpleural nodules in the posterior left lobe ( if patient stable), if not will be done after discharge    DVT  --likely provoked and with history of DVT  --on Heparin  --vascular following  --Duplex  IMPRESSION:  Extensive left lower extremity DVT.  Acute deep venous thrombosis: above and below the knee.    We will be happy to answer any questions on behalf of [facility]    Tova Perez NP  Hematology/Oncology  [facility]  [phone] (Cell)  [phone] (Office)  [phone] (Alt office)  Evenings and weekends please call MD on call or office

## 2022-01-10 NOTE — PROVIDER CONTACT NOTE (OTHER) - ASSESSMENT
Pt. with scant blood on tissue upon entering room. As per pt. and visualization blood is not actively perfusing from nose. Scant amount of blood when pt. puts tissue in her nose. Pt. on Heparin gtt @ 13cc/hr.
VS as per flowsheet

## 2022-01-10 NOTE — PROVIDER CONTACT NOTE (OTHER) - ACTION/TREATMENT ORDERED:
Team 2/ Drew ROMAN made aware. Will continue to monitor as per MD and will endorse to day RN.
MD notified and made aware, will draw PTT lab on pt when pt returns to the floor

## 2022-01-10 NOTE — PROGRESS NOTE ADULT - SUBJECTIVE AND OBJECTIVE BOX
PROGRESS NOTE:   Authored by Jasmin Robbins MD  Pager: [facility] [phone]; [facility] [phone]    Patient is a 86y old  Female who presents with a chief complaint of DVT (2022 18:29)      SUBJECTIVE / OVERNIGHT EVENTS:    ADDITIONAL REVIEW OF SYSTEMS:    MEDICATIONS  (STANDING):  atorvastatin 20 milliGRAM(s) Oral at bedtime  dextrose 40% Gel 15 Gram(s) Oral once  dextrose 5%. 1000 milliLiter(s) (50 mL/Hr) IV Continuous <Continuous>  dextrose 5%. 1000 milliLiter(s) (100 mL/Hr) IV Continuous <Continuous>  dextrose 50% Injectable 25 Gram(s) IV Push once  dextrose 50% Injectable 12.5 Gram(s) IV Push once  dextrose 50% Injectable 25 Gram(s) IV Push once  donepezil 10 milliGRAM(s) Oral at bedtime  escitalopram 5 milliGRAM(s) Oral daily  famotidine    Tablet 20 milliGRAM(s) Oral daily  glucagon  Injectable 1 milliGRAM(s) IntraMuscular once  heparin  Infusion.  Unit(s)/Hr (16 mL/Hr) IV Continuous <Continuous>  insulin lispro (ADMELOG) corrective regimen sliding scale   SubCutaneous three times a day before meals  insulin lispro (ADMELOG) corrective regimen sliding scale   SubCutaneous at bedtime    MEDICATIONS  (PRN):  acetaminophen     Tablet .. 650 milliGRAM(s) Oral every 6 hours PRN Temp greater or equal to 38C (100.4F), Mild Pain (1 - 3)  heparin   Injectable 7000 Unit(s) IV Push every 6 hours PRN For aPTT less than 40  heparin   Injectable 3500 Unit(s) IV Push every 6 hours PRN For aPTT between 40 - 57  melatonin 3 milliGRAM(s) Oral at bedtime PRN Insomnia      CAPILLARY BLOOD GLUCOSE      POCT Blood Glucose.: 212 mg/dL (10 Stefan 2022 06:15)  POCT Blood Glucose.: 177 mg/dL (2022 22:02)    I&O's Summary    2022 07:01  -  10 Stefan 2022 07:00  --------------------------------------------------------  IN: 175 mL / OUT: 200 mL / NET: -25 mL        PHYSICAL EXAM:  Vital Signs Last 24 Hrs  T(C): 36.4 (10 Stefan 2022 05:25), Max: 36.9 (2022 18:15)  T(F): 97.5 (10 Stefan 2022 05:25), Max: 98.4 (2022 18:15)  HR: 64 (10 Stefan 2022 05:25) (64 - 81)  BP: 135/65 (10 Stefan 2022 05:25) (119/72 - 150/64)  BP(mean): --  RR: 18 (10 Stefan 2022 05:25) (16 - 18)  SpO2: 100% (10 Stefan 2022 05:25) (98% - 100%)    GENERAL: No acute distress, well-developed  HEAD:  Atraumatic, Normocephalic  EYES: EOMI, PERRLA, conjunctiva and sclera clear  NECK: Supple, no lymphadenopathy, no JVD  CHEST/LUNG: CTAB; No wheezes, rales, or rhonchi  HEART: Regular rate and rhythm; No murmurs, rubs, or gallops  ABDOMEN: Soft, non-tender, non-distended; normal bowel sounds, no organomegaly  EXTREMITIES:  2+ peripheral pulses b/l, No clubbing, cyanosis, or edema  NEUROLOGY: A&O x 3, no focal deficits  SKIN: No rashes or lesions    LABS:                        9.6    8.56  )-----------( 196      ( 10 Stefan 2022 06:10 )             30.3     01-10    136  |  104  |  39<H>  ----------------------------<  197<H>  3.8   |  18<L>  |  2.06<H>    Ca    9.9      10 Stefan 2022 06:10  Phos  3.3     01-10  Mg     2.2     01-10    TPro  6.5  /  Alb  3.5  /  TBili  0.4  /  DBili  x   /  AST  13  /  ALT  8<L>  /  AlkPhos  66  01-10    PT/INR - ( 10 Stefan 2022 02:58 )   PT: 14.6 sec;   INR: 1.23 ratio         PTT - ( 10 Stefan 2022 02:58 )  PTT:>200.0 sec      Urinalysis Basic - ( 2022 18:46 )    Color: Yellow / Appearance: Slightly Turbid / S.016 / pH: x  Gluc: x / Ketone: Negative  / Bili: Negative / Urobili: Negative   Blood: x / Protein: 30 mg/dL / Nitrite: Positive   Leuk Esterase: Large / RBC: 3 /hpf /  /HPF   Sq Epi: x / Non Sq Epi: 1 /hpf / Bacteria: Moderate          RADIOLOGY & ADDITIONAL TESTS:  Results Reviewed:   Imaging Personally Reviewed:  Electrocardiogram Personally Reviewed:    COORDINATION OF CARE:  Care Discussed with Consultants/Other Providers [Y/N]:  Prior or Outpatient Records Reviewed [Y/N]:   PROGRESS NOTE:   Authored by Jasmin Robbins MD  Pager: [facility] [phone]; [facility] [phone]    Patient is a 86y old  Female who presents with a chief complaint of DVT (2022 18:29)      SUBJECTIVE / OVERNIGHT EVENTS:  No acute events overnight. Pt denies any SOB or chest pain. States that LE pain has improved.     ADDITIONAL REVIEW OF SYSTEMS:    MEDICATIONS  (STANDING):  atorvastatin 20 milliGRAM(s) Oral at bedtime  dextrose 40% Gel 15 Gram(s) Oral once  dextrose 5%. 1000 milliLiter(s) (50 mL/Hr) IV Continuous <Continuous>  dextrose 5%. 1000 milliLiter(s) (100 mL/Hr) IV Continuous <Continuous>  dextrose 50% Injectable 25 Gram(s) IV Push once  dextrose 50% Injectable 12.5 Gram(s) IV Push once  dextrose 50% Injectable 25 Gram(s) IV Push once  donepezil 10 milliGRAM(s) Oral at bedtime  escitalopram 5 milliGRAM(s) Oral daily  famotidine    Tablet 20 milliGRAM(s) Oral daily  glucagon  Injectable 1 milliGRAM(s) IntraMuscular once  heparin  Infusion.  Unit(s)/Hr (16 mL/Hr) IV Continuous <Continuous>  insulin lispro (ADMELOG) corrective regimen sliding scale   SubCutaneous three times a day before meals  insulin lispro (ADMELOG) corrective regimen sliding scale   SubCutaneous at bedtime    MEDICATIONS  (PRN):  acetaminophen     Tablet .. 650 milliGRAM(s) Oral every 6 hours PRN Temp greater or equal to 38C (100.4F), Mild Pain (1 - 3)  heparin   Injectable 7000 Unit(s) IV Push every 6 hours PRN For aPTT less than 40  heparin   Injectable 3500 Unit(s) IV Push every 6 hours PRN For aPTT between 40 - 57  melatonin 3 milliGRAM(s) Oral at bedtime PRN Insomnia      CAPILLARY BLOOD GLUCOSE      POCT Blood Glucose.: 212 mg/dL (10 Stefan 2022 06:15)  POCT Blood Glucose.: 177 mg/dL (2022 22:02)    I&O's Summary    2022 07:01  -  10 Stefan 2022 07:00  --------------------------------------------------------  IN: 175 mL / OUT: 200 mL / NET: -25 mL        PHYSICAL EXAM:  Vital Signs Last 24 Hrs  T(C): 36.4 (10 Stefan 2022 05:25), Max: 36.9 (2022 18:15)  T(F): 97.5 (10 Stefan 2022 05:25), Max: 98.4 (2022 18:15)  HR: 64 (10 Stefan 2022 05:25) (64 - 81)  BP: 135/65 (10 Stefan 2022 05:25) (119/72 - 150/64)  BP(mean): --  RR: 18 (10 Stefan 2022 05:25) (16 - 18)  SpO2: 100% (10 Stefan 2022 05:25) (98% - 100%)    PHYSICAL EXAM:  GENERAL: NAD, well-groomed, well-developed  HEAD:  Atraumatic, Normocephalic  EYES: EOMI, PERRLA, conjunctiva and sclera clear  ENMT: No tonsillar erythema, exudates, or enlargement; Moist mucous membranes, Good dentition  NECK: Supple, No JVD  NERVOUS SYSTEM: AOX3, motor and sensation grossly intact in b/l UE and b/l LE  PSYCHIATRIC: Appropriate affect and mood  CHEST/LUNG: Clear to auscultation bilaterally; No rales, rhonchi, wheezing, or rubs  HEART: Regular rate and rhythm; 2/6 systolic murmur at L upper sternal border  ABDOMEN: Soft, Nontender, Nondistended; Bowel sounds present  EXTREMITIES:  2+ Peripheral Pulses, significant non pitting edema of LLE, no RLE edema  SKIN: No rashes or lesions    LABS:                        9.6    8.56  )-----------( 196      ( 10 Stefan 2022 06:10 )             30.3     01-10    136  |  104  |  39<H>  ----------------------------<  197<H>  3.8   |  18<L>  |  2.06<H>    Ca    9.9      10 Stefan 2022 06:10  Phos  3.3     01-10  Mg     2.2     01-10    TPro  6.5  /  Alb  3.5  /  TBili  0.4  /  DBili  x   /  AST  13  /  ALT  8<L>  /  AlkPhos  66  01-10    PT/INR - ( 10 Stefan 2022 02:58 )   PT: 14.6 sec;   INR: 1.23 ratio         PTT - ( 10 Stefan 2022 02:58 )  PTT:>200.0 sec      Urinalysis Basic - ( 2022 18:46 )    Color: Yellow / Appearance: Slightly Turbid / S.016 / pH: x  Gluc: x / Ketone: Negative  / Bili: Negative / Urobili: Negative   Blood: x / Protein: 30 mg/dL / Nitrite: Positive   Leuk Esterase: Large / RBC: 3 /hpf /  /HPF   Sq Epi: x / Non Sq Epi: 1 /hpf / Bacteria: Moderate          RADIOLOGY & ADDITIONAL TESTS:  Results Reviewed:   Imaging Personally Reviewed:  Electrocardiogram Personally Reviewed:    COORDINATION OF CARE:  Care Discussed with Consultants/Other Providers [Y/N]:  Prior or Outpatient Records Reviewed [Y/N]:

## 2022-01-10 NOTE — PROGRESS NOTE ADULT - PROBLEM SELECTOR PLAN 4
-Noted 2/6 systolic murmur at L upper sternal border  -patient states she has never been told of history of murmur  -given murmur as well as recent syncope, possible c/f RH strain    Plan:  obtain echo

## 2022-01-10 NOTE — PROGRESS NOTE ADULT - ASSESSMENT
86F with PMH of lung cancer on radiation, R popliteal DVT s/p IVC filter, T2DM, HTN, obesity, remote MI s/p stent, multiple falls presents with 1 day of LLE swelling/pain found to have extensive LLE DVT (external iliac to femoral, posterior trunk) with IVCF in place    - No acute vascular intervention indicated, no e/o LLE phlegmasia  - c/w therapeutic anticoagulation  - Hematology evaluation for lung cancer, provoked DVT and known history of DVT  - LE elevation  - CT venogram to assess proximal extent of thrombus  - CT head to assess for intracranial bleeding as patient has had multiple falls and is now on hep gtt  - Carotid duplex as part of syncopal workup  - Monitor skin changes, neurovascular status and alert vascular surgery team of any acute changes  - d/w pt that if there are any changes in the lle vasc exam she may need a lle thrombectomy but at this time this is not indicated   - Advised pt to ambulate as tolerated  - Please page [phone] w/ any questions    KAYLIE Decker PGY-3 will follow 86F with PMH of lung cancer on radiation, R popliteal DVT s/p IVC filter, T2DM, HTN, obesity, remote MI s/p stent, multiple falls presents with 1 day of LLE swelling/pain found to have extensive LLE DVT (external iliac to femoral, posterior trunk) with IVCF in place    - No acute vascular intervention indicated, no e/o LLE phlegmasia  clinically improving   - c/w therapeutic anticoagulation  - Hematology evaluation for lung cancer, provoked DVT and known history of DVT  - LE elevation  - CT venogram to assess proximal extent of thrombus  - CT head to assess for intracranial bleeding as patient has had multiple falls and is now on hep gtt  - Monitor skin changes, neurovascular status and alert vascular surgery team of any acute changes  -will follow     KAYLIE Decker PGY-3

## 2022-01-10 NOTE — PROVIDER CONTACT NOTE (CRITICAL VALUE NOTIFICATION) - ACTION/TREATMENT ORDERED:
team 2 made aware. Heparin nomogram followed. Will continue to monitor.
team 2 made aware. Heparin nomogram followed. Will continue to monitor.

## 2022-01-10 NOTE — PROVIDER CONTACT NOTE (CRITICAL VALUE NOTIFICATION) - ASSESSMENT
VS as charted. Pt. A&ox4, admitted to [facility] Heparin gtt running at 16cc/hr.
VS as charted. Pt. A&ox4, admitted to [facility] Heparin gtt running at 17cc/hr.

## 2022-01-10 NOTE — PROGRESS NOTE ADULT - PROBLEM SELECTOR PLAN 2
-Patient's Cr 2.3 on admission, up from last known 1.1 in March   -patient was recently hospitalized for syncope at [facility] which was though to be from dehydration, and she was treated w/ IVF  -could be prerenal in the setting of dehydration, but will r/o other causes    Plan: IVF 50cc/hr LR overnight trial and repeat BMP in morning given unknown cardiac function  -obtain urine lytes -Patient's Cr 2.3 on admission, up from last known 1.1 in March   -patient was recently hospitalized for syncope at [facility] which was though to be from dehydration, and she was treated w/ IVF  -could be prerenal in the setting of dehydration, but will r/o other causes    Plan: IVF 50cc/hr LR overnight trial and repeat BMP in morning given unknown cardiac function  -obtain urine lytes: FeNA: 0.4% (likely pre-renal)

## 2022-01-10 NOTE — PROGRESS NOTE ADULT - PROBLEM SELECTOR PLAN 1
-Woke up this morning w/ swelling and pain of LLE  -Found to have acute LLE DVT involving the external iliac vein, common femoral vein, femoral vein, and posterior trunk  -likely due to combination of recent hospitalization for syncope, lung cancer, and immobility    Plan:  -start heparin drip  -monitor for neurovascular changes  -no acute vascular surgery intervention -Woke up this morning w/ swelling and pain of LLE  -Found to have acute LLE DVT involving the external iliac vein, common femoral vein, femoral vein, and posterior trunk  -likely due to combination of recent hospitalization for syncope, lung cancer, and immobility    Plan:  -start heparin drip  -monitor for neurovascular changes  -no acute vascular surgery intervention  - Hem evaluation for recurrent DVT -Woke up this morning w/ swelling and pain of LLE  -Found to have acute LLE DVT involving the external iliac vein, common femoral vein, femoral vein, and posterior trunk  -likely due to combination of recent hospitalization for syncope, lung cancer, and immobility    Plan:  -start heparin drip  -monitor for neurovascular changes  -no acute vascular surgery intervention  -Hematology evaluation for recurrent DVT

## 2022-01-11 DIAGNOSIS — N39.0 URINARY TRACT INFECTION, SITE NOT SPECIFIED: ICD-10-CM

## 2022-01-11 LAB
ALBUMIN SERPL ELPH-MCNC: 3.4 G/DL — SIGNIFICANT CHANGE UP (ref 3.3–5)
ALP SERPL-CCNC: 72 U/L — SIGNIFICANT CHANGE UP (ref 40–120)
ALT FLD-CCNC: 9 U/L — LOW (ref 10–45)
ANION GAP SERPL CALC-SCNC: 13 MMOL/L — SIGNIFICANT CHANGE UP (ref 5–17)
APTT BLD: 64 SEC — HIGH (ref 27.5–35.5)
APTT BLD: 81.9 SEC — HIGH (ref 27.5–35.5)
AST SERPL-CCNC: 11 U/L — SIGNIFICANT CHANGE UP (ref 10–40)
BILIRUB SERPL-MCNC: 0.2 MG/DL — SIGNIFICANT CHANGE UP (ref 0.2–1.2)
BUN SERPL-MCNC: 35 MG/DL — HIGH (ref 7–23)
CALCIUM SERPL-MCNC: 9.6 MG/DL — SIGNIFICANT CHANGE UP (ref 8.4–10.5)
CHLORIDE SERPL-SCNC: 105 MMOL/L — SIGNIFICANT CHANGE UP (ref 96–108)
CO2 SERPL-SCNC: 18 MMOL/L — LOW (ref 22–31)
CREAT SERPL-MCNC: 1.92 MG/DL — HIGH (ref 0.5–1.3)
GLUCOSE BLDC GLUCOMTR-MCNC: 186 MG/DL — HIGH (ref 70–99)
GLUCOSE BLDC GLUCOMTR-MCNC: 189 MG/DL — HIGH (ref 70–99)
GLUCOSE BLDC GLUCOMTR-MCNC: 203 MG/DL — HIGH (ref 70–99)
GLUCOSE BLDC GLUCOMTR-MCNC: 207 MG/DL — HIGH (ref 70–99)
GLUCOSE SERPL-MCNC: 208 MG/DL — HIGH (ref 70–99)
HCT VFR BLD CALC: 32.8 % — LOW (ref 34.5–45)
HGB BLD-MCNC: 10.4 G/DL — LOW (ref 11.5–15.5)
MAGNESIUM SERPL-MCNC: 2.3 MG/DL — SIGNIFICANT CHANGE UP (ref 1.6–2.6)
MCHC RBC-ENTMCNC: 28.5 PG — SIGNIFICANT CHANGE UP (ref 27–34)
MCHC RBC-ENTMCNC: 31.7 GM/DL — LOW (ref 32–36)
MCV RBC AUTO: 89.9 FL — SIGNIFICANT CHANGE UP (ref 80–100)
NRBC # BLD: 0 /100 WBCS — SIGNIFICANT CHANGE UP (ref 0–0)
PHOSPHATE SERPL-MCNC: 3.3 MG/DL — SIGNIFICANT CHANGE UP (ref 2.5–4.5)
PLATELET # BLD AUTO: 236 K/UL — SIGNIFICANT CHANGE UP (ref 150–400)
POTASSIUM SERPL-MCNC: 4.2 MMOL/L — SIGNIFICANT CHANGE UP (ref 3.5–5.3)
POTASSIUM SERPL-SCNC: 4.2 MMOL/L — SIGNIFICANT CHANGE UP (ref 3.5–5.3)
PROT SERPL-MCNC: 6.6 G/DL — SIGNIFICANT CHANGE UP (ref 6–8.3)
RBC # BLD: 3.65 M/UL — LOW (ref 3.8–5.2)
RBC # FLD: 12.7 % — SIGNIFICANT CHANGE UP (ref 10.3–14.5)
SODIUM SERPL-SCNC: 136 MMOL/L — SIGNIFICANT CHANGE UP (ref 135–145)
WBC # BLD: 8.82 K/UL — SIGNIFICANT CHANGE UP (ref 3.8–10.5)
WBC # FLD AUTO: 8.82 K/UL — SIGNIFICANT CHANGE UP (ref 3.8–10.5)

## 2022-01-11 PROCEDURE — 70450 CT HEAD/BRAIN W/O DYE: CPT | Mod: 26

## 2022-01-11 PROCEDURE — 99232 SBSQ HOSP IP/OBS MODERATE 35: CPT | Mod: GC

## 2022-01-11 PROCEDURE — 99232 SBSQ HOSP IP/OBS MODERATE 35: CPT

## 2022-01-11 RX ORDER — SODIUM CHLORIDE 9 MG/ML
1000 INJECTION, SOLUTION INTRAVENOUS
Refills: 0 | Status: DISCONTINUED | OUTPATIENT
Start: 2022-01-11 | End: 2022-01-11

## 2022-01-11 RX ORDER — SODIUM CHLORIDE 9 MG/ML
1000 INJECTION, SOLUTION INTRAVENOUS
Refills: 0 | Status: DISCONTINUED | OUTPATIENT
Start: 2022-01-11 | End: 2022-01-12

## 2022-01-11 RX ADMIN — FAMOTIDINE 20 MILLIGRAM(S): 10 INJECTION INTRAVENOUS at 11:54

## 2022-01-11 RX ADMIN — DONEPEZIL HYDROCHLORIDE 10 MILLIGRAM(S): 10 TABLET, FILM COATED ORAL at 21:21

## 2022-01-11 RX ADMIN — HEPARIN SODIUM 1400 UNIT(S)/HR: 5000 INJECTION INTRAVENOUS; SUBCUTANEOUS at 19:26

## 2022-01-11 RX ADMIN — Medication 2: at 17:54

## 2022-01-11 RX ADMIN — CEFTRIAXONE 100 MILLIGRAM(S): 500 INJECTION, POWDER, FOR SOLUTION INTRAMUSCULAR; INTRAVENOUS at 16:01

## 2022-01-11 RX ADMIN — Medication 4: at 12:57

## 2022-01-11 RX ADMIN — SODIUM CHLORIDE 75 MILLILITER(S): 9 INJECTION, SOLUTION INTRAVENOUS at 17:54

## 2022-01-11 RX ADMIN — ATORVASTATIN CALCIUM 20 MILLIGRAM(S): 80 TABLET, FILM COATED ORAL at 21:20

## 2022-01-11 RX ADMIN — HEPARIN SODIUM 1400 UNIT(S)/HR: 5000 INJECTION INTRAVENOUS; SUBCUTANEOUS at 00:42

## 2022-01-11 RX ADMIN — Medication 2: at 08:47

## 2022-01-11 RX ADMIN — SODIUM CHLORIDE 50 MILLILITER(S): 9 INJECTION, SOLUTION INTRAVENOUS at 13:49

## 2022-01-11 RX ADMIN — ESCITALOPRAM OXALATE 5 MILLIGRAM(S): 10 TABLET, FILM COATED ORAL at 11:53

## 2022-01-11 RX ADMIN — HEPARIN SODIUM 1400 UNIT(S)/HR: 5000 INJECTION INTRAVENOUS; SUBCUTANEOUS at 07:34

## 2022-01-11 NOTE — DIETITIAN INITIAL EVALUATION ADULT. - ADD RECOMMEND
Suggest multivitamin and vitamin C supplementation if not otherwise contraindicated to promote wound healing. Provide encouragement with PO intake, menu selections, and assistance with meals as needed. Continue to monitor nutritional intake, labs, weights, BM, skin, clinical course.

## 2022-01-11 NOTE — DIETITIAN NUTRITION RISK NOTIFICATION - TREATMENT: THE FOLLOWING DIET HAS BEEN RECOMMENDED
Diet, Regular:   Consistent Carbohydrate {Evening Snack} (CSTCHOSN)  DASH/TLC {Sodium & Cholesterol Restricted} (DASH) (01-09-22 @ 17:45) [Active]

## 2022-01-11 NOTE — DIETITIAN INITIAL EVALUATION ADULT. - ETIOLOGY
inadequate PO intake in setting of increased physiological demand for nutrients 2/2 lung CA on radiation, pressure injury

## 2022-01-11 NOTE — CHART NOTE - NSCHARTNOTEFT_GEN_A_CORE
- Will evaluate once CT venogram is performed to assess proximal extent of thrombus
-Request for lung biopsy by outside oncologist, specifically a hypermetabolic left lung subpleural nodule. However most recent PET imaging in [facility] PACS is from 6/11/21 and does not show subpleural nodules.   -Can upload outside imaging to PACS via medical records to re-evaluate.  -Unable to perform inpatient biopsy in the setting of acute DVT requiring anticoagulation. Recommend outpatient oncology follow up and then arrange for biopsy. Probably best to continue care with oncologist at [facility] to avoid discontinuity of care.

## 2022-01-11 NOTE — DIETITIAN INITIAL EVALUATION ADULT. - ORAL INTAKE PTA/DIET HISTORY
PTA pt reports fair PO intake, eats 2 meals per day. For breakfast eats eggs and sausage, for lunch eats soup with vegetables. Skips dinner. Snacks on pretzels and sugar free cookies. Endorses intake of vitamin D and vitamin B12 PTA. NKFA. No chewing/swallowing difficulty reported.

## 2022-01-11 NOTE — PROGRESS NOTE ADULT - PROBLEM SELECTOR PLAN 5
-home prandin 1mg TID  -ISS inpatient, A1C in AM -Noted 2/6 systolic murmur at L upper sternal border  -patient states she has never been told of history of murmur  -given murmur as well as recent syncope, possible c/f RH strain    Plan:  obtain echo

## 2022-01-11 NOTE — PHYSICAL THERAPY INITIAL EVALUATION ADULT - PERTINENT HX OF CURRENT PROBLEM, REHAB EVAL
85F with PMH of lung cancer on radiation, history of DVT w/ IVC filter, T2DM not on insulin, HTN, obesity, COVID in February, remote MI s/p stent presenting with LLE swelling/pain found to have acute LLE DVT, likely d/t combination of recent hospitalization for syncope, lung cancer, and prolonged immobility.

## 2022-01-11 NOTE — PROGRESS NOTE ADULT - PROBLEM SELECTOR PLAN 2
-Patient's Cr 2.3 on admission, up from last known 1.1 in March   -patient was recently hospitalized for syncope at [facility] which was though to be from dehydration, and she was treated w/ IVF  -could be prerenal in the setting of dehydration, but will r/o other causes    Plan: IVF 50cc/hr LR overnight trial and repeat BMP in morning given unknown cardiac function  -obtain urine lytes: FeNA: 0.4% (likely pre-renal) -Patient's Cr 2.3 on admission, up from last known 1.1 in March   -patient was recently hospitalized for syncope at [facility] which was though to be from dehydration, and she was treated w/ IVF  -could be prerenal in the setting of dehydration, but will r/o other causes    Plan: IVF 50cc/hr LR overnight trial and repeat BMP in morning given unknown cardiac function  -obtain urine lytes: FeNA: 0.4% (likely pre-renal)  - improving Cr , cont to monitor

## 2022-01-11 NOTE — PROGRESS NOTE ADULT - ATTENDING COMMENTS
84 y/o female with history of lung cancer followed by [facility] admitted with new DVT. She was planned to undergo biopsy of the posterior left lobe PET-avid nodule on January 6th but unable to make it to appointment. DVT likely associated with malignancy; recommend transition to enoxaparin or DOAC. If interventional radiology here can perform biopsy can maintain patient on heparin drip. Otherwise enoxaparin may be preferred in the short-term while [facility] set up an outpatient biopsy again.

## 2022-01-11 NOTE — DIETITIAN INITIAL EVALUATION ADULT. - PHYSCIAL ASSESSMENT
Per documentation pt with stage II to right buttocks.  Nutrition focused physical exam deferred at this time per pt preference.

## 2022-01-11 NOTE — PROGRESS NOTE ADULT - ASSESSMENT
Hematology/Oncology consulted on this patient with history of Lung cancer, currently undergoing care at [facility] with Dr Cisse. She has a history of HTN, PTCA, CAD, PM and NIDDM. As per daughter, patient stayed overnight  at [facility] on 1/6/21 after " passing out" She also missed an [facility] [place] originally scheduled on 1/6 at [facility]. She is an 86 year old female with history of  left lung Adenocarcinoma S/P SBRT to the DONNA. 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 Pt with history of IVC filter placed. She presented to [facility] with LLE swelling and pain, confirmed for a positive DVT.    Lung Adenocarcinoma  --treated with RT only within the last 2 months to [facility]  --She is currently undergoing care with Dr Cisse at [facility]  --will follow up after discharge at [facility]  --CT abd/pelvis pending  --Rec CT chest  --If possible, [facility] oncologist Dr Cisse is requesting lung biopsy of FDG avid subpleural nodules in the posterior left lobe ( if patient stable), if not can be done as outpatient     DVT  --likely provoked and with history of DVT  --remains on Heparin  --vascular following  --Duplex  IMPRESSION:  Extensive left lower extremity DVT.  Acute deep venous thrombosis: above and below the knee.  --CT Lower extremity pending    Anemia  --Hgb 9.5  --likely reactive  --If acute drop, please transfuse for Hgb <7.0    We will be happy to answer any questions on behalf of [facility]    Tova Perez NP  Hematology/Oncology  [facility]  [phone] (Cell)  [phone] (Office)  [phone] (Alt office)  Evenings and weekends please call MD on call or office   Hematology/Oncology consulted on this patient with history of Lung cancer, currently undergoing care at [facility] with Dr Cisse. She has a history of HTN, PTCA, CAD, PM and NIDDM. As per daughter, patient stayed overnight  at [facility] on 1/6/21 after " passing out" She also missed [place] [facility] [place]6 [place] [facility]. She is an 86 year old female with history of  left lung Adenocarcinoma S/P SBRT to the DONNA. 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 Pt with history of IVC filter placed. She presented to [facility] with LLE swelling and pain, confirmed for a positive DVT.    Lung Adenocarcinoma  --treated with RT only within the last 2 months to [facility]  --She is currently undergoing care with Dr Cisse at [facility]  --will follow up after discharge at [facility]  --CT abd/pelvis pending  --Rec CT chest  --If possible, [facility] oncologist Dr Cisse is requesting lung biopsy of FDG avid subpleural nodules in the posterior left lobe ( if patient stable), if not can be done as outpatient     DVT  --likely provoked and with history of DVT  --remains on Heparin  --vascular following, venogram pending  --Duplex  IMPRESSION:  Extensive left lower extremity DVT.  Acute deep venous thrombosis: above and below the knee.  --CT Lower extremity pending    Anemia  --Hgb 9.5  --likely reactive  --If acute drop, please transfuse for Hgb <7.0    Elevated creatinine  --1.92 today, improvement from yesterday at 2.06  --rec renal consult    We will be happy to answer any questions on behalf of [facility]    Tova Perez NP  Hematology/Oncology  [facility]  [phone] (Cell)  [phone] (Office)  [phone] (Alt office)  Evenings and weekends please call MD on call or office

## 2022-01-11 NOTE — PROGRESS NOTE ADULT - PROBLEM SELECTOR PLAN 3
I Reilly Mena MD have personally  seen and examined the patient today and have noted the findings and formulated the plan of care.  I Reilly Mena MD have personally seen and examined the patient at bedside today at 8 am

## 2022-01-11 NOTE — PROGRESS NOTE ADULT - SUBJECTIVE AND OBJECTIVE BOX
PROGRESS NOTE:   Authored by Jasmin Robbins MD  Pager: [facility] [phone]; [facility] [phone]    Patient is a 86y old  Female who presents with a chief complaint of DVT (2022 07:37)      SUBJECTIVE / OVERNIGHT EVENTS:    ADDITIONAL REVIEW OF SYSTEMS:    MEDICATIONS  (STANDING):  atorvastatin 20 milliGRAM(s) Oral at bedtime  cefTRIAXone   IVPB      cefTRIAXone   IVPB 1000 milliGRAM(s) IV Intermittent every 24 hours  dextrose 40% Gel 15 Gram(s) Oral once  dextrose 5%. 1000 milliLiter(s) (50 mL/Hr) IV Continuous <Continuous>  dextrose 5%. 1000 milliLiter(s) (100 mL/Hr) IV Continuous <Continuous>  dextrose 50% Injectable 25 Gram(s) IV Push once  dextrose 50% Injectable 12.5 Gram(s) IV Push once  dextrose 50% Injectable 25 Gram(s) IV Push once  donepezil 10 milliGRAM(s) Oral at bedtime  escitalopram 5 milliGRAM(s) Oral daily  famotidine    Tablet 20 milliGRAM(s) Oral daily  glucagon  Injectable 1 milliGRAM(s) IntraMuscular once  heparin  Infusion.  Unit(s)/Hr (16 mL/Hr) IV Continuous <Continuous>  insulin lispro (ADMELOG) corrective regimen sliding scale   SubCutaneous three times a day before meals  insulin lispro (ADMELOG) corrective regimen sliding scale   SubCutaneous at bedtime  lactated ringers. 1000 milliLiter(s) (50 mL/Hr) IV Continuous <Continuous>    MEDICATIONS  (PRN):  acetaminophen     Tablet .. 650 milliGRAM(s) Oral every 6 hours PRN Temp greater or equal to 38C (100.4F), Mild Pain (1 - 3)  heparin   Injectable 7000 Unit(s) IV Push every 6 hours PRN For aPTT less than 40  heparin   Injectable 3500 Unit(s) IV Push every 6 hours PRN For aPTT between 40 - 57  melatonin 3 milliGRAM(s) Oral at bedtime PRN Insomnia      CAPILLARY BLOOD GLUCOSE      POCT Blood Glucose.: 209 mg/dL (10 Stefan 2022 21:03)  POCT Blood Glucose.: 145 mg/dL (10 Stefan 2022 17:14)  POCT Blood Glucose.: 211 mg/dL (10 Stefan 2022 12:46)  POCT Blood Glucose.: 208 mg/dL (10 Stefan 2022 08:52)    I&O's Summary    10 Stefan 2022 07:01  -  2022 07:00  --------------------------------------------------------  IN: 715 mL / OUT: 400 mL / NET: 315 mL        PHYSICAL EXAM:  Vital Signs Last 24 Hrs  T(C): 36.8 (2022 05:02), Max: 36.8 (2022 05:02)  T(F): 98.2 (2022 05:02), Max: 98.2 (2022 05:02)  HR: 68 (2022 05:) (68 - 88)  BP: 149/66 (2022 05:02) (145/76 - 149/66)  BP(mean): --  RR: 18 (2022 05:02) (18 - 20)  SpO2: 96% (2022 05:02) (96% - 99%)    GENERAL: No acute distress, well-developed  HEAD:  Atraumatic, Normocephalic  EYES: EOMI, PERRLA, conjunctiva and sclera clear  NECK: Supple, no lymphadenopathy, no JVD  CHEST/LUNG: CTAB; No wheezes, rales, or rhonchi  HEART: Regular rate and rhythm; No murmurs, rubs, or gallops  ABDOMEN: Soft, non-tender, non-distended; normal bowel sounds, no organomegaly  EXTREMITIES:  2+ peripheral pulses b/l, No clubbing, cyanosis, or edema  NEUROLOGY: A&O x 3, no focal deficits  SKIN: No rashes or lesions    LABS:                        9.5    9.41  )-----------( 228      ( 10 Stefan 2022 21:35 )             29.7     01-10    136  |  104  |  39<H>  ----------------------------<  197<H>  3.8   |  18<L>  |  2.06<H>    Ca    9.9      10 Stefan 2022 06:10  Phos  3.3     01-10  Mg     2.2     01-10    TPro  6.5  /  Alb  3.5  /  TBili  0.4  /  DBili  x   /  AST  13  /  ALT  8<L>  /  AlkPhos  66  01-10    PT/INR - ( 10 Stefan 2022 02:58 )   PT: 14.6 sec;   INR: 1.23 ratio         PTT - ( 10 Stefan 2022 21:35 )  PTT:>200.0 sec      Urinalysis Basic - ( 2022 18:46 )    Color: Yellow / Appearance: Slightly Turbid / S.016 / pH: x  Gluc: x / Ketone: Negative  / Bili: Negative / Urobili: Negative   Blood: x / Protein: 30 mg/dL / Nitrite: Positive   Leuk Esterase: Large / RBC: 3 /hpf /  /HPF   Sq Epi: x / Non Sq Epi: 1 /hpf / Bacteria: Moderate          RADIOLOGY & ADDITIONAL TESTS:  Results Reviewed:   Imaging Personally Reviewed:  Electrocardiogram Personally Reviewed:    COORDINATION OF CARE:  Care Discussed with Consultants/Other Providers [Y/N]:  Prior or Outpatient Records Reviewed [Y/N]:   PROGRESS NOTE:   Authored by Jasmin Robbins MD  Pager: [facility] [phone]; [facility] [phone]    Patient is a 86y old  Female who presents with a chief complaint of DVT (2022 07:37)      SUBJECTIVE / OVERNIGHT EVENTS:  No acute events overnight. Pt denies any pain and feels betters    ADDITIONAL REVIEW OF SYSTEMS:    MEDICATIONS  (STANDING):  atorvastatin 20 milliGRAM(s) Oral at bedtime  cefTRIAXone   IVPB      cefTRIAXone   IVPB 1000 milliGRAM(s) IV Intermittent every 24 hours  dextrose 40% Gel 15 Gram(s) Oral once  dextrose 5%. 1000 milliLiter(s) (50 mL/Hr) IV Continuous <Continuous>  dextrose 5%. 1000 milliLiter(s) (100 mL/Hr) IV Continuous <Continuous>  dextrose 50% Injectable 25 Gram(s) IV Push once  dextrose 50% Injectable 12.5 Gram(s) IV Push once  dextrose 50% Injectable 25 Gram(s) IV Push once  donepezil 10 milliGRAM(s) Oral at bedtime  escitalopram 5 milliGRAM(s) Oral daily  famotidine    Tablet 20 milliGRAM(s) Oral daily  glucagon  Injectable 1 milliGRAM(s) IntraMuscular once  heparin  Infusion.  Unit(s)/Hr (16 mL/Hr) IV Continuous <Continuous>  insulin lispro (ADMELOG) corrective regimen sliding scale   SubCutaneous three times a day before meals  insulin lispro (ADMELOG) corrective regimen sliding scale   SubCutaneous at bedtime  lactated ringers. 1000 milliLiter(s) (50 mL/Hr) IV Continuous <Continuous>    MEDICATIONS  (PRN):  acetaminophen     Tablet .. 650 milliGRAM(s) Oral every 6 hours PRN Temp greater or equal to 38C (100.4F), Mild Pain (1 - 3)  heparin   Injectable 7000 Unit(s) IV Push every 6 hours PRN For aPTT less than 40  heparin   Injectable 3500 Unit(s) IV Push every 6 hours PRN For aPTT between 40 - 57  melatonin 3 milliGRAM(s) Oral at bedtime PRN Insomnia      CAPILLARY BLOOD GLUCOSE      POCT Blood Glucose.: 209 mg/dL (10 Stefan 2022 21:03)  POCT Blood Glucose.: 145 mg/dL (10 Stefan 2022 17:14)  POCT Blood Glucose.: 211 mg/dL (10 Stefan 2022 12:46)  POCT Blood Glucose.: 208 mg/dL (10 Stefan 2022 08:52)    I&O's Summary    10 Stefan 2022 07:01  -  2022 07:00  --------------------------------------------------------  IN: 715 mL / OUT: 400 mL / NET: 315 mL        PHYSICAL EXAM:  Vital Signs Last 24 Hrs  T(C): 36.8 (2022 05:02), Max: 36.8 (2022 05:02)  T(F): 98.2 (2022 05:02), Max: 98.2 (2022 05:02)  HR: 68 (2022 05:02) (68 - 88)  BP: 149/66 (2022 05:02) (145/76 - 149/66)  BP(mean): --  RR: 18 (2022 05:02) (18 - 20)  SpO2: 96% (2022 05:02) (96% - 99%)    GENERAL: NAD, well-groomed, well-developed  HEAD:  Atraumatic, Normocephalic  EYES: EOMI, PERRLA, conjunctiva and sclera clear  ENMT: No tonsillar erythema, exudates, or enlargement; Moist mucous membranes, Good dentition  NECK: Supple, No JVD  NERVOUS SYSTEM: AOX3, motor and sensation grossly intact in b/l UE and b/l LE  PSYCHIATRIC: Appropriate affect and mood  CHEST/LUNG: Clear to auscultation bilaterally; No rales, rhonchi, wheezing, or rubs  HEART: Regular rate and rhythm; 2/6 systolic murmur at L upper sternal border  ABDOMEN: Soft, Nontender, Nondistended; Bowel sounds present  EXTREMITIES:  2+ Peripheral Pulses, significant non pitting edema of LLE, no RLE edema  SKIN: No rashes or lesions      LABS:                        9.5    9.41  )-----------( 228      ( 10 Stefan 2022 21:35 )             29.7     01-10    136  |  104  |  39<H>  ----------------------------<  197<H>  3.8   |  18<L>  |  2.06<H>    Ca    9.9      10 Stefan 2022 06:10  Phos  3.3     01-10  Mg     2.2     01-10    TPro  6.5  /  Alb  3.5  /  TBili  0.4  /  DBili  x   /  AST  13  /  ALT  8<L>  /  AlkPhos  66  01-10    PT/INR - ( 10 Stefan 2022 02:58 )   PT: 14.6 sec;   INR: 1.23 ratio         PTT - ( 10 Stefan 2022 21:35 )  PTT:>200.0 sec      Urinalysis Basic - ( 2022 18:46 )    Color: Yellow / Appearance: Slightly Turbid / S.016 / pH: x  Gluc: x / Ketone: Negative  / Bili: Negative / Urobili: Negative   Blood: x / Protein: 30 mg/dL / Nitrite: Positive   Leuk Esterase: Large / RBC: 3 /hpf /  /HPF   Sq Epi: x / Non Sq Epi: 1 /hpf / Bacteria: Moderate          RADIOLOGY & ADDITIONAL TESTS:  Results Reviewed:   Imaging Personally Reviewed:  Electrocardiogram Personally Reviewed:    COORDINATION OF CARE:  Care Discussed with Consultants/Other Providers [Y/N]:  Prior or Outpatient Records Reviewed [Y/N]:   PROGRESS NOTE:   Authored by Jasmin Robbins MD  Pager: [facility] [phone]; [facility] [phone]    Patient is a 86y old  Female who presents with a chief complaint of DVT (2022 07:37)      SUBJECTIVE / OVERNIGHT EVENTS:  No acute events overnight. Pt denies any pain and feels betters    ADDITIONAL REVIEW OF SYSTEMS:   Negative unless specified above     MEDICATIONS  (STANDING):  atorvastatin 20 milliGRAM(s) Oral at bedtime  cefTRIAXone   IVPB      cefTRIAXone   IVPB 1000 milliGRAM(s) IV Intermittent every 24 hours  dextrose 40% Gel 15 Gram(s) Oral once  dextrose 5%. 1000 milliLiter(s) (50 mL/Hr) IV Continuous <Continuous>  dextrose 5%. 1000 milliLiter(s) (100 mL/Hr) IV Continuous <Continuous>  dextrose 50% Injectable 25 Gram(s) IV Push once  dextrose 50% Injectable 12.5 Gram(s) IV Push once  dextrose 50% Injectable 25 Gram(s) IV Push once  donepezil 10 milliGRAM(s) Oral at bedtime  escitalopram 5 milliGRAM(s) Oral daily  famotidine    Tablet 20 milliGRAM(s) Oral daily  glucagon  Injectable 1 milliGRAM(s) IntraMuscular once  heparin  Infusion.  Unit(s)/Hr (16 mL/Hr) IV Continuous <Continuous>  insulin lispro (ADMELOG) corrective regimen sliding scale   SubCutaneous three times a day before meals  insulin lispro (ADMELOG) corrective regimen sliding scale   SubCutaneous at bedtime  lactated ringers. 1000 milliLiter(s) (50 mL/Hr) IV Continuous <Continuous>    MEDICATIONS  (PRN):  acetaminophen     Tablet .. 650 milliGRAM(s) Oral every 6 hours PRN Temp greater or equal to 38C (100.4F), Mild Pain (1 - 3)  heparin   Injectable 7000 Unit(s) IV Push every 6 hours PRN For aPTT less than 40  heparin   Injectable 3500 Unit(s) IV Push every 6 hours PRN For aPTT between 40 - 57  melatonin 3 milliGRAM(s) Oral at bedtime PRN Insomnia      CAPILLARY BLOOD GLUCOSE      POCT Blood Glucose.: 209 mg/dL (10 Stefan 2022 21:03)  POCT Blood Glucose.: 145 mg/dL (10 Stefan 2022 17:14)  POCT Blood Glucose.: 211 mg/dL (10 Stefan 2022 12:46)  POCT Blood Glucose.: 208 mg/dL (10 Stefan 2022 08:52)    I&O's Summary    10 Stefan 2022 07:01  -  2022 07:00  --------------------------------------------------------  IN: 715 mL / OUT: 400 mL / NET: 315 mL        PHYSICAL EXAM:  Vital Signs Last 24 Hrs  T(C): 36.8 (2022 05:02), Max: 36.8 (2022 05:02)  T(F): 98.2 (2022 05:02), Max: 98.2 (2022 05:02)  HR: 68 (2022 05:02) (68 - 88)  BP: 149/66 (2022 05:02) (145/76 - 149/66)  BP(mean): --  RR: 18 (2022 05:02) (18 - 20)  SpO2: 96% (2022 05:02) (96% - 99%)    GENERAL: NAD, well-groomed, well-developed  HEAD:  Atraumatic, Normocephalic  EYES: EOMI, PERRLA, conjunctiva and sclera clear  ENMT: No tonsillar erythema, exudates, or enlargement; Moist mucous membranes, Good dentition  NECK: Supple, No JVD  NERVOUS SYSTEM: AOX3, motor and sensation grossly intact in b/l UE and b/l LE  PSYCHIATRIC: Appropriate affect and mood  CHEST/LUNG: Clear to auscultation bilaterally; No rales, rhonchi, wheezing, or rubs  HEART: Regular rate and rhythm; 2/6 systolic murmur at L upper sternal border  ABDOMEN: Soft, Nontender, Nondistended; Bowel sounds present  EXTREMITIES:  2+ Peripheral Pulses, significant non pitting edema of LLE, no RLE edema        LABS:                        9.5    9.41  )-----------( 228      ( 10 Stefan 2022 21:35 )             29.7     01-10    136  |  104  |  39<H>  ----------------------------<  197<H>  3.8   |  18<L>  |  2.06<H>    Ca    9.9      10 Stefan 2022 06:10  Phos  3.3     -10  Mg     2.2     01-10    TPro  6.5  /  Alb  3.5  /  TBili  0.4  /  DBili  x   /  AST  13  /  ALT  8<L>  /  AlkPhos  66  01-10    PT/INR - ( 10 Stefan 2022 02:58 )   PT: 14.6 sec;   INR: 1.23 ratio         PTT - ( 10 Stefan 2022 21:35 )  PTT:>200.0 sec      Urinalysis Basic - ( 2022 18:46 )    Color: Yellow / Appearance: Slightly Turbid / S.016 / pH: x  Gluc: x / Ketone: Negative  / Bili: Negative / Urobili: Negative   Blood: x / Protein: 30 mg/dL / Nitrite: Positive   Leuk Esterase: Large / RBC: 3 /hpf /  /HPF   Sq Epi: x / Non Sq Epi: 1 /hpf / Bacteria: Moderate          RADIOLOGY & ADDITIONAL TESTS:  Results Reviewed:   Imaging Personally Reviewed:  Electrocardiogram Personally Reviewed:    COORDINATION OF CARE:  Care Discussed with Consultants/Other Providers [Y/N]:  Prior or Outpatient Records Reviewed [Y/N]:

## 2022-01-11 NOTE — PROGRESS NOTE ADULT - PROBLEM SELECTOR PLAN 3
-Hgb 9.8, down from 12-13 in previous admission in March  -no signs or symptoms of bleeding  -ACD vs iron deficiency vs other    Plan:  -monitor for bleeding given starting heparin drip for acute DVT  -trend CBC Q12  -iron studies, B12, folate -UA positive  -Ucx: Positive for E. Coli  -Pt started on Ceftriaxone (1/10- )

## 2022-01-11 NOTE — PROGRESS NOTE ADULT - ASSESSMENT
will follow 86F with PMH of lung cancer on radiation, R popliteal DVT s/p IVC filter, T2DM, HTN, obesity, remote MI s/p stent, multiple falls presents with 1 day of LLE swelling/pain found to have extensive LLE DVT (external iliac to femoral, posterior trunk) with IVCF in place    - clinically improving  continue anticoag rx  -will follow

## 2022-01-11 NOTE — DIETITIAN INITIAL EVALUATION ADULT. - PROBLEM SELECTOR PLAN 2
-Patient's Cr 2.3 on admission, up from last known 1.1 in March   -patient was recently hospitalized for syncope at [facility] which was though to be from dehydration, and she was treated w/ IVF  -could be prerenal in the setting of dehydration, but will r/o other causes    Plan: IVF 50cc/hr LR overnight trial and repeat BMP in morning given unknown cardiac function  -obtain urine lytes

## 2022-01-11 NOTE — PROGRESS NOTE ADULT - SUBJECTIVE AND OBJECTIVE BOX
Patient is a 86y old  Female who presents with a chief complaint of DVT (10 Stefan 2022 15:28)      MEDICATIONS  (STANDING):  atorvastatin 20 milliGRAM(s) Oral at bedtime  cefTRIAXone   IVPB      cefTRIAXone   IVPB 1000 milliGRAM(s) IV Intermittent every 24 hours  dextrose 40% Gel 15 Gram(s) Oral once  dextrose 5%. 1000 milliLiter(s) (50 mL/Hr) IV Continuous <Continuous>  dextrose 5%. 1000 milliLiter(s) (100 mL/Hr) IV Continuous <Continuous>  dextrose 50% Injectable 25 Gram(s) IV Push once  dextrose 50% Injectable 12.5 Gram(s) IV Push once  dextrose 50% Injectable 25 Gram(s) IV Push once  donepezil 10 milliGRAM(s) Oral at bedtime  escitalopram 5 milliGRAM(s) Oral daily  famotidine    Tablet 20 milliGRAM(s) Oral daily  glucagon  Injectable 1 milliGRAM(s) IntraMuscular once  heparin  Infusion.  Unit(s)/Hr (16 mL/Hr) IV Continuous <Continuous>  insulin lispro (ADMELOG) corrective regimen sliding scale   SubCutaneous three times a day before meals  insulin lispro (ADMELOG) corrective regimen sliding scale   SubCutaneous at bedtime  lactated ringers. 1000 milliLiter(s) (50 mL/Hr) IV Continuous <Continuous>    MEDICATIONS  (PRN):  acetaminophen     Tablet .. 650 milliGRAM(s) Oral every 6 hours PRN Temp greater or equal to 38C (100.4F), Mild Pain (1 - 3)  heparin   Injectable 7000 Unit(s) IV Push every 6 hours PRN For aPTT less than 40  heparin   Injectable 3500 Unit(s) IV Push every 6 hours PRN For aPTT between 40 - 57  melatonin 3 milliGRAM(s) Oral at bedtime PRN Insomnia      ROS  No fever, sweats, chills  No epistaxis, HA, sore throat  No CP, SOB, cough, sputum  No n/v/d, abd pain, melena, hematochezia  No edema  No rash  No anxiety  No back pain, joint pain  No bleeding, bruising  No dysuria, hematuria    Vital Signs Last 24 Hrs  T(C): 36.8 (11 Jan 2022 05:02), Max: 36.8 (11 Jan 2022 05:02)  T(F): 98.2 (11 Jan 2022 05:02), Max: 98.2 (11 Jan 2022 05:02)  HR: 68 (11 Jan 2022 05:02) (68 - 88)  BP: 149/66 (11 Jan 2022 05:02) (145/76 - 149/66)  BP(mean): --  RR: 18 (11 Jan 2022 05:02) (18 - 20)  SpO2: 96% (11 Jan 2022 05:02) (96% - 99%)    PE  NAD  Awake, alert  Anicteric, MMM  No c/c/e  No rash grossly                            9.5    9.41  )-----------( 228      ( 10 Stefan 2022 21:35 )             29.7       01-10    136  |  104  |  39<H>  ----------------------------<  197<H>  3.8   |  18<L>  |  2.06<H>    Ca    9.9      10 Stefan 2022 06:10  Phos  3.3     01-10  Mg     2.2     01-10    TPro  6.5  /  Alb  3.5  /  TBili  0.4  /  DBili  x   /  AST  13  /  ALT  8<L>  /  AlkPhos  66  01-10

## 2022-01-11 NOTE — DIETITIAN INITIAL EVALUATION ADULT. - PERTINENT MEDS FT
MEDICATIONS  (STANDING):  atorvastatin 20 milliGRAM(s) Oral at bedtime  cefTRIAXone   IVPB      cefTRIAXone   IVPB 1000 milliGRAM(s) IV Intermittent every 24 hours  dextrose 40% Gel 15 Gram(s) Oral once  dextrose 5%. 1000 milliLiter(s) (50 mL/Hr) IV Continuous <Continuous>  dextrose 5%. 1000 milliLiter(s) (100 mL/Hr) IV Continuous <Continuous>  dextrose 50% Injectable 25 Gram(s) IV Push once  dextrose 50% Injectable 12.5 Gram(s) IV Push once  dextrose 50% Injectable 25 Gram(s) IV Push once  donepezil 10 milliGRAM(s) Oral at bedtime  escitalopram 5 milliGRAM(s) Oral daily  famotidine    Tablet 20 milliGRAM(s) Oral daily  glucagon  Injectable 1 milliGRAM(s) IntraMuscular once  heparin  Infusion.  Unit(s)/Hr (16 mL/Hr) IV Continuous <Continuous>  insulin lispro (ADMELOG) corrective regimen sliding scale   SubCutaneous three times a day before meals  insulin lispro (ADMELOG) corrective regimen sliding scale   SubCutaneous at bedtime  lactated ringers. 1000 milliLiter(s) (50 mL/Hr) IV Continuous <Continuous>    MEDICATIONS  (PRN):  acetaminophen     Tablet .. 650 milliGRAM(s) Oral every 6 hours PRN Temp greater or equal to 38C (100.4F), Mild Pain (1 - 3)  heparin   Injectable 7000 Unit(s) IV Push every 6 hours PRN For aPTT less than 40  heparin   Injectable 3500 Unit(s) IV Push every 6 hours PRN For aPTT between 40 - 57  melatonin 3 milliGRAM(s) Oral at bedtime PRN Insomnia

## 2022-01-11 NOTE — DIETITIAN INITIAL EVALUATION ADULT. - PERTINENT LABORATORY DATA
01-11 @ 08:00: Na 136, BUN 35<H>, Cr 1.92<H>, <H>, K+ 4.2, Phos 3.3, Mg 2.3, Alk Phos 72, ALT/SGPT 9<L>, AST/SGOT 11    A1C with Estimated Average Glucose Result: 7.4 % (01-10-22 @ 09:04)    CAPILLARY BLOOD GLUCOSE  POCT Blood Glucose.: 189 mg/dL (11 Jan 2022 08:44)  POCT Blood Glucose.: 209 mg/dL (10 Stefan 2022 21:03)  POCT Blood Glucose.: 145 mg/dL (10 Stefan 2022 17:14)  POCT Blood Glucose.: 211 mg/dL (10 Stefan 2022 12:46)

## 2022-01-11 NOTE — PROGRESS NOTE ADULT - ATTENDING COMMENTS
86F with PMH of lung cancer on radiation, R popliteal DVT s/p IVC filter,  Left Popliteal V DVT (  2/2021), DM2 , HTN, obesity, remote MI s/p stent, multiple falls presents with 1 day of LLE swelling/pain.  Patient had a recent hospitalization at [facility] for syncope which was though to be due to dehydration.  AS reported, Pt was on Eliquis which was recently DC in 5/2021.  Patient had lower ext doppler and  found to have extensive LLE DVT (external iliac to femoral, posterior trunk).  Pt was placed on Heparin drip which will cont and monitor INR and  Hem/onc evaluation is noted and appreciated , once renal function improved , most likely will change to Lovenox subcutaneous.  Cont to monitor for any bleed and monitor SPO2.  CT Venogram is pend as requested by Vascular surgery .  IR team was contacted for possible  a hypermetabolic left lung subpleural nodule, awaiting on rec         Clara Vieyra   Hospitalist   [phone].

## 2022-01-11 NOTE — DIETITIAN INITIAL EVALUATION ADULT. - PROBLEM SELECTOR PLAN 3
-Hgb 9.8, down from 12-13 in previous admission in March  -no signs or symptoms of bleeding  -ACD vs iron deficiency vs other    Plan:  -monitor for bleeding given starting heparin drip for acute DVT  -trend CBC  -iron studies, B12, folate

## 2022-01-11 NOTE — PROGRESS NOTE ADULT - PROBLEM SELECTOR PLAN 1
-Woke up this morning w/ swelling and pain of LLE  -Found to have acute LLE DVT involving the external iliac vein, common femoral vein, femoral vein, and posterior trunk  -likely due to combination of recent hospitalization for syncope, lung cancer, and immobility    Plan:  -start heparin drip  -monitor for neurovascular changes  -no acute vascular surgery intervention  -Hematology evaluation for recurrent DVT -Woke up this morning w/ swelling and pain of LLE  -Found to have acute LLE DVT involving the external iliac vein, common femoral vein, femoral vein, and posterior trunk  -likely due to combination of recent hospitalization for syncope, lung cancer, and immobility    Plan:  - CW heparin drip, monitor PTT   -monitor for neurovascular changes  -no acute vascular surgery intervention, CT venogram is pend   -Hematology evaluation for recurrent DVT , rec is appreciated

## 2022-01-11 NOTE — DIETITIAN INITIAL EVALUATION ADULT. - CHIEF COMPLAINT
The patient is a 85 y/o F PMH lung cancer getting radiation, GERD, HTN HLD, recent fall onto shoulder and admission to [facility] for syncope presents to ED c/o LLE swelling and pain x2 days. Found to have DVT LLE.

## 2022-01-11 NOTE — DIETITIAN INITIAL EVALUATION ADULT. - OTHER INFO
Pt reports UBW 170lb. Dosing weight 193.7lb. Per Cierra LIN, weights as follows: 188lb (5/25/21), 194lb (3/5/21), 228.8lb (2/8/21). Suggest 35.1lb x < 1 year, 15.3% weight loss, not clinically significant - however, pt with edema at this time ?masking further weight loss.     Of note, pt with HbA1c 7.4% suggesting suboptimal glycemic control PTA. Pt on repaglinide PTA per chart.    Pt denies nausea, vomiting, diarrhea, or constipation. Last BM 1/10. Pt reports good appetite and PO intake since admission, consuming >50% of meals. Discussed importance of adequate protein intake to support wound healing, reviewed protein sources.

## 2022-01-11 NOTE — PROGRESS NOTE ADULT - PROBLEM SELECTOR PLAN 4
-Noted 2/6 systolic murmur at L upper sternal border  -patient states she has never been told of history of murmur  -given murmur as well as recent syncope, possible c/f RH strain    Plan:  obtain echo -Hgb 9.8, down from 12-13 in previous admission in March  -no signs or symptoms of bleeding  -ACD vs iron deficiency vs other    Plan:  -monitor for bleeding given starting heparin drip for acute DVT  -trend CBC Q12  -iron studies, B12, folate

## 2022-01-12 ENCOUNTER — TRANSCRIPTION ENCOUNTER (OUTPATIENT)
Age: 87
End: 2022-01-12

## 2022-01-12 DIAGNOSIS — N18.4 CHRONIC KIDNEY DISEASE, STAGE 4 (SEVERE): ICD-10-CM

## 2022-01-12 DIAGNOSIS — N17.9 ACUTE KIDNEY FAILURE, UNSPECIFIED: ICD-10-CM

## 2022-01-12 LAB
-  AMIKACIN: SIGNIFICANT CHANGE UP
-  AMOXICILLIN/CLAVULANIC ACID: SIGNIFICANT CHANGE UP
-  AMPICILLIN/SULBACTAM: SIGNIFICANT CHANGE UP
-  AMPICILLIN: SIGNIFICANT CHANGE UP
-  AZTREONAM: SIGNIFICANT CHANGE UP
-  CEFAZOLIN: SIGNIFICANT CHANGE UP
-  CEFEPIME: SIGNIFICANT CHANGE UP
-  CEFOXITIN: SIGNIFICANT CHANGE UP
-  CEFTRIAXONE: SIGNIFICANT CHANGE UP
-  CIPROFLOXACIN: SIGNIFICANT CHANGE UP
-  ERTAPENEM: SIGNIFICANT CHANGE UP
-  GENTAMICIN: SIGNIFICANT CHANGE UP
-  IMIPENEM: SIGNIFICANT CHANGE UP
-  LEVOFLOXACIN: SIGNIFICANT CHANGE UP
-  MEROPENEM: SIGNIFICANT CHANGE UP
-  NITROFURANTOIN: SIGNIFICANT CHANGE UP
-  PIPERACILLIN/TAZOBACTAM: SIGNIFICANT CHANGE UP
-  TIGECYCLINE: SIGNIFICANT CHANGE UP
-  TOBRAMYCIN: SIGNIFICANT CHANGE UP
-  TRIMETHOPRIM/SULFAMETHOXAZOLE: SIGNIFICANT CHANGE UP
ALBUMIN SERPL ELPH-MCNC: 3.4 G/DL — SIGNIFICANT CHANGE UP (ref 3.3–5)
ALP SERPL-CCNC: 77 U/L — SIGNIFICANT CHANGE UP (ref 40–120)
ALT FLD-CCNC: 7 U/L — LOW (ref 10–45)
ANION GAP SERPL CALC-SCNC: 13 MMOL/L — SIGNIFICANT CHANGE UP (ref 5–17)
APTT BLD: 58.5 SEC — HIGH (ref 27.5–35.5)
AST SERPL-CCNC: 8 U/L — LOW (ref 10–40)
BILIRUB SERPL-MCNC: 0.2 MG/DL — SIGNIFICANT CHANGE UP (ref 0.2–1.2)
BLD GP AB SCN SERPL QL: NEGATIVE — SIGNIFICANT CHANGE UP
BUN SERPL-MCNC: 29 MG/DL — HIGH (ref 7–23)
CALCIUM SERPL-MCNC: 10.1 MG/DL — SIGNIFICANT CHANGE UP (ref 8.4–10.5)
CHLORIDE SERPL-SCNC: 106 MMOL/L — SIGNIFICANT CHANGE UP (ref 96–108)
CO2 SERPL-SCNC: 19 MMOL/L — LOW (ref 22–31)
CREAT SERPL-MCNC: 1.6 MG/DL — HIGH (ref 0.5–1.3)
CULTURE RESULTS: SIGNIFICANT CHANGE UP
GLUCOSE BLDC GLUCOMTR-MCNC: 188 MG/DL — HIGH (ref 70–99)
GLUCOSE BLDC GLUCOMTR-MCNC: 192 MG/DL — HIGH (ref 70–99)
GLUCOSE BLDC GLUCOMTR-MCNC: 225 MG/DL — HIGH (ref 70–99)
GLUCOSE BLDC GLUCOMTR-MCNC: 239 MG/DL — HIGH (ref 70–99)
GLUCOSE SERPL-MCNC: 186 MG/DL — HIGH (ref 70–99)
HCT VFR BLD CALC: 35 % — SIGNIFICANT CHANGE UP (ref 34.5–45)
HGB BLD-MCNC: 11 G/DL — LOW (ref 11.5–15.5)
INR BLD: 1.07 RATIO — SIGNIFICANT CHANGE UP (ref 0.88–1.16)
MAGNESIUM SERPL-MCNC: 2 MG/DL — SIGNIFICANT CHANGE UP (ref 1.6–2.6)
MCHC RBC-ENTMCNC: 28.4 PG — SIGNIFICANT CHANGE UP (ref 27–34)
MCHC RBC-ENTMCNC: 31.4 GM/DL — LOW (ref 32–36)
MCV RBC AUTO: 90.4 FL — SIGNIFICANT CHANGE UP (ref 80–100)
METHOD TYPE: SIGNIFICANT CHANGE UP
NRBC # BLD: 0 /100 WBCS — SIGNIFICANT CHANGE UP (ref 0–0)
ORGANISM # SPEC MICROSCOPIC CNT: SIGNIFICANT CHANGE UP
ORGANISM # SPEC MICROSCOPIC CNT: SIGNIFICANT CHANGE UP
PHOSPHATE SERPL-MCNC: 2.9 MG/DL — SIGNIFICANT CHANGE UP (ref 2.5–4.5)
PLATELET # BLD AUTO: 251 K/UL — SIGNIFICANT CHANGE UP (ref 150–400)
POTASSIUM SERPL-MCNC: 4.2 MMOL/L — SIGNIFICANT CHANGE UP (ref 3.5–5.3)
POTASSIUM SERPL-SCNC: 4.2 MMOL/L — SIGNIFICANT CHANGE UP (ref 3.5–5.3)
PROT SERPL-MCNC: 6.6 G/DL — SIGNIFICANT CHANGE UP (ref 6–8.3)
PROTHROM AB SERPL-ACNC: 12.8 SEC — SIGNIFICANT CHANGE UP (ref 10.6–13.6)
RBC # BLD: 3.87 M/UL — SIGNIFICANT CHANGE UP (ref 3.8–5.2)
RBC # FLD: 12.7 % — SIGNIFICANT CHANGE UP (ref 10.3–14.5)
RH IG SCN BLD-IMP: POSITIVE — SIGNIFICANT CHANGE UP
SODIUM SERPL-SCNC: 138 MMOL/L — SIGNIFICANT CHANGE UP (ref 135–145)
SPECIMEN SOURCE: SIGNIFICANT CHANGE UP
WBC # BLD: 8.79 K/UL — SIGNIFICANT CHANGE UP (ref 3.8–10.5)
WBC # FLD AUTO: 8.79 K/UL — SIGNIFICANT CHANGE UP (ref 3.8–10.5)

## 2022-01-12 PROCEDURE — 99232 SBSQ HOSP IP/OBS MODERATE 35: CPT | Mod: GC

## 2022-01-12 PROCEDURE — 99232 SBSQ HOSP IP/OBS MODERATE 35: CPT

## 2022-01-12 RX ORDER — SODIUM CHLORIDE 9 MG/ML
1000 INJECTION, SOLUTION INTRAVENOUS
Refills: 0 | Status: DISCONTINUED | OUTPATIENT
Start: 2022-01-12 | End: 2022-01-13

## 2022-01-12 RX ORDER — ONDANSETRON 8 MG/1
4 TABLET, FILM COATED ORAL ONCE
Refills: 0 | Status: COMPLETED | OUTPATIENT
Start: 2022-01-12 | End: 2022-01-16

## 2022-01-12 RX ORDER — ENOXAPARIN SODIUM 100 MG/ML
130 INJECTION SUBCUTANEOUS DAILY
Refills: 0 | Status: DISCONTINUED | OUTPATIENT
Start: 2022-01-13 | End: 2022-01-16

## 2022-01-12 RX ORDER — ERTAPENEM SODIUM 1 G/1
1000 INJECTION, POWDER, LYOPHILIZED, FOR SOLUTION INTRAMUSCULAR; INTRAVENOUS EVERY 24 HOURS
Refills: 0 | Status: DISCONTINUED | OUTPATIENT
Start: 2022-01-13 | End: 2022-01-16

## 2022-01-12 RX ORDER — ERTAPENEM SODIUM 1 G/1
INJECTION, POWDER, LYOPHILIZED, FOR SOLUTION INTRAMUSCULAR; INTRAVENOUS
Refills: 0 | Status: DISCONTINUED | OUTPATIENT
Start: 2022-01-12 | End: 2022-01-16

## 2022-01-12 RX ORDER — ERTAPENEM SODIUM 1 G/1
1000 INJECTION, POWDER, LYOPHILIZED, FOR SOLUTION INTRAMUSCULAR; INTRAVENOUS ONCE
Refills: 0 | Status: COMPLETED | OUTPATIENT
Start: 2022-01-12 | End: 2022-01-12

## 2022-01-12 RX ORDER — ASCORBIC ACID 60 MG
500 TABLET,CHEWABLE ORAL DAILY
Refills: 0 | Status: DISCONTINUED | OUTPATIENT
Start: 2022-01-12 | End: 2022-01-16

## 2022-01-12 RX ADMIN — SODIUM CHLORIDE 75 MILLILITER(S): 9 INJECTION, SOLUTION INTRAVENOUS at 20:59

## 2022-01-12 RX ADMIN — ATORVASTATIN CALCIUM 20 MILLIGRAM(S): 80 TABLET, FILM COATED ORAL at 21:22

## 2022-01-12 RX ADMIN — SODIUM CHLORIDE 75 MILLILITER(S): 9 INJECTION, SOLUTION INTRAVENOUS at 11:30

## 2022-01-12 RX ADMIN — Medication 2: at 09:14

## 2022-01-12 RX ADMIN — Medication 4: at 13:44

## 2022-01-12 RX ADMIN — HEPARIN SODIUM 1400 UNIT(S)/HR: 5000 INJECTION INTRAVENOUS; SUBCUTANEOUS at 08:19

## 2022-01-12 RX ADMIN — ESCITALOPRAM OXALATE 5 MILLIGRAM(S): 10 TABLET, FILM COATED ORAL at 13:44

## 2022-01-12 RX ADMIN — Medication 500 MILLIGRAM(S): at 13:48

## 2022-01-12 RX ADMIN — Medication 1 TABLET(S): at 13:47

## 2022-01-12 RX ADMIN — HEPARIN SODIUM 1400 UNIT(S)/HR: 5000 INJECTION INTRAVENOUS; SUBCUTANEOUS at 02:03

## 2022-01-12 RX ADMIN — HEPARIN SODIUM 1400 UNIT(S)/HR: 5000 INJECTION INTRAVENOUS; SUBCUTANEOUS at 16:21

## 2022-01-12 RX ADMIN — Medication 2: at 17:23

## 2022-01-12 RX ADMIN — ERTAPENEM SODIUM 1000 MILLIGRAM(S): 1 INJECTION, POWDER, LYOPHILIZED, FOR SOLUTION INTRAMUSCULAR; INTRAVENOUS at 17:23

## 2022-01-12 RX ADMIN — DONEPEZIL HYDROCHLORIDE 10 MILLIGRAM(S): 10 TABLET, FILM COATED ORAL at 21:22

## 2022-01-12 RX ADMIN — FAMOTIDINE 20 MILLIGRAM(S): 10 INJECTION INTRAVENOUS at 13:44

## 2022-01-12 NOTE — DISCHARGE NOTE PROVIDER - CARE PROVIDERS DIRECT ADDRESSES
,[email or web address],DirectAddress_[email or web address],DirectAddress_Unknown,[email or web address] ,[email or web address],DirectAddress_Unknown,DirectAddress_Unknown,DirectAddress_Unknown

## 2022-01-12 NOTE — PROGRESS NOTE ADULT - PROBLEM SELECTOR PLAN 4
-Hgb 9.8, down from 12-13 in previous admission in March  -no signs or symptoms of bleeding  -ACD vs iron deficiency vs other    Plan:  -monitor for bleeding given starting heparin drip for acute DVT  -trend CBC Q12  -iron studies, B12, folate

## 2022-01-12 NOTE — DISCHARGE NOTE PROVIDER - NSDCCPCAREPLAN_GEN_ALL_CORE_FT
PRINCIPAL DISCHARGE DIAGNOSIS  Diagnosis: DVT, lower extremity  Assessment and Plan of Treatment: You cam ein with left leg swelling. we found that you had a clot in oyur leg. This was likely due to your cancer. We started you on heparin, a blod thinner, and you started to feel a lot better.       PRINCIPAL DISCHARGE DIAGNOSIS  Diagnosis: DVT, lower extremity  Assessment and Plan of Treatment: You came in with left leg swelling, and we found that you had a clot in your leg. This was likely due to your cancer. We started you on heparin, a blood thinner. Please follow up with vascular surgery, Dr. Mena, in 1-2 weeks of discharge.       PRINCIPAL DISCHARGE DIAGNOSIS  Diagnosis: DVT, lower extremity  Assessment and Plan of Treatment: You came in with left leg swelling, and we found that you had a clot in your leg. This was likely due to your cancer. We started you on heparin, a blood thinner. Please keep your legs elevated when possible. Please follow up with vascular surgery, Dr. Mena, in 1-2 weeks of discharge.      SECONDARY DISCHARGE DIAGNOSES  Diagnosis: Sacral wound  Assessment and Plan of Treatment: Per wound care, cleanse with NS, pat dry, and apply cavilon to skin, then apply an allevyn foam dressing daily. Please follow up with wound care outpatient.     PRINCIPAL DISCHARGE DIAGNOSIS  Diagnosis: DVT, lower extremity  Assessment and Plan of Treatment: You came in with left leg swelling, and we found that you had a clot in your leg. This was likely due to your cancer. We started you on heparin, a blood thinner, and transitioned to lovenox. Your brother Griffin was trained on administering lovenox, please administer every day. Please keep your legs elevated when possible. Please follow up with vascular surgery, Dr. Mena, in 1-2 weeks of discharge.      SECONDARY DISCHARGE DIAGNOSES  Diagnosis: Sacral wound  Assessment and Plan of Treatment: Per wound care, cleanse with NS, pat dry, and apply cavilon to skin, then apply an allevyn foam dressing daily. Please follow up with wound care outpatient.     PRINCIPAL DISCHARGE DIAGNOSIS  Diagnosis: DVT, lower extremity  Assessment and Plan of Treatment: You came in with left leg swelling, and we found that you had a clot in your leg. This was likely due to your cancer. We started you on heparin, a blood thinner, and transitioned to lovenox. Your brother Griffin was trained on administering lovenox, please administer every day. Please keep your legs elevated when possible. Please follow up with vascular surgery, Dr. Mena, in 1-2 weeks of discharge.      SECONDARY DISCHARGE DIAGNOSES  Diagnosis: Sacral wound  Assessment and Plan of Treatment: Per wound care, cleanse with NS, pat dry, and apply cavilon to skin, then apply an allevyn foam dressing daily. Please follow up with wound care outpatient.    Diagnosis: Acute UTI  Assessment and Plan of Treatment: You were also found to have an infection in the urinary tract system and was treated with a course of antibiotics. Please monitor for signs of pain/discomfort with urination, and follow up with your primary care provider.     PRINCIPAL DISCHARGE DIAGNOSIS  Diagnosis: DVT, lower extremity  Assessment and Plan of Treatment: You came in with left leg swelling, and we found that you had a clot in your leg. This was likely due to your cancer. We started you on heparin, a blood thinner, and transitioned to lovenox. Your brother Griffin was trained on administering lovenox, please administer 130mg every day. Please keep your legs elevated when possible. Please follow up with vascular surgery, Dr. Mena, in 1-2 weeks of discharge as well as your cancer doctor.      SECONDARY DISCHARGE DIAGNOSES  Diagnosis: Sacral wound  Assessment and Plan of Treatment: Per wound care, cleanse with NS, pat dry, and apply cavilon to skin, then apply an allevyn foam dressing daily. Please follow up with wound care outpatient.    Diagnosis: Acute UTI  Assessment and Plan of Treatment: You were also found to have an infection in the urinary tract system and was treated with a course of antibiotics. Please monitor for signs of pain/discomfort with urination, and follow up with your primary care provider.

## 2022-01-12 NOTE — PROGRESS NOTE ADULT - PROBLEM SELECTOR PLAN 2
-Patient's Cr 2.3 on admission, up from last known 1.1 in March   -patient was recently hospitalized for syncope at [facility] which was though to be from dehydration, and she was treated w/ IVF  -could be prerenal in the setting of dehydration, but will r/o other causes    Plan: IVF 50cc/hr LR overnight trial and repeat BMP in morning given unknown cardiac function  -obtain urine lytes: FeNA: 0.4% (likely pre-renal)  - improving Cr , cont to monitor -Patient's Cr 2.3 on admission, up from last known 1.1 in March   -patient was recently hospitalized for syncope at [facility] which was though to be from dehydration, and she was treated w/ IVF  -could be prerenal in the setting of dehydration, but will r/o other causes    Plan:   -obtain urine lytes: FeNA: 0.4% (likely pre-renal)  -improving Cr , cont to monitor  -Cont LR 75ml/hr

## 2022-01-12 NOTE — PROGRESS NOTE ADULT - ASSESSMENT
Hematology/Oncology consulted on this patient with history of Lung cancer, currently undergoing care at [facility] with Dr Cisse. She has a history of HTN, PTCA, CAD, PM and NIDDM. As per daughter, patient stayed overnight  at [facility] on 1/6/21 after " passing out" She also missed an [facility] IR Lung biopsy originally scheduled on 1/6 at [facility]. She is an 86 year old female with history of  left lung Adenocarcinoma S/P SBRT to the DONNA. 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 Pt with history of IVC filter placed. She presented to [facility] with LLE swelling and pain, confirmed for a positive DVT.    Lung Adenocarcinoma  --treated with RT only within the last 2 months to [facility]  --She is currently undergoing care with Dr Cisse at [facility]  --will follow up after discharge at [facility]  --Rec CT chest  --Patient to have her lung biopsy at [facility] as previously planned, IR feels with Acute DVT, biopsy should wait.  --Appreciate recs    DVT  --likely provoked and with history of DVT  --remains on Heparin  --vascular following, venogram pending  --Duplex  IMPRESSION:  Extensive left lower extremity DVT.  Acute deep venous thrombosis: above and below the knee.      Anemia  --Hgb 11.0  --likely reactive  --If acute drop, please transfuse for Hgb <7.0    Elevated creatinine  --1.92 today, improvement from yesterday at 1.6  --CT C/A/P and CT lower extremities on hold until improvement in renal function  --rec renal consult    We will be happy to answer any questions on behalf of [facility]    Tova Perez NP  Hematology/Oncology  [facility]  [phone] (Cell)  [phone] (Office)  [phone] (Alt office)  Evenings and weekends please call MD on call or office

## 2022-01-12 NOTE — DISCHARGE NOTE PROVIDER - DETAILS OF MALNUTRITION DIAGNOSIS/DIAGNOSES
This patient has been assessed with a concern for Malnutrition and was treated during this hospitalization for the following Nutrition diagnosis/diagnoses:     -  01/11/2022: Moderate protein-calorie malnutrition

## 2022-01-12 NOTE — DISCHARGE NOTE PROVIDER - HOSPITAL COURSE
85F with PMH of lung cancer on radiation, DVT w/ IVC filter previously on eliquis (not currently), T2DM not on insulin, HTN, obesity, Covid in February, remote MI s/p stent presents with 1 day of LLE swelling/pain. Patient states she woke up this morning and had significant swelling of her LLE w/ associated pain. Patient had a recent hospitalization at [facility] for syncope which was though to be due to dehydration. Per patient she was there for two days and was sent home. Denies any difficulty walking below her baseline. At baseline, she has had reduced mobility since Covid in February and has been receiving physical therapy where she has recently progressed to walking with a cane. Generally, she has reduced mobility and spends much of her time lying down watching TV or in bed. Per patient/daughter/records, she has a remote history of DVT about 10 years ago for which she had an IVC filter palced and was on eliquis. Eliquis 5mg BID was stopped in May by PCP.     For her lung cancer, patient is treated at [facility] and has only received radiation therapy. Has bad biopsy but family is not sure of the type of lung cancer. Recently had PET scan and showed "new areas lung lighting up", but still no extrapulmonary involvement of the cancer. She was found to have a an acute LLE DVT involving the external iliac vein, common femoral vein, femoral vein and posterior trunk. Patient started on heparin drip and vascular consulted. Hospital course complicated with ROBERTO likely pre-renal, and patient started on maintenance fluid. Hematology/oncology consulted and DVT likely 2/2 malignancy. 85F with PMH of lung cancer on radiation, DVT w/ IVC filter previously on eliquis (not currently), T2DM not on insulin, HTN, obesity, Covid in February, remote MI s/p stent presents with 1 day of LLE swelling/pain. Per patient/daughter/records, she has a remote history of DVT about 10 years ago for which she had an IVC filter placed and was on eliquis. Eliquis 5mg BID was stopped in May by PCP.      Found to have a an acute LLE DVT involving the external iliac vein, common femoral vein, femoral vein and posterior trunk. Patient started on heparin drip and vascular consulted. No surgical intervention during this admission, will follow up outpatient for possible IVCF removal. Hospital course complicated with ROBERTO likely pre-renal, and patient started on maintenance fluid, and resolved. Hematology/oncology consulted and DVT likely 2/2 malignancy, to follow up at [facility] outpatient. Medically stable for discharge 85F with PMH of lung cancer on radiation, DVT w/ IVC filter previously on eliquis (not currently), T2DM not on insulin, HTN, obesity, Covid in February, remote MI s/p stent presents with 1 day of LLE swelling/pain. Per patient/daughter/records, she has a remote history of DVT about 10 years ago for which she had an IVC filter placed and was on eliquis. Eliquis 5mg BID was stopped in May by PCP.      Found to have a an acute LLE DVT involving the external iliac vein, common femoral vein, femoral vein and posterior trunk. Patient started on heparin drip and vascular consulted. No surgical intervention during this admission, will follow up outpatient for possible IVCF removal. Hospital course complicated with ESBL UTI, completed ertapenem 5 days, and ROBERTO likely pre-renal, and patient started on maintenance fluid, and resolved. Hematology/oncology consulted and DVT likely 2/2 malignancy, to follow up at [facility] outpatient. Medically stable for discharge

## 2022-01-12 NOTE — CONSULT NOTE ADULT - PROBLEM SELECTOR RECOMMENDATION 9
resolving pre-renal kidney injury with IVF  - would ideally wait for full resolution of ROBERTO before giving IV contrast, especially if scan non-urgent  - if CT scan with contrast is urgent, can pre-hydrate with NS 1cc/kg/hr of IVF for 6 hours before and after scan, max 100cc/hr  - avoid hypervolemia  - can decrease IVF to 60/hr
KYLE Mena MD performed a history and physical exam of the patient and discussed  the findings and plan with the house officer. I reviewed the resident note and agree with the findings and plan   I Reilly Mena MD have personally seen and examined the patient at bedside today at  7pm

## 2022-01-12 NOTE — CONSULT NOTE ADULT - SUBJECTIVE AND OBJECTIVE BOX
[facility] DIVISION OF KIDNEY DISEASES AND HYPERTENSION -- [phone]  -- INITIAL CONSULT NOTE  --------------------------------------------------------------------------------  HPI:  86F hx of lung cancer, DM2, HTN, obesity, Covid in Feb 2021, MI + stent about 10 years ago, here for LLE swelling and found to have DVT. During hospital course, pt started on heparin gtt. At bedside, pt states that initial pain and swelling that she felt has since resolved and she feels normal. Denies shortness of breath or palpitations. Pt's creatinine downtrending over course of hospital stay with IVF.  Denies any hx of kidney illness. No other complaints.      PAST HISTORY  --------------------------------------------------------------------------------  PAST MEDICAL & SURGICAL HISTORY:  Dementia    Obesity    HTN (hypertension)    Diabetes    DVT, lower extremity    MI (myocardial infarction)    Pacemaker      FAMILY HISTORY:  Unknown status of immunity to COVID-19 virus      PAST SOCIAL HISTORY:    ALLERGIES & MEDICATIONS  --------------------------------------------------------------------------------  Allergies    iodine (Hives)  penicillin (Hives)    Intolerances      Standing Inpatient Medications  ascorbic acid 500 milliGRAM(s) Oral daily  atorvastatin 20 milliGRAM(s) Oral at bedtime  cefTRIAXone   IVPB      cefTRIAXone   IVPB 1000 milliGRAM(s) IV Intermittent every 24 hours  dextrose 40% Gel 15 Gram(s) Oral once  dextrose 5%. 1000 milliLiter(s) IV Continuous <Continuous>  dextrose 5%. 1000 milliLiter(s) IV Continuous <Continuous>  dextrose 50% Injectable 25 Gram(s) IV Push once  dextrose 50% Injectable 12.5 Gram(s) IV Push once  dextrose 50% Injectable 25 Gram(s) IV Push once  donepezil 10 milliGRAM(s) Oral at bedtime  escitalopram 5 milliGRAM(s) Oral daily  famotidine    Tablet 20 milliGRAM(s) Oral daily  glucagon  Injectable 1 milliGRAM(s) IntraMuscular once  heparin  Infusion.  Unit(s)/Hr IV Continuous <Continuous>  insulin lispro (ADMELOG) corrective regimen sliding scale   SubCutaneous three times a day before meals  insulin lispro (ADMELOG) corrective regimen sliding scale   SubCutaneous at bedtime  lactated ringers. 1000 milliLiter(s) IV Continuous <Continuous>  multivitamin 1 Tablet(s) Oral daily  ondansetron Injectable 4 milliGRAM(s) IV Push once    PRN Inpatient Medications  heparin   Injectable 7000 Unit(s) IV Push every 6 hours PRN  heparin   Injectable 3500 Unit(s) IV Push every 6 hours PRN      REVIEW OF SYSTEMS  --------------------------------------------------------------------------------  Gen: No fevers/chills  Skin: No rashes  Head/Eyes/Ears: Normal hearing,   Respiratory: No dyspnea, cough  CV: No chest pain  GI: No abdominal pain, diarrhea  : No dysuria, hematuria  MSK: No  edema  Heme: No easy bruising or bleeding  Psych: No significant depression    All other systems were reviewed and are negative, except as noted.    VITALS/PHYSICAL EXAM  --------------------------------------------------------------------------------  T(C): 36.7 (01-12-22 @ 14:20), Max: 37 (01-11-22 @ 21:01)  HR: 69 (01-12-22 @ 14:20) (65 - 75)  BP: 153/79 (01-12-22 @ 14:20) (148/72 - 161/66)  RR: 18 (01-12-22 @ 14:20) (18 - 20)  SpO2: 99% (01-12-22 @ 14:20) (97% - 100%)  Wt(kg): --        01-11-22 @ 07:01  -  01-12-22 @ 07:00  --------------------------------------------------------  IN: 1358 mL / OUT: 1950 mL / NET: -592 mL    01-12-22 @ 07:01  -  01-12-22 @ 15:09  --------------------------------------------------------  IN: 625 mL / OUT: 750 mL / NET: -125 mL      Physical Exam:  	Gen: NAD  	HEENT: MMM  	Pulm: CTA B/L  	CV: S1S2, L sternal border systolic murmur.  	Abd: Soft, +BS   	Ext: No LE edema B/L  	Neuro: Awake  	Skin: Warm and dry  	Vascular access:    LABS/STUDIES  --------------------------------------------------------------------------------              11.0   8.79  >-----------<  251      [01-12-22 @ 06:55]              35.0     138  |  106  |  29  ----------------------------<  186      [01-12-22 @ 06:55]  4.2   |  19  |  1.60        Ca     10.1     [01-12-22 @ 06:55]      Mg     2.0     [01-12-22 @ 06:55]      Phos  2.9     [01-12-22 @ 06:55]    TPro  6.6  /  Alb  3.4  /  TBili  0.2  /  DBili  x   /  AST  8   /  ALT  7   /  AlkPhos  77  [01-12-22 @ 06:55]    PT/INR: PT 12.8 , INR 1.07       [01-12-22 @ 07:26]  PTT: 58.5       [01-12-22 @ 07:26]      Creatinine Trend:  SCr 1.60 [01-12 @ 06:55]  SCr 1.92 [01-11 @ 08:00]  SCr 2.06 [01-10 @ 06:10]  SCr 2.18 [01-10 @ 02:22]  SCr 2.30 [01-09 @ 16:04]    Urinalysis - [01-09-22 @ 18:46]      Color Yellow / Appearance Slightly Turbid / SG 1.016 / pH 5.5      Gluc Negative / Ketone Negative  / Bili Negative / Urobili Negative       Blood Negative / Protein 30 mg/dL / Leuk Est Large / Nitrite Positive      RBC 3 /  / Hyaline 0 / Gran  / Sq Epi  / Non Sq Epi 1 / Bacteria Moderate    Urine Creatinine 115      [01-09-22 @ 18:46]  Urine Sodium 32      [01-09-22 @ 18:46]  Urine Urea Nitrogen 709      [01-09-22 @ 22:36]    Iron 40, TIBC 232, %sat 17      [01-10-22 @ 09:19]  Ferritin 229      [01-10-22 @ 09:13]  TSH 0.63      [02-13-21 @ 18:19]  Lipid: chol 171, , HDL 38, LDL --      [02-07-21 @ 07:45]      Syphilis Screen (Treponema Pallidum Ab) Negative      [02-14-21 @ 15:42]  SPEP Interpretation: YANDEL Ambrocio M.D.      [02-13-21 @ 18:19]

## 2022-01-12 NOTE — DISCHARGE NOTE PROVIDER - NSDCMRMEDTOKEN_GEN_ALL_CORE_FT
atorvastatin 20 mg oral tablet: 1 tab(s) orally once a day (at bedtime)    was on crestor at home    donepezil 10 mg oral tablet: 1 tab(s) orally once a day (at bedtime)  famotidine 20 mg oral tablet: 1 tab(s) orally once a day  Lexapro 5 mg oral tablet: 1 tab(s) orally once a day  oxybutynin 5 mg oral tablet: 1 tab(s) orally 2 times a day  Prandin 1 mg oral tablet: 1 tab(s) orally 3 times a day (before meals)   atorvastatin 20 mg oral tablet: 1 tab(s) orally once a day (at bedtime)    was on crestor at home    donepezil 10 mg oral tablet: 1 tab(s) orally once a day (at bedtime)  enoxaparin 120 mg/0.8 mL injectable solution: 130 milligram(s) subcutaneously once a day   famotidine 20 mg oral tablet: 1 tab(s) orally once a day  Lexapro 5 mg oral tablet: 1 tab(s) orally once a day  oxybutynin 5 mg oral tablet: 1 tab(s) orally 2 times a day  Prandin 1 mg oral tablet: 1 tab(s) orally 3 times a day (before meals)   apixaban 5 mg oral tablet: 2 tab(s) orally 2 times a day 1/16-1/22  apixaban 5 mg oral tablet: 1 tab(s) orally once a day starting 1/23  atorvastatin 20 mg oral tablet: 1 tab(s) orally once a day (at bedtime)    was on crestor at home    donepezil 10 mg oral tablet: 1 tab(s) orally once a day (at bedtime)  enoxaparin 100 mg/mL injectable solution: 100 milligram(s) subcutaneously once a day   famotidine 20 mg oral tablet: 1 tab(s) orally once a day  Lexapro 5 mg oral tablet: 1 tab(s) orally once a day  Lovenox 30 mg/0.3 mL injectable solution: 30 milligram(s) subcutaneously once a day   oxybutynin 5 mg oral tablet: 1 tab(s) orally 2 times a day  Prandin 1 mg oral tablet: 1 tab(s) orally 3 times a day (before meals)   atorvastatin 20 mg oral tablet: 1 tab(s) orally once a day (at bedtime)    was on crestor at home    donepezil 10 mg oral tablet: 1 tab(s) orally once a day (at bedtime)  enoxaparin 100 mg/mL injectable solution: 100 milligram(s) subcutaneously once a day   famotidine 20 mg oral tablet: 1 tab(s) orally once a day  Lexapro 5 mg oral tablet: 1 tab(s) orally once a day  Lovenox 30 mg/0.3 mL injectable solution: 30 milligram(s) subcutaneously once a day   oxybutynin 5 mg oral tablet: 1 tab(s) orally 2 times a day  Prandin 1 mg oral tablet: 1 tab(s) orally 3 times a day (before meals)   ascorbic acid 500 mg oral tablet: 1 tab(s) orally once a day  atorvastatin 20 mg oral tablet: 1 tab(s) orally once a day (at bedtime)    was on crestor at home    donepezil 10 mg oral tablet: 1 tab(s) orally once a day (at bedtime)  enoxaparin 100 mg/mL injectable solution: 100 milligram(s) subcutaneously once a day   famotidine 20 mg oral tablet: 1 tab(s) orally once a day  Lexapro 5 mg oral tablet: 1 tab(s) orally once a day  Lovenox 30 mg/0.3 mL injectable solution: 30 milligram(s) subcutaneously once a day   Multiple Vitamins oral tablet: 1 tab(s) orally once a day  Prandin 1 mg oral tablet: 1 tab(s) orally 3 times a day (before meals)

## 2022-01-12 NOTE — PROGRESS NOTE ADULT - SUBJECTIVE AND OBJECTIVE BOX
Subjective:  Patient seen at bedside this AM. Reports feeling well, without complaints. Denies chest pain, SOB.      24h Events:   - Overnight, no acute events    Objective:  Vital Signs  T(C): 36.8 (01-12 @ 09:30), Max: 37 (01-11 @ 21:01)  HR: 67 (01-12 @ 09:30) (65 - 75)  BP: 148/72 (01-12 @ 09:30) (148/72 - 161/66)  RR: 18 (01-12 @ 09:30) (18 - 20)  SpO2: 98% (01-12 @ 09:30) (97% - 100%)  01-11-22 @ 07:01  -  01-12-22 @ 07:00  --------------------------------------------------------  IN:  Total IN: 0 mL    OUT:    Voided (mL): 1950 mL  Total OUT: 1950 mL    Total NET: -1950 mL      Physical Exam:  GEN: resting in bed comfortably in NAD  NEURO: awake, alert  RESP: no increased WOB  EXTR: LLE with mildly reduced edema, palpable DP pulses bilaterally      Labs:             11.0   8.79  )-----------( 251      ( 12 Jan 2022 06:55 )             35.0     138  |  106  |  29<H>  ----------------------------<  186<H>  4.2   |  19<L>  |  1.60<H>    Ca    10.1      12 Jan 2022 06:55  Phos  2.9     01-12  Mg     2.0     01-12    TPro  6.6  /  Alb  3.4  /  TBili  0.2  /  DBili  x   /  AST  8<L>  /  ALT  7<L>  /  AlkPhos  77  01-12    POCT Blood Glucose.: 225 mg/dL (12 Jan 2022 12:58)  POCT Blood Glucose.: 188 mg/dL (12 Jan 2022 08:51)  POCT Blood Glucose.: 207 mg/dL (11 Jan 2022 21:25)  POCT Blood Glucose.: 186 mg/dL (11 Jan 2022 17:27)    Medications:   MEDICATIONS  (STANDING):  ascorbic acid 500 milliGRAM(s) Oral daily  atorvastatin 20 milliGRAM(s) Oral at bedtime  cefTRIAXone   IVPB      cefTRIAXone   IVPB 1000 milliGRAM(s) IV Intermittent every 24 hours  dextrose 40% Gel 15 Gram(s) Oral once  dextrose 5%. 1000 milliLiter(s) (50 mL/Hr) IV Continuous <Continuous>  dextrose 5%. 1000 milliLiter(s) (100 mL/Hr) IV Continuous <Continuous>  dextrose 50% Injectable 25 Gram(s) IV Push once  dextrose 50% Injectable 12.5 Gram(s) IV Push once  dextrose 50% Injectable 25 Gram(s) IV Push once  donepezil 10 milliGRAM(s) Oral at bedtime  escitalopram 5 milliGRAM(s) Oral daily  famotidine    Tablet 20 milliGRAM(s) Oral daily  glucagon  Injectable 1 milliGRAM(s) IntraMuscular once  heparin  Infusion.  Unit(s)/Hr (16 mL/Hr) IV Continuous <Continuous>  insulin lispro (ADMELOG) corrective regimen sliding scale   SubCutaneous three times a day before meals  insulin lispro (ADMELOG) corrective regimen sliding scale   SubCutaneous at bedtime  lactated ringers. 1000 milliLiter(s) (75 mL/Hr) IV Continuous <Continuous>  multivitamin 1 Tablet(s) Oral daily  ondansetron Injectable 4 milliGRAM(s) IV Push once    MEDICATIONS  (PRN):  acetaminophen     Tablet .. 650 milliGRAM(s) Oral every 6 hours PRN Temp greater or equal to 38C (100.4F), Mild Pain (1 - 3)  heparin   Injectable 7000 Unit(s) IV Push every 6 hours PRN For aPTT less than 40  heparin   Injectable 3500 Unit(s) IV Push every 6 hours PRN For aPTT between 40 - 57  melatonin 3 milliGRAM(s) Oral at bedtime PRN Insomnia   Subjective:  Patient seen at bedside this AM. Reports feeling well, without complaints. Denies chest pain, SOB.      24h Events:   - Overnight, no acute events    Objective:  Vital Signs  T(C): 36.8 (01-12 @ 09:30), Max: 37 (01-11 @ 21:01)  HR: 67 (01-12 @ 09:30) (65 - 75)  BP: 148/72 (01-12 @ 09:30) (148/72 - 161/66)  RR: 18 (01-12 @ 09:30) (18 - 20)  SpO2: 98% (01-12 @ 09:30) (97% - 100%)  01-11-22 @ 07:01  -  01-12-22 @ 07:00  --------------------------------------------------------  IN:  Total IN: 0 mL    OUT:    Voided (mL): 1950 mL  Total OUT: 1950 mL    Total NET: -1950 mL      Physical Exam:  GEN: resting in bed comfortably in NAD  NEURO: awake, alert  RESP: no increased WOB  EXTR: LLE with mild to mod now reduced edema, palpable DP pulses bilaterally  no evid of ischemia      Labs:             11.0   8.79  )-----------( 251      ( 12 Jan 2022 06:55 )             35.0     138  |  106  |  29<H>  ----------------------------<  186<H>  4.2   |  19<L>  |  1.60<H>    Ca    10.1      12 Jan 2022 06:55  Phos  2.9     01-12  Mg     2.0     01-12    TPro  6.6  /  Alb  3.4  /  TBili  0.2  /  DBili  x   /  AST  8<L>  /  ALT  7<L>  /  AlkPhos  77  01-12    POCT Blood Glucose.: 225 mg/dL (12 Jan 2022 12:58)  POCT Blood Glucose.: 188 mg/dL (12 Jan 2022 08:51)  POCT Blood Glucose.: 207 mg/dL (11 Jan 2022 21:25)  POCT Blood Glucose.: 186 mg/dL (11 Jan 2022 17:27)    Medications:   MEDICATIONS  (STANDING):  ascorbic acid 500 milliGRAM(s) Oral daily  atorvastatin 20 milliGRAM(s) Oral at bedtime  cefTRIAXone   IVPB      cefTRIAXone   IVPB 1000 milliGRAM(s) IV Intermittent every 24 hours  dextrose 40% Gel 15 Gram(s) Oral once  dextrose 5%. 1000 milliLiter(s) (50 mL/Hr) IV Continuous <Continuous>  dextrose 5%. 1000 milliLiter(s) (100 mL/Hr) IV Continuous <Continuous>  dextrose 50% Injectable 25 Gram(s) IV Push once  dextrose 50% Injectable 12.5 Gram(s) IV Push once  dextrose 50% Injectable 25 Gram(s) IV Push once  donepezil 10 milliGRAM(s) Oral at bedtime  escitalopram 5 milliGRAM(s) Oral daily  famotidine    Tablet 20 milliGRAM(s) Oral daily  glucagon  Injectable 1 milliGRAM(s) IntraMuscular once  heparin  Infusion.  Unit(s)/Hr (16 mL/Hr) IV Continuous <Continuous>  insulin lispro (ADMELOG) corrective regimen sliding scale   SubCutaneous three times a day before meals  insulin lispro (ADMELOG) corrective regimen sliding scale   SubCutaneous at bedtime  lactated ringers. 1000 milliLiter(s) (75 mL/Hr) IV Continuous <Continuous>  multivitamin 1 Tablet(s) Oral daily  ondansetron Injectable 4 milliGRAM(s) IV Push once    MEDICATIONS  (PRN):  acetaminophen     Tablet .. 650 milliGRAM(s) Oral every 6 hours PRN Temp greater or equal to 38C (100.4F), Mild Pain (1 - 3)  heparin   Injectable 7000 Unit(s) IV Push every 6 hours PRN For aPTT less than 40  heparin   Injectable 3500 Unit(s) IV Push every 6 hours PRN For aPTT between 40 - 57  melatonin 3 milliGRAM(s) Oral at bedtime PRN Insomnia

## 2022-01-12 NOTE — DISCHARGE NOTE PROVIDER - NSDCCPTREATMENT_GEN_ALL_CORE_FT
PRINCIPAL PROCEDURE  Procedure: CT abdomen pelvis  Findings and Treatment: IMPRESSION:  No pulmonary embolism.  Spiculated mass in the left upper lobe without significant change from   prior PET CT 6/11/2021.  IVC filter with clot present within and slightly above the filter. Clot   below the filter as above.< from: CT Abdomen and Pelvis w/ IV Cont (01.13.22 @ 22:29) >        SECONDARY PROCEDURE  Procedure: VS vein duplex scan BILAT LE  Findings and Treatment: RIGHT:  Normal compressibility of the RIGHT common femoral, femoral and popliteal   veins.  Doppler examination shows normal spontaneous and phasic flow.  No RIGHT calf vein thrombosis is detected.  LEFT:  Extensive left lower extremity deep vein thrombi involving the external   iliac vein, common femoral vein, femoral vein, and posterior trunk. The   popliteal and posterior tibial veins appear appear to be patent.  IMPRESSION:  Extensive left lower extremity DVT.  Acute deep venous thrombosis: above and below the knee.< from: [facility] Duplex Lower Ext Vein Scan, Bilat (01.09.22 @ 16:16) >

## 2022-01-12 NOTE — PROGRESS NOTE ADULT - ASSESSMENT
86F with PMH of lung cancer on radiation, R popliteal DVT s/p IVC filter, T2DM, HTN, obesity, remote MI s/p stent, multiple falls presents with 1 day of LLE swelling/pain found to have extensive LLE DVT (external iliac to femoral, posterior trunk) with IVCF in place.     PLAN:   - C/w AC  - Will follow      Lydia Acosta, PGY-2  Vascular Surgery  #[id number]  86F with PMH of lung cancer on radiation, R popliteal DVT s/p IVC filter, T2DM, HTN, obesity, remote MI s/p stent, multiple falls presents with 1 day of LLE swelling/pain found to have extensive LLE DVT (external iliac to femoral, posterior trunk) with IVCF in place.     PLAN:   clinically improving   - C/w AC  - Will follow      Lydia Acosta, PGY-2  Vascular Surgery  #[id number]

## 2022-01-12 NOTE — DISCHARGE NOTE PROVIDER - CARE PROVIDER_API CALL
Reilly Mena)  Vascular Surgery  [address], [address]  [place] [address]  Phone: [phone]  Fax: [phone]  Follow Up Time:     Kel [place]isse  [facility] [place]  [address] [address]  Phone: [phone]  Fax: (   )    -  Follow Up Time:    Reilly Mena)  Vascular Surgery  [address], [address]  [place] [address]  Phone: [phone]  Fax: [phone]  Follow Up Time:     Kel Cisse  [facility] [place]  [address] [address]  Phone: [phone]  Fax: (   )    -  Follow Up Time:     [facility] [place],   [address]  Phone: [phone]  Fax: (   )    -  Follow Up Time:    Reilly Mena)  Vascular Surgery  [address], [address]  [place] [address]  Phone: [phone]  Fax: [phone]  Follow Up Time:     Kel Cisse  [facility] [place]  [address] [facility] [address]  Phone: [phone]  Fax: (   )    -  Follow Up Time:     [facility] [place],   [address]  Phone: [phone]  Fax: (   )    -  Follow Up Time:     [place]  Phone: [phone]  Fax: (   )    -  Follow Up Time:

## 2022-01-12 NOTE — DISCHARGE NOTE PROVIDER - PROVIDER TOKENS
PROVIDER:[TOKEN:[[id number]]],FREE:[LAST:[Tanna],FIRST:[Kel],PHONE:[phone]],FAX:[(   )    -],ADDRESS:[[place] [address]]] PROVIDER:[TOKEN:[[id number]]],FREE:[LAST:[Tanna],FIRST:[Kel],PHONE:[phone]],FAX:[(   )    -],ADDRESS:[[place] [id number]]],FREE:[LAST:[[facility]],PHONE:[phone]],FAX:[(   )    -],ADDRESS:[[address] [place] [address]]] PROVIDER:[TOKEN:[[id number]]],FREE:[LAST:[Tanna],FIRST:[Kel],PHONE:[phone]],FAX:[(   )    -],ADDRESS:[[place] [id number]]],FREE:[LAST:[[facility]],PHONE:[phone]],FAX:[(   )    -],ADDRESS:[[address] [place] [address]]],FREE:[LAST:[[place]],FIRST:[[place]],PHONE:[phone]],FAX:[(   )    -]]

## 2022-01-12 NOTE — CONSULT NOTE ADULT - ASSESSMENT
86F hx of lung cancer, DM2, HTN, obesity, Covid in Feb 2021, MI + stent about 10 years ago, here for LLE swelling and found to have DVT. Nephrology service consulted for clearance for pt to receive contrast in setting of recent ROBERTO. Pt's elevated creatinine, likely pre-renal, resolving over course of hospital stay with IVF.     Discussed case with vascular team about urgency of CT venogram, pt currently on anti-coagulation with improving but not resolved ROBERTO. No plan for thrombectomy    Echo reviewed, pt comfortable on room air. CT can ideally be deferred until full resolution of ROBERTO.

## 2022-01-12 NOTE — PROGRESS NOTE ADULT - ATTENDING COMMENTS
84 y/o female with history of lung cancer followed by [facility] admitted with new DVT. She was planned to undergo biopsy of the posterior left lobe PET-avid nodule on January 6th but unable to make it to appointment. DVT likely associated with malignancy; recommend transition to enoxaparin or DOAC. If interventional radiology here can perform biopsy can maintain patient on heparin drip. Otherwise enoxaparin may be preferred in the short-term while [facility] set up an outpatient biopsy again.

## 2022-01-12 NOTE — CONSULT NOTE ADULT - ATTENDING COMMENTS
84 y/o female with history of lung cancer followed by [facility] admitted with new DVT. She was planned to undergo biopsy of the posterior left lobe PET-avid nodule on January 6th but unable to make it to appointment. DVT likely associated with malignancy; recommend transition to enoxaparin or DOAC. If interventional radiology here can perform biopsy can maintain patient on heparin drip. Otherwise enoxaparin may be preferred in the short-term while [facility] set up an outpatient biopsy again.
86F ROBERTO  hx of lung cancer, DM2, HTN, obesity, Covid in Feb 2021,   LLE swelling and found to have DVT.   Pt's creatinine downtrending over course of hospital stay with IVF.  Cont current tx
KYLE Mena MD performed a history and physical exam of the patient and discussed  the findings and plan with the house officer. I reviewed the resident note and agree with the findings and plan   I Reilly Mena MD have personally seen and examined the patient at bedside today at  7pm

## 2022-01-12 NOTE — PROGRESS NOTE ADULT - PROBLEM SELECTOR PLAN 1
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 Will transition to Lovenox 11/13  -monitor for neurovascular changes  -no acute vascular surgery intervention, CT venogram is pend. Will hold off till Cr improves  -Hematology evaluation for recurrent DVT , DVT likely drom malignancy

## 2022-01-12 NOTE — PROGRESS NOTE ADULT - SUBJECTIVE AND OBJECTIVE BOX
PROGRESS NOTE:   Authored by Jasmin Robbins MD  Pager: [facility] [phone]; [facility] [phone]    Patient is a 86y old  Female who presents with a chief complaint of DVT (11 Jan 2022 19:40)      SUBJECTIVE / OVERNIGHT EVENTS:    ADDITIONAL REVIEW OF SYSTEMS:    MEDICATIONS  (STANDING):  atorvastatin 20 milliGRAM(s) Oral at bedtime  cefTRIAXone   IVPB      cefTRIAXone   IVPB 1000 milliGRAM(s) IV Intermittent every 24 hours  dextrose 40% Gel 15 Gram(s) Oral once  dextrose 5%. 1000 milliLiter(s) (50 mL/Hr) IV Continuous <Continuous>  dextrose 5%. 1000 milliLiter(s) (100 mL/Hr) IV Continuous <Continuous>  dextrose 50% Injectable 25 Gram(s) IV Push once  dextrose 50% Injectable 12.5 Gram(s) IV Push once  dextrose 50% Injectable 25 Gram(s) IV Push once  donepezil 10 milliGRAM(s) Oral at bedtime  escitalopram 5 milliGRAM(s) Oral daily  famotidine    Tablet 20 milliGRAM(s) Oral daily  glucagon  Injectable 1 milliGRAM(s) IntraMuscular once  heparin  Infusion.  Unit(s)/Hr (16 mL/Hr) IV Continuous <Continuous>  insulin lispro (ADMELOG) corrective regimen sliding scale   SubCutaneous three times a day before meals  insulin lispro (ADMELOG) corrective regimen sliding scale   SubCutaneous at bedtime  lactated ringers. 1000 milliLiter(s) (75 mL/Hr) IV Continuous <Continuous>    MEDICATIONS  (PRN):  acetaminophen     Tablet .. 650 milliGRAM(s) Oral every 6 hours PRN Temp greater or equal to 38C (100.4F), Mild Pain (1 - 3)  heparin   Injectable 7000 Unit(s) IV Push every 6 hours PRN For aPTT less than 40  heparin   Injectable 3500 Unit(s) IV Push every 6 hours PRN For aPTT between 40 - 57  melatonin 3 milliGRAM(s) Oral at bedtime PRN Insomnia      CAPILLARY BLOOD GLUCOSE      POCT Blood Glucose.: 207 mg/dL (11 Jan 2022 21:25)  POCT Blood Glucose.: 186 mg/dL (11 Jan 2022 17:27)  POCT Blood Glucose.: 203 mg/dL (11 Jan 2022 12:25)  POCT Blood Glucose.: 189 mg/dL (11 Jan 2022 08:44)    I&O's Summary    11 Jan 2022 07:01  -  12 Jan 2022 07:00  --------------------------------------------------------  IN: 1358 mL / OUT: 1950 mL / NET: -592 mL        PHYSICAL EXAM:  Vital Signs Last 24 Hrs  T(C): 36.3 (12 Jan 2022 05:09), Max: 37 (11 Jan 2022 21:01)  T(F): 97.4 (12 Jan 2022 05:09), Max: 98.6 (11 Jan 2022 21:01)  HR: 65 (12 Jan 2022 05:09) (65 - 83)  BP: 161/66 (12 Jan 2022 05:09) (159/78 - 161/66)  BP(mean): --  RR: 20 (12 Jan 2022 05:09) (20 - 20)  SpO2: 100% (12 Jan 2022 05:09) (97% - 100%)    GENERAL: No acute distress, well-developed  HEAD:  Atraumatic, Normocephalic  EYES: EOMI, PERRLA, conjunctiva and sclera clear  NECK: Supple, no lymphadenopathy, no JVD  CHEST/LUNG: CTAB; No wheezes, rales, or rhonchi  HEART: Regular rate and rhythm; No murmurs, rubs, or gallops  ABDOMEN: Soft, non-tender, non-distended; normal bowel sounds, no organomegaly  EXTREMITIES:  2+ peripheral pulses b/l, No clubbing, cyanosis, or edema  NEUROLOGY: A&O x 3, no focal deficits  SKIN: No rashes or lesions    LABS:                        11.0   8.79  )-----------( 251      ( 12 Jan 2022 06:55 )             35.0     01-11    136  |  105  |  35<H>  ----------------------------<  208<H>  4.2   |  18<L>  |  1.92<H>    Ca    9.6      11 Jan 2022 08:00  Phos  3.3     01-11  Mg     2.3     01-11    TPro  6.6  /  Alb  3.4  /  TBili  0.2  /  DBili  x   /  AST  11  /  ALT  9<L>  /  AlkPhos  72  01-11    PTT - ( 11 Jan 2022 17:18 )  PTT:64.0 sec          Culture - Urine (collected 10 Stefan 2022 18:59)  Source: Catheterized Catheterized  Preliminary Report (11 Jan 2022 15:08):    >100,000 CFU/ml Escherichia coli        RADIOLOGY & ADDITIONAL TESTS:  Results Reviewed:   Imaging Personally Reviewed:  Electrocardiogram Personally Reviewed:    COORDINATION OF CARE:  Care Discussed with Consultants/Other Providers [Y/N]:  Prior or Outpatient Records Reviewed [Y/N]:   PROGRESS NOTE:   Authored by Jasmin Robbins MD  Pager: [facility] [phone]; [facility] [phone]    Patient is a 86y old  Female who presents with a chief complaint of DVT (11 Jan 2022 19:40)      SUBJECTIVE / OVERNIGHT EVENTS:   No overnight events   ADDITIONAL REVIEW OF SYSTEMS:   Negative unless specified above    MEDICATIONS  (STANDING):  atorvastatin 20 milliGRAM(s) Oral at bedtime  cefTRIAXone   IVPB      cefTRIAXone   IVPB 1000 milliGRAM(s) IV Intermittent every 24 hours  dextrose 40% Gel 15 Gram(s) Oral once  dextrose 5%. 1000 milliLiter(s) (50 mL/Hr) IV Continuous <Continuous>  dextrose 5%. 1000 milliLiter(s) (100 mL/Hr) IV Continuous <Continuous>  dextrose 50% Injectable 25 Gram(s) IV Push once  dextrose 50% Injectable 12.5 Gram(s) IV Push once  dextrose 50% Injectable 25 Gram(s) IV Push once  donepezil 10 milliGRAM(s) Oral at bedtime  escitalopram 5 milliGRAM(s) Oral daily  famotidine    Tablet 20 milliGRAM(s) Oral daily  glucagon  Injectable 1 milliGRAM(s) IntraMuscular once  heparin  Infusion.  Unit(s)/Hr (16 mL/Hr) IV Continuous <Continuous>  insulin lispro (ADMELOG) corrective regimen sliding scale   SubCutaneous three times a day before meals  insulin lispro (ADMELOG) corrective regimen sliding scale   SubCutaneous at bedtime  lactated ringers. 1000 milliLiter(s) (75 mL/Hr) IV Continuous <Continuous>    MEDICATIONS  (PRN):  acetaminophen     Tablet .. 650 milliGRAM(s) Oral every 6 hours PRN Temp greater or equal to 38C (100.4F), Mild Pain (1 - 3)  heparin   Injectable 7000 Unit(s) IV Push every 6 hours PRN For aPTT less than 40  heparin   Injectable 3500 Unit(s) IV Push every 6 hours PRN For aPTT between 40 - 57  melatonin 3 milliGRAM(s) Oral at bedtime PRN Insomnia      CAPILLARY BLOOD GLUCOSE      POCT Blood Glucose.: 207 mg/dL (11 Jan 2022 21:25)  POCT Blood Glucose.: 186 mg/dL (11 Jan 2022 17:27)  POCT Blood Glucose.: 203 mg/dL (11 Jan 2022 12:25)  POCT Blood Glucose.: 189 mg/dL (11 Jan 2022 08:44)    I&O's Summary    11 Jan 2022 07:01  -  12 Jan 2022 07:00  --------------------------------------------------------  IN: 1358 mL / OUT: 1950 mL / NET: -592 mL        PHYSICAL EXAM:  Vital Signs Last 24 Hrs  T(C): 36.3 (12 Jan 2022 05:09), Max: 37 (11 Jan 2022 21:01)  T(F): 97.4 (12 Jan 2022 05:09), Max: 98.6 (11 Jan 2022 21:01)  HR: 65 (12 Jan 2022 05:09) (65 - 83)  BP: 161/66 (12 Jan 2022 05:09) (159/78 - 161/66)  BP(mean): --  RR: 20 (12 Jan 2022 05:09) (20 - 20)  SpO2: 100% (12 Jan 2022 05:09) (97% - 100%)    GENERAL: No acute distress, well-developed  HEAD:  Atraumatic, Normocephalic  EYES: EOMI, PERRLA, conjunctiva and sclera clear  NECK: Supple, no lymphadenopathy, no JVD  CHEST/LUNG: CTAB; No wheezes, rales, or rhonchi  HEART: Regular rate and rhythm; No murmurs, rubs, or gallops  ABDOMEN: Soft, non-tender, non-distended; normal bowel sounds, no organomegaly  EXTREMITIES:  2+ peripheral pulses b/l, No clubbing, cyanosis, or edema  NEUROLOGY: A&O x 3, no focal deficits  SKIN: No rashes or lesions    LABS:                        11.0   8.79  )-----------( 251      ( 12 Jan 2022 06:55 )             35.0     01-11    136  |  105  |  35<H>  ----------------------------<  208<H>  4.2   |  18<L>  |  1.92<H>    Ca    9.6      11 Jan 2022 08:00  Phos  3.3     01-11  Mg     2.3     01-11    TPro  6.6  /  Alb  3.4  /  TBili  0.2  /  DBili  x   /  AST  11  /  ALT  9<L>  /  AlkPhos  72  01-11    PTT - ( 11 Jan 2022 17:18 )  PTT:64.0 sec          Culture - Urine (collected 10 Stefan 2022 18:59)  Source: Catheterized Catheterized  Preliminary Report (11 Jan 2022 15:08):    >100,000 CFU/ml Escherichia coli        RADIOLOGY & ADDITIONAL TESTS:  Results Reviewed:   Imaging Personally Reviewed:  Electrocardiogram Personally Reviewed:    COORDINATION OF CARE:  Care Discussed with Consultants/Other Providers [Y/N]:  Prior or Outpatient Records Reviewed [Y/N]:   PROGRESS NOTE:   Authored by Jasmin Robbins MD  Pager: [facility] [phone]; [facility] [phone]    Patient is a 86y old  Female who presents with a chief complaint of DVT (11 Jan 2022 19:40)      SUBJECTIVE / OVERNIGHT EVENTS:   No overnight events     ADDITIONAL REVIEW OF SYSTEMS:   Negative unless specified above    MEDICATIONS  (STANDING):  atorvastatin 20 milliGRAM(s) Oral at bedtime  cefTRIAXone   IVPB      cefTRIAXone   IVPB 1000 milliGRAM(s) IV Intermittent every 24 hours  dextrose 40% Gel 15 Gram(s) Oral once  dextrose 5%. 1000 milliLiter(s) (50 mL/Hr) IV Continuous <Continuous>  dextrose 5%. 1000 milliLiter(s) (100 mL/Hr) IV Continuous <Continuous>  dextrose 50% Injectable 25 Gram(s) IV Push once  dextrose 50% Injectable 12.5 Gram(s) IV Push once  dextrose 50% Injectable 25 Gram(s) IV Push once  donepezil 10 milliGRAM(s) Oral at bedtime  escitalopram 5 milliGRAM(s) Oral daily  famotidine    Tablet 20 milliGRAM(s) Oral daily  glucagon  Injectable 1 milliGRAM(s) IntraMuscular once  heparin  Infusion.  Unit(s)/Hr (16 mL/Hr) IV Continuous <Continuous>  insulin lispro (ADMELOG) corrective regimen sliding scale   SubCutaneous three times a day before meals  insulin lispro (ADMELOG) corrective regimen sliding scale   SubCutaneous at bedtime  lactated ringers. 1000 milliLiter(s) (75 mL/Hr) IV Continuous <Continuous>    MEDICATIONS  (PRN):  acetaminophen     Tablet .. 650 milliGRAM(s) Oral every 6 hours PRN Temp greater or equal to 38C (100.4F), Mild Pain (1 - 3)  heparin   Injectable 7000 Unit(s) IV Push every 6 hours PRN For aPTT less than 40  heparin   Injectable 3500 Unit(s) IV Push every 6 hours PRN For aPTT between 40 - 57  melatonin 3 milliGRAM(s) Oral at bedtime PRN Insomnia      CAPILLARY BLOOD GLUCOSE      POCT Blood Glucose.: 207 mg/dL (11 Jan 2022 21:25)  POCT Blood Glucose.: 186 mg/dL (11 Jan 2022 17:27)  POCT Blood Glucose.: 203 mg/dL (11 Jan 2022 12:25)  POCT Blood Glucose.: 189 mg/dL (11 Jan 2022 08:44)    I&O's Summary    11 Jan 2022 07:01  -  12 Jan 2022 07:00  --------------------------------------------------------  IN: 1358 mL / OUT: 1950 mL / NET: -592 mL        PHYSICAL EXAM:  Vital Signs Last 24 Hrs  T(C): 36.3 (12 Jan 2022 05:09), Max: 37 (11 Jan 2022 21:01)  T(F): 97.4 (12 Jan 2022 05:09), Max: 98.6 (11 Jan 2022 21:01)  HR: 65 (12 Jan 2022 05:09) (65 - 83)  BP: 161/66 (12 Jan 2022 05:09) (159/78 - 161/66)  BP(mean): --  RR: 20 (12 Jan 2022 05:09) (20 - 20)  SpO2: 100% (12 Jan 2022 05:09) (97% - 100%)    GENERAL: NAD, well-groomed, well-developed  HEAD:  Atraumatic, Normocephalic  EYES: EOMI, PERRLA, conjunctiva and sclera clear  ENMT: No tonsillar erythema, exudates, or enlargement; Moist mucous membranes, Good dentition  NECK: Supple, No JVD  NERVOUS SYSTEM: AOX3, motor and sensation grossly intact in b/l UE and b/l LE  PSYCHIATRIC: Appropriate affect and mood  CHEST/LUNG: Clear to auscultation bilaterally; No rales, rhonchi, wheezing, or rubs  HEART: Regular rate and rhythm; 2/6 systolic murmur at L upper sternal border  ABDOMEN: Soft, Nontender, Nondistended; Bowel sounds present  EXTREMITIES:  2+ Peripheral Pulses, significant non pitting edema of LLE, no RLE edema    LABS:                        11.0   8.79  )-----------( 251      ( 12 Jan 2022 06:55 )             35.0     01-11    136  |  105  |  35<H>  ----------------------------<  208<H>  4.2   |  18<L>  |  1.92<H>    Ca    9.6      11 Jan 2022 08:00  Phos  3.3     01-11  Mg     2.3     01-11    TPro  6.6  /  Alb  3.4  /  TBili  0.2  /  DBili  x   /  AST  11  /  ALT  9<L>  /  AlkPhos  72  01-11    PTT - ( 11 Jan 2022 17:18 )  PTT:64.0 sec          Culture - Urine (collected 10 Stefan 2022 18:59)  Source: Catheterized Catheterized  Preliminary Report (11 Jan 2022 15:08):    >100,000 CFU/ml Escherichia coli        RADIOLOGY & ADDITIONAL TESTS:  Results Reviewed:   Imaging Personally Reviewed:  Electrocardiogram Personally Reviewed:    COORDINATION OF CARE:  Care Discussed with Consultants/Other Providers [Y/N]:  Prior or Outpatient Records Reviewed [Y/N]:

## 2022-01-12 NOTE — PROGRESS NOTE ADULT - SUBJECTIVE AND OBJECTIVE BOX
Patient is a 86y old  Female who presents with a chief complaint of DVT (12 Jan 2022 07:23)      MEDICATIONS  (STANDING):  atorvastatin 20 milliGRAM(s) Oral at bedtime  cefTRIAXone   IVPB      cefTRIAXone   IVPB 1000 milliGRAM(s) IV Intermittent every 24 hours  dextrose 40% Gel 15 Gram(s) Oral once  dextrose 5%. 1000 milliLiter(s) (50 mL/Hr) IV Continuous <Continuous>  dextrose 5%. 1000 milliLiter(s) (100 mL/Hr) IV Continuous <Continuous>  dextrose 50% Injectable 25 Gram(s) IV Push once  dextrose 50% Injectable 12.5 Gram(s) IV Push once  dextrose 50% Injectable 25 Gram(s) IV Push once  donepezil 10 milliGRAM(s) Oral at bedtime  escitalopram 5 milliGRAM(s) Oral daily  famotidine    Tablet 20 milliGRAM(s) Oral daily  glucagon  Injectable 1 milliGRAM(s) IntraMuscular once  heparin  Infusion.  Unit(s)/Hr (16 mL/Hr) IV Continuous <Continuous>  insulin lispro (ADMELOG) corrective regimen sliding scale   SubCutaneous three times a day before meals  insulin lispro (ADMELOG) corrective regimen sliding scale   SubCutaneous at bedtime  lactated ringers. 1000 milliLiter(s) (75 mL/Hr) IV Continuous <Continuous>  multivitamin 1 Tablet(s) Oral daily    MEDICATIONS  (PRN):  acetaminophen     Tablet .. 650 milliGRAM(s) Oral every 6 hours PRN Temp greater or equal to 38C (100.4F), Mild Pain (1 - 3)  heparin   Injectable 7000 Unit(s) IV Push every 6 hours PRN For aPTT less than 40  heparin   Injectable 3500 Unit(s) IV Push every 6 hours PRN For aPTT between 40 - 57  melatonin 3 milliGRAM(s) Oral at bedtime PRN Insomnia      ROS  No fever, sweats, chills  No epistaxis, HA, sore throat  No CP, SOB, cough, sputum  No n/v/d, abd pain, melena, hematochezia  No edema  No rash  No anxiety  Lower extremity weakness  No bleeding, bruising  No dysuria, hematuria    Vital Signs Last 24 Hrs  T(C): 36.3 (12 Jan 2022 05:09), Max: 37 (11 Jan 2022 21:01)  T(F): 97.4 (12 Jan 2022 05:09), Max: 98.6 (11 Jan 2022 21:01)  HR: 65 (12 Jan 2022 05:09) (65 - 83)  BP: 161/66 (12 Jan 2022 05:09) (159/78 - 161/66)  BP(mean): --  RR: 20 (12 Jan 2022 05:09) (20 - 20)  SpO2: 100% (12 Jan 2022 05:09) (97% - 100%)    PE  NAD  Awake, alert  Anicteric, MMM  No c/c/e  No rash grossly  decreased ROM                          11.0   8.79  )-----------( 251      ( 12 Jan 2022 06:55 )             35.0       01-12    138  |  106  |  29<H>  ----------------------------<  186<H>  4.2   |  19<L>  |  1.60<H>    Ca    10.1      12 Jan 2022 06:55  Phos  2.9     01-12  Mg     2.0     01-12    TPro  6.6  /  Alb  3.4  /  TBili  0.2  /  DBili  x   /  AST  8<L>  /  ALT  7<L>  /  AlkPhos  77  01-12

## 2022-01-12 NOTE — PROGRESS NOTE ADULT - ATTENDING COMMENTS
I Reilly Mena MD have seen and examined the patient today and agree with  the  evaluation, assessment and plan of the surgical house officer  KYLE Mena MD have personally seen and examined the patient at bedside today at  6 pm

## 2022-01-13 DIAGNOSIS — C34.90 MALIGNANT NEOPLASM OF UNSPECIFIED PART OF UNSPECIFIED BRONCHUS OR LUNG: ICD-10-CM

## 2022-01-13 LAB
ALBUMIN SERPL ELPH-MCNC: 3.3 G/DL — SIGNIFICANT CHANGE UP (ref 3.3–5)
ALP SERPL-CCNC: 82 U/L — SIGNIFICANT CHANGE UP (ref 40–120)
ALT FLD-CCNC: 8 U/L — LOW (ref 10–45)
ANION GAP SERPL CALC-SCNC: 12 MMOL/L — SIGNIFICANT CHANGE UP (ref 5–17)
APTT BLD: 62.8 SEC — HIGH (ref 27.5–35.5)
AST SERPL-CCNC: 13 U/L — SIGNIFICANT CHANGE UP (ref 10–40)
BILIRUB SERPL-MCNC: 0.2 MG/DL — SIGNIFICANT CHANGE UP (ref 0.2–1.2)
BUN SERPL-MCNC: 26 MG/DL — HIGH (ref 7–23)
CALCIUM SERPL-MCNC: 9.7 MG/DL — SIGNIFICANT CHANGE UP (ref 8.4–10.5)
CHLORIDE SERPL-SCNC: 106 MMOL/L — SIGNIFICANT CHANGE UP (ref 96–108)
CO2 SERPL-SCNC: 21 MMOL/L — LOW (ref 22–31)
CREAT SERPL-MCNC: 1.4 MG/DL — HIGH (ref 0.5–1.3)
GLUCOSE BLDC GLUCOMTR-MCNC: 191 MG/DL — HIGH (ref 70–99)
GLUCOSE BLDC GLUCOMTR-MCNC: 296 MG/DL — HIGH (ref 70–99)
GLUCOSE BLDC GLUCOMTR-MCNC: 322 MG/DL — HIGH (ref 70–99)
GLUCOSE BLDC GLUCOMTR-MCNC: 322 MG/DL — HIGH (ref 70–99)
GLUCOSE SERPL-MCNC: 208 MG/DL — HIGH (ref 70–99)
HCT VFR BLD CALC: 34.4 % — LOW (ref 34.5–45)
HGB BLD-MCNC: 10.8 G/DL — LOW (ref 11.5–15.5)
MCHC RBC-ENTMCNC: 28.1 PG — SIGNIFICANT CHANGE UP (ref 27–34)
MCHC RBC-ENTMCNC: 31.4 GM/DL — LOW (ref 32–36)
MCV RBC AUTO: 89.4 FL — SIGNIFICANT CHANGE UP (ref 80–100)
NRBC # BLD: 0 /100 WBCS — SIGNIFICANT CHANGE UP (ref 0–0)
PLATELET # BLD AUTO: 288 K/UL — SIGNIFICANT CHANGE UP (ref 150–400)
POTASSIUM SERPL-MCNC: 5.1 MMOL/L — SIGNIFICANT CHANGE UP (ref 3.5–5.3)
POTASSIUM SERPL-SCNC: 5.1 MMOL/L — SIGNIFICANT CHANGE UP (ref 3.5–5.3)
PROT SERPL-MCNC: 6.2 G/DL — SIGNIFICANT CHANGE UP (ref 6–8.3)
RBC # BLD: 3.85 M/UL — SIGNIFICANT CHANGE UP (ref 3.8–5.2)
RBC # FLD: 12.8 % — SIGNIFICANT CHANGE UP (ref 10.3–14.5)
SODIUM SERPL-SCNC: 139 MMOL/L — SIGNIFICANT CHANGE UP (ref 135–145)
WBC # BLD: 8 K/UL — SIGNIFICANT CHANGE UP (ref 3.8–10.5)
WBC # FLD AUTO: 8 K/UL — SIGNIFICANT CHANGE UP (ref 3.8–10.5)

## 2022-01-13 PROCEDURE — 74177 CT ABD & PELVIS W/CONTRAST: CPT | Mod: 26

## 2022-01-13 PROCEDURE — 99233 SBSQ HOSP IP/OBS HIGH 50: CPT | Mod: GC

## 2022-01-13 PROCEDURE — 71275 CT ANGIOGRAPHY CHEST: CPT | Mod: 26

## 2022-01-13 PROCEDURE — 99232 SBSQ HOSP IP/OBS MODERATE 35: CPT

## 2022-01-13 RX ORDER — SODIUM CHLORIDE 9 MG/ML
1000 INJECTION, SOLUTION INTRAVENOUS
Refills: 0 | Status: DISCONTINUED | OUTPATIENT
Start: 2022-01-13 | End: 2022-01-15

## 2022-01-13 RX ORDER — DIPHENHYDRAMINE HCL 50 MG
50 CAPSULE ORAL ONCE
Refills: 0 | Status: COMPLETED | OUTPATIENT
Start: 2022-01-13 | End: 2022-01-13

## 2022-01-13 RX ADMIN — Medication 50 MILLIGRAM(S): at 12:32

## 2022-01-13 RX ADMIN — DONEPEZIL HYDROCHLORIDE 10 MILLIGRAM(S): 10 TABLET, FILM COATED ORAL at 21:03

## 2022-01-13 RX ADMIN — Medication 4: at 21:07

## 2022-01-13 RX ADMIN — Medication 40 MILLIGRAM(S): at 17:39

## 2022-01-13 RX ADMIN — Medication 50 MILLIGRAM(S): at 20:31

## 2022-01-13 RX ADMIN — FAMOTIDINE 20 MILLIGRAM(S): 10 INJECTION INTRAVENOUS at 12:28

## 2022-01-13 RX ADMIN — Medication 1 TABLET(S): at 12:28

## 2022-01-13 RX ADMIN — Medication 500 MILLIGRAM(S): at 12:27

## 2022-01-13 RX ADMIN — Medication 6: at 12:39

## 2022-01-13 RX ADMIN — Medication 8: at 17:43

## 2022-01-13 RX ADMIN — HEPARIN SODIUM 1400 UNIT(S)/HR: 5000 INJECTION INTRAVENOUS; SUBCUTANEOUS at 08:16

## 2022-01-13 RX ADMIN — ENOXAPARIN SODIUM 130 MILLIGRAM(S): 100 INJECTION SUBCUTANEOUS at 12:41

## 2022-01-13 RX ADMIN — ERTAPENEM SODIUM 120 MILLIGRAM(S): 1 INJECTION, POWDER, LYOPHILIZED, FOR SOLUTION INTRAMUSCULAR; INTRAVENOUS at 17:00

## 2022-01-13 RX ADMIN — ATORVASTATIN CALCIUM 20 MILLIGRAM(S): 80 TABLET, FILM COATED ORAL at 21:03

## 2022-01-13 RX ADMIN — Medication 40 MILLIGRAM(S): at 09:50

## 2022-01-13 RX ADMIN — Medication 2: at 08:49

## 2022-01-13 RX ADMIN — ESCITALOPRAM OXALATE 5 MILLIGRAM(S): 10 TABLET, FILM COATED ORAL at 12:28

## 2022-01-13 NOTE — CONSULT NOTE ADULT - ASSESSMENT
Impression:    sacral/bilateral Buttocks suspected deep tissue injury present on admission in evolution  Incontinence of bowel and bladder  Incontinence Dermatitis    Recommend:  1.) topical therapy: sacral/buttock injury - cleanse with NS, pat dry,, apply cavilon to periwound skin, then apply an allevyn foam dressing daily  2.) Incontinence Management - incontinence cleanser, pads, pericare BID  3.) Maintain on an alternating air with low air loss surface  4.) Turn and reposition Q 2 hours  5.) Nutrition optimization  6.) Offload heels/feet with complete cair air fluidized boots; ensure that the soles of the feet are not resting on the foot board of the bed.    Care as per medicine. Will not actively follow but remain available. Please recall for new issues or deterioration.  Upon [facility] [address] [place] [address] [phone]  Seen and discussed with clinical nurse  Thank you for this consult  Charis Suazo, NP-C, CWOCN [id number]

## 2022-01-13 NOTE — PROGRESS NOTE ADULT - SUBJECTIVE AND OBJECTIVE BOX
Subjective:  Patient seen at bedside this AM. Reports feeling well, without complaints. Denies chest pain, SOB.      24h Events:   - Overnight, no acute events    Objective:  Vital Signs  T(C): 36.3 (01-13 @ 04:57), Max: 36.9 (01-12 @ 17:00)  HR: 79 (01-13 @ 04:57) (67 - 89)  BP: 153/62 (01-13 @ 04:57) (132/79 - 153/79)  RR: 18 (01-13 @ 04:57) (18 - 18)  SpO2: 98% (01-13 @ 04:57) (95% - 99%)  01-12-22 @ 07:01  -  01-13-22 @ 07:00  --------------------------------------------------------  IN:  Total IN: 0 mL    OUT:    Voided (mL): 1400 mL  Total OUT: 1400 mL    Total NET: -1400 mL      Physical Exam:  GEN: resting in bed comfortably in NAD  NEURO: awake, alert  RESP: no increased WOB  EXTR: LLE with mild to mod now reduced edema, palpable DP pulses bilaterally, no evidence of ischemia      Labs:             10.8   8.00  )-----------( 288      ( 13 Jan 2022 07:05 )             34.4     139  |  106  |  26<H>  ----------------------------<  208<H>  5.1   |  21<L>  |  1.40<H>    Ca    9.7      13 Jan 2022 07:05  Phos  2.9     01-12  Mg     2.0     01-12    TPro  6.2  /  Alb  3.3  /  TBili  0.2  /  DBili  x   /  AST  13  /  ALT  8<L>  /  AlkPhos  82  01-13      POCT Blood Glucose.: 239 mg/dL (12 Jan 2022 21:19)  POCT Blood Glucose.: 192 mg/dL (12 Jan 2022 17:14)  POCT Blood Glucose.: 225 mg/dL (12 Jan 2022 12:58)  POCT Blood Glucose.: 188 mg/dL (12 Jan 2022 08:51)      Medications:   MEDICATIONS  (STANDING):  ascorbic acid 500 milliGRAM(s) Oral daily  atorvastatin 20 milliGRAM(s) Oral at bedtime  dextrose 40% Gel 15 Gram(s) Oral once  dextrose 5%. 1000 milliLiter(s) (50 mL/Hr) IV Continuous <Continuous>  dextrose 5%. 1000 milliLiter(s) (100 mL/Hr) IV Continuous <Continuous>  dextrose 50% Injectable 25 Gram(s) IV Push once  dextrose 50% Injectable 12.5 Gram(s) IV Push once  dextrose 50% Injectable 25 Gram(s) IV Push once  donepezil 10 milliGRAM(s) Oral at bedtime  enoxaparin Injectable 130 milliGRAM(s) SubCutaneous daily  ertapenem  IVPB 1000 milliGRAM(s) IV Intermittent every 24 hours  ertapenem  IVPB      escitalopram 5 milliGRAM(s) Oral daily  famotidine    Tablet 20 milliGRAM(s) Oral daily  glucagon  Injectable 1 milliGRAM(s) IntraMuscular once  heparin  Infusion.  Unit(s)/Hr (16 mL/Hr) IV Continuous <Continuous>  insulin lispro (ADMELOG) corrective regimen sliding scale   SubCutaneous three times a day before meals  insulin lispro (ADMELOG) corrective regimen sliding scale   SubCutaneous at bedtime  lactated ringers. 1000 milliLiter(s) (75 mL/Hr) IV Continuous <Continuous>  multivitamin 1 Tablet(s) Oral daily  ondansetron Injectable 4 milliGRAM(s) IV Push once    MEDICATIONS  (PRN):  heparin   Injectable 7000 Unit(s) IV Push every 6 hours PRN For aPTT less than 40  heparin   Injectable 3500 Unit(s) IV Push every 6 hours PRN For aPTT between 40 - 57   Subjective:  Patient seen at bedside this AM. Reports feeling well, without complaints. Denies chest pain, SOB.    pt states left leg is feeling better     24h Events:   - Overnight, no acute events    Objective:  Vital Signs  T(C): 36.3 (01-13 @ 04:57), Max: 36.9 (01-12 @ 17:00)  HR: 79 (01-13 @ 04:57) (67 - 89)  BP: 153/62 (01-13 @ 04:57) (132/79 - 153/79)  RR: 18 (01-13 @ 04:57) (18 - 18)  SpO2: 98% (01-13 @ 04:57) (95% - 99%)  01-12-22 @ 07:01  -  01-13-22 @ 07:00  --------------------------------------------------------  IN:  Total IN: 0 mL    OUT:    Voided (mL): 1400 mL  Total OUT: 1400 mL    Total NET: -1400 mL      Physical Exam:  GEN: resting in bed comfortably in NAD  NEURO: awake, alert  RESP: no increased WOB  EXTR: LLE with mod dec in edema, palpable DP pulses bilaterally, no evidence of ischemia      Labs:             10.8   8.00  )-----------( 288      ( 13 Jan 2022 07:05 )             34.4     139  |  106  |  26<H>  ----------------------------<  208<H>  5.1   |  21<L>  |  1.40<H>    Ca    9.7      13 Jan 2022 07:05  Phos  2.9     01-12  Mg     2.0     01-12    TPro  6.2  /  Alb  3.3  /  TBili  0.2  /  DBili  x   /  AST  13  /  ALT  8<L>  /  AlkPhos  82  01-13      POCT Blood Glucose.: 239 mg/dL (12 Jan 2022 21:19)  POCT Blood Glucose.: 192 mg/dL (12 Jan 2022 17:14)  POCT Blood Glucose.: 225 mg/dL (12 Jan 2022 12:58)  POCT Blood Glucose.: 188 mg/dL (12 Jan 2022 08:51)      Medications:   MEDICATIONS  (STANDING):  ascorbic acid 500 milliGRAM(s) Oral daily  atorvastatin 20 milliGRAM(s) Oral at bedtime  dextrose 40% Gel 15 Gram(s) Oral once  dextrose 5%. 1000 milliLiter(s) (50 mL/Hr) IV Continuous <Continuous>  dextrose 5%. 1000 milliLiter(s) (100 mL/Hr) IV Continuous <Continuous>  dextrose 50% Injectable 25 Gram(s) IV Push once  dextrose 50% Injectable 12.5 Gram(s) IV Push once  dextrose 50% Injectable 25 Gram(s) IV Push once  donepezil 10 milliGRAM(s) Oral at bedtime  enoxaparin Injectable 130 milliGRAM(s) SubCutaneous daily  ertapenem  IVPB 1000 milliGRAM(s) IV Intermittent every 24 hours  ertapenem  IVPB      escitalopram 5 milliGRAM(s) Oral daily  famotidine    Tablet 20 milliGRAM(s) Oral daily  glucagon  Injectable 1 milliGRAM(s) IntraMuscular once  heparin  Infusion.  Unit(s)/Hr (16 mL/Hr) IV Continuous <Continuous>  insulin lispro (ADMELOG) corrective regimen sliding scale   SubCutaneous three times a day before meals  insulin lispro (ADMELOG) corrective regimen sliding scale   SubCutaneous at bedtime  lactated ringers. 1000 milliLiter(s) (75 mL/Hr) IV Continuous <Continuous>  multivitamin 1 Tablet(s) Oral daily  ondansetron Injectable 4 milliGRAM(s) IV Push once    MEDICATIONS  (PRN):  heparin   Injectable 7000 Unit(s) IV Push every 6 hours PRN For aPTT less than 40  heparin   Injectable 3500 Unit(s) IV Push every 6 hours PRN For aPTT between 40 - 57

## 2022-01-13 NOTE — PROGRESS NOTE ADULT - PROBLEM SELECTOR PLAN 1
-Woke up this morning w/ swelling and pain of LLE  -Found to have acute LLE DVT involving the external iliac vein, common femoral vein, femoral vein, and posterior trunk  -likely due to combination of recent hospitalization for syncope, lung cancer, and immobility    Plan:  - enoxaparin 11/13 s/p heparin drip  -no acute vascular surgery intervention. 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 Likely due to combination of recent hospitalization, lung cancer, and immobility.    Plan:  - enoxaparin 11/13 s/p heparin drip  - no acute vascular surgery intervention  - f/u CT venogram 1/13 Found to have acute LLE DVT involving the external iliac vein, common femoral vein, femoral vein, and posterior trunk. Likely due to combination of recent hospitalization, lung cancer, and immobility.    Plan:  - enoxaparin 11/13 s/p heparin drip  - no acute vascular surgery intervention  - f/u CT venogram 1/13 which was requested by the Vascular surgery team

## 2022-01-13 NOTE — PROGRESS NOTE ADULT - SUBJECTIVE AND OBJECTIVE BOX
Resident: Bernice Wilkerson, PGY1, Medicine, Pager [phone] (NS) [phone] ([facility])    ID/CC: Patient is a 86y old  Female who presents with a chief complaint of DVT (12 Jan 2022 15:08)    HPI:  85F with PMH of lung cancer on radiation, DVT w/ IVC filter previously on eliquis (not currently), T2DM not on insulin, HTN, obesity, Covid in February, remote MI s/p stent presents with 1 day of LLE swelling/pain. Patient states she woke up this morning and had significant swelling of her LLE w/ associated pain. Patient had a recent hospitalization at [facility] for syncope which was though to be due to dehydration. Per patient she was there for two days and was sent home. Denies any difficulty walking below her baseline. At baseline, she has had reduced mobility since Covid in February and has been receiving physical therapy where she has recently progressed to walking with a cane. Generally, she has reduced mobility and spends much of her time lying down watching TV or in bed. Per patient/daughter/records, she has a remote history of DVT about 10 years ago for which she had an IVC filter palced and was on eliquis. Eliquis 5mg BID was stopped in May by PCP.   For her lung cancer, patient is treated at [facility] and has only received radiation therapy. Has bad biopsy but family is not sure of the type of lung cancer. Recently had PET scan and showed "new areas lung lighting up", but still no extrapulmonary involvement of the cancer.   (09 Jan 2022 18:08)    PMH/PSH:   Dementia  Obesity  HTN (hypertension)  Diabetes  DVT, lower extremity  MI (myocardial infarction)  History of implantable cardiac defibrillator (ICD) / Pacemaker    24-Hour Events and Subjective:  -     Hospital Meds:  MEDICATIONS  (STANDING):  ascorbic acid 500 milliGRAM(s) Oral daily  atorvastatin 20 milliGRAM(s) Oral at bedtime  dextrose 40% Gel 15 Gram(s) Oral once  dextrose 5%. 1000 milliLiter(s) (50 mL/Hr) IV Continuous <Continuous>  dextrose 5%. 1000 milliLiter(s) (100 mL/Hr) IV Continuous <Continuous>  dextrose 50% Injectable 25 Gram(s) IV Push once  dextrose 50% Injectable 12.5 Gram(s) IV Push once  dextrose 50% Injectable 25 Gram(s) IV Push once  donepezil 10 milliGRAM(s) Oral at bedtime  enoxaparin Injectable 130 milliGRAM(s) SubCutaneous daily  ertapenem  IVPB 1000 milliGRAM(s) IV Intermittent every 24 hours  ertapenem  IVPB      escitalopram 5 milliGRAM(s) Oral daily  famotidine    Tablet 20 milliGRAM(s) Oral daily  glucagon  Injectable 1 milliGRAM(s) IntraMuscular once  heparin  Infusion.  Unit(s)/Hr (16 mL/Hr) IV Continuous <Continuous>  insulin lispro (ADMELOG) corrective regimen sliding scale   SubCutaneous three times a day before meals  insulin lispro (ADMELOG) corrective regimen sliding scale   SubCutaneous at bedtime  lactated ringers. 1000 milliLiter(s) (75 mL/Hr) IV Continuous <Continuous>  multivitamin 1 Tablet(s) Oral daily  ondansetron Injectable 4 milliGRAM(s) IV Push once  MEDICATIONS  (PRN):  heparin   Injectable 7000 Unit(s) IV Push every 6 hours PRN For aPTT less than 40  heparin   Injectable 3500 Unit(s) IV Push every 6 hours PRN For aPTT between 40 - 57    Allergies:  iodine (Hives)  penicillin (Hives)    Vitals:  T(F): 97.4 (13 Jan 2022 04:57), Max: 98.5 (12 Jan 2022 17:00)  HR: 79 (13 Jan 2022 04:57) (67 - 89)  BP: 153/62 (13 Jan 2022 04:57) (132/79 - 153/79)  RR: 18 (13 Jan 2022 04:57) (18 - 18)  SpO2: 98% (13 Jan 2022 04:57) (95% - 99%)    I&O: I&O's Summary  12 Jan 2022 07:01  -  13 Jan 2022 07:00  IN: 2262 mL / OUT: 1400 mL / NET: 862 mL    Physical Exam:   GENERAL: NAD, well-groomed, well-developed  HEAD:  Atraumatic, Normocephalic  EYES: EOMI, PERRLA, conjunctiva and sclera clear  ENMT: No tonsillar erythema, exudates, or enlargement; Moist mucous membranes, Good dentition  NECK: Supple, No JVD  NERVOUS SYSTEM: AOX3, motor and sensation grossly intact in b/l UE and b/l LE  PSYCHIATRIC: Appropriate affect and mood  CHEST/LUNG: Clear to auscultation bilaterally; No rales, rhonchi, wheezing, or rubs  HEART: Regular rate and rhythm; 2/6 systolic murmur at L upper sternal border  ABDOMEN: Soft, Nontender, Nondistended; Bowel sounds present  EXTREMITIES:  2+ Peripheral Pulses, significant non pitting edema of LLE, no RLE edema    Labs:                         11.0L   8.79  )-----------( 251      ( 12 Jan 2022 06:55 )             35.0     PT/INR - ( 12 Jan 2022 07:26 )   PT: 12.8 sec;   INR: 1.07 ratio    PTT - ( 12 Jan 2022 07:26 )  PTT:58.5H sec    01-12  138  |  106  |  29<H>  ----------------------------<  186<H>  4.2   |  19<L>  |  1.60<H>  eGFR 29L-34L  Ca    10.1      12 Jan 2022 06:55  Phos  2.9     01-12  Mg     2.0     01-12  TPro  6.6  /  Alb  3.4  /  TBili  0.2  /  DBili  x   /  AST  8<L>  /  ALT  7<L>  /  AlkPhos  77  01-12    Culture - Urine (collected 10 Steafn 2022 18:59)  Source: Catheterized Catheterized  Final Report (12 Jan 2022 14:19):    >100,000 CFU/ml Escherichia coli ESBL  Organism: Escherichia coli ESBL (12 Jan 2022 14:19)  Organism: Escherichia coli ESBL (12 Jan 2022 14:19)    Studies and Radiology: Resident: Bernice Wilkerson, PGY1, Medicine, Pager [phone] ([facility]) [phone] ([facility])    ID/CC: Patient is a 86y old  Female who presents with a chief complaint of DVT (12 Jan 2022 15:08)    HPI:  85F with PMH of lung cancer on radiation, DVT w/ IVC filter previously on eliquis (not currently), T2DM not on insulin, HTN, obesity, Covid in February, remote MI s/p stent presents with 1 day of LLE swelling/pain. Patient states she woke up this morning and had significant swelling of her LLE w/ associated pain. Patient had a recent hospitalization at [facility] for syncope which was though to be due to dehydration. Per patient she was there for two days and was sent home. Denies any difficulty walking below her baseline. At baseline, she has had reduced mobility since Covid in February and has been receiving physical therapy where she has recently progressed to walking with a cane. Generally, she has reduced mobility and spends much of her time lying down watching TV or in bed. Per patient/daughter/records, she has a remote history of DVT about 10 years ago for which she had an IVC filter palced and was on eliquis. Eliquis 5mg BID was stopped in May by PCP.   For her lung cancer, patient is treated at [facility] and has only received radiation therapy. Has bad biopsy but family is not sure of the type of lung cancer. Recently had PET scan and showed "new areas lung lighting up", but still no extrapulmonary involvement of the cancer.   (09 Jan 2022 18:08)    PMH/PSH:   Dementia  Obesity  HTN (hypertension)  Diabetes  DVT, lower extremity  MI (myocardial infarction)  History of implantable cardiac defibrillator (ICD) / Pacemaker    24-Hour Events and Subjective:  -     Hospital Meds:  MEDICATIONS  (STANDING):  ascorbic acid 500 milliGRAM(s) Oral daily  atorvastatin 20 milliGRAM(s) Oral at bedtime  dextrose 40% Gel 15 Gram(s) Oral once  dextrose 5%. 1000 milliLiter(s) (50 mL/Hr) IV Continuous <Continuous>  dextrose 5%. 1000 milliLiter(s) (100 mL/Hr) IV Continuous <Continuous>  dextrose 50% Injectable 25 Gram(s) IV Push once  dextrose 50% Injectable 12.5 Gram(s) IV Push once  dextrose 50% Injectable 25 Gram(s) IV Push once  donepezil 10 milliGRAM(s) Oral at bedtime  enoxaparin Injectable 130 milliGRAM(s) SubCutaneous daily  ertapenem  IVPB 1000 milliGRAM(s) IV Intermittent every 24 hours  ertapenem  IVPB      escitalopram 5 milliGRAM(s) Oral daily  famotidine    Tablet 20 milliGRAM(s) Oral daily  glucagon  Injectable 1 milliGRAM(s) IntraMuscular once  heparin  Infusion.  Unit(s)/Hr (16 mL/Hr) IV Continuous <Continuous>  insulin lispro (ADMELOG) corrective regimen sliding scale   SubCutaneous three times a day before meals  insulin lispro (ADMELOG) corrective regimen sliding scale   SubCutaneous at bedtime  lactated ringers. 1000 milliLiter(s) (75 mL/Hr) IV Continuous <Continuous>  multivitamin 1 Tablet(s) Oral daily  ondansetron Injectable 4 milliGRAM(s) IV Push once  MEDICATIONS  (PRN):  heparin   Injectable 7000 Unit(s) IV Push every 6 hours PRN For aPTT less than 40  heparin   Injectable 3500 Unit(s) IV Push every 6 hours PRN For aPTT between 40 - 57    Allergies:  iodine (Hives)  penicillin (Hives)    Vitals:  T(F): 97.4 (13 Jan 2022 04:57), Max: 98.5 (12 Jan 2022 17:00)  HR: 79 (13 Jan 2022 04:57) (67 - 89)  BP: 153/62 (13 Jan 2022 04:57) (132/79 - 153/79)  RR: 18 (13 Jan 2022 04:57) (18 - 18)  SpO2: 98% (13 Jan 2022 04:57) (95% - 99%)    I&O: I&O's Summary  12 Jan 2022 07:01  -  13 Jan 2022 07:00  IN: 2262 mL / OUT: 1400 mL / NET: 862 mL    Physical Exam:   GENERAL: NAD, obese  HEAD:  Atraumatic, Normocephalic  EYES: EOMI, PERRLA, conjunctiva and sclera clear  ENMT: No tonsillar erythema, exudates, or enlargement; Moist mucous membranes, Good dentition, hearing loss  NECK: Supple, No JVD  NERVOUS SYSTEM: AOX3, motor and sensation grossly intact in b/l UE and b/l LE  PSYCHIATRIC: Appropriate affect and mood  CHEST/LUNG: Clear to auscultation bilaterally; No rales, rhonchi, wheezing, or rubs  HEART: Regular rate and rhythm; systolic murmur LUSB  ABDOMEN: Soft, Nontender, Nondistended; Bowel sounds present  EXTREMITIES:  2+ Peripheral Pulses, significant non pitting edema of LLE, no RLE edema    Labs:                10.8L   8.00  )-----------( 288      ( 13 Jan 2022 07:05 )             34.4L     PT/INR - ( 12 Jan 2022 07:26 )   PT: 12.8 sec;   INR: 1.07 ratio    PTT - ( 13 Jan 2022 07:05 )  PTT:62.8 sec    01-13  139  |  106  |  26<H>  ----------------------------<  208<H>  5.1   |  21<L>  |  1.40<H>  eGFR 34L-40L  Ca    9.7      13 Jan 2022 07:05  Phos  2.9     01-12  Mg     2.0     01-12 01-12  138  |  106  |  29<H>  ----------------------------<  186<H>  4.2   |  19<L>  |  1.60<H>  eGFR 29L-34L  Ca    10.1      12 Jan 2022 06:55  Phos  2.9     01-12  Mg     2.0     01-12    TPro  6.2  /  Alb  3.3  /  TBili  0.2  /  DBili  x   /  AST  13  /  ALT  8<L>  /  AlkPhos  82  01-13  TPro  6.6  /  Alb  3.4  /  TBili  0.2  /  DBili  x   /  AST  8<L>  /  ALT  7<L>  /  AlkPhos  77  01-12    Culture - Urine (collected 10 Stefan 2022 18:59)  Source: Catheterized Catheterized  Final Report (12 Jan 2022 14:19):    >100,000 CFU/ml Escherichia coli ESBL  Organism: Escherichia coli ESBL (12 Jan 2022 14:19)  Organism: Escherichia coli ESBL (12 Jan 2022 14:19)    Studies and Radiology:

## 2022-01-13 NOTE — PROGRESS NOTE ADULT - ATTENDING COMMENTS
86F with PMH of lung cancer on radiation, R popliteal DVT s/p IVC filter,  Left Popliteal V DVT (  2/2021), DM2 , HTN, obesity, remote MI s/p stent, multiple falls presents with 1 day of LLE swelling/pain.  Patient had a recent hospitalization at [facility] for syncope which was though to be due to dehydration.  AS reported, Pt was on Eliquis which was recently DC in 5/2021.  Patient had lower ext doppler and  found to have extensive LLE DVT (external iliac to femoral, posterior trunk).  Pt was placed on Heparin drip which will cont and monitor INR and  Hem/onc evaluation is noted and appreciated , once renal function improved , most likely will change to Lovenox subcutaneous.  Cont to monitor for any bleed and monitor SPO2.  CT Venogram is pend as requested by Vascular surgery .  IR team was contacted for possible  a hypermetabolic left lung subpleural nodule who recommended outpatient  oncology follow up and then arrange for biopsy. Probably best to continue care with oncologist at [facility] to avoid discontinuity of care.  Creatinine is improving with IVF with FENA <1 .  WIll get Nephro evalu and will cont IVF and monitor Cr and will get  CT Venogram as per vascular surgery. She has been mentation ok.      Clara Vieyra   Hospitalist   [phone]. 86F with PMH of lung cancer on radiation, R popliteal DVT s/p IVC filter,  Left Popliteal V DVT (  2/2021), DM2 , HTN, obesity, remote MI s/p stent, multiple falls presents with 1 day of LLE swelling/pain.  Patient had a recent hospitalization at [facility] for syncope which was though to be due to dehydration.  AS reported, Pt was on Eliquis which was recently DC in 5/2021.  Patient had lower ext doppler and  found to have extensive LLE DVT (external iliac to femoral, posterior trunk).  Pt was placed on Heparin drip which will cont and monitor INR and  Hem/onc evaluation is noted and appreciated , once renal function improved , most likely will change to Lovenox subcutaneous.  Cont to monitor for any bleed and monitor SPO2.  CT Venogram is pend as requested by Vascular surgery .  IR team was contacted for possible  a hypermetabolic left lung subpleural nodule who recommended outpatient  oncology follow up and then arrange for biopsy. Probably best to continue care with oncologist at [facility] to avoid discontinuity of care.  Creatinine is improving with IVF with FENA <1 Cr has improved and Vascular team requested  Venogram which will be done with premedication with Steroid and H1 blocker prior to testing.  Close monitoring     Clara Vieyra   Hospitalist   [phone].

## 2022-01-13 NOTE — PROGRESS NOTE ADULT - PROBLEM SELECTOR PLAN 9
DVT Prophylaxis: DVT on admission, patient on heparin drip  Diet: Consistent Carb/DASH  Dispo: likely home, pending PT eval H/o.  - IR recommended outpatient onc f/u for L lung subpleural nodule H/o. Outpatient Dr Cisse at [facility].  - IR recommended bx outpatient (eg [facility])  - f/u CT chest  - appreciate Heme/Onc recs H/o. Outpatient Dr Cisse at [facility].  - IR recommended bx outpatient (eg [facility])  - f/u CT chest once done   - appreciate Heme/Onc recs

## 2022-01-13 NOTE — PROGRESS NOTE ADULT - PROBLEM SELECTOR PLAN 3
-UA positive  -Ucx: Positive for E. Coli  -Pt started on Ceftriaxone (1/10- ) Ucx: Positive for E. Coli.  - Ceftriaxone 1/10- then ertapenem 1/12- Ucx: Positive for E. Coli.  - Ceftriaxone 1/10- 1/12 then ertapenem 1/12- due to ESBL in urine

## 2022-01-13 NOTE — PROGRESS NOTE ADULT - ASSESSMENT
86F with PMH of lung cancer on radiation, R popliteal DVT s/p IVC filter, T2DM, HTN, obesity, remote MI s/p stent, multiple falls presents with 1 day of LLE swelling/pain found to have extensive LLE DVT (external iliac to femoral, posterior trunk) with IVCF in place.     PLAN:   - C/w AC  - Please obtain CT venogram to evaluate extend / propagation of DVTs now that patient's Cr is improved (ordered)   - C/w care per primary team      Lydia Acosta, PGY-2  Vascular Surgery  #[id number]  86F with PMH of lung cancer on radiation, R popliteal DVT s/p IVC filter, T2DM, HTN, obesity, remote MI s/p stent, multiple falls presents with 1 day of LLE swelling/pain found to have extensive LLE DVT (external iliac to femoral, posterior trunk) with IVCF in place.     PLAN:   clinically improving   - C/w AC  -await ctv of abd/pelvis to r/o left may thurner syndrome  - C/w care per primary team  will follow     Lydia Acosta, PGY-2  Vascular Surgery  #[phone]

## 2022-01-13 NOTE — PROGRESS NOTE ADULT - ATTENDING COMMENTS
I Reilly Mena MD have seen and examined the patient today and agree with  the  evaluation, assessment and plan of the surgical house officer  KYLE Mena MD have personally seen and examined the patient at bedside today at  7 pm

## 2022-01-13 NOTE — PROGRESS NOTE ADULT - PROBLEM SELECTOR PLAN 5
-Noted 2/6 systolic murmur at L upper sternal border  -patient states she has never been told of history of murmur  -given murmur as well as recent syncope, possible c/f RH strain    Plan:  - s/p echo 2/6 systolic murmur at L upper sternal border. TTE 1/10.

## 2022-01-13 NOTE — PROGRESS NOTE ADULT - PROBLEM SELECTOR PLAN 4
-Hgb 9.8, down from 12-13 in previous admission in March  -no signs or symptoms of bleeding  -ACD vs iron deficiency vs other    Plan:  -monitor for bleeding given starting heparin drip for acute DVT  -trend CBC Q12  -iron studies, B12, folate Hgb 9.8, down from 12-13 in previous admission in March. No signs or symptoms of bleeding. Maybe ACD. Unlikely 2/2 nutritional deficiency.

## 2022-01-13 NOTE — PROGRESS NOTE ADULT - ASSESSMENT
85F with PMH of lung cancer on radiation, DVT w/ IVC filter previously on eliquis (not currently), T2DM not on insulin, HTN, obesity, Covid in February, remote MI s/p stent presents with 1 day of LLE swelling/pain found to have acute LLE DVT.  85F with PMH of lung cancer on radiation, DVT w/ IVC filter previously on eliquis (not currently), T2DM not on insulin, HTN, obesity, Covid in February, remote MI s/p stent presents with 1 day of LLE swelling/pain found to have acute LLE DVT.     86F with PMH of lung cancer on radiation, R popliteal DVT s/p IVC filter,  Left Popliteal V DVT (  2/2021), DM2 , HTN, obesity, remote MI s/p stent, multiple falls presents with 1 day of LLE swelling/pain.  Patient had a recent hospitalization at [facility] for syncope which was though to be due to dehydration.  AS reported, Pt was on Eliquis which was recently DC in 5/2021.  Patient had lower ext doppler and  found to have extensive LLE DVT (external iliac to femoral, posterior trunk).  Pt was placed on Heparin drip which will cont and monitor INR and  Hem/onc evaluation is noted and appreciated , once renal function improved , most likely will change to Lovenox subcutaneous.  Cont to monitor for any bleed and monitor SPO2.  CT Venogram is pend as requested by Vascular surgery .  IR team was contacted for possible  a hypermetabolic left lung subpleural nodule who recommended outpatient  oncology follow up and then arrange for biopsy. Probably best to continue care with oncologist at [facility] to avoid discontinuity of care.  Creatinine is improving with IVF with FENA <1 .  WIll get Nephro evalu and will cont IVF and monitor Cr and will get  CT Venogram as per vascular surgery. She has been mentation ok.   85F with PMH of lung cancer on radiation, R popliteal DVT w/ IVC filter previously on eliquis dc 5/2021, L popliteal DVT 2/2021, T2DM not on insulin, HTN, obesity, Covid in 2/2021, remote MI s/p stent, multiple falls, recent hospitalization for syncope.  Presents with LLE swelling/pain. Found to have acute LLE DVT.

## 2022-01-13 NOTE — PROGRESS NOTE ADULT - PROBLEM SELECTOR PLAN 2
-Patient's Cr 2.3 on admission, up from last known 1.1 in March   -patient was recently hospitalized for syncope at [facility] which was though to be from dehydration, and she was treated w/ IVF  -could be prerenal in the setting of dehydration, but will r/o other causes    Plan:   -obtain urine lytes: FeNA: 0.4% (likely pre-renal)  -improving Cr , cont to monitor  -Cont LR 75ml/hr  -Nephro rec no contrast while ROBERTO -Patient's Cr 2.3 on admission, up from last known 1.1 in March   -patient was recently hospitalized for syncope at [facility] which was though to be from dehydration, and she was treated w/ IVF  -could be prerenal in the setting of dehydration, but will r/o other causes  - FeNA: 0.4% (likely pre-renal)    Plan:  - IVF

## 2022-01-13 NOTE — PROGRESS NOTE ADULT - SUBJECTIVE AND OBJECTIVE BOX
Patient is a 86y old  Female who presents with a chief complaint of DVT (13 Jan 2022 08:31)      MEDICATIONS  (STANDING):  ascorbic acid 500 milliGRAM(s) Oral daily  atorvastatin 20 milliGRAM(s) Oral at bedtime  dextrose 40% Gel 15 Gram(s) Oral once  dextrose 5%. 1000 milliLiter(s) (50 mL/Hr) IV Continuous <Continuous>  dextrose 5%. 1000 milliLiter(s) (100 mL/Hr) IV Continuous <Continuous>  dextrose 50% Injectable 25 Gram(s) IV Push once  dextrose 50% Injectable 12.5 Gram(s) IV Push once  dextrose 50% Injectable 25 Gram(s) IV Push once  diphenhydrAMINE Injectable 50 milliGRAM(s) IV Push once  donepezil 10 milliGRAM(s) Oral at bedtime  enoxaparin Injectable 130 milliGRAM(s) SubCutaneous daily  ertapenem  IVPB 1000 milliGRAM(s) IV Intermittent every 24 hours  ertapenem  IVPB      escitalopram 5 milliGRAM(s) Oral daily  famotidine    Tablet 20 milliGRAM(s) Oral daily  glucagon  Injectable 1 milliGRAM(s) IntraMuscular once  heparin  Infusion.  Unit(s)/Hr (16 mL/Hr) IV Continuous <Continuous>  insulin lispro (ADMELOG) corrective regimen sliding scale   SubCutaneous three times a day before meals  insulin lispro (ADMELOG) corrective regimen sliding scale   SubCutaneous at bedtime  lactated ringers. 1000 milliLiter(s) (80 mL/Hr) IV Continuous <Continuous>  multivitamin 1 Tablet(s) Oral daily  ondansetron Injectable 4 milliGRAM(s) IV Push once    MEDICATIONS  (PRN):  heparin   Injectable 7000 Unit(s) IV Push every 6 hours PRN For aPTT less than 40  heparin   Injectable 3500 Unit(s) IV Push every 6 hours PRN For aPTT between 40 - 57      ROS  No fever, sweats, chills  No epistaxis, HA, sore throat  No CP, SOB, cough, sputum  No n/v/d, abd pain, melena, hematochezia  No edema  No rash  No anxiety    Vital Signs Last 24 Hrs  T(C): 36.3 (13 Jan 2022 04:57), Max: 36.9 (12 Jan 2022 17:00)  T(F): 97.4 (13 Jan 2022 04:57), Max: 98.5 (12 Jan 2022 17:00)  HR: 79 (13 Jan 2022 04:57) (69 - 89)  BP: 153/62 (13 Jan 2022 04:57) (132/79 - 153/79)  BP(mean): --  RR: 18 (13 Jan 2022 04:57) (18 - 18)  SpO2: 98% (13 Jan 2022 04:57) (95% - 99%)    PE  NAD  Awake, alert  Anicteric, MMM  No c/c/e  No rash grossly                            10.8   8.00  )-----------( 288      ( 13 Jan 2022 07:05 )             34.4       01-13    139  |  106  |  26<H>  ----------------------------<  208<H>  5.1   |  21<L>  |  1.40<H>    Ca    9.7      13 Jan 2022 07:05  Phos  2.9     01-12  Mg     2.0     01-12    TPro  6.2  /  Alb  3.3  /  TBili  0.2  /  DBili  x   /  AST  13  /  ALT  8<L>  /  AlkPhos  82  01-13

## 2022-01-13 NOTE — PROGRESS NOTE ADULT - ASSESSMENT
Hematology/Oncology consulted on this patient with history of Lung cancer, currently undergoing care at [facility] with Dr Cisse. She has a history of HTN, PTCA, CAD, PM and NIDDM. As per daughter, patient stayed overnight  at [facility] on 1/6/21 after " passing out" She also missed an [facility] IR Lung biopsy originally scheduled on 1/6 at [facility]. She is an 86 year old female with history of  left lung Adenocarcinoma S/P SBRT to the DONNA. 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 Pt with history of IVC filter placed. She presented to [facility] with LLE swelling and pain, confirmed for a positive DVT.    Lung Adenocarcinoma  --treated with RT only within the last 2 months to [facility]  --She is currently undergoing care with Dr Cisse at [facility]  --will follow up after discharge at [facility]  --Rec CT chest  --Patient to have her lung biopsy at [facility] as previously planned, IR feels with Acute DVT, biopsy should wait.  --Appreciate recs    DVT  --likely provoked and with history of DVT  --remains on Heparin  --vascular following, venogram pending ( waiting for improvement in creatinine)  --Duplex  IMPRESSION:  Extensive left lower extremity DVT.  Acute deep venous thrombosis: above and below the knee.      Anemia  --Hgb 10.8  --likely reactive  --If acute drop, please transfuse for Hgb <7.0    Elevated creatinine  --creatinine improving to 1.4  --CT C/A/P and CT lower extremities on hold until improvement in renal function  --renal following    We will be happy to answer any questions on behalf of [facility]    Tova Perez NP  Hematology/Oncology  [facility]  [phone] (Cell)  [phone] (Office)  [phone] (Alt office)  Evenings and weekends please call MD on call or office

## 2022-01-13 NOTE — CONSULT NOTE ADULT - SUBJECTIVE AND OBJECTIVE BOX
Wound Surgery Consult Note:    HPI:  85F with PMH of lung cancer on radiation, DVT w/ IVC filter previously on eliquis (not currently), T2DM not on insulin, HTN, obesity, Covid in February, remote MI s/p stent presents with 1 day of LLE swelling/pain. Patient states she woke up this morning and had significant swelling of her LLE w/ associated pain. Patient had a recent hospitalization at [facility] for syncope which was though to be due to dehydration. Per patient she was there for two days and was sent home. Denies any difficulty walking below her baseline. At baseline, she has had reduced mobility since Covid in February and has been receiving physical therapy where she has recently progressed to walking with a cane. Generally, she has reduced mobility and spends much of her time lying down watching TV or in bed. Per patient/daughter/records, she has a remote history of DVT about 10 years ago for which she had an IVC filter palced and was on eliquis. Eliquis 5mg BID was stopped in May by PCP.     For her lung cancer, patient is treated at [facility] and has only received radiation therapy. Has bad biopsy but family is not sure of the type of lung cancer. Recently had PET scan and showed "new areas lung lighting up", but still no extrapulmonary involvement of the cancer.   (09 Jan 2022 18:08)    Request for wound care consult for sacral/bilateral buttocks skin breakdown received from nursing. Ms. Yanez was encountered on an alternating air with low air loss surface. She is incontinent of stool and urine.  She was able turn and reposition self in bed although she is weak. Her extreme immobility, inactivity, incontinence of urine and stool as well as poor nutritional status all contribute to her high risk for pressure injury development and hinder healing. Identification of the initial signs of deep tissue damage at the level of the skin within 72 hours of admission make this an injury that occurred prior to admission. Ms. Yanez stated that the wound began during a prior hospitalization at which time she was in bed and grossly incontinent for an extended period.    PAST MEDICAL & SURGICAL HISTORY:  Dementia  Obesity  HTN (hypertension)  Diabetes  DVT, lower extremity  MI (myocardial infarction)  Pacemaker    MEDICATIONS  (STANDING):  ascorbic acid 500 milliGRAM(s) Oral daily  atorvastatin 20 milliGRAM(s) Oral at bedtime  dextrose 40% Gel 15 Gram(s) Oral once  dextrose 5%. 1000 milliLiter(s) (50 mL/Hr) IV Continuous <Continuous>  dextrose 5%. 1000 milliLiter(s) (100 mL/Hr) IV Continuous <Continuous>  dextrose 50% Injectable 25 Gram(s) IV Push once  dextrose 50% Injectable 12.5 Gram(s) IV Push once  dextrose 50% Injectable 25 Gram(s) IV Push once  diphenhydrAMINE Injectable 50 milliGRAM(s) IV Push once  donepezil 10 milliGRAM(s) Oral at bedtime  enoxaparin Injectable 130 milliGRAM(s) SubCutaneous daily  ertapenem  IVPB 1000 milliGRAM(s) IV Intermittent every 24 hours  ertapenem  IVPB      escitalopram 5 milliGRAM(s) Oral daily  famotidine    Tablet 20 milliGRAM(s) Oral daily  glucagon  Injectable 1 milliGRAM(s) IntraMuscular once  heparin  Infusion.  Unit(s)/Hr (16 mL/Hr) IV Continuous <Continuous>  insulin lispro (ADMELOG) corrective regimen sliding scale   SubCutaneous three times a day before meals  insulin lispro (ADMELOG) corrective regimen sliding scale   SubCutaneous at bedtime  lactated ringers. 1000 milliLiter(s) (80 mL/Hr) IV Continuous <Continuous>  multivitamin 1 Tablet(s) Oral daily  ondansetron Injectable 4 milliGRAM(s) IV Push once    MEDICATIONS  (PRN):  heparin   Injectable 7000 Unit(s) IV Push every 6 hours PRN For aPTT less than 40  heparin   Injectable 3500 Unit(s) IV Push every 6 hours PRN For aPTT between 40 - 57    Allergies    iodine (Hives)  penicillin (Hives)    Intolerances    Vital Signs Last 24 Hrs  T(C): 36.3 (13 Jan 2022 04:57), Max: 36.9 (12 Jan 2022 17:00)  T(F): 97.4 (13 Jan 2022 04:57), Max: 98.5 (12 Jan 2022 17:00)  HR: 79 (13 Jan 2022 04:57) (69 - 89)  BP: 153/62 (13 Jan 2022 04:57) (132/79 - 153/79)  BP(mean): --  RR: 18 (13 Jan 2022 04:57) (18 - 18)  SpO2: 98% (13 Jan 2022 04:57) (95% - 99%)    Physical Exam:  General: A&O x 3   Respiratory: no SOB on room air  Gastrointestinal: soft NT/ND  Neurology: verbal, following commands  Musculoskeletal: no contractures  Vascular: BLE edema equal  Skin:  Sacral/bilateral buttocks with deep maroon discolored areas with right sided superficial skin loss, L 3cm x W 3cm X 0.1cm, scant drainage  No odor, increased warmth, tenderness, induration, fluctuance    LABS:  01-13    139  |  106  |  26<H>  ----------------------------<  208<H>  5.1   |  21<L>  |  1.40<H>    Ca    9.7      13 Jan 2022 07:05  Phos  2.9     01-12  Mg     2.0     01-12    TPro  6.2  /  Alb  3.3  /  TBili  0.2  /  DBili  x   /  AST  13  /  ALT  8<L>  /  AlkPhos  82  01-13                          10.8   8.00  )-----------( 288      ( 13 Jan 2022 07:05 )             34.4     PT/INR - ( 12 Jan 2022 07:26 )   PT: 12.8 sec;   INR: 1.07 ratio         PTT - ( 13 Jan 2022 07:05 )  PTT:62.8 sec      RADIOLOGY & ADDITIONAL STUDIES:  Cultures:    A/P:    Compression BLE  Consider ELIZABETH/PVR, XRay, Duplex, MRI, CT  BLE elevation  Abx per Medicine/ ID  Moisturize intact skin w/ SWEEN cream BID  con't Nutrition (as tolerated), Nutrition Consult  con't Offloading   con't Pericare  Care as per medicine will follow w/ you  Upon discharge f/u as outpatient at [facility] [address] [place] [address] [phone]  Seen w/ attng and D/w team  Thank you for this consult  Julia Velez PA-C [id number]

## 2022-01-13 NOTE — PROGRESS NOTE ADULT - ATTENDING COMMENTS
84 y/o female with history of lung cancer followed by [facility] admitted with new DVT. She was planned to undergo biopsy of the posterior left lobe PET-avid nodule on January 6th but unable to make it to appointment. DVT likely associated with malignancy; recommend transition to enoxaparin or DOAC. If interventional radiology here can perform biopsy can maintain patient on heparin drip. Otherwise enoxaparin may be preferred in the short-term while [facility] set up an outpatient biopsy again.

## 2022-01-14 DIAGNOSIS — Z95.828 PRESENCE OF OTHER VASCULAR IMPLANTS AND GRAFTS: ICD-10-CM

## 2022-01-14 LAB
ALBUMIN SERPL ELPH-MCNC: 3.2 G/DL — LOW (ref 3.3–5)
ALP SERPL-CCNC: 79 U/L — SIGNIFICANT CHANGE UP (ref 40–120)
ALT FLD-CCNC: 12 U/L — SIGNIFICANT CHANGE UP (ref 10–45)
ANION GAP SERPL CALC-SCNC: 11 MMOL/L — SIGNIFICANT CHANGE UP (ref 5–17)
APTT BLD: 33.5 SEC — SIGNIFICANT CHANGE UP (ref 27.5–35.5)
AST SERPL-CCNC: 14 U/L — SIGNIFICANT CHANGE UP (ref 10–40)
BILIRUB SERPL-MCNC: 0.2 MG/DL — SIGNIFICANT CHANGE UP (ref 0.2–1.2)
BUN SERPL-MCNC: 33 MG/DL — HIGH (ref 7–23)
CALCIUM SERPL-MCNC: 9.8 MG/DL — SIGNIFICANT CHANGE UP (ref 8.4–10.5)
CHLORIDE SERPL-SCNC: 104 MMOL/L — SIGNIFICANT CHANGE UP (ref 96–108)
CO2 SERPL-SCNC: 20 MMOL/L — LOW (ref 22–31)
CREAT SERPL-MCNC: 1.39 MG/DL — HIGH (ref 0.5–1.3)
GLUCOSE BLDC GLUCOMTR-MCNC: 253 MG/DL — HIGH (ref 70–99)
GLUCOSE BLDC GLUCOMTR-MCNC: 270 MG/DL — HIGH (ref 70–99)
GLUCOSE BLDC GLUCOMTR-MCNC: 284 MG/DL — HIGH (ref 70–99)
GLUCOSE BLDC GLUCOMTR-MCNC: 289 MG/DL — HIGH (ref 70–99)
GLUCOSE SERPL-MCNC: 290 MG/DL — HIGH (ref 70–99)
HCT VFR BLD CALC: 32.4 % — LOW (ref 34.5–45)
HGB BLD-MCNC: 10.3 G/DL — LOW (ref 11.5–15.5)
MCHC RBC-ENTMCNC: 28.5 PG — SIGNIFICANT CHANGE UP (ref 27–34)
MCHC RBC-ENTMCNC: 31.8 GM/DL — LOW (ref 32–36)
MCV RBC AUTO: 89.8 FL — SIGNIFICANT CHANGE UP (ref 80–100)
NRBC # BLD: 0 /100 WBCS — SIGNIFICANT CHANGE UP (ref 0–0)
PLATELET # BLD AUTO: 284 K/UL — SIGNIFICANT CHANGE UP (ref 150–400)
POTASSIUM SERPL-MCNC: 5 MMOL/L — SIGNIFICANT CHANGE UP (ref 3.5–5.3)
POTASSIUM SERPL-SCNC: 5 MMOL/L — SIGNIFICANT CHANGE UP (ref 3.5–5.3)
PROT SERPL-MCNC: 6.2 G/DL — SIGNIFICANT CHANGE UP (ref 6–8.3)
RBC # BLD: 3.61 M/UL — LOW (ref 3.8–5.2)
RBC # FLD: 12.5 % — SIGNIFICANT CHANGE UP (ref 10.3–14.5)
SODIUM SERPL-SCNC: 135 MMOL/L — SIGNIFICANT CHANGE UP (ref 135–145)
WBC # BLD: 13.57 K/UL — HIGH (ref 3.8–10.5)
WBC # FLD AUTO: 13.57 K/UL — HIGH (ref 3.8–10.5)

## 2022-01-14 PROCEDURE — 99232 SBSQ HOSP IP/OBS MODERATE 35: CPT

## 2022-01-14 PROCEDURE — 99233 SBSQ HOSP IP/OBS HIGH 50: CPT | Mod: GC

## 2022-01-14 RX ORDER — ENOXAPARIN SODIUM 100 MG/ML
130 INJECTION SUBCUTANEOUS
Qty: 30 | Refills: 0
Start: 2022-01-14 | End: 2022-02-12

## 2022-01-14 RX ADMIN — Medication 2: at 21:23

## 2022-01-14 RX ADMIN — Medication 6: at 17:51

## 2022-01-14 RX ADMIN — DONEPEZIL HYDROCHLORIDE 10 MILLIGRAM(S): 10 TABLET, FILM COATED ORAL at 21:24

## 2022-01-14 RX ADMIN — Medication 1 TABLET(S): at 12:09

## 2022-01-14 RX ADMIN — FAMOTIDINE 20 MILLIGRAM(S): 10 INJECTION INTRAVENOUS at 12:09

## 2022-01-14 RX ADMIN — ATORVASTATIN CALCIUM 20 MILLIGRAM(S): 80 TABLET, FILM COATED ORAL at 21:24

## 2022-01-14 RX ADMIN — ESCITALOPRAM OXALATE 5 MILLIGRAM(S): 10 TABLET, FILM COATED ORAL at 12:09

## 2022-01-14 RX ADMIN — Medication 6: at 13:45

## 2022-01-14 RX ADMIN — ENOXAPARIN SODIUM 130 MILLIGRAM(S): 100 INJECTION SUBCUTANEOUS at 13:46

## 2022-01-14 RX ADMIN — Medication 6: at 09:05

## 2022-01-14 RX ADMIN — Medication 500 MILLIGRAM(S): at 12:08

## 2022-01-14 RX ADMIN — ERTAPENEM SODIUM 120 MILLIGRAM(S): 1 INJECTION, POWDER, LYOPHILIZED, FOR SOLUTION INTRAMUSCULAR; INTRAVENOUS at 16:13

## 2022-01-14 NOTE — PROGRESS NOTE ADULT - ATTENDING COMMENTS
86 y/o female with history of lung cancer followed by [facility] admitted with new DVT. She was planned to undergo biopsy of the posterior left lobe PET-avid nodule on January 6th but unable to make it to appointment. DVT likely associated with malignancy; recommend transition to enoxaparin or DOAC. If interventional radiology here can perform biopsy can maintain patient on heparin drip. Otherwise enoxaparin may be preferred in the short-term while [facility] set up an outpatient biopsy again.

## 2022-01-14 NOTE — PROGRESS NOTE ADULT - SUBJECTIVE AND OBJECTIVE BOX
Patient is a 86y old  Female who presents with a chief complaint of DVT (14 Jan 2022 11:10)      Vascular Surgery Attending Progress Note    Interval HPI: pt w/o new c/o     Medications:  ascorbic acid 500 milliGRAM(s) Oral daily  atorvastatin 20 milliGRAM(s) Oral at bedtime  dextrose 40% Gel 15 Gram(s) Oral once  dextrose 5%. 1000 milliLiter(s) IV Continuous <Continuous>  dextrose 5%. 1000 milliLiter(s) IV Continuous <Continuous>  dextrose 50% Injectable 25 Gram(s) IV Push once  dextrose 50% Injectable 12.5 Gram(s) IV Push once  dextrose 50% Injectable 25 Gram(s) IV Push once  donepezil 10 milliGRAM(s) Oral at bedtime  enoxaparin Injectable 130 milliGRAM(s) SubCutaneous daily  ertapenem  IVPB 1000 milliGRAM(s) IV Intermittent every 24 hours  ertapenem  IVPB      escitalopram 5 milliGRAM(s) Oral daily  famotidine    Tablet 20 milliGRAM(s) Oral daily  glucagon  Injectable 1 milliGRAM(s) IntraMuscular once  insulin lispro (ADMELOG) corrective regimen sliding scale   SubCutaneous three times a day before meals  insulin lispro (ADMELOG) corrective regimen sliding scale   SubCutaneous at bedtime  lactated ringers. 1000 milliLiter(s) IV Continuous <Continuous>  multivitamin 1 Tablet(s) Oral daily  ondansetron Injectable 4 milliGRAM(s) IV Push once      Vital Signs Last 24 Hrs  T(C): 37.1 (14 Jan 2022 17:00), Max: 37.1 (14 Jan 2022 17:00)  T(F): 98.8 (14 Jan 2022 17:00), Max: 98.8 (14 Jan 2022 17:00)  HR: 87 (14 Jan 2022 17:00) (76 - 98)  BP: 152/76 (14 Jan 2022 17:00) (142/78 - 158/82)  BP(mean): --  RR: 18 (14 Jan 2022 17:00) (18 - 20)  SpO2: 98% (14 Jan 2022 17:00) (95% - 98%)  I&O's Summary    13 Jan 2022 07:01  -  14 Jan 2022 07:00  --------------------------------------------------------  IN: 2680 mL / OUT: 1000 mL / NET: 1680 mL    14 Jan 2022 07:01  -  14 Jan 2022 20:39  --------------------------------------------------------  IN: 1760 mL / OUT: 1175 mL / NET: 585 mL        Physical Exam:  Neuro  A&Ox3 VSS  Vascular:  lle edema remains   no evid of phlegmasia     LABS:                        10.3   13.57 )-----------( 284      ( 14 Jan 2022 06:08 )             32.4     01-14    135  |  104  |  33<H>  ----------------------------<  290<H>  5.0   |  20<L>  |  1.39<H>    Ca    9.8      14 Jan 2022 06:08    TPro  6.2  /  Alb  3.2<L>  /  TBili  0.2  /  DBili  x   /  AST  14  /  ALT  12  /  AlkPhos  79  01-14    PTT - ( 14 Jan 2022 06:08 )  PTT:33.5 sec    CTV of abd/pelvis reviewed     AMNA MCKEON MD  [phone] Cell [phone]

## 2022-01-14 NOTE — PROGRESS NOTE ADULT - ASSESSMENT
Hematology/Oncology consulted on this patient with history of Lung cancer, currently undergoing care at [facility] with Dr Cisse. She has a history of HTN, PTCA, CAD, PM and NIDDM. As per daughter, patient stayed overnight  at [facility] on 1/6/21 after " passing out" She also [place] [facility] [place] [facility]. She is an 86 year old female with history of  left lung Adenocarcinoma S/P SBRT to the DONNA. 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 Pt with history of IVC filter placed. She presented to [facility] with LLE swelling and pain, confirmed for a positive DVT.    Lung Adenocarcinoma  --treated with RT only within the last 2 months to [facility]  --She is currently undergoing care with Dr Cisse at [facility]  --will follow up after [place] [facility]  [place]Rec CT chest  --Patient to have [place] [facility] [place], IR feels with Acute DVT, biopsy should wait.  --Appreciate recs    DVT  --likely provoked and with history of DVT  --Heparin D/C'd, on Lovenox 130 mg daily  --vascular following  --Duplex  IMPRESSION:  Extensive left lower extremity DVT.  Acute deep venous thrombosis: above and below the knee.  --CTA  No pulmonary embolism.  Spiculated mass in the left upper lobe without significant change from   prior PET CT 6/11/2021.  IVC filter with clot present within and slightly above the filter. Clot   below the filter as above.    --CTAP  VESSELS: Atherosclerotic changes. IVC filter. Clot is present within the   filter and slightly above the filter (5:57). Clot below the filter   involves the left common and external iliac vein, left common femoral   vein and portions of the left femoral vein. Clot is also present within   the right common iliac vein and right internal iliac vein.                    Anemia  --Hgb 10.3  --likely reactive  --If acute drop, please transfuse for Hgb <7.0    Elevated creatinine  --creatinine improving to 1.39  --renal following    We will be happy to answer any questions on behalf of [facility]    Tova Perez NP  Hematology/Oncology  [facility]  [phone] (Cell)  [phone] (Office)  [phone] (Alt office)  Evenings and weekends please call MD on call or office   Hematology/Oncology consulted on this patient with history of Lung cancer, currently undergoing care at [facility] with Dr Cisse. She has a history of HTN, PTCA, CAD, PM and NIDDM. As per daughter, patient stayed overnight  at [facility] on 1/6/21 after " passing out" She also missed an [facility] IR Lung biopsy originally scheduled on 1/6 at [facility]. She is an 86 year old female with history of  left lung Adenocarcinoma S/P SBRT to the DONNA. 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 Pt with history of IVC filter placed. She presented to [facility] with LLE swelling and pain, confirmed for a positive DVT.    Lung Adenocarcinoma  --treated with RT only within the last 2 months to [facility]  --She is currently undergoing care with Dr Cisse at [facility]  --will follow up after discharge at [facility]  --Patient to have her lung biopsy at [facility] as previously planned, IR feels with Acute DVT, biopsy should wait.  --Appreciate recs    DVT  --likely provoked and with history of DVT  --Heparin D/C'd, on Lovenox 130 mg daily  --vascular following  --Duplex  IMPRESSION:  Extensive left lower extremity DVT.  Acute deep venous thrombosis: above and below the knee.  --CTA  No pulmonary embolism.  Spiculated mass in the left upper lobe without significant change from   prior PET CT 6/11/2021.  IVC filter with clot present within and slightly above the filter. Clot   below the filter as above.    --CTAP  VESSELS: Atherosclerotic changes. IVC filter. Clot is present within the   filter and slightly above the filter (5:57). Clot below the filter   involves the left common and external iliac vein, left common femoral   vein and portions of the left femoral vein. Clot is also present within   the right common iliac vein and right internal iliac vein.                    Anemia  --Hgb 10.3  --likely reactive  --If acute drop, please transfuse for Hgb <7.0    Elevated creatinine  --creatinine improving to 1.39  --renal following    We will be happy to answer any questions on behalf of [facility]    Tova Perez NP  Hematology/Oncology  [facility]  [phone] (Cell)  [phone] (Office)  [phone] (Alt office)  Evenings and weekends please call MD on call or office

## 2022-01-14 NOTE — PROGRESS NOTE ADULT - SUBJECTIVE AND OBJECTIVE BOX
Patient is a 86y old  Female who presents with a chief complaint of DVT (14 Jan 2022 06:47)      MEDICATIONS  (STANDING):  ascorbic acid 500 milliGRAM(s) Oral daily  atorvastatin 20 milliGRAM(s) Oral at bedtime  dextrose 40% Gel 15 Gram(s) Oral once  dextrose 5%. 1000 milliLiter(s) (50 mL/Hr) IV Continuous <Continuous>  dextrose 5%. 1000 milliLiter(s) (100 mL/Hr) IV Continuous <Continuous>  dextrose 50% Injectable 25 Gram(s) IV Push once  dextrose 50% Injectable 12.5 Gram(s) IV Push once  dextrose 50% Injectable 25 Gram(s) IV Push once  donepezil 10 milliGRAM(s) Oral at bedtime  enoxaparin Injectable 130 milliGRAM(s) SubCutaneous daily  ertapenem  IVPB 1000 milliGRAM(s) IV Intermittent every 24 hours  ertapenem  IVPB      escitalopram 5 milliGRAM(s) Oral daily  famotidine    Tablet 20 milliGRAM(s) Oral daily  glucagon  Injectable 1 milliGRAM(s) IntraMuscular once  insulin lispro (ADMELOG) corrective regimen sliding scale   SubCutaneous three times a day before meals  insulin lispro (ADMELOG) corrective regimen sliding scale   SubCutaneous at bedtime  lactated ringers. 1000 milliLiter(s) (80 mL/Hr) IV Continuous <Continuous>  multivitamin 1 Tablet(s) Oral daily  ondansetron Injectable 4 milliGRAM(s) IV Push once    MEDICATIONS  (PRN):      ROS  No fever, sweats, chills  No epistaxis, HA, sore throat  No CP, SOB, cough, sputum  No n/v/d, abd pain, melena, hematochezia  No edema  No rash  No anxiety  No back pain, joint pain  No bleeding, bruising  No dysuria, hematuria    Vital Signs Last 24 Hrs  T(C): 36.4 (14 Jan 2022 09:00), Max: 36.9 (13 Jan 2022 17:00)  T(F): 97.6 (14 Jan 2022 09:00), Max: 98.4 (13 Jan 2022 17:00)  HR: 76 (14 Jan 2022 09:00) (9 - 98)  BP: 143/77 (14 Jan 2022 09:00) (118/73 - 158/82)  BP(mean): --  RR: 20 (14 Jan 2022 09:00) (18 - 20)  SpO2: 96% (14 Jan 2022 09:00) (95% - 99%)    PE  NAD  Awake, alert  Anicteric, MMM  No rash grossly                            10.3   13.57 )-----------( 284      ( 14 Jan 2022 06:08 )             32.4       01-14    135  |  104  |  33<H>  ----------------------------<  290<H>  5.0   |  20<L>  |  1.39<H>    Ca    9.8      14 Jan 2022 06:08    TPro  6.2  /  Alb  3.2<L>  /  TBili  0.2  /  DBili  x   /  AST  14  /  ALT  12  /  AlkPhos  79  01-14  ACC: [id number] EXAM:  CT ANGIO CHEST PULM [facility]                        ACC: [id number] EXAM:  CT ABDOMEN AND PELVIS IC                          PROCEDURE DATE:  01/13/2022          INTERPRETATION:  CLINICAL INFORMATION: Left upper lobe lung cancer status   post SBRT. Left lower extremity DVT.    COMPARISON: PET CT 6/11/2021    CONTRAST/COMPLICATIONS:  IV Contrast: Omnipaque 350 (accession [id number])  125 cc administered   25   cc discarded  Oral Contrast: NONE  Complications: None reported at time of study completion    PROCEDURE:  CT Angiography of the Chest was performed followed by portal venous phase   imaging of the Abdomen and Pelvis with additional delayed coverage to the   feet.  Sagittal and coronal reformats were performed as well as 3D (MIP)   reconstructions.    FINDINGS:  CHEST:  LUNGS AND LARGE AIRWAYS: Patent central airways. Spiculated mass in the   left upper lobe (5:37) 2.6 x 2.0 cm previously 2.5 x 2.1 cm at PET CT   6/11/2021. Mild linear type atelectasis surrounds the mass.  PLEURA: No pleural effusion.  VESSELS: No pulmonary embolism. Atherosclerotic changes of the aorta and   coronary arteries.  HEART: Heart size is normal. No pericardial effusion.  MEDIASTINUM AND RICARDO: No lymphadenopathy.  CHEST WALL AND LOWER NECK: Left chest wall cardiac device.    ABDOMEN AND PELVIS:  LIVER: Within normal limits.  BILE DUCTS: Normal caliber.  GALLBLADDER: Within normal limits.  SPLEEN: Within normal limits.  PANCREAS: Scattered pancreatic cystic lesions with the largest in the   pancreatic body measuring 2.3 cm. Main pancreatic duct is normal in   caliber. Calcifications in the pancreatic tail are unchanged likely   reflective of prior pancreatitis..  ADRENALS: Within normal limits.  KIDNEYS/URETERS: Bilateral renal cysts. No hydronephrosis.    BLADDER: Bladder diverticula. Mild perivesicular inflammation. Correlate   for cystitis.  REPRODUCTIVE ORGANS: Uterus and adnexa within normal limits.    BOWEL: No bowel obstruction. Colonic diverticulosis.  PERITONEUM: No ascites.  VESSELS: Atherosclerotic changes. IVC filter. Clot is present within the   filter and slightly above the filter (5:57). Clot below the filter   involves the left common and external iliac vein, left common femoral   vein and portions of the left femoral vein. Clot is also present within   the right common iliac vein and right internal iliac vein.  RETROPERITONEUM/LYMPH NODES: No lymphadenopathy.  ABDOMINAL WALL: Within normal limits.  BONES: Degenerative changes. No destructive osseous lesion.    IMPRESSION:  No pulmonary embolism.    Spiculated mass in the left upper lobe without significant change from   prior PET CT 6/11/2021.    IVC filter with clot present within and slightly above the filter. Clot   below the filter as above.    --- End of Report ---    ACC: [id number] EXAM:  CT ANGIO CHEST PULM [facility]                        ACC: [id number] EXAM:  CT ABDOMEN AND PELVIS IC                          PROCEDURE DATE:  01/13/2022          INTERPRETATION:  CLINICAL INFORMATION: Left upper lobe lung cancer status   post SBRT. Left lower extremity DVT.    COMPARISON: PET CT 6/11/2021    CONTRAST/COMPLICATIONS:  IV Contrast: Omnipaque 350 (accession [id number])  125 cc administered   25   cc discarded  Oral Contrast: NONE  Complications: None reported at time of study completion    PROCEDURE:  CT Angiography of the Chest was performed followed by portal venous phase   imaging of the Abdomen and Pelvis with additional delayed coverage to the   feet.  Sagittal and coronal reformats were performed as well as 3D (MIP)   reconstructions.    FINDINGS:  CHEST:  LUNGS AND LARGE AIRWAYS: Patent central airways. Spiculated mass in the   left upper lobe (5:37) 2.6 x 2.0 cm previously 2.5 x 2.1 cm at PET CT   6/11/2021. Mild linear type atelectasis surrounds the mass.  PLEURA: No pleural effusion.  VESSELS: No pulmonary embolism. Atherosclerotic changes of the aorta and   coronary arteries.  HEART: Heart size is normal. No pericardial effusion.  MEDIASTINUM AND RICARDO: No lymphadenopathy.  CHEST WALL AND LOWER NECK: Left chest wall cardiac device.    ABDOMEN AND PELVIS:  LIVER: Within normal limits.  BILE DUCTS: Normal caliber.  GALLBLADDER: Within normal limits.  SPLEEN: Within normal limits.  PANCREAS: Scattered pancreatic cystic lesions with the largest in the   pancreatic body measuring 2.3 cm. Main pancreatic duct is normal in   caliber. Calcifications in the pancreatic tail are unchanged likely   reflective of prior pancreatitis..  ADRENALS: Within normal limits.  KIDNEYS/URETERS: Bilateral renal cysts. No hydronephrosis.    BLADDER: Bladder diverticula. Mild perivesicular inflammation. Correlate   for cystitis.  REPRODUCTIVE ORGANS: Uterus and adnexa within normal limits.    BOWEL: No bowel obstruction. Colonic diverticulosis.  PERITONEUM: No ascites.  VESSELS: Atherosclerotic changes. IVC filter. Clot is present within the   filter and slightly above the filter (5:57). Clot below the filter   involves the left common and external iliac vein, left common femoral   vein and portions of the left femoral vein. Clot is also present within   the right common iliac vein and right internal iliac vein.  RETROPERITONEUM/LYMPH NODES: No lymphadenopathy.  ABDOMINAL WALL: Within normal limits.  BONES: Degenerative changes. No destructive osseous lesion.    IMPRESSION:  No pulmonary embolism.    Spiculated mass in the left upper lobe without significant change from   prior PET CT 6/11/2021.    IVC filter with clot present within and slightly above the filter. Clot   below the filter as above.    --- End of Report ---

## 2022-01-14 NOTE — PROGRESS NOTE ADULT - PROBLEM SELECTOR PLAN 9
[place] [facility]  - IR recommended bx outpatient (eg [facility])  - f/u CT chest once done   - appreciate Heme/Onc recs H/o. Outpatient Dr Cisse at [facility].  - IR recommended bx outpatient (eg [facility])  - f/u CT chest (spiculated nodule in L lung unchanged, no PE)  - appreciate Heme/Onc recs

## 2022-01-14 NOTE — PROGRESS NOTE ADULT - PROBLEM SELECTOR PLAN 2
-Patient's Cr 2.3 on admission, up from last known 1.1 in March   -patient was recently hospitalized for syncope at [facility] which was though to be from dehydration, and she was treated w/ IVF  -could be prerenal in the setting of dehydration, but will r/o other causes  - FeNA: 0.4% (likely pre-renal)    Plan:  - IVF -Patient's Cr 2.3 on admission, up from last known 1.1 in March   -patient was recently hospitalized for syncope at [facility] which was though to be from dehydration, and she was treated w/ IVF  -could be prerenal in the setting of dehydration, but will r/o other causes  - FeNA: 0.4% (likely pre-renal)  -Cr 1.39

## 2022-01-14 NOTE — PROGRESS NOTE ADULT - ASSESSMENT
86F with PMH of lung cancer on radiation, R popliteal DVT s/p IVC filter, T2DM, HTN, obesity, remote MI s/p stent, multiple falls presents with 1 day of LLE swelling/pain found to have extensive LLE DVT (external iliac to femoral, posterior trunk) with IVCF in place.     PLAN:   clinically improving   -given current malignancy status and the extent of the  thrombus to and slightly above ivc filter would still recommend  anticoag  no vasc surg intervention needed  will follow     Lydia Acosta, PGY-2  Vascular Surgery  #[id number]

## 2022-01-14 NOTE — PROGRESS NOTE ADULT - ASSESSMENT
85F with PMH of lung cancer on radiation, R popliteal DVT w/ IVC filter previously on eliquis dc 5/2021, L popliteal DVT 2/2021, T2DM not on insulin, HTN, obesity, Covid in 2/2021, remote MI s/p stent, multiple falls, recent hospitalization for syncope.  Presents with LLE swelling/pain. Found to have acute LLE DVT.  85F with PMH of lung cancer on radiation, R popliteal DVT w/ IVC filter previously on eliquis dc 5/2021, L popliteal DVT 2/2021, T2DM not on insulin, HTN, obesity, Covid in 2/2021, remote MI s/p stent, multiple falls, recent hospitalization for syncope.Presents with LLE swelling/pain, found to have acute LLE DVT.

## 2022-01-14 NOTE — PROGRESS NOTE ADULT - SUBJECTIVE AND OBJECTIVE BOX
Staci Duncan, PGY-1  TEAMS or Pager [phone] / [phone]  ---------------------------------------------------------------------------------------------  Patient is a 86y old  Female who presents with a chief complaint of DVT (13 Jan 2022 11:15)      SUBJECTIVE / OVERNIGHT EVENTS:  ADDITIONAL REVIEW OF SYSTEMS:  CONSTITUTIONAL: No weakness, fevers or chills  EYES/ENT: No visual changes;  No vertigo or throat pain   NECK: No pain or stiffness  RESPIRATORY: No cough, wheezing, hemoptysis; No shortness of breath  CARDIOVASCULAR: No chest pain or palpitations  GASTROINTESTINAL: No abdominal or epigastric pain. No nausea, vomiting, or hematemesis; No diarrhea or constipation. No melena or hematochezia.  GENITOURINARY: No dysuria, frequency or hematuria  NEUROLOGICAL: No numbness or weakness  SKIN: No itching, rashes      MEDICATIONS  (STANDING):  ascorbic acid 500 milliGRAM(s) Oral daily  atorvastatin 20 milliGRAM(s) Oral at bedtime  dextrose 40% Gel 15 Gram(s) Oral once  dextrose 5%. 1000 milliLiter(s) (50 mL/Hr) IV Continuous <Continuous>  dextrose 5%. 1000 milliLiter(s) (100 mL/Hr) IV Continuous <Continuous>  dextrose 50% Injectable 25 Gram(s) IV Push once  dextrose 50% Injectable 12.5 Gram(s) IV Push once  dextrose 50% Injectable 25 Gram(s) IV Push once  donepezil 10 milliGRAM(s) Oral at bedtime  enoxaparin Injectable 130 milliGRAM(s) SubCutaneous daily  ertapenem  IVPB 1000 milliGRAM(s) IV Intermittent every 24 hours  ertapenem  IVPB      escitalopram 5 milliGRAM(s) Oral daily  famotidine    Tablet 20 milliGRAM(s) Oral daily  glucagon  Injectable 1 milliGRAM(s) IntraMuscular once  insulin lispro (ADMELOG) corrective regimen sliding scale   SubCutaneous three times a day before meals  insulin lispro (ADMELOG) corrective regimen sliding scale   SubCutaneous at bedtime  lactated ringers. 1000 milliLiter(s) (80 mL/Hr) IV Continuous <Continuous>  multivitamin 1 Tablet(s) Oral daily  ondansetron Injectable 4 milliGRAM(s) IV Push once    MEDICATIONS  (PRN):      CAPILLARY BLOOD GLUCOSE      POCT Blood Glucose.: 322 mg/dL (13 Jan 2022 21:06)  POCT Blood Glucose.: 322 mg/dL (13 Jan 2022 17:35)  POCT Blood Glucose.: 296 mg/dL (13 Jan 2022 12:30)  POCT Blood Glucose.: 191 mg/dL (13 Jan 2022 08:44)    I&O's Summary    12 Jan 2022 07:01  -  13 Jan 2022 07:00  --------------------------------------------------------  IN: 2262 mL / OUT: 1400 mL / NET: 862 mL    13 Jan 2022 07:01  -  14 Jan 2022 06:47  --------------------------------------------------------  IN: 2680 mL / OUT: 1000 mL / NET: 1680 mL        PHYSICAL EXAM:  Vital Signs Last 24 Hrs  T(C): 36.7 (14 Jan 2022 05:22), Max: 36.9 (13 Jan 2022 17:00)  T(F): 98 (14 Jan 2022 05:22), Max: 98.4 (13 Jan 2022 17:00)  HR: 82 (14 Jan 2022 05:22) (9 - 98)  BP: 154/85 (14 Jan 2022 05:22) (118/73 - 158/82)  BP(mean): --  RR: 18 (14 Jan 2022 05:22) (18 - 20)  SpO2: 95% (14 Jan 2022 05:22) (95% - 99%)  GENERAL: NAD, well-groomed, well-developed  HEAD:  Atraumatic, Normocephalic  EYES: EOMI, PERRLA, conjunctiva and sclera clear  ENMT: No tonsillar erythema, exudates, or enlargement; Moist mucous membranes  NECK: Supple, No JVD, Normal thyroid  HEART: Regular rate and rhythm; No murmurs, rubs, or gallops  RESPIRATORY: CTA B/L, No W/R/R  ABDOMEN: Soft, Nontender, Nondistended; Bowel sounds present  NEUROLOGY: A&Ox3, nonfocal, moving all extremities  EXTREMITIES:  2+ Peripheral Pulses, No clubbing, cyanosis, or edema  SKIN: warm, dry, normal color, no rash or abnormal lesions      LABS:                        10.3   13.57 )-----------( 284      ( 14 Jan 2022 06:08 )             32.4     01-13    139  |  106  |  26<H>  ----------------------------<  208<H>  5.1   |  21<L>  |  1.40<H>    Ca    9.7      13 Jan 2022 07:05  Phos  2.9     01-12  Mg     2.0     01-12    TPro  6.2  /  Alb  3.3  /  TBili  0.2  /  DBili  x   /  AST  13  /  ALT  8<L>  /  AlkPhos  82  01-13    PT/INR - ( 12 Jan 2022 07:26 )   PT: 12.8 sec;   INR: 1.07 ratio         PTT - ( 14 Jan 2022 06:08 )  PTT:33.5 sec                RADIOLOGY & ADDITIONAL TESTS:  Results Reviewed:   Imaging Personally Reviewed:  Electrocardiogram Personally Reviewed:    COORDINATION OF CARE:  Care Discussed with Consultants/Other Providers [Y/N]:  Prior or Outpatient Records Reviewed [Y/N]:   Staci Duncan, PGY-1  TEAMS or Pager [phone] / [phone]  ---------------------------------------------------------------------------------------------  Patient is a 86y old  Female who presents with a chief complaint of DVT (13 Jan 2022 11:15)      SUBJECTIVE / OVERNIGHT EVENTS:  -NAEOVN, no concerns    ADDITIONAL REVIEW OF SYSTEMS:  CONSTITUTIONAL: No weakness, fevers or chills  EYES/ENT: No visual changes;  No vertigo or throat pain   NECK: No pain or stiffness  RESPIRATORY: No cough, wheezing, hemoptysis; No shortness of breath  CARDIOVASCULAR: No chest pain or palpitations  GASTROINTESTINAL: No abdominal or epigastric pain. No nausea, vomiting, or hematemesis; No diarrhea or constipation. No melena or hematochezia.  GENITOURINARY: No dysuria, frequency or hematuria  NEUROLOGICAL: No numbness or weakness  SKIN: No itching, rashes        MEDICATIONS  (STANDING):  ascorbic acid 500 milliGRAM(s) Oral daily  atorvastatin 20 milliGRAM(s) Oral at bedtime  dextrose 40% Gel 15 Gram(s) Oral once  dextrose 5%. 1000 milliLiter(s) (50 mL/Hr) IV Continuous <Continuous>  dextrose 5%. 1000 milliLiter(s) (100 mL/Hr) IV Continuous <Continuous>  dextrose 50% Injectable 25 Gram(s) IV Push once  dextrose 50% Injectable 12.5 Gram(s) IV Push once  dextrose 50% Injectable 25 Gram(s) IV Push once  donepezil 10 milliGRAM(s) Oral at bedtime  enoxaparin Injectable 130 milliGRAM(s) SubCutaneous daily  ertapenem  IVPB 1000 milliGRAM(s) IV Intermittent every 24 hours  ertapenem  IVPB      escitalopram 5 milliGRAM(s) Oral daily  famotidine    Tablet 20 milliGRAM(s) Oral daily  glucagon  Injectable 1 milliGRAM(s) IntraMuscular once  insulin lispro (ADMELOG) corrective regimen sliding scale   SubCutaneous three times a day before meals  insulin lispro (ADMELOG) corrective regimen sliding scale   SubCutaneous at bedtime  lactated ringers. 1000 milliLiter(s) (80 mL/Hr) IV Continuous <Continuous>  multivitamin 1 Tablet(s) Oral daily  ondansetron Injectable 4 milliGRAM(s) IV Push once    MEDICATIONS  (PRN):      CAPILLARY BLOOD GLUCOSE      POCT Blood Glucose.: 322 mg/dL (13 Jan 2022 21:06)  POCT Blood Glucose.: 322 mg/dL (13 Jan 2022 17:35)  POCT Blood Glucose.: 296 mg/dL (13 Jan 2022 12:30)  POCT Blood Glucose.: 191 mg/dL (13 Jan 2022 08:44)    I&O's Summary    12 Jan 2022 07:01  -  13 Jan 2022 07:00  --------------------------------------------------------  IN: 2262 mL / OUT: 1400 mL / NET: 862 mL    13 Jan 2022 07:01  -  14 Jan 2022 06:47  --------------------------------------------------------  IN: 2680 mL / OUT: 1000 mL / NET: 1680 mL        PHYSICAL EXAM:  Vital Signs Last 24 Hrs  T(C): 36.7 (14 Jan 2022 05:22), Max: 36.9 (13 Jan 2022 17:00)  T(F): 98 (14 Jan 2022 05:22), Max: 98.4 (13 Jan 2022 17:00)  HR: 82 (14 Jan 2022 05:22) (9 - 98)  BP: 154/85 (14 Jan 2022 05:22) (118/73 - 158/82)  BP(mean): --  RR: 18 (14 Jan 2022 05:22) (18 - 20)  SpO2: 95% (14 Jan 2022 05:22) (95% - 99%)  GENERAL: NAD, well-groomed, well-developed  HEAD:  Atraumatic, Normocephalic  EYES: EOMI, PERRLA, conjunctiva and sclera clear  ENMT: No tonsillar erythema, exudates, or enlargement; Moist mucous membranes  NECK: Supple, No JVD, Normal thyroid  HEART: Regular rate and rhythm; No murmurs, rubs, or gallops  RESPIRATORY: CTA B/L, No W/R/R  ABDOMEN: Soft, Nontender, Nondistended; Bowel sounds present  NEUROLOGY: A&Ox3, nonfocal, moving all extremities  EXTREMITIES:  2+ Peripheral Pulses, No clubbing, cyanosis, L lower leg edema, no erythema  SKIN: warm, dry, normal color, no rash or abnormal lesions      LABS:                        10.3   13.57 )-----------( 284      ( 14 Jan 2022 06:08 )             32.4     01-13    139  |  106  |  26<H>  ----------------------------<  208<H>  5.1   |  21<L>  |  1.40<H>    Ca    9.7      13 Jan 2022 07:05  Phos  2.9     01-12  Mg     2.0     01-12    TPro  6.2  /  Alb  3.3  /  TBili  0.2  /  DBili  x   /  AST  13  /  ALT  8<L>  /  AlkPhos  82  01-13    PT/INR - ( 12 Jan 2022 07:26 )   PT: 12.8 sec;   INR: 1.07 ratio         PTT - ( 14 Jan 2022 06:08 )  PTT:33.5 sec                RADIOLOGY & ADDITIONAL TESTS:  < from: CT Abdomen and Pelvis w/ IV Cont (01.13.22 @ 22:29) >  IMPRESSION:  No pulmonary embolism.    Spiculated mass in the left upper lobe without significant change from   prior PET CT 6/11/2021.    IVC filter with clot present within and slightly above the filter. Clot   below the filter as above.    < end of copied text >   Staci Duncan, PGY-1  TEAMS or Pager [phone] / [phone]  ---------------------------------------------------------------------------------------------  Patient is a 86y old  Female who presents with a chief complaint of DVT (13 Jan 2022 11:15)      SUBJECTIVE / OVERNIGHT EVENTS:  -NAEOVN, no concerns    ADDITIONAL REVIEW OF SYSTEMS:  CONSTITUTIONAL: No weakness, fevers or chills  EYES/ENT: No visual changes;  No vertigo or throat pain   NECK: No pain or stiffness  RESPIRATORY: No cough, wheezing, hemoptysis; No shortness of breath  CARDIOVASCULAR: No chest pain or palpitations  GASTROINTESTINAL: No abdominal or epigastric pain. No nausea, vomiting, or hematemesis; No diarrhea or constipation. No melena or hematochezia.  GENITOURINARY: No dysuria, frequency or hematuria  NEUROLOGICAL: No numbness or weakness        MEDICATIONS  (STANDING):  ascorbic acid 500 milliGRAM(s) Oral daily  atorvastatin 20 milliGRAM(s) Oral at bedtime  dextrose 40% Gel 15 Gram(s) Oral once  dextrose 5%. 1000 milliLiter(s) (50 mL/Hr) IV Continuous <Continuous>  dextrose 5%. 1000 milliLiter(s) (100 mL/Hr) IV Continuous <Continuous>  dextrose 50% Injectable 25 Gram(s) IV Push once  dextrose 50% Injectable 12.5 Gram(s) IV Push once  dextrose 50% Injectable 25 Gram(s) IV Push once  donepezil 10 milliGRAM(s) Oral at bedtime  enoxaparin Injectable 130 milliGRAM(s) SubCutaneous daily  ertapenem  IVPB 1000 milliGRAM(s) IV Intermittent every 24 hours  ertapenem  IVPB      escitalopram 5 milliGRAM(s) Oral daily  famotidine    Tablet 20 milliGRAM(s) Oral daily  glucagon  Injectable 1 milliGRAM(s) IntraMuscular once  insulin lispro (ADMELOG) corrective regimen sliding scale   SubCutaneous three times a day before meals  insulin lispro (ADMELOG) corrective regimen sliding scale   SubCutaneous at bedtime  lactated ringers. 1000 milliLiter(s) (80 mL/Hr) IV Continuous <Continuous>  multivitamin 1 Tablet(s) Oral daily  ondansetron Injectable 4 milliGRAM(s) IV Push once    MEDICATIONS  (PRN):      CAPILLARY BLOOD GLUCOSE      POCT Blood Glucose.: 322 mg/dL (13 Jan 2022 21:06)  POCT Blood Glucose.: 322 mg/dL (13 Jan 2022 17:35)  POCT Blood Glucose.: 296 mg/dL (13 Jan 2022 12:30)  POCT Blood Glucose.: 191 mg/dL (13 Jan 2022 08:44)    I&O's Summary    12 Jan 2022 07:01  -  13 Jan 2022 07:00  --------------------------------------------------------  IN: 2262 mL / OUT: 1400 mL / NET: 862 mL    13 Jan 2022 07:01  -  14 Jan 2022 06:47  --------------------------------------------------------  IN: 2680 mL / OUT: 1000 mL / NET: 1680 mL        PHYSICAL EXAM:  Vital Signs Last 24 Hrs  T(C): 36.7 (14 Jan 2022 05:22), Max: 36.9 (13 Jan 2022 17:00)  T(F): 98 (14 Jan 2022 05:22), Max: 98.4 (13 Jan 2022 17:00)  HR: 82 (14 Jan 2022 05:22) (9 - 98)  BP: 154/85 (14 Jan 2022 05:22) (118/73 - 158/82)  BP(mean): --  RR: 18 (14 Jan 2022 05:22) (18 - 20)  SpO2: 95% (14 Jan 2022 05:22) (95% - 99%)  GENERAL: NAD, well-groomed, well-developed  HEAD:  Atraumatic, Normocephalic  EYES: PERRLA, conjunctiva and sclera clear  ENMT: No tonsillar erythema, exudates, or enlargement; Moist mucous membranes  NECK: Supple, No JVD, Normal thyroid  HEART: Regular rate and rhythm; No murmurs, rubs, or gallops  RESPIRATORY: CTA B/L, No W/R/R  ABDOMEN: Soft, Nontender, Nondistended; Bowel sounds present  NEUROLOGY: A&Ox3, nonfocal, moving all extremities  EXTREMITIES:  2+ Peripheral Pulses, No clubbing, cyanosis, L lower leg edema, no erythema        LABS:                        10.3   13.57 )-----------( 284      ( 14 Jan 2022 06:08 )             32.4     01-13    139  |  106  |  26<H>  ----------------------------<  208<H>  5.1   |  21<L>  |  1.40<H>    Ca    9.7      13 Jan 2022 07:05  Phos  2.9     01-12  Mg     2.0     01-12    TPro  6.2  /  Alb  3.3  /  TBili  0.2  /  DBili  x   /  AST  13  /  ALT  8<L>  /  AlkPhos  82  01-13    PT/INR - ( 12 Jan 2022 07:26 )   PT: 12.8 sec;   INR: 1.07 ratio         PTT - ( 14 Jan 2022 06:08 )  PTT:33.5 sec                RADIOLOGY & ADDITIONAL TESTS:  < from: CT Abdomen and Pelvis w/ IV Cont (01.13.22 @ 22:29) >  IMPRESSION:  No pulmonary embolism.    Spiculated mass in the left upper lobe without significant change from   prior PET CT 6/11/2021.    IVC filter with clot present within and slightly above the filter. Clot   below the filter as above.    < end of copied text >

## 2022-01-14 NOTE — PROGRESS NOTE ADULT - ATTENDING COMMENTS
86F with PMH of lung cancer on radiation, R popliteal DVT s/p IVC filter,  Left Popliteal V DVT (  2/2021), DM2 , HTN, obesity, remote MI s/p stent, multiple falls presents with 1 day of LLE swelling/pain.  Patient had a recent hospitalization at [facility] for syncope which was though to be due to dehydration.  AS reported, Pt was on Eliquis which was recently DC in 5/2021.  Patient had lower ext doppler and  found to have extensive LLE DVT (external iliac to femoral, posterior trunk).  Pt was placed on Heparin drip which was changed to Lovenox subcutaneous.   Cont to monitor for any bleed and monitor SPO2.  CT Venogram was  requested by Vascular surgery with report of Clot within, above and below the IVC filter and no PE/but pos spiculated mass  on CTA .  Vascular team was contacted by the team with no intervention planned by the vascular team.  Patient will be DC with home services and will be DC on Lovenox.  SHe will be followed with her oupt private oncologist whose team has been following patient during hospitalization.     IR team was contacted for possible  a hypermetabolic left lung subpleural nodule who recommended outpatient  oncology follow up and the oncology team will arrange for biopsy, "[place] [facility] [place]  The oncology team is following patient here and will arrange for oupt Biopsy.     Creatinine is improving with IVF with FENA <1 Cr has improved  , cont to monitor   Prepare for DC with home services , once Lovenox is approved and family is taught on administration.      Clara Vieyra   Hospitalist   [phone].

## 2022-01-14 NOTE — PROGRESS NOTE ADULT - PROBLEM SELECTOR PLAN 4
Hgb 9.8, down from 12-13 in previous admission in March. No signs or symptoms of bleeding. Maybe ACD. Unlikely 2/2 nutritional deficiency.

## 2022-01-14 NOTE — PROGRESS NOTE ADULT - PROBLEM SELECTOR PLAN 1
Found to have acute LLE DVT involving the external iliac vein, common femoral vein, femoral vein, and posterior trunk. Likely due to combination of recent hospitalization, lung cancer, and immobility.    Plan:  - enoxaparin 11/13 s/p heparin drip  - no acute vascular surgery intervention  - f/u CT venogram 1/13 which was requested by the Vascular surgery team Found to have acute LLE DVT involving the external iliac vein, common femoral vein, femoral vein, and posterior trunk. Likely due to combination of recent hospitalization, lung cancer, and immobility.    Plan:  - enoxaparin 11/13 s/p heparin drip  - f/u CT 1/13 showing clot within IVCF and slightly above filter, f/u vascular recs

## 2022-01-15 LAB
ALBUMIN SERPL ELPH-MCNC: 3.2 G/DL — LOW (ref 3.3–5)
ALP SERPL-CCNC: 82 U/L — SIGNIFICANT CHANGE UP (ref 40–120)
ALT FLD-CCNC: 18 U/L — SIGNIFICANT CHANGE UP (ref 10–45)
ANION GAP SERPL CALC-SCNC: 11 MMOL/L — SIGNIFICANT CHANGE UP (ref 5–17)
AST SERPL-CCNC: 22 U/L — SIGNIFICANT CHANGE UP (ref 10–40)
BILIRUB SERPL-MCNC: 0.2 MG/DL — SIGNIFICANT CHANGE UP (ref 0.2–1.2)
BUN SERPL-MCNC: 37 MG/DL — HIGH (ref 7–23)
CALCIUM SERPL-MCNC: 9.9 MG/DL — SIGNIFICANT CHANGE UP (ref 8.4–10.5)
CHLORIDE SERPL-SCNC: 105 MMOL/L — SIGNIFICANT CHANGE UP (ref 96–108)
CO2 SERPL-SCNC: 22 MMOL/L — SIGNIFICANT CHANGE UP (ref 22–31)
CREAT SERPL-MCNC: 1.49 MG/DL — HIGH (ref 0.5–1.3)
GLUCOSE BLDC GLUCOMTR-MCNC: 177 MG/DL — HIGH (ref 70–99)
GLUCOSE BLDC GLUCOMTR-MCNC: 222 MG/DL — HIGH (ref 70–99)
GLUCOSE BLDC GLUCOMTR-MCNC: 286 MG/DL — HIGH (ref 70–99)
GLUCOSE BLDC GLUCOMTR-MCNC: 326 MG/DL — HIGH (ref 70–99)
GLUCOSE SERPL-MCNC: 235 MG/DL — HIGH (ref 70–99)
HCT VFR BLD CALC: 31.4 % — LOW (ref 34.5–45)
HGB BLD-MCNC: 10 G/DL — LOW (ref 11.5–15.5)
MCHC RBC-ENTMCNC: 28.3 PG — SIGNIFICANT CHANGE UP (ref 27–34)
MCHC RBC-ENTMCNC: 31.8 GM/DL — LOW (ref 32–36)
MCV RBC AUTO: 89 FL — SIGNIFICANT CHANGE UP (ref 80–100)
NRBC # BLD: 0 /100 WBCS — SIGNIFICANT CHANGE UP (ref 0–0)
PLATELET # BLD AUTO: 287 K/UL — SIGNIFICANT CHANGE UP (ref 150–400)
POTASSIUM SERPL-MCNC: 4.6 MMOL/L — SIGNIFICANT CHANGE UP (ref 3.5–5.3)
POTASSIUM SERPL-SCNC: 4.6 MMOL/L — SIGNIFICANT CHANGE UP (ref 3.5–5.3)
PROT SERPL-MCNC: 6.1 G/DL — SIGNIFICANT CHANGE UP (ref 6–8.3)
RBC # BLD: 3.53 M/UL — LOW (ref 3.8–5.2)
RBC # FLD: 12.8 % — SIGNIFICANT CHANGE UP (ref 10.3–14.5)
SODIUM SERPL-SCNC: 138 MMOL/L — SIGNIFICANT CHANGE UP (ref 135–145)
WBC # BLD: 13.87 K/UL — HIGH (ref 3.8–10.5)
WBC # FLD AUTO: 13.87 K/UL — HIGH (ref 3.8–10.5)

## 2022-01-15 PROCEDURE — 99232 SBSQ HOSP IP/OBS MODERATE 35: CPT

## 2022-01-15 RX ORDER — SODIUM CHLORIDE 9 MG/ML
1000 INJECTION, SOLUTION INTRAVENOUS
Refills: 0 | Status: DISCONTINUED | OUTPATIENT
Start: 2022-01-15 | End: 2022-01-16

## 2022-01-15 RX ORDER — ENOXAPARIN SODIUM 100 MG/ML
30 INJECTION SUBCUTANEOUS
Qty: 30 | Refills: 0
Start: 2022-01-15

## 2022-01-15 RX ORDER — APIXABAN 2.5 MG/1
2 TABLET, FILM COATED ORAL
Qty: 28 | Refills: 0
Start: 2022-01-15 | End: 2022-01-21

## 2022-01-15 RX ORDER — APIXABAN 2.5 MG/1
1 TABLET, FILM COATED ORAL
Qty: 23 | Refills: 0
Start: 2022-01-15 | End: 2022-02-06

## 2022-01-15 RX ORDER — ENOXAPARIN SODIUM 100 MG/ML
100 INJECTION SUBCUTANEOUS
Qty: 30 | Refills: 0
Start: 2022-01-15

## 2022-01-15 RX ADMIN — Medication 1 TABLET(S): at 12:30

## 2022-01-15 RX ADMIN — ATORVASTATIN CALCIUM 20 MILLIGRAM(S): 80 TABLET, FILM COATED ORAL at 21:11

## 2022-01-15 RX ADMIN — Medication 2: at 09:17

## 2022-01-15 RX ADMIN — FAMOTIDINE 20 MILLIGRAM(S): 10 INJECTION INTRAVENOUS at 12:30

## 2022-01-15 RX ADMIN — ERTAPENEM SODIUM 120 MILLIGRAM(S): 1 INJECTION, POWDER, LYOPHILIZED, FOR SOLUTION INTRAMUSCULAR; INTRAVENOUS at 16:47

## 2022-01-15 RX ADMIN — ENOXAPARIN SODIUM 130 MILLIGRAM(S): 100 INJECTION SUBCUTANEOUS at 13:22

## 2022-01-15 RX ADMIN — Medication 4: at 17:28

## 2022-01-15 RX ADMIN — ESCITALOPRAM OXALATE 5 MILLIGRAM(S): 10 TABLET, FILM COATED ORAL at 12:30

## 2022-01-15 RX ADMIN — Medication 2: at 21:10

## 2022-01-15 RX ADMIN — DONEPEZIL HYDROCHLORIDE 10 MILLIGRAM(S): 10 TABLET, FILM COATED ORAL at 21:11

## 2022-01-15 RX ADMIN — Medication 8: at 13:01

## 2022-01-15 RX ADMIN — Medication 500 MILLIGRAM(S): at 12:30

## 2022-01-15 RX ADMIN — SODIUM CHLORIDE 60 MILLILITER(S): 9 INJECTION, SOLUTION INTRAVENOUS at 15:22

## 2022-01-15 NOTE — PROGRESS NOTE ADULT - SUBJECTIVE AND OBJECTIVE BOX
INTERVAL HPI/OVERNIGHT EVENTS:  Patient S&E at bedside. No o/n events, patient resting comfortably. No complaints at this time. Patient denies fever, chills, dizziness, weakness, CP, palpitations, SOB, cough, N/V/D/C, dysuria, changes in bowel movements, LE edema.    VITAL SIGNS:  T(F): 97.8 (01-15-22 @ 05:04)  HR: 73 (01-15-22 @ 05:04)  BP: 151/75 (01-15-22 @ 05:04)  RR: 18 (01-15-22 @ 05:04)  SpO2: 98% (01-15-22 @ 05:04)  Wt(kg): --    PHYSICAL EXAM:    Constitutional: NAD  Eyes: EOMI, sclera non-icteric  Neck: supple, no masses, no JVD  Respiratory: CTA b/l, good air entry b/l  Cardiovascular: RRR, no M/R/G  Gastrointestinal: soft, NTND, no masses palpable, + BS, no hepatosplenomegaly  Extremities: no c/c/e  Neurological: AAOx3      MEDICATIONS  (STANDING):  ascorbic acid 500 milliGRAM(s) Oral daily  atorvastatin 20 milliGRAM(s) Oral at bedtime  dextrose 40% Gel 15 Gram(s) Oral once  dextrose 5%. 1000 milliLiter(s) (50 mL/Hr) IV Continuous <Continuous>  dextrose 5%. 1000 milliLiter(s) (100 mL/Hr) IV Continuous <Continuous>  dextrose 50% Injectable 25 Gram(s) IV Push once  dextrose 50% Injectable 12.5 Gram(s) IV Push once  dextrose 50% Injectable 25 Gram(s) IV Push once  donepezil 10 milliGRAM(s) Oral at bedtime  enoxaparin Injectable 130 milliGRAM(s) SubCutaneous daily  ertapenem  IVPB 1000 milliGRAM(s) IV Intermittent every 24 hours  ertapenem  IVPB      escitalopram 5 milliGRAM(s) Oral daily  famotidine    Tablet 20 milliGRAM(s) Oral daily  glucagon  Injectable 1 milliGRAM(s) IntraMuscular once  insulin lispro (ADMELOG) corrective regimen sliding scale   SubCutaneous three times a day before meals  insulin lispro (ADMELOG) corrective regimen sliding scale   SubCutaneous at bedtime  multivitamin 1 Tablet(s) Oral daily  ondansetron Injectable 4 milliGRAM(s) IV Push once    MEDICATIONS  (PRN):      Allergies    iodine (Hives)  penicillin (Hives)    Intolerances        LABS:                        10.0   13.87 )-----------( 287      ( 15 Stefan 2022 05:30 )             31.4     01-15    138  |  105  |  37<H>  ----------------------------<  235<H>  4.6   |  22  |  1.49<H>    Ca    9.9      15 Stefan 2022 05:30    TPro  6.1  /  Alb  3.2<L>  /  TBili  0.2  /  DBili  x   /  AST  22  /  ALT  18  /  AlkPhos  82  01-15    PTT - ( 14 Jan 2022 06:08 )  PTT:33.5 sec      RADIOLOGY & ADDITIONAL TESTS:  Studies reviewed.    ASSESSMENT & PLAN:

## 2022-01-15 NOTE — PROGRESS NOTE ADULT - ATTENDING COMMENTS
Patient seen and examined   Labs and vitals are reviewed.  85 Y/O/F with PMHx of lung cancer on radiation, R popliteal DVT s/p IVC filter,  Left Popliteal V DVT (  2/2021), DM2 , HTN, obesity, remote MI s/p stent, multiple falls presents with 1 day of LLE swelling/pain with DVT on Lovenox - will continue   Hypermetabolic left lung subpleural nodule outpatient  oncology follow up and likely biopsy  Creatinine is 1.49   ESBL UTI to complete 5 days of Ertapenem  Likely DC in next 24 hours if remains stable

## 2022-01-15 NOTE — PROGRESS NOTE ADULT - PROBLEM SELECTOR PLAN 2
-Patient's Cr 2.3 on admission, up from last known 1.1 in March   -patient was recently hospitalized for syncope at [facility] which was though to be from dehydration, and she was treated w/ IVF  -could be prerenal in the setting of dehydration, but will r/o other causes  - FeNA: 0.4% (likely pre-renal)  -Cr 1.39 -Patient's Cr 2.3 on admission, up from last known 1.1 in March   -patient was recently hospitalized for syncope at [facility] which was though to be from dehydration, and she was treated w/ IVF  -could be prerenal in the setting of dehydration, but will r/o other causes  - FeNA: 0.4% (likely pre-renal)  -Cr 1.3-1.4

## 2022-01-15 NOTE — PROGRESS NOTE ADULT - ASSESSMENT
85F with PMH of lung cancer on radiation, R popliteal DVT w/ IVC filter previously on eliquis dc 5/2021, L popliteal DVT 2/2021, T2DM not on insulin, HTN, obesity, Covid in 2/2021, remote MI s/p stent, multiple falls, recent hospitalization for syncope.Presents with LLE swelling/pain, found to have acute LLE DVT.

## 2022-01-15 NOTE — PROGRESS NOTE ADULT - SUBJECTIVE AND OBJECTIVE BOX
Staci Duncan, PGY-1  TEAMS or Pager [phone] / [phone]  ---------------------------------------------------------------------------------------------  Patient is a 86y old  Female who presents with a chief complaint of DVT (13 Jan 2022 11:15)      SUBJECTIVE / OVERNIGHT EVENTS:  -NAEOVN, no concerns    ADDITIONAL REVIEW OF SYSTEMS:  CONSTITUTIONAL: No weakness, fevers or chills  EYES/ENT: No visual changes;  No vertigo or throat pain   NECK: No pain or stiffness  RESPIRATORY: No cough, wheezing, hemoptysis; No shortness of breath  CARDIOVASCULAR: No chest pain or palpitations  GASTROINTESTINAL: No abdominal or epigastric pain. No nausea, vomiting, or hematemesis; No diarrhea or constipation. No melena or hematochezia.  GENITOURINARY: No dysuria, frequency or hematuria  NEUROLOGICAL: No numbness or weakness        MEDICATIONS  (STANDING):  ascorbic acid 500 milliGRAM(s) Oral daily  atorvastatin 20 milliGRAM(s) Oral at bedtime  dextrose 40% Gel 15 Gram(s) Oral once  dextrose 5%. 1000 milliLiter(s) (50 mL/Hr) IV Continuous <Continuous>  dextrose 5%. 1000 milliLiter(s) (100 mL/Hr) IV Continuous <Continuous>  dextrose 50% Injectable 25 Gram(s) IV Push once  dextrose 50% Injectable 12.5 Gram(s) IV Push once  dextrose 50% Injectable 25 Gram(s) IV Push once  donepezil 10 milliGRAM(s) Oral at bedtime  enoxaparin Injectable 130 milliGRAM(s) SubCutaneous daily  ertapenem  IVPB 1000 milliGRAM(s) IV Intermittent every 24 hours  ertapenem  IVPB      escitalopram 5 milliGRAM(s) Oral daily  famotidine    Tablet 20 milliGRAM(s) Oral daily  glucagon  Injectable 1 milliGRAM(s) IntraMuscular once  insulin lispro (ADMELOG) corrective regimen sliding scale   SubCutaneous three times a day before meals  insulin lispro (ADMELOG) corrective regimen sliding scale   SubCutaneous at bedtime  lactated ringers. 1000 milliLiter(s) (80 mL/Hr) IV Continuous <Continuous>  multivitamin 1 Tablet(s) Oral daily  ondansetron Injectable 4 milliGRAM(s) IV Push once    MEDICATIONS  (PRN):      CAPILLARY BLOOD GLUCOSE      POCT Blood Glucose.: 322 mg/dL (13 Jan 2022 21:06)  POCT Blood Glucose.: 322 mg/dL (13 Jan 2022 17:35)  POCT Blood Glucose.: 296 mg/dL (13 Jan 2022 12:30)  POCT Blood Glucose.: 191 mg/dL (13 Jan 2022 08:44)    I&O's Summary    12 Jan 2022 07:01  -  13 Jan 2022 07:00  --------------------------------------------------------  IN: 2262 mL / OUT: 1400 mL / NET: 862 mL    13 Jan 2022 07:01  -  14 Jan 2022 06:47  --------------------------------------------------------  IN: 2680 mL / OUT: 1000 mL / NET: 1680 mL        PHYSICAL EXAM:  Vital Signs Last 24 Hrs  T(C): 36.7 (14 Jan 2022 05:22), Max: 36.9 (13 Jan 2022 17:00)  T(F): 98 (14 Jan 2022 05:22), Max: 98.4 (13 Jan 2022 17:00)  HR: 82 (14 Jan 2022 05:22) (9 - 98)  BP: 154/85 (14 Jan 2022 05:22) (118/73 - 158/82)  BP(mean): --  RR: 18 (14 Jan 2022 05:22) (18 - 20)  SpO2: 95% (14 Jan 2022 05:22) (95% - 99%)  GENERAL: NAD, well-groomed, well-developed  HEAD:  Atraumatic, Normocephalic  EYES: PERRLA, conjunctiva and sclera clear  ENMT: No tonsillar erythema, exudates, or enlargement; Moist mucous membranes  NECK: Supple, No JVD, Normal thyroid  HEART: Regular rate and rhythm; No murmurs, rubs, or gallops  RESPIRATORY: CTA B/L, No W/R/R  ABDOMEN: Soft, Nontender, Nondistended; Bowel sounds present  NEUROLOGY: A&Ox3, nonfocal, moving all extremities  EXTREMITIES:  2+ Peripheral Pulses, No clubbing, cyanosis, L lower leg edema, no erythema        LABS:                        10.3   13.57 )-----------( 284      ( 14 Jan 2022 06:08 )             32.4     01-13    139  |  106  |  26<H>  ----------------------------<  208<H>  5.1   |  21<L>  |  1.40<H>    Ca    9.7      13 Jan 2022 07:05  Phos  2.9     01-12  Mg     2.0     01-12    TPro  6.2  /  Alb  3.3  /  TBili  0.2  /  DBili  x   /  AST  13  /  ALT  8<L>  /  AlkPhos  82  01-13    PT/INR - ( 12 Jan 2022 07:26 )   PT: 12.8 sec;   INR: 1.07 ratio         PTT - ( 14 Jan 2022 06:08 )  PTT:33.5 sec                RADIOLOGY & ADDITIONAL TESTS:  < from: CT Abdomen and Pelvis w/ IV Cont (01.13.22 @ 22:29) >  IMPRESSION:  No pulmonary embolism.    Spiculated mass in the left upper lobe without significant change from   prior PET CT 6/11/2021.    IVC filter with clot present within and slightly above the filter. Clot   below the filter as above.    < end of copied text >   Staci Duncan, PGY-1  TEAMS or Pager [phone] / [phone]  ---------------------------------------------------------------------------------------------  Patient is a 86y old  Female who presents with a chief complaint of DVT (13 Jan 2022 11:15)      SUBJECTIVE / OVERNIGHT EVENTS:  -NAEOVN, no concerns    ADDITIONAL REVIEW OF SYSTEMS:  CONSTITUTIONAL: No weakness, fevers or chills  EYES/ENT: No visual changes;  No vertigo or throat pain   NECK: No pain or stiffness  RESPIRATORY: No cough, wheezing, hemoptysis; No shortness of breath  CARDIOVASCULAR: No chest pain or palpitations  GASTROINTESTINAL: No abdominal or epigastric pain. No nausea, vomiting, or hematemesis; No diarrhea or constipation. No melena or hematochezia.  GENITOURINARY: No dysuria, frequency or hematuria  NEUROLOGICAL: No numbness or weakness        MEDICATIONS  (STANDING):  ascorbic acid 500 milliGRAM(s) Oral daily  atorvastatin 20 milliGRAM(s) Oral at bedtime  dextrose 40% Gel 15 Gram(s) Oral once  dextrose 5%. 1000 milliLiter(s) (50 mL/Hr) IV Continuous <Continuous>  dextrose 5%. 1000 milliLiter(s) (100 mL/Hr) IV Continuous <Continuous>  dextrose 50% Injectable 25 Gram(s) IV Push once  dextrose 50% Injectable 12.5 Gram(s) IV Push once  dextrose 50% Injectable 25 Gram(s) IV Push once  donepezil 10 milliGRAM(s) Oral at bedtime  enoxaparin Injectable 130 milliGRAM(s) SubCutaneous daily  ertapenem  IVPB 1000 milliGRAM(s) IV Intermittent every 24 hours  ertapenem  IVPB      escitalopram 5 milliGRAM(s) Oral daily  famotidine    Tablet 20 milliGRAM(s) Oral daily  glucagon  Injectable 1 milliGRAM(s) IntraMuscular once  insulin lispro (ADMELOG) corrective regimen sliding scale   SubCutaneous three times a day before meals  insulin lispro (ADMELOG) corrective regimen sliding scale   SubCutaneous at bedtime  lactated ringers. 1000 milliLiter(s) (80 mL/Hr) IV Continuous <Continuous>  multivitamin 1 Tablet(s) Oral daily  ondansetron Injectable 4 milliGRAM(s) IV Push once    MEDICATIONS  (PRN):      CAPILLARY BLOOD GLUCOSE      POCT Blood Glucose.: 322 mg/dL (13 Jan 2022 21:06)  POCT Blood Glucose.: 322 mg/dL (13 Jan 2022 17:35)  POCT Blood Glucose.: 296 mg/dL (13 Jan 2022 12:30)  POCT Blood Glucose.: 191 mg/dL (13 Jan 2022 08:44)    I&O's Summary    12 Jan 2022 07:01  -  13 Jan 2022 07:00  --------------------------------------------------------  IN: 2262 mL / OUT: 1400 mL / NET: 862 mL    13 Jan 2022 07:01  -  14 Jan 2022 06:47  --------------------------------------------------------  IN: 2680 mL / OUT: 1000 mL / NET: 1680 mL        PHYSICAL EXAM:  Vital Signs Last 24 Hrs  T(C): 36.7 (14 Jan 2022 05:22), Max: 36.9 (13 Jan 2022 17:00)  T(F): 98 (14 Jan 2022 05:22), Max: 98.4 (13 Jan 2022 17:00)  HR: 82 (14 Jan 2022 05:22) (9 - 98)  BP: 154/85 (14 Jan 2022 05:22) (118/73 - 158/82)  BP(mean): --  RR: 18 (14 Jan 2022 05:22) (18 - 20)  SpO2: 95% (14 Jan 2022 05:22) (95% - 99%)  GENERAL: NAD, well-groomed, well-developed  HEAD:  Atraumatic, Normocephalic  EYES: PERRLA, conjunctiva and sclera clear  ENMT: No tonsillar erythema, exudates, or enlargement; Moist mucous membranes  NECK: Supple, No JVD, Normal thyroid  HEART: Regular rate and rhythm; No murmurs, rubs, or gallops  RESPIRATORY: CTA B/L, No W/R/R  ABDOMEN: Soft, Nontender, Nondistended; Bowel sounds present  NEUROLOGY: A&Ox3, nonfocal, moving all extremities  EXTREMITIES:  2+ Peripheral Pulses, No clubbing, cyanosis, L lower leg edema, no erythema        LABS:                                   10.0   13.87 )-----------( 287      ( 15 Stefan 2022 05:30 )             31.4     01-15    138  |  105  |  37<H>  ----------------------------<  235<H>  4.6   |  22  |  1.49<H>    Ca    9.9      15 Stefan 2022 05:30    TPro  6.1  /  Alb  3.2<L>  /  TBili  0.2  /  DBili  x   /  AST  22  /  ALT  18  /  AlkPhos  82  01-15                RADIOLOGY & ADDITIONAL TESTS:  < from: CT Abdomen and Pelvis w/ IV Cont (01.13.22 @ 22:29) >  IMPRESSION:  No pulmonary embolism.    Spiculated mass in the left upper lobe without significant change from   prior PET CT 6/11/2021.    IVC filter with clot present within and slightly above the filter. Clot   below the filter as above.    < end of copied text >   Staci Duncan, PGY-1  TEAMS or Pager [phone] / [phone]  ---------------------------------------------------------------------------------------------  Patient is a 86y old  Female who presents with a chief complaint of DVT (13 Jan 2022 11:15)      SUBJECTIVE / OVERNIGHT EVENTS:  -NAEOVN, no concerns    ADDITIONAL REVIEW OF SYSTEMS:  CONSTITUTIONAL: No weakness, fevers or chills.  EYES/ENT: No visual changes;  No vertigo or throat pain   NECK: No pain or stiffness  RESPIRATORY: No cough, wheezing, hemoptysis; No shortness of breath  CARDIOVASCULAR: No chest pain or palpitations.  GASTROINTESTINAL: No abdominal or epigastric pain. No nausea, vomiting, or hematemesis; No diarrhea or constipation. No melena or hematochezia.  GENITOURINARY: No dysuria, frequency or hematuria  NEUROLOGICAL: No numbness or weakness.        MEDICATIONS  (STANDING):  ascorbic acid 500 milliGRAM(s) Oral daily  atorvastatin 20 milliGRAM(s) Oral at bedtime  dextrose 40% Gel 15 Gram(s) Oral once  dextrose 5%. 1000 milliLiter(s) (50 mL/Hr) IV Continuous <Continuous>  dextrose 5%. 1000 milliLiter(s) (100 mL/Hr) IV Continuous <Continuous>  dextrose 50% Injectable 25 Gram(s) IV Push once  dextrose 50% Injectable 12.5 Gram(s) IV Push once  dextrose 50% Injectable 25 Gram(s) IV Push once  donepezil 10 milliGRAM(s) Oral at bedtime  enoxaparin Injectable 130 milliGRAM(s) SubCutaneous daily  ertapenem  IVPB 1000 milliGRAM(s) IV Intermittent every 24 hours  ertapenem  IVPB      escitalopram 5 milliGRAM(s) Oral daily  famotidine    Tablet 20 milliGRAM(s) Oral daily  glucagon  Injectable 1 milliGRAM(s) IntraMuscular once  insulin lispro (ADMELOG) corrective regimen sliding scale   SubCutaneous three times a day before meals  insulin lispro (ADMELOG) corrective regimen sliding scale   SubCutaneous at bedtime  lactated ringers. 1000 milliLiter(s) (80 mL/Hr) IV Continuous <Continuous>  multivitamin 1 Tablet(s) Oral daily  ondansetron Injectable 4 milliGRAM(s) IV Push once    MEDICATIONS  (PRN):      CAPILLARY BLOOD GLUCOSE      POCT Blood Glucose.: 322 mg/dL (13 Jan 2022 21:06)  POCT Blood Glucose.: 322 mg/dL (13 Jan 2022 17:35)  POCT Blood Glucose.: 296 mg/dL (13 Jan 2022 12:30)  POCT Blood Glucose.: 191 mg/dL (13 Jan 2022 08:44)    I&O's Summary    12 Jan 2022 07:01  -  13 Jan 2022 07:00  --------------------------------------------------------  IN: 2262 mL / OUT: 1400 mL / NET: 862 mL    13 Jan 2022 07:01  -  14 Jan 2022 06:47  --------------------------------------------------------  IN: 2680 mL / OUT: 1000 mL / NET: 1680 mL        PHYSICAL EXAM:  Vital Signs Last 24 Hrs  T(C): 36.7 (14 Jan 2022 05:22), Max: 36.9 (13 Jan 2022 17:00).  T(F): 98 (14 Jan 2022 05:22), Max: 98.4 (13 Jan 2022 17:00)  HR: 82 (14 Jan 2022 05:22) (9 - 98)  BP: 154/85 (14 Jan 2022 05:22) (118/73 - 158/82)  BP(mean): --  RR: 18 (14 Jan 2022 05:22) (18 - 20)  SpO2: 95% (14 Jan 2022 05:22) (95% - 99%)  GENERAL: NAD, well-groomed, well-developed  HEAD:  Atraumatic, Normocephalic  EYES: PERRLA, conjunctiva and sclera clear  ENMT: No tonsillar erythema, exudates, or enlargement; Moist mucous membranes  NECK: Supple, No JVD, Normal thyroid  HEART: Regular rate and rhythm; No murmurs, rubs, or gallops  RESPIRATORY: CTA B/L, No W/R/R  ABDOMEN: Soft, Nontender, Nondistended; Bowel sounds present  NEUROLOGY: A&Ox3, nonfocal, moving all extremities  EXTREMITIES:  2+ Peripheral Pulses, No clubbing, cyanosis, L lower leg edema, no erythema        LABS:                                   10.0   13.87 )-----------( 287      ( 15 Stefan 2022 05:30 )             31.4     01-15    138  |  105  |  37<H>  ----------------------------<  235<H>  4.6   |  22  |  1.49<H>    Ca    9.9      15 Stefan 2022 05:30    TPro  6.1  /  Alb  3.2<L>  /  TBili  0.2  /  DBili  x   /  AST  22  /  ALT  18  /  AlkPhos  82  01-15                RADIOLOGY & ADDITIONAL TESTS:  < from: CT Abdomen and Pelvis w/ IV Cont (01.13.22 @ 22:29) >  IMPRESSION:  No pulmonary embolism.    Spiculated mass in the left upper lobe without significant change from   prior PET CT 6/11/2021.    IVC filter with clot present within and slightly above the filter. Clot   below the filter as above.    < end of copied text >

## 2022-01-15 NOTE — PROGRESS NOTE ADULT - PROBLEM SELECTOR PLAN 1
Found to have acute LLE DVT involving the external iliac vein, common femoral vein, femoral vein, and posterior trunk. Likely due to combination of recent hospitalization, lung cancer, and immobility.    Plan:  - enoxaparin 11/13 s/p heparin drip  - f/u CT 1/13 showing clot within IVCF and slightly above filter, f/u vascular recs Found to have acute LLE DVT involving the external iliac vein, common femoral vein, femoral vein, and posterior trunk. Likely due to combination of recent hospitalization, lung cancer, and immobility.    Plan:  - enoxaparin 11/13 s/p heparin drip  - f/u CT 1/13 showing clot within IVCF and slightly above filter, per vascular c/w AC, no intervention Found to have acute LLE DVT involving the external iliac vein, common femoral vein, femoral vein, and posterior trunk. Likely due to combination of recent hospitalization, lung cancer, and immobility.  Plan: Enoxaparin 11/13 s/p heparin drip  F/u CT 1/13 showing clot within IVCF and slightly above filter, per vascular c/w AC, no intervention

## 2022-01-15 NOTE — PROGRESS NOTE ADULT - ASSESSMENT
Hematology/Oncology consulted on this patient with history of Lung cancer, currently undergoing care at [facility] with Dr Cisse. She has a history of HTN, PTCA, CAD, PM and NIDDM. As per daughter, patient stayed overnight  at [facility] on 1/6/21 after " passing out" She also missed an [facility] IR Lung biopsy originally scheduled on 1/6 at [facility]. She is an 86 year old female with history of  left lung Adenocarcinoma S/P SBRT to the [facility]. 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 Pt with history of IVC filter placed. She presented to [facility] with LLE swelling and pain, confirmed for a positive DVT.    Lung Adenocarcinoma  --treated with RT only within the last 2 months to [facility]  --She is currently undergoing care with Dr Cisse at [facility]  --will follow up after discharge at [facility]  --Patient to have her lung biopsy at [facility] as previously planned, IR feels with Acute DVT, biopsy should wait.  --Appreciate recs    DVT  --likely provoked and with history of DVT  -- cw Lovenox 130 mg daily  --vascular following                    Anemia  --Hgb 10.0  --likely reactive  --If acute drop, please transfuse for Hgb <7.0      Vahe Mc MD   Hematology/ Oncology   [facility]   [facility] Inpatient   C: [phone]  [address] [place], [place]  P:[phone]  F:[phone]  and [phone]  [address] [place], [place]   P:[phone]  F:[phone]

## 2022-01-15 NOTE — PROGRESS NOTE ADULT - PROBLEM SELECTOR PLAN 9
H/o. Outpatient [place]isse [place] [facility]  - IR recommended bx outpatient (eg [facility])  - f/u CT chest (spiculated nodule in L lung unchanged, no PE)  - appreciate Heme/Onc recs

## 2022-01-16 ENCOUNTER — TRANSCRIPTION ENCOUNTER (OUTPATIENT)
Age: 87
End: 2022-01-16

## 2022-01-16 VITALS
RESPIRATION RATE: 20 BRPM | SYSTOLIC BLOOD PRESSURE: 142 MMHG | TEMPERATURE: 98 F | HEART RATE: 87 BPM | DIASTOLIC BLOOD PRESSURE: 78 MMHG | OXYGEN SATURATION: 99 %

## 2022-01-16 LAB
ALBUMIN SERPL ELPH-MCNC: 3.1 G/DL — LOW (ref 3.3–5)
ALP SERPL-CCNC: 81 U/L — SIGNIFICANT CHANGE UP (ref 40–120)
ALT FLD-CCNC: 19 U/L — SIGNIFICANT CHANGE UP (ref 10–45)
ANION GAP SERPL CALC-SCNC: 9 MMOL/L — SIGNIFICANT CHANGE UP (ref 5–17)
AST SERPL-CCNC: 18 U/L — SIGNIFICANT CHANGE UP (ref 10–40)
BASOPHILS # BLD AUTO: 0.1 K/UL — SIGNIFICANT CHANGE UP (ref 0–0.2)
BASOPHILS NFR BLD AUTO: 1 % — SIGNIFICANT CHANGE UP (ref 0–2)
BILIRUB SERPL-MCNC: 0.2 MG/DL — SIGNIFICANT CHANGE UP (ref 0.2–1.2)
BUN SERPL-MCNC: 32 MG/DL — HIGH (ref 7–23)
CALCIUM SERPL-MCNC: 9.8 MG/DL — SIGNIFICANT CHANGE UP (ref 8.4–10.5)
CHLORIDE SERPL-SCNC: 105 MMOL/L — SIGNIFICANT CHANGE UP (ref 96–108)
CO2 SERPL-SCNC: 23 MMOL/L — SIGNIFICANT CHANGE UP (ref 22–31)
CREAT SERPL-MCNC: 1.4 MG/DL — HIGH (ref 0.5–1.3)
EOSINOPHIL # BLD AUTO: 0.48 K/UL — SIGNIFICANT CHANGE UP (ref 0–0.5)
EOSINOPHIL NFR BLD AUTO: 4.6 % — SIGNIFICANT CHANGE UP (ref 0–6)
GLUCOSE BLDC GLUCOMTR-MCNC: 213 MG/DL — HIGH (ref 70–99)
GLUCOSE BLDC GLUCOMTR-MCNC: 243 MG/DL — HIGH (ref 70–99)
GLUCOSE SERPL-MCNC: 212 MG/DL — HIGH (ref 70–99)
HCT VFR BLD CALC: 33.7 % — LOW (ref 34.5–45)
HGB BLD-MCNC: 10.4 G/DL — LOW (ref 11.5–15.5)
IMM GRANULOCYTES NFR BLD AUTO: 0.8 % — SIGNIFICANT CHANGE UP (ref 0–1.5)
LYMPHOCYTES # BLD AUTO: 3.17 K/UL — SIGNIFICANT CHANGE UP (ref 1–3.3)
LYMPHOCYTES # BLD AUTO: 30.2 % — SIGNIFICANT CHANGE UP (ref 13–44)
MAGNESIUM SERPL-MCNC: 1.8 MG/DL — SIGNIFICANT CHANGE UP (ref 1.6–2.6)
MCHC RBC-ENTMCNC: 28.3 PG — SIGNIFICANT CHANGE UP (ref 27–34)
MCHC RBC-ENTMCNC: 30.9 GM/DL — LOW (ref 32–36)
MCV RBC AUTO: 91.8 FL — SIGNIFICANT CHANGE UP (ref 80–100)
MONOCYTES # BLD AUTO: 0.87 K/UL — SIGNIFICANT CHANGE UP (ref 0–0.9)
MONOCYTES NFR BLD AUTO: 8.3 % — SIGNIFICANT CHANGE UP (ref 2–14)
NEUTROPHILS # BLD AUTO: 5.78 K/UL — SIGNIFICANT CHANGE UP (ref 1.8–7.4)
NEUTROPHILS NFR BLD AUTO: 55.1 % — SIGNIFICANT CHANGE UP (ref 43–77)
NRBC # BLD: 0 /100 WBCS — SIGNIFICANT CHANGE UP (ref 0–0)
PHOSPHATE SERPL-MCNC: 3.5 MG/DL — SIGNIFICANT CHANGE UP (ref 2.5–4.5)
PLATELET # BLD AUTO: 263 K/UL — SIGNIFICANT CHANGE UP (ref 150–400)
POTASSIUM SERPL-MCNC: 4.7 MMOL/L — SIGNIFICANT CHANGE UP (ref 3.5–5.3)
POTASSIUM SERPL-SCNC: 4.7 MMOL/L — SIGNIFICANT CHANGE UP (ref 3.5–5.3)
PROT SERPL-MCNC: 6.1 G/DL — SIGNIFICANT CHANGE UP (ref 6–8.3)
RBC # BLD: 3.67 M/UL — LOW (ref 3.8–5.2)
RBC # FLD: 13 % — SIGNIFICANT CHANGE UP (ref 10.3–14.5)
SARS-COV-2 RNA SPEC QL NAA+PROBE: SIGNIFICANT CHANGE UP
SODIUM SERPL-SCNC: 137 MMOL/L — SIGNIFICANT CHANGE UP (ref 135–145)
WBC # BLD: 10.48 K/UL — SIGNIFICANT CHANGE UP (ref 3.8–10.5)
WBC # FLD AUTO: 10.48 K/UL — SIGNIFICANT CHANGE UP (ref 3.8–10.5)

## 2022-01-16 PROCEDURE — 83735 ASSAY OF MAGNESIUM: CPT

## 2022-01-16 PROCEDURE — 87086 URINE CULTURE/COLONY COUNT: CPT

## 2022-01-16 PROCEDURE — 82746 ASSAY OF FOLIC ACID SERUM: CPT

## 2022-01-16 PROCEDURE — 93970 EXTREMITY STUDY: CPT

## 2022-01-16 PROCEDURE — 82607 VITAMIN B-12: CPT

## 2022-01-16 PROCEDURE — 99285 EMERGENCY DEPT VISIT HI MDM: CPT | Mod: 25

## 2022-01-16 PROCEDURE — 85045 AUTOMATED RETICULOCYTE COUNT: CPT

## 2022-01-16 PROCEDURE — 80053 COMPREHEN METABOLIC PANEL: CPT

## 2022-01-16 PROCEDURE — 87186 SC STD MICRODIL/AGAR DIL: CPT

## 2022-01-16 PROCEDURE — 85610 PROTHROMBIN TIME: CPT

## 2022-01-16 PROCEDURE — 86901 BLOOD TYPING SEROLOGIC RH(D): CPT

## 2022-01-16 PROCEDURE — 82728 ASSAY OF FERRITIN: CPT

## 2022-01-16 PROCEDURE — 84100 ASSAY OF PHOSPHORUS: CPT

## 2022-01-16 PROCEDURE — 83550 IRON BINDING TEST: CPT

## 2022-01-16 PROCEDURE — 85730 THROMBOPLASTIN TIME PARTIAL: CPT

## 2022-01-16 PROCEDURE — 83036 HEMOGLOBIN GLYCOSYLATED A1C: CPT

## 2022-01-16 PROCEDURE — 71275 CT ANGIOGRAPHY CHEST: CPT

## 2022-01-16 PROCEDURE — 83540 ASSAY OF IRON: CPT

## 2022-01-16 PROCEDURE — 97530 THERAPEUTIC ACTIVITIES: CPT

## 2022-01-16 PROCEDURE — 97161 PT EVAL LOW COMPLEX 20 MIN: CPT

## 2022-01-16 PROCEDURE — 84466 ASSAY OF TRANSFERRIN: CPT

## 2022-01-16 PROCEDURE — 80048 BASIC METABOLIC PNL TOTAL CA: CPT

## 2022-01-16 PROCEDURE — U0005: CPT

## 2022-01-16 PROCEDURE — 36415 COLL VENOUS BLD VENIPUNCTURE: CPT

## 2022-01-16 PROCEDURE — 84300 ASSAY OF URINE SODIUM: CPT

## 2022-01-16 PROCEDURE — 82570 ASSAY OF URINE CREATININE: CPT

## 2022-01-16 PROCEDURE — 93880 EXTRACRANIAL BILAT STUDY: CPT

## 2022-01-16 PROCEDURE — 93306 TTE W/DOPPLER COMPLETE: CPT

## 2022-01-16 PROCEDURE — 82962 GLUCOSE BLOOD TEST: CPT

## 2022-01-16 PROCEDURE — 85027 COMPLETE CBC AUTOMATED: CPT

## 2022-01-16 PROCEDURE — 93005 ELECTROCARDIOGRAM TRACING: CPT

## 2022-01-16 PROCEDURE — 97116 GAIT TRAINING THERAPY: CPT

## 2022-01-16 PROCEDURE — U0003: CPT

## 2022-01-16 PROCEDURE — 99239 HOSP IP/OBS DSCHRG MGMT >30: CPT

## 2022-01-16 PROCEDURE — 85025 COMPLETE CBC W/AUTO DIFF WBC: CPT

## 2022-01-16 PROCEDURE — 86850 RBC ANTIBODY SCREEN: CPT

## 2022-01-16 PROCEDURE — 70450 CT HEAD/BRAIN W/O DYE: CPT

## 2022-01-16 PROCEDURE — 86900 BLOOD TYPING SEROLOGIC ABO: CPT

## 2022-01-16 PROCEDURE — 81001 URINALYSIS AUTO W/SCOPE: CPT

## 2022-01-16 PROCEDURE — 84540 ASSAY OF URINE/UREA-N: CPT

## 2022-01-16 PROCEDURE — 74177 CT ABD & PELVIS W/CONTRAST: CPT

## 2022-01-16 RX ORDER — ASCORBIC ACID 60 MG
1 TABLET,CHEWABLE ORAL
Qty: 30 | Refills: 0
Start: 2022-01-16 | End: 2022-02-14

## 2022-01-16 RX ADMIN — ONDANSETRON 4 MILLIGRAM(S): 8 TABLET, FILM COATED ORAL at 13:49

## 2022-01-16 RX ADMIN — ENOXAPARIN SODIUM 130 MILLIGRAM(S): 100 INJECTION SUBCUTANEOUS at 12:00

## 2022-01-16 RX ADMIN — Medication 4: at 13:10

## 2022-01-16 RX ADMIN — Medication 4: at 08:59

## 2022-01-16 RX ADMIN — FAMOTIDINE 20 MILLIGRAM(S): 10 INJECTION INTRAVENOUS at 11:59

## 2022-01-16 RX ADMIN — Medication 500 MILLIGRAM(S): at 11:59

## 2022-01-16 RX ADMIN — Medication 1 TABLET(S): at 12:00

## 2022-01-16 RX ADMIN — ERTAPENEM SODIUM 120 MILLIGRAM(S): 1 INJECTION, POWDER, LYOPHILIZED, FOR SOLUTION INTRAMUSCULAR; INTRAVENOUS at 12:08

## 2022-01-16 RX ADMIN — ESCITALOPRAM OXALATE 5 MILLIGRAM(S): 10 TABLET, FILM COATED ORAL at 11:59

## 2022-01-16 NOTE — PROGRESS NOTE ADULT - PROBLEM SELECTOR PLAN 3
Ucx: Positive for E. Coli.  - Ceftriaxone 1/10- 1/12 then ertapenem 1/12- due to ESBL in urine Ucx: Positive for E. Coli.  - Ceftriaxone 1/10- 1/12 then ertapenem 1/12-1/16 due to ESBL in urine

## 2022-01-16 NOTE — DISCHARGE NOTE NURSING/CASE MANAGEMENT/SOCIAL WORK - NSDCPEFALRISK_GEN_ALL_CORE
For information on Fall & Injury Prevention, visit: [email or web address] OR  [email or web address] OR  https://www.cdc.gov/steadi/patient.html

## 2022-01-16 NOTE — PROGRESS NOTE ADULT - SUBJECTIVE AND OBJECTIVE BOX
PROGRESS NOTE:     CONTACT INFO:   Sanderson (Xiao) Joan   NS: [phone]/[facility]: [id number]  PGY-3    Patient is a 86y old  Female who presents with a chief complaint of DVT (15 Stefan 2022 08:57)      SUBJECTIVE / OVERNIGHT EVENTS:    ADDITIONAL REVIEW OF SYSTEMS:    MEDICATIONS  (STANDING):  ascorbic acid 500 milliGRAM(s) Oral daily  atorvastatin 20 milliGRAM(s) Oral at bedtime  dextrose 40% Gel 15 Gram(s) Oral once  dextrose 5%. 1000 milliLiter(s) (50 mL/Hr) IV Continuous <Continuous>  dextrose 5%. 1000 milliLiter(s) (100 mL/Hr) IV Continuous <Continuous>  dextrose 50% Injectable 25 Gram(s) IV Push once  dextrose 50% Injectable 12.5 Gram(s) IV Push once  dextrose 50% Injectable 25 Gram(s) IV Push once  donepezil 10 milliGRAM(s) Oral at bedtime  enoxaparin Injectable 130 milliGRAM(s) SubCutaneous daily  ertapenem  IVPB 1000 milliGRAM(s) IV Intermittent every 24 hours  ertapenem  IVPB      escitalopram 5 milliGRAM(s) Oral daily  famotidine    Tablet 20 milliGRAM(s) Oral daily  glucagon  Injectable 1 milliGRAM(s) IntraMuscular once  insulin lispro (ADMELOG) corrective regimen sliding scale   SubCutaneous three times a day before meals  insulin lispro (ADMELOG) corrective regimen sliding scale   SubCutaneous at bedtime  lactated ringers. 1000 milliLiter(s) (60 mL/Hr) IV Continuous <Continuous>  multivitamin 1 Tablet(s) Oral daily  ondansetron Injectable 4 milliGRAM(s) IV Push once    MEDICATIONS  (PRN):      CAPILLARY BLOOD GLUCOSE      POCT Blood Glucose.: 213 mg/dL (16 Jan 2022 08:48)  POCT Blood Glucose.: 286 mg/dL (15 Stefan 2022 21:00)  POCT Blood Glucose.: 222 mg/dL (15 Stefan 2022 17:09)  POCT Blood Glucose.: 326 mg/dL (15 Stefan 2022 12:37)    I&O's Summary    15 Stefan 2022 07:01  -  16 Jan 2022 07:00  --------------------------------------------------------  IN: 1370 mL / OUT: 1150 mL / NET: 220 mL        PHYSICAL EXAM:  Vital Signs Last 24 Hrs  T(C): 36.4 (16 Jan 2022 08:56), Max: 36.7 (15 Stefan 2022 21:00)  T(F): 97.6 (16 Jan 2022 08:56), Max: 98.1 (15 Stefan 2022 21:00)  HR: 87 (16 Jan 2022 08:56) (87 - 105)  BP: 167/73 (16 Jan 2022 08:56) (146/83 - 167/73)  BP(mean): --  RR: 20 (16 Jan 2022 08:56) (18 - 20)  SpO2: 99% (16 Jan 2022 08:56) (98% - 100%)    CONSTITUTIONAL: NAD, well-developed  RESPIRATORY: Normal respiratory effort; lungs are clear to auscultation bilaterally  CARDIOVASCULAR: Regular rate and rhythm, normal S1 and S2, no murmur/rub/gallop; No lower extremity edema; Peripheral pulses are 2+ bilaterally  ABDOMEN: Nontender to palpation, normoactive bowel sounds, no rebound/guarding; No hepatosplenomegaly  MUSCLOSKELETAL: no clubbing or cyanosis of digits; no joint swelling or tenderness to palpation  PSYCH: A+O to person, place, and time; affect appropriate    LABS:                        10.4   10.48 )-----------( 263      ( 16 Jan 2022 06:10 )             33.7     01-16    137  |  105  |  32<H>  ----------------------------<  212<H>  4.7   |  23  |  1.40<H>    Ca    9.8      16 Jan 2022 06:10  Phos  3.5     01-16  Mg     1.8     01-16    TPro  6.1  /  Alb  3.1<L>  /  TBili  0.2  /  DBili  x   /  AST  18  /  ALT  19  /  AlkPhos  81  01-16                RADIOLOGY & ADDITIONAL TESTS:  Results Reviewed:   Imaging Personally Reviewed:  Electrocardiogram Personally Reviewed:    COORDINATION OF CARE:  Care Discussed with Consultants/Other Providers [Y/N]:  Prior or Outpatient Records Reviewed [Y/N]:   PROGRESS NOTE:     CONTACT INFO:   Sanderson (Xiao) Joan   NS: [id number]/[facility]: [id number]  PGY-3    Patient is a 86y old  Female who presents with a chief complaint of DVT (15 Stefan 2022 08:57)      SUBJECTIVE / OVERNIGHT EVENTS: no acute event overnight.     ADDITIONAL REVIEW OF SYSTEMS: Reports feeling better. No active complaints.     MEDICATIONS  (STANDING):  ascorbic acid 500 milliGRAM(s) Oral daily  atorvastatin 20 milliGRAM(s) Oral at bedtime  dextrose 40% Gel 15 Gram(s) Oral once  dextrose 5%. 1000 milliLiter(s) (50 mL/Hr) IV Continuous <Continuous>  dextrose 5%. 1000 milliLiter(s) (100 mL/Hr) IV Continuous <Continuous>  dextrose 50% Injectable 25 Gram(s) IV Push once  dextrose 50% Injectable 12.5 Gram(s) IV Push once  dextrose 50% Injectable 25 Gram(s) IV Push once  donepezil 10 milliGRAM(s) Oral at bedtime  enoxaparin Injectable 130 milliGRAM(s) SubCutaneous daily  ertapenem  IVPB 1000 milliGRAM(s) IV Intermittent every 24 hours  ertapenem  IVPB      escitalopram 5 milliGRAM(s) Oral daily  famotidine    Tablet 20 milliGRAM(s) Oral daily  glucagon  Injectable 1 milliGRAM(s) IntraMuscular once  insulin lispro (ADMELOG) corrective regimen sliding scale   SubCutaneous three times a day before meals  insulin lispro (ADMELOG) corrective regimen sliding scale   SubCutaneous at bedtime  lactated ringers. 1000 milliLiter(s) (60 mL/Hr) IV Continuous <Continuous>  multivitamin 1 Tablet(s) Oral daily  ondansetron Injectable 4 milliGRAM(s) IV Push once    MEDICATIONS  (PRN):      CAPILLARY BLOOD GLUCOSE      POCT Blood Glucose.: 213 mg/dL (16 Jan 2022 08:48)  POCT Blood Glucose.: 286 mg/dL (15 Stefan 2022 21:00)  POCT Blood Glucose.: 222 mg/dL (15 Stefan 2022 17:09)  POCT Blood Glucose.: 326 mg/dL (15 Stefan 2022 12:37)    I&O's Summary    15 Stefan 2022 07:01  -  16 Jan 2022 07:00  --------------------------------------------------------  IN: 1370 mL / OUT: 1150 mL / NET: 220 mL        PHYSICAL EXAM:  Vital Signs Last 24 Hrs  T(C): 36.4 (16 Jan 2022 08:56), Max: 36.7 (15 Stefan 2022 21:00)  T(F): 97.6 (16 Jan 2022 08:56), Max: 98.1 (15 Stefan 2022 21:00)  HR: 87 (16 Jan 2022 08:56) (87 - 105)  BP: 167/73 (16 Jan 2022 08:56) (146/83 - 167/73)  BP(mean): --  RR: 20 (16 Jan 2022 08:56) (18 - 20)  SpO2: 99% (16 Jan 2022 08:56) (98% - 100%)    CONSTITUTIONAL: NAD, well-developed  RESPIRATORY: Normal respiratory effort; lungs are clear to auscultation bilaterally  CARDIOVASCULAR: Regular rate and rhythm, normal S1 and S2, no murmur/rub/gallop;  2+ Peripheral Pulses, No clubbing, cyanosis, L lower leg edema, no erythema  ABDOMEN: Nontender to palpation, normoactive bowel sounds, no rebound/guarding  MUSCLOSKELETAL: no clubbing or cyanosis of digits  PSYCH: A+O to person, place, and time; affect appropriate    LABS:                        10.4   10.48 )-----------( 263      ( 16 Jan 2022 06:10 )             33.7     01-16    137  |  105  |  32<H>  ----------------------------<  212<H>  4.7   |  23  |  1.40<H>    Ca    9.8      16 Jan 2022 06:10  Phos  3.5     01-16  Mg     1.8     01-16    TPro  6.1  /  Alb  3.1<L>  /  TBili  0.2  /  DBili  x   /  AST  18  /  ALT  19  /  AlkPhos  81  01-16                RADIOLOGY & ADDITIONAL TESTS:  Results Reviewed:   Imaging Personally Reviewed:  Electrocardiogram Personally Reviewed:    COORDINATION OF CARE:  Care Discussed with Consultants/Other Providers [Y/N]:  Prior or Outpatient Records Reviewed [Y/N]:

## 2022-01-16 NOTE — PROGRESS NOTE ADULT - PROBLEM SELECTOR PLAN 1
Found to have acute LLE DVT involving the external iliac vein, common femoral vein, femoral vein, and posterior trunk. Likely due to combination of recent hospitalization, lung cancer, and immobility.  Plan: Enoxaparin 11/13 s/p heparin drip  F/u CT 1/13 showing clot within IVCF and slightly above filter, per vascular c/w AC, no intervention Found to have acute LLE DVT involving the external iliac vein, common femoral vein, femoral vein, and posterior trunk. Likely due to combination of recent hospitalization, lung cancer, and immobility.  -c/w lovenox 130mg daily  -F/u CT 1/13 showing clot within IVCF and slightly above filter, per vascular c/w AC, no intervention  -outpt f/u vascular sx and hematology/onc

## 2022-01-16 NOTE — PROGRESS NOTE ADULT - PROBLEM SELECTOR PROBLEM 2
Leg DVT (deep venous thromboembolism), acute, left
ROBERTO (acute kidney injury)
Leg DVT (deep venous thromboembolism), acute, left
ROBERTO (acute kidney injury)
ROBERTO (acute kidney injury)
Leg DVT (deep venous thromboembolism), acute, left
ROBERTO (acute kidney injury)
ROBERTO (acute kidney injury)

## 2022-01-16 NOTE — PROGRESS NOTE ADULT - ATTENDING SUPERVISION STATEMENT
ACP
Resident/Fellow
Resident

## 2022-01-16 NOTE — PROGRESS NOTE ADULT - SUBJECTIVE AND OBJECTIVE BOX
INTERVAL HPI/OVERNIGHT EVENTS:  Patient S&E at bedside. No o/n events, patient resting comfortably. No complaints at this time. Patient denies fever, chills, dizziness, weakness, CP, palpitations, SOB, cough, N/V/D/C, dysuria, changes in bowel movements.   VITAL SIGNS:  T(F): 97.6 (01-16-22 @ 08:56)  HR: 87 (01-16-22 @ 08:56)  BP: 167/73 (01-16-22 @ 08:56)  RR: 20 (01-16-22 @ 08:56)  SpO2: 99% (01-16-22 @ 08:56)  Wt(kg): --    PHYSICAL EXAM:  NAD   LUNGS CTA   LEFT lower extremity edema improved warmth improved   abdomen soft nontender   no murmurs       MEDICATIONS  (STANDING):  ascorbic acid 500 milliGRAM(s) Oral daily  atorvastatin 20 milliGRAM(s) Oral at bedtime  dextrose 40% Gel 15 Gram(s) Oral once  dextrose 5%. 1000 milliLiter(s) (50 mL/Hr) IV Continuous <Continuous>  dextrose 5%. 1000 milliLiter(s) (100 mL/Hr) IV Continuous <Continuous>  dextrose 50% Injectable 25 Gram(s) IV Push once  dextrose 50% Injectable 12.5 Gram(s) IV Push once  dextrose 50% Injectable 25 Gram(s) IV Push once  donepezil 10 milliGRAM(s) Oral at bedtime  enoxaparin Injectable 130 milliGRAM(s) SubCutaneous daily  ertapenem  IVPB 1000 milliGRAM(s) IV Intermittent every 24 hours  ertapenem  IVPB      escitalopram 5 milliGRAM(s) Oral daily  famotidine    Tablet 20 milliGRAM(s) Oral daily  glucagon  Injectable 1 milliGRAM(s) IntraMuscular once  insulin lispro (ADMELOG) corrective regimen sliding scale   SubCutaneous three times a day before meals  insulin lispro (ADMELOG) corrective regimen sliding scale   SubCutaneous at bedtime  lactated ringers. 1000 milliLiter(s) (60 mL/Hr) IV Continuous <Continuous>  multivitamin 1 Tablet(s) Oral daily  ondansetron Injectable 4 milliGRAM(s) IV Push once    MEDICATIONS  (PRN):      Allergies    iodine (Hives)  penicillin (Hives)    Intolerances        LABS:                        10.4   10.48 )-----------( 263      ( 16 Jan 2022 06:10 )             33.7     01-16    137  |  105  |  32<H>  ----------------------------<  212<H>  4.7   |  23  |  1.40<H>    Ca    9.8      16 Jan 2022 06:10  Phos  3.5     01-16  Mg     1.8     01-16    TPro  6.1  /  Alb  3.1<L>  /  TBili  0.2  /  DBili  x   /  AST  18  /  ALT  19  /  AlkPhos  81  01-16          RADIOLOGY & ADDITIONAL TESTS:  Studies reviewed.    ASSESSMENT & PLAN:

## 2022-01-16 NOTE — PROGRESS NOTE ADULT - PROBLEM SELECTOR PLAN 9
H/o. Outpatient Dr Cisse at [facility] [place].  - IR recommended bx outpatient (eg [facility])  - f/u CT chest (spiculated nodule in L lung unchanged, no PE)  - appreciate Heme/Onc recs H/o. Outpatient Dr Cisse at [facility] [place].  - IR recommended bx outpatient (eg [facility])  - CT chest (spiculated nodule in L lung unchanged, no PE)  - appreciate Heme/Onc recs

## 2022-01-16 NOTE — PROGRESS NOTE ADULT - PROVIDER SPECIALTY LIST ADULT
Heme/Onc
Vascular Surgery
Heme/Onc
Vascular Surgery
Heme/Onc
Heme/Onc
Internal Medicine
Vascular Surgery
Internal Medicine

## 2022-01-16 NOTE — PROGRESS NOTE ADULT - PROBLEM SELECTOR PLAN 10
Diet: Consistent Carb/DASH  Dispo: PT rec home with home PT

## 2022-01-16 NOTE — PROGRESS NOTE ADULT - PROBLEM SELECTOR PROBLEM 1
DVT (deep venous thrombosis)
Stage 4 chronic kidney disease
DVT (deep venous thrombosis)
Stage 4 chronic kidney disease
DVT (deep venous thrombosis)
Stage 4 chronic kidney disease
DVT (deep venous thrombosis)
DVT (deep venous thrombosis)

## 2022-01-16 NOTE — PROGRESS NOTE ADULT - ATTENDING COMMENTS
Patient seen and examined   Labs and vitals are reviewed.  No events   afebrile   no diarrhea   no abdominal pain   no CP   no SOB   HD stable   WBC is wnl   Creatinine is down to 1.4 at base line   85 Y/O/F with PMHx of lung cancer on radiation, R popliteal DVT s/p IVC filter,  Left Popliteal V DVT (  2/2021), DM2 , HTN, obesity, remote MI s/p stent, multiple falls presents with 1 day of LLE swelling/pain with DVT on Lovenox - To continue Lovenox 130 mg daily - teaching done yesterday to family member   out patient biopsy   oncology note appreciated   DC planning as planned

## 2022-01-16 NOTE — PROGRESS NOTE ADULT - PROBLEM SELECTOR PLAN 2
-Patient's Cr 2.3 on admission, up from last known 1.1 in March   -patient was recently hospitalized for syncope at [facility] which was though to be from dehydration, and she was treated w/ IVF  -could be prerenal in the setting of dehydration, but will r/o other causes  - FeNA: 0.4% (likely pre-renal)  -Cr 1.3-1.4 -Patient's Cr 2.3 on admission, up from last known 1.1 in March   -patient was recently hospitalized for syncope at [facility] which was though to be from dehydration, and she was treated w/ IVF  -could be prerenal in the setting of dehydration, but will r/o other causes  - FeNA: 0.4% (likely pre-renal)--improved s/p fluid now stablized at Cr 1.3-1.4

## 2022-01-16 NOTE — PROGRESS NOTE ADULT - NUTRITIONAL ASSESSMENT
This patient has been assessed with a concern for Malnutrition and has been determined to have a diagnosis/diagnoses of Moderate protein-calorie malnutrition.    This patient is being managed with:   Diet Regular-  Consistent Carbohydrate {Evening Snack} (CSTCHOSN)  DASH/TLC {Sodium & Cholesterol Restricted} (DASH)  Low Sodium  Supplement Feeding Modality:  Oral  Glucerna Shake Cans or Servings Per Day:  2       Frequency:  Daily  Entered: Jan 12 2022  7:57AM    
This patient has been assessed with a concern for Malnutrition and has been determined to have a diagnosis/diagnoses of Moderate protein-calorie malnutrition.    This patient is being managed with:   Diet Regular-  Consistent Carbohydrate {Evening Snack} (CSTCHOSN)  DASH/TLC {Sodium & Cholesterol Restricted} (DASH)  Low Sodium  Supplement Feeding Modality:  Oral  Glucerna Shake Cans or Servings Per Day:  2       Frequency:  Daily  Entered: Jan 12 2022  7:57AM    
This patient has been assessed with a concern for Malnutrition and has been determined to have a diagnosis/diagnoses of Moderate protein-calorie malnutrition.    This patient is being managed with:   Diet Regular-  Consistent Carbohydrate {Evening Snack} (CSTCHOSN)  DASH/TLC {Sodium & Cholesterol Restricted} (DASH)  Entered: Jan 9 2022  5:37PM    
This patient has been assessed with a concern for Malnutrition and has been determined to have a diagnosis/diagnoses of Moderate protein-calorie malnutrition.    This patient is being managed with:   Diet Regular-  Consistent Carbohydrate {Evening Snack} (CSTCHOSN)  DASH/TLC {Sodium & Cholesterol Restricted} (DASH)  Low Sodium  Supplement Feeding Modality:  Oral  Glucerna Shake Cans or Servings Per Day:  2       Frequency:  Daily  Entered: Jan 12 2022  7:57AM    
This patient has been assessed with a concern for Malnutrition and has been determined to have a diagnosis/diagnoses of Moderate protein-calorie malnutrition.    This patient is being managed with:   Diet Regular-  Consistent Carbohydrate {Evening Snack} (CSTCHOSN)  DASH/TLC {Sodium & Cholesterol Restricted} (DASH)  Low Sodium  Supplement Feeding Modality:  Oral  Glucerna Shake Cans or Servings Per Day:  2       Frequency:  Daily  Entered: Jan 12 2022  7:57AM

## 2022-01-16 NOTE — DISCHARGE NOTE NURSING/CASE MANAGEMENT/SOCIAL WORK - PATIENT PORTAL LINK FT
You can access the FollowMyHealth Patient Portal offered by [facility] by registering at the following website: [email or web address]. By joining [facility]’s FollowMyHealth portal, you will also be able to view your health information using other applications (apps) compatible with our system.

## 2022-01-25 PROBLEM — I21.9 ACUTE MYOCARDIAL INFARCTION, UNSPECIFIED: Chronic | Status: ACTIVE | Noted: 2022-01-09

## 2022-01-25 PROBLEM — I82.409 ACUTE EMBOLISM AND THROMBOSIS OF UNSPECIFIED DEEP VEINS OF UNSPECIFIED LOWER EXTREMITY: Chronic | Status: ACTIVE | Noted: 2022-01-09

## 2022-01-31 ENCOUNTER — NON-APPOINTMENT (OUTPATIENT)
Age: 87
End: 2022-01-31

## 2022-01-31 ENCOUNTER — APPOINTMENT (OUTPATIENT)
Dept: VASCULAR SURGERY | Facility: CLINIC | Age: 87
End: 2022-01-31

## 2022-02-01 ENCOUNTER — APPOINTMENT (OUTPATIENT)
Dept: VASCULAR SURGERY | Facility: CLINIC | Age: 87
End: 2022-02-01
Payer: MEDICARE

## 2022-02-01 VITALS
TEMPERATURE: 98 F | BODY MASS INDEX: 35.5 KG/M2 | HEART RATE: 96 BPM | SYSTOLIC BLOOD PRESSURE: 164 MMHG | HEIGHT: 61 IN | DIASTOLIC BLOOD PRESSURE: 84 MMHG | WEIGHT: 188 LBS

## 2022-02-01 DIAGNOSIS — I82.402 ACUTE EMBOLISM AND THROMBOSIS OF UNSPECIFIED DEEP VEINS OF LEFT LOWER EXTREMITY: ICD-10-CM

## 2022-02-01 DIAGNOSIS — M79.89 OTHER SPECIFIED SOFT TISSUE DISORDERS: ICD-10-CM

## 2022-02-01 DIAGNOSIS — I82.422 ACUTE EMBOLISM AND THROMBOSIS OF LEFT ILIAC VEIN: ICD-10-CM

## 2022-02-01 PROCEDURE — 93971 EXTREMITY STUDY: CPT | Mod: LT

## 2022-02-01 PROCEDURE — 99214 OFFICE O/P EST MOD 30 MIN: CPT

## 2022-02-01 PROCEDURE — 93979 VASCULAR STUDY: CPT

## 2022-02-01 RX ORDER — ENOXAPARIN SODIUM 100 MG/ML
100 INJECTION SUBCUTANEOUS
Qty: 30 | Refills: 0 | Status: ACTIVE | COMMUNITY
Start: 2022-01-15

## 2022-02-01 RX ORDER — APIXABAN 5 MG/1
5 TABLET, FILM COATED ORAL
Qty: 60 | Refills: 6 | Status: ACTIVE | COMMUNITY
Start: 2022-02-01 | End: 1900-01-01

## 2022-02-01 RX ORDER — DONEPEZIL HYDROCHLORIDE 10 MG/1
10 TABLET ORAL
Refills: 0 | Status: ACTIVE | COMMUNITY

## 2022-02-01 RX ORDER — ASCORBIC ACID 500 MG
500 TABLET ORAL
Qty: 30 | Refills: 0 | Status: ACTIVE | COMMUNITY
Start: 2022-01-16

## 2022-02-01 RX ORDER — ATORVASTATIN CALCIUM 20 MG/1
20 TABLET, FILM COATED ORAL
Qty: 90 | Refills: 0 | Status: ACTIVE | COMMUNITY
Start: 2021-11-17

## 2022-02-01 RX ORDER — ATORVASTATIN CALCIUM 20 MG/1
20 TABLET, FILM COATED ORAL
Refills: 0 | Status: ACTIVE | COMMUNITY

## 2022-02-01 RX ORDER — MULTIVITAMIN WITH FOLIC ACID 400 MCG
TABLET ORAL
Qty: 30 | Refills: 0 | Status: ACTIVE | COMMUNITY
Start: 2022-01-16

## 2022-02-01 RX ORDER — REPAGLINIDE 1 MG/1
1 TABLET ORAL
Qty: 270 | Refills: 0 | Status: ACTIVE | COMMUNITY
Start: 2021-11-17

## 2022-02-01 RX ORDER — ESCITALOPRAM OXALATE 20 MG/1
TABLET ORAL
Refills: 0 | Status: ACTIVE | COMMUNITY

## 2022-02-01 RX ORDER — IRBESARTAN 300 MG/1
300 TABLET ORAL
Qty: 90 | Refills: 0 | Status: ACTIVE | COMMUNITY
Start: 2022-01-24

## 2022-02-01 RX ORDER — MUPIROCIN 20 MG/G
2 OINTMENT TOPICAL
Qty: 22 | Refills: 0 | Status: ACTIVE | COMMUNITY
Start: 2022-01-04

## 2022-02-01 RX ORDER — DORZOLAMIDE HYDROCHLORIDE TIMOLOL MALEATE 20; 5 MG/ML; MG/ML
22.3-6.8 SOLUTION/ DROPS OPHTHALMIC
Qty: 10 | Refills: 0 | Status: ACTIVE | COMMUNITY
Start: 2021-12-20

## 2022-02-01 RX ORDER — AMLODIPINE BESYLATE 5 MG/1
5 TABLET ORAL
Refills: 0 | Status: ACTIVE | COMMUNITY

## 2022-02-01 RX ORDER — OXYBUTYNIN CHLORIDE 5 MG/1
5 TABLET ORAL
Refills: 0 | Status: ACTIVE | COMMUNITY

## 2022-02-01 RX ORDER — REPAGLINIDE 1 MG/1
1 TABLET ORAL
Refills: 0 | Status: ACTIVE | COMMUNITY

## 2022-02-01 RX ORDER — APIXABAN 5 MG/1
5 TABLET, FILM COATED ORAL
Refills: 0 | Status: ACTIVE | COMMUNITY

## 2022-02-01 RX ORDER — LEVETIRACETAM 500 MG/1
500 TABLET, FILM COATED ORAL
Refills: 0 | Status: ACTIVE | COMMUNITY

## 2022-02-01 RX ORDER — ENOXAPARIN SODIUM 100 MG/ML
30 INJECTION SUBCUTANEOUS
Qty: 9 | Refills: 0 | Status: ACTIVE | COMMUNITY
Start: 2022-01-15

## 2022-02-01 NOTE — PHYSICAL EXAM
[Normal Breath Sounds] : Normal breath sounds [1+] : left 1+ [2+] : left 2+ [Ankle Swelling (On Exam)] : present [Ankle Swelling Bilaterally] : bilaterally  [Varicose Veins Of Lower Extremities] : bilaterally [] : bilaterally [Ankle Swelling On The Right] : mild [No HSM] : no hepatosplenomegaly [No Rash or Lesion] : No rash or lesion [Alert] : alert [Oriented to Person] : oriented to person [Oriented to Place] : oriented to place [Oriented to Time] : oriented to time [Calm] : calm [JVD] : no jugular venous distention  [Abdomen Masses] : No abdominal masses [Tender] : was nontender [de-identified] : nad [de-identified] : wnl [FreeTextEntry1] : Mild bilateral leg venous insufficiency \par w mild bilateral leg stasis dermatitis\par and mild rle and  moderate left leg edema \par Multiple  bilateral leg small varicose  veins and spider veins  ant post medial calf and shin \par RLE Varicose veins measuring  1-2 mm in size on the calf /shin \par LLE Varicose veins measuring  1-2 mm in size on the calf /shin \par no wounds/ulcers\par \par  [de-identified] : ambulates slowly w walker  [de-identified] : Abdi Cranial nerves 2-12 abdi grossly intact [de-identified] : cooperative

## 2022-02-01 NOTE — ASSESSMENT
[FreeTextEntry1] : Impression clinically improving left ileofemoral dvt on eliquis and lovenox \par \par \par Plan Med Conservative management leg elevation, offered knee high compression stockings pt declined\par continue eliquis 5 mg bid\par continue lovenox \par pt's son/daughter will call w dose of rx for renewal \par f/u w oncologist for malignancy rx /monitoring \par ov w  abd venous duplex s/o left ileofem dvt and lle venous duplex s/o dvt in  3 mo june 2022 \par rto prn \par \par \par \par \par Varicose veins are enlarged, twisted veins. Varicose veins are caused by increased blood pressure in the veins.  The blood moves towards the heart by 1-way valves in the veins. When the valves become weakened or damaged, blood can collect in the veins and pool in your lower legs (ankles). This causes the veins to become enlarged and incompetent with reflux. Sitting or standing for long periods can cause blood to pool in the leg veins, increasing the pressure within the veins. \par Risk factors for varicose veins or venous disease may include:  obesity, older age, standing or sitting for prolonged periods of time for several years, being female, pregnancy, taking oral contraceptive pills or hormone replacement, being inactive, and/or smoking. \par The most common symptoms of varicose veins are sensations in the legs, such as a heavy feeling, burning, and/or aching. However, each individual may experience symptoms differently.  Other symptoms may include:  color changes in the skin, sores on the legs, or rash.  Severe varicose veins or venous disease may eventually produce long-term mild swelling that can result in more serious skin and tissue problems, such as ulcers and non-healing sores.\par Varicose veins and venous disease are diagnosed by a complete medical history, physical examination, and diagnostic studies for varicose veins including duplex ultrasound and color-flow imaging.  \par Medical treatment for varicose veins and venous disease include:  compression stockings, sclerotherapy, endovenous ablation and/or surgical treatment with microphlebectomy.  \par \par  [Arterial/Venous Disease] : arterial/venous disease [Medication Management] : medication management

## 2022-02-01 NOTE — DATA REVIEWED
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

## 2022-02-01 NOTE — HISTORY OF PRESENT ILLNESS
[FreeTextEntry1] : [facility]  eval for left ileofem dvt    by LLE venous duplex sig from eiv, cfv, sfv, tpt, pop v , ptv\par CTV Abd/plevis sig for  thrombus above and below ivc filter, lt civ and eiv\par pt w hx of malignancy and CKD 4\par pt was placed on eliquis\par pt was also placed on lovenox by heme  onc for   malignancy status \par pt presents for re eval\par pt states that she is able to perform activities of daily living at home w about 85% functional status \par pt states lle swelling is much better

## 2022-02-02 ENCOUNTER — NON-APPOINTMENT (OUTPATIENT)
Age: 87
End: 2022-02-02

## 2022-02-12 ENCOUNTER — EMERGENCY (EMERGENCY)
Facility: HOSPITAL | Age: 87
LOS: 1 days | Discharge: HOME | End: 2022-02-12
Attending: EMERGENCY MEDICINE
Payer: MEDICARE

## 2022-02-12 ENCOUNTER — RX RENEWAL (OUTPATIENT)
Age: 87
End: 2022-02-12

## 2022-02-12 VITALS
DIASTOLIC BLOOD PRESSURE: 74 MMHG | OXYGEN SATURATION: 99 % | HEART RATE: 87 BPM | RESPIRATION RATE: 18 BRPM | SYSTOLIC BLOOD PRESSURE: 138 MMHG | HEIGHT: 61.5 IN | TEMPERATURE: 98 F | WEIGHT: 197.98 LBS

## 2022-02-12 VITALS
DIASTOLIC BLOOD PRESSURE: 76 MMHG | SYSTOLIC BLOOD PRESSURE: 132 MMHG | HEART RATE: 88 BPM | OXYGEN SATURATION: 99 % | TEMPERATURE: 98 F | RESPIRATION RATE: 18 BRPM

## 2022-02-12 DIAGNOSIS — Z95.0 PRESENCE OF CARDIAC PACEMAKER: Chronic | ICD-10-CM

## 2022-02-12 PROCEDURE — 82962 GLUCOSE BLOOD TEST: CPT

## 2022-02-12 PROCEDURE — 99283 EMERGENCY DEPT VISIT LOW MDM: CPT

## 2022-02-12 PROCEDURE — 99283 EMERGENCY DEPT VISIT LOW MDM: CPT | Mod: GC

## 2022-02-12 NOTE — ED PROVIDER NOTE - PATIENT PORTAL LINK FT
You can access the FollowMyHealth Patient Portal offered by [facility] by registering at the following website: [email or web address]. By joining [facility]’s FollowMyHealth portal, you will also be able to view your health information using other applications (apps) compatible with our system.

## 2022-02-12 NOTE — ED ADULT NURSE NOTE - NSIMPLEMENTINTERV_GEN_ALL_ED
Implemented All Universal Safety Interventions:  [place] to call system. Call bell, personal items and telephone within reach. Instruct patient to call for assistance. Room bathroom lighting operational. Non-slip footwear when patient is off stretcher. Physically safe environment: no spills, clutter or unnecessary equipment. Stretcher in lowest position, wheels locked, appropriate side rails in place.

## 2022-02-12 NOTE — ED PROVIDER NOTE - ATTENDING CONTRIBUTION TO CARE
Attending MD Bess Clark:  I personally have seen and examined this patient.  Resident note reviewed and agree on plan of care and except where noted.  See HPI, PE, and MDM for details.

## 2022-02-12 NOTE — ED ADULT NURSE NOTE - OBJECTIVE STATEMENT
pt had covid 8 mos ago and was hospitalized for "a long time"  it was then she developed a sacral ulcer that is "now healing"  and being "treated"  pt is on blood thinners and yesterday her aide cleaned her right ear and it began to bleed.  bleeding has stopped but she is here to be evaluated.  also her daughter told her that the right eye is swollen  no vision changes and both eyes appear the same.  pt also has a history of diabetes

## 2022-02-12 NOTE — ED PROVIDER NOTE - NS ED ROS FT
CONSTITUTIONAL - No fever, No weight change, No lightheadedness  SKIN - No rash  HEMATOLOGIC - No abnormal bleeding or bruising  EYES - No eye pain, No blurred vision  ENT - No change in hearing, No sore throat, No neck pain, No rhinorrhea, No ear pain, (+) bleeding from ear  RESPIRATORY - No shortness of breath, No cough  CARDIAC -No chest pain, No palpitations  GI - No abdominal pain, No nausea, No vomiting, No diarrhea, No constipation  - No dysuria, no frequency, no hematuria.   MUSCULOSKELETAL - No joint pain, No swelling, No back pain  NEUROLOGIC - No numbness, No focal weakness, No headache, No dizziness

## 2022-02-12 NOTE — ED PROVIDER NOTE - PHYSICAL EXAMINATION
GENERAL: Sitting comfortably in bed in no acute distress, no evidence of bruising trauma or rashes to the face.  NEURO: Alert and Oriented to person, place, date and situation. Memory difficulty remembering details of hospitalization. Pupils symmetric, No ptosis. No facial asymmetry or dysarthria, no tremor noted.  HEENT: No conjunctival injection or scleral icterus. Dried blood on pinna. TM intact bilaterally, no erythema, no hemotypanum, no ruptured TM. R ear canal with some red blood, no active oozing but abrasion to the ear canal. No pus, no swelling. No mastoid tenderness or rash surrounding the ear. No pain with pinna movement but pain with insertion of the otoscope.  CARD: Normal rate and regular rhythm, no murmurs and no gallops appreciated.  RESP: Clear to auscultation bilaterally, No wheezes, rales or rhonchi. Good respiratory effort.  ABD: Bowel sounds active. Nondistended, Soft and nontender to palpation in all quadrants, no guarding, no rigidity. No masses appreciated.  EXT: No pedal edema. 2+DP pulses bilaterally. GENERAL: Sitting comfortably in bed in no acute distress, no evidence of bruising trauma or rashes to the face.  NEURO: Alert and Oriented to person, place, date and situation. Memory difficulty remembering details of hospitalization. Pupils symmetric, No ptosis. No facial asymmetry or dysarthria, no tremor noted.  HEENT: No conjunctival injection or scleral icterus. Dried blood on pinna. TM intact bilaterally, no erythema, no hemotypanum, no ruptured TM. R ear canal with some red blood, no active oozing but abrasion to the ear canal. No pus, no swelling. No mastoid tenderness or rash surrounding the ear. No pain with pinna movement but pain with insertion of the otoscope.  CARD: Normal rate and regular rhythm, no murmurs and no gallops appreciated.  RESP: Clear to auscultation bilaterally, No wheezes, rales or rhonchi. Good respiratory effort.  ABD: Bowel sounds active. Nondistended, Soft and nontender to palpation in all quadrants, no guarding, no rigidity. No masses appreciated.  EXT: No pedal edema. 2+DP pulses bilaterally.  Attending Bess Clark: Gen: NAD, heent: atrauamtic, eomi, right ear: no external ttp, no ttp mastoid area, no swelling or erythema. small abrasion in the ear canal with scab, tm clear, perrla, mmm,no proptosis, no pain with extra ocular mvvements, neck; nttp, no nuchal rigidity, chest: nttp, no crepitus, cv: rrr, no murmurs, lungs: ctab, abd: soft, nontender, nondistended, no peritoneal signs,  no guarding, ext: wwp, neg homans, skin: no rash, neuro: awake and alert, following commands, speech clear, sensation and strength intact, no focal deficits

## 2022-02-12 NOTE — ED PROVIDER NOTE - PROGRESS NOTE DETAILS
Attending Bess Clark: on repeat exam on active bleeding. family contacted and made aware to avoid putting any qtips in her ear. will need ent follow up and close monitoring for any signs of infection

## 2022-02-12 NOTE — ED ADULT NURSE NOTE - DATE OF LAST VACCINATION
12-Apr-2020 Thalidomide Counseling: I discussed with the patient the risks of thalidomide including but not limited to birth defects, anxiety, weakness, chest pain, dizziness, cough and severe allergy.

## 2022-02-12 NOTE — ED PROVIDER NOTE - NSICDXPASTMEDICALHX_GEN_ALL_CORE_FT
PAST MEDICAL HISTORY:  Dementia     Diabetes     DVT, lower extremity     HTN (hypertension)     Lung cancer     MI (myocardial infarction)     Obesity     UTI due to extended-spectrum beta lactamase (ESBL) producing Escherichia coli

## 2022-02-12 NOTE — ED PROVIDER NOTE - OBJECTIVE STATEMENT
86 year old with PMHx Lung cancer diagnosed 2021 currently on radiation not chemo, CAD s/p 1 stent, T2DM, HTN, DVT with IVC filter, recent admission from 1/9-1/14 for new large DVT after Eliquis stopped with course complicated by ESBL UTI DC'd on Lovenox and Eliquis presents with 1 day of bleeding from the R ear. Woke up this morning with dried blood on the ear canal, yesterday her aide cleaned her ears out with a Q-tip and water, she reports having a lot of earwax lately. Denies ear pain, swelling, eye discharge or erythema, fever, bleeding from the nose, mouth, rectum, or in the urine. Currently on Lovenox 130mg qd and Eliquis 5mg BID since hospital discharge, has not taken her Lovenox dose this morning due to being in the ED.

## 2022-02-12 NOTE — ED PROVIDER NOTE - CLINICAL SUMMARY MEDICAL DECISION MAKING FREE TEXT BOX
86 year old with PMHx T2DM, DVTs currently on Eliquis and Lovenox presents with bleeding from the R ear after ear cleaning yesterday. Vitals normal, exam significant for abrasion to the R ear canal, but no evidence of otitis externa, mastoiditis, infection of the wound, rupture of TM or hemotympanum. Will DC with instruction to continue anticoagulation due to hypercoagulable state secondary to active malignancy and return precautions should any other bleeding develop. Advised family and patient not to put anything else into the ear and to f/u with ENT for further cleaning. 86 year old with PMHx T2DM, DVTs currently on Eliquis and Lovenox presents with bleeding from the R ear after ear cleaning yesterday. Vitals normal, exam significant for abrasion to the R ear canal, but no evidence of otitis externa, mastoiditis, infection of the wound, rupture of TM or hemotympanum. Will DC with instruction to continue anticoagulation due to hypercoagulable state secondary to active malignancy and return precautions should any other bleeding develop. Advised family and patient not to put anything else into the ear and to f/u with ENT for further cleaning.  Attending Bess Clark: 87 yo female with multiple medical issues currently on anticoagulation for dvt presenting after noticed bleeding from right ear and concern for right periorbital swelling. on exam pt with abrasion in inner ear small amount of oozing and surgicel placed with hemostasis. no evidence of tm perforation on exam. no evidence of otitis externa on exam. on exam no evidence of periorbital swelling. no rash or temporal ttp to suggest zoster. no vision changes or headaches making glaucoma less likely. pt well appearing. will provide wound care. monitor for further bleeding

## 2022-02-12 NOTE — ED PROVIDER NOTE - NSFOLLOWUPCLINICS_GEN_ALL_ED_FT
[facility] - ENT  Otolaryngology (ENT)  [address] [place] [address]  Phone: [phone]  Fax:   Follow Up Time: 4-6 Days

## 2022-02-15 RX ORDER — ENOXAPARIN SODIUM 100 MG/ML
100 INJECTION SUBCUTANEOUS
Qty: 30 | Refills: 3 | Status: ACTIVE | COMMUNITY
Start: 2022-02-15 | End: 1900-01-01

## 2022-02-15 RX ORDER — ENOXAPARIN SODIUM 100 MG/ML
30 INJECTION SUBCUTANEOUS
Qty: 30 | Refills: 3 | Status: ACTIVE | COMMUNITY
Start: 2022-02-15 | End: 1900-01-01

## 2022-03-07 NOTE — PATIENT PROFILE ADULT - ARE SIGNIFICANT INDICATORS COMPLETE.
Subjective   Patient ID: Amaris Lou ( 1964) is a 57 year old female.    Chief Complaint   Patient presents with   • Follow-up     wound has healed   • Office Visit     HPI    Amaris Lou returns to clinic for follow-up. She had an infected sebaceous cyst and underwent I&D. She was last seen in my clinic on 2021, and I had instructed her to return to clinic, if she notices a recurrent cyst. She states that she still has some discomfort in the area. However, she does not have pain like before and also denies any drainage.    Amaris has a past medical history of Abdominal pain (2017), Acute non-recurrent pansinusitis (10/9/2020), AF (atrial fibrillation), Asthma, Breast cancer (CMS/HCC), Breast mass, left (2015), Candidal intertrigo (3/18/2021), Chest tightness (2021), Diabetes mellitus (CMS/HCC), Diabetes mellitus, type 2 (CMS/HCC), Diarrhea (6/3/2019), Exposure to COVID-19 virus (2021), HSV (herpes simplex virus) infection, Increased urinary frequency (2020), Loose stools (3/31/2015), Morbid obesity, OAB (overactive bladder), Obstructed, uropathy (2018), Palpitations, SOB (shortness of breath), and Upper respiratory tract infection (2021).    She has no past medical history of Miami's disease (CMS/HCC), Amyloidosis (CMS/HCC), Conn syndrome (CMS/HCC), Cushing syndrome (CMS/HCC), DI (diabetes insipidus) (CMS/HCC), Galactorrhea, Goiter, Graves disease, Hashimoto's thyroiditis, Hypernatremia, Hyperparathyroidism (CMS/HCC), Hyperthyroidism, Hyponatremia, Hypopituitarism (CMS/HCC), Hypothyroidism, Insulin dependent diabetes mellitus type IA (CMS/HCC), Insulinoma, No known problems, Pheochromocytoma, Polyglandular dysfunction (CMS/HCC), or Thyroid cancer (CMS/HCC).  Amaris has a past surgical history that includes total abdominal hysterectomy w/ bilateral salpingoophorectomy; Cardioversion (N/A, 2016); and Gallbladder surgery.  Her family history includes  Bleeding Disorder in her sister; COPD in her mother; Cancer in her sister; Cancer, Breast in her sister; Diabetes in her mother; Neurological Disorder in her sister; Patient is unaware of any medical problems in her father; Systemic Lupus Erythematosus in an other family member.  Amaris reports that she has never smoked. She has never used smokeless tobacco. She reports previous alcohol use. She reports that she does not use drugs.  Amaris   Current Outpatient Medications   Medication Sig Dispense Refill   • Blood Glucose Monitoring Suppl (ONE TOUCH ULTRA 2) w/Device Kit E11.9 test glucose 4 times daily 1 kit 0   • blood glucose (YONAS CONTOUR NEXT TEST) test strip Test blood sugar 4 times daily. Diagnosis: E11.9. Meter: contour next 200 each 11   • Microlet Lancets Misc Test 4 times daily 200 each 11   • Continuous Blood Gluc Transmit (Dexcom G6 Transmitter) Misc 1 Device every 3 months. 1 each 3   • empagliflozin (JARDIANCE) 10 MG tablet Take 1 tablet by mouth daily (before breakfast). 90 tablet 1   • dulaglutide (Trulicity) 1.5 MG/0.5ML pen-injector Inject 1.5 mg into the skin every 7 days. 2 mL 3   • HumaLOG KwikPen 100 UNIT/ML pen-injector Inject 35 Units into the skin 3 times daily (before meals). Prime 2 units before each dose. 33 mL 3   • insulin glargine 100 UNIT/ML pen-injector 46 units BID 15 mL 12   • Insulin Pen Needle (B-D U/F PEN NEEDLE 5/16\") 31G X 8 MM Misc USE TO INJECT INSULIN 6 TIMES DAILY. Dispense 90 days supply 600 each 3   • lidocaine (LIDODERM) 5 % patch Place 1 patch onto the skin every 24 hours. Remove patch 12 hours after applying 15 patch 1   • Potassium Citrate 99 MG Cap every 24 hours.     • HYDROcodone-acetaminophen (NORCO) 7.5-325 MG per tablet Take 1 tablet by mouth every 6 hours as needed for Pain. 10 tablet 0   • fluconazole (DIFLUCAN) 150 MG tablet Take 1 tablet. If symptoms not improved after 4 days, can take second tablet. 2 tablet 0   • acyclovir (ZOVIRAX) 5 % ointment Apply  Yes topically every 3 hours. 15 g 0   • sulfamethoxazole-trimethoprim (Bactrim DS) 800-160 MG per tablet Take 1 tablet by mouth 2 times daily. 14 tablet 0   • Lancet Devices (OneTouch Delica Plus Lancing) Misc 1 each 4 times daily. Test glucose 4 times daily. E11.9 1 each 0   • fluticasone-salmeterol 500-50 MCG/DOSE inhaler Inhale 1 puff into the lungs two times daily.     • Continuous Blood Gluc Sensor (Dexcom G6 Sensor) Misc Insert new sensor every 10 days.  1 Device continuous. Change sensor every 10 days 3 each 11   • acyclovir (ZOVIRAX) 400 MG tablet      • albuterol 108 (90 Base) MCG/ACT inhaler      • ondansetron (Zofran ODT) 4 MG disintegrating tablet Place 1 tablet onto the tongue every 8 hours as needed for Nausea. 15 tablet 0   • acyclovir (ZOVIRAX) 800 MG tablet Take 800 mg by mouth 2 times daily.     • erythromycin (ILOTYCIN) ophthalmic ointment APPLY THIN LAYER IN RIGHT EYE EVERY NIGHT AT BEDTIME     • tobramycin-dexamethasone (TOBRADEX) ophthalmic suspension      • losartan (COZAAR) 100 MG tablet Take 1 tablet by mouth daily. 90 tablet 3   • metFORMIN (GLUCOPHAGE-XR) 500 MG 24 hr tablet Take 1 tablet by mouth daily (with breakfast). 180 tablet 0   • Wixela Inhub 500-50 MCG/DOSE inhaler INHALE 2 PUFFS INTO THE LUNGS EVERY 12 HOURS 60 each 3   • ferrous sulfate (FeroSul) 325 (65 FE) MG tablet Take 1 tablet by mouth every other day. 90 tablet 3   • valACYclovir (VALTREX) 500 MG tablet 2000 mg q 12 hours x 1 day 8 tablet 1   • Eliquis 5 MG Tab TAKE 1 TABLET BY MOUTH EVERY 12 HOURS 180 tablet 3   • albuterol (PROAIR RESPICLICK) 108 (90 Base) MCG/ACT inhaler Inhale 1 puff into the lungs every 4 hours as needed for Shortness of Breath or Wheezing. 1 Inhaler 6   • nystatin (MYCOSTATIN) 074674 UNIT/GM cream Apply topically 2 times daily. 30 g 0   • atorvastatin (LIPITOR) 20 MG tablet Take 1 tablet by mouth at bedtime. 90 tablet 3   • metoPROLOL succinate (TOPROL-XL) 25 MG 24 hr tablet Take 1 tablet by mouth daily.  90 tablet 3   • montelukast (SINGULAIR) 10 MG tablet Take 1 tablet by mouth daily. 90 tablet 3   • hydrochlorothiazide (HYDRODIURIL) 25 MG tablet Take 1 tablet by mouth daily. 90 tablet 1   • fluticasone (FLONASE) 50 MCG/ACT nasal spray Spray 1 spray in each nostril daily. 16 g 11   • flecainide (TAMBOCOR) 100 MG tablet Take 1 tablet by mouth 2 times daily. 180 tablet 3   • predniSONE (DELTASONE) 20 MG tablet      • Continuous Blood Gluc  (Dexcom G6 ) Device 1 Device continuous. Use as directed 1 Device 0   • ondansetron (ZOFRAN) 4 MG tablet Take 1 tablet by mouth every 8 hours as needed for Nausea. 20 tablet 3   • budesonide-formoterol (SYMBICORT) 160-4.5 MCG/ACT inhaler Inhale 2 puffs into the lungs 2 times daily. 10.2 g 12   • azelastine (ASTELIN) 0.1 % nasal spray Spray 1 spray in each nostril 2 times daily. 1 Bottle 6   • Vitamin D, Ergocalciferol, 41944 units capsule Take 1 capsule by mouth 1 day a week. 8 capsule 0   • oxygen (O2) gas      • prednisoLONE acetate (PRED FORTE, OMNIPRED) 1 % ophthalmic suspension Apply to eye as needed.        No current facility-administered medications for this visit.     Amaris has No Known Allergies.    Review of Systems   Constitutional: Negative for chills and fever.   HENT: Negative for sore throat.    Eyes: Negative for visual disturbance.   Respiratory: Negative for shortness of breath.    Cardiovascular: Negative for chest pain.   Gastrointestinal: Negative for nausea and vomiting.   Endocrine: Negative for polydipsia, polyphagia and polyuria.   Genitourinary: Negative for dysuria.   Musculoskeletal: Negative for arthralgias and myalgias.   Skin: Negative for rash.   Allergic/Immunologic: Negative for environmental allergies.   Neurological: Negative for headaches.   Hematological: Does not bruise/bleed easily.   Psychiatric/Behavioral: Negative for agitation.   All other systems reviewed and are negative.      Objective   Physical Exam  Vitals  reviewed.   Constitutional:       General: She is not in acute distress.     Appearance: Normal appearance.   HENT:      Head: Normocephalic.      Nose: Nose normal.      Mouth/Throat:      Mouth: Mucous membranes are moist.      Neck: Neck supple.   Eyes:      Conjunctiva/sclera: Conjunctivae normal.   Neck:     Cardiovascular:      Rate and Rhythm: Normal rate.   Pulmonary:      Effort: Pulmonary effort is normal.   Abdominal:      Palpations: Abdomen is soft.      Tenderness: There is no abdominal tenderness.   Musculoskeletal:         General: Normal range of motion.   Skin:     General: Skin is warm and dry.   Neurological:      General: No focal deficit present.      Mental Status: She is alert.   Psychiatric:         Mood and Affect: Mood normal.                 Assessment   Problem List Items Addressed This Visit        Skin    Sebaceous cyst - Primary        I explained to Amaris Lou that she has healed well. There is some scar in the area of concern but no evidence of an obvious recurrent cyst. Therefore, I recommended that she continue monitoring this area for now. She was instructed to return to clinic in about 3 months for follow-up, or sooner if she notices that there is an obvious recurrence.    Reny Hernandez MD, MS, FACS  [facility], General Surgery  [address], [address]  [phone]

## 2022-06-01 ENCOUNTER — NON-APPOINTMENT (OUTPATIENT)
Age: 87
End: 2022-06-01

## 2022-06-21 ENCOUNTER — APPOINTMENT (OUTPATIENT)
Dept: VASCULAR SURGERY | Facility: CLINIC | Age: 87
End: 2022-06-21
Payer: MEDICARE

## 2022-06-21 VITALS
HEIGHT: 61 IN | TEMPERATURE: 96 F | DIASTOLIC BLOOD PRESSURE: 79 MMHG | SYSTOLIC BLOOD PRESSURE: 120 MMHG | WEIGHT: 200 LBS | HEART RATE: 91 BPM | BODY MASS INDEX: 37.76 KG/M2

## 2022-06-21 DIAGNOSIS — I82.532 CHRONIC EMBOLISM AND THROMBOSIS OF LEFT POPLITEAL VEIN: ICD-10-CM

## 2022-06-21 DIAGNOSIS — I87.2 VENOUS INSUFFICIENCY (CHRONIC) (PERIPHERAL): ICD-10-CM

## 2022-06-21 DIAGNOSIS — I83.893 VARICOSE VEINS OF BILATERAL LOWER EXTREMITIES WITH OTHER COMPLICATIONS: ICD-10-CM

## 2022-06-21 DIAGNOSIS — I82.512 CHRONIC EMBOLISM AND THROMBOSIS OF LEFT FEMORAL VEIN: ICD-10-CM

## 2022-06-21 DIAGNOSIS — I87.009 POSTTHROMBOTIC SYNDROME W/OUT COMPLICATIONS OF UNSPECIFIED EXTREMITY: ICD-10-CM

## 2022-06-21 PROCEDURE — 93979 VASCULAR STUDY: CPT

## 2022-06-21 PROCEDURE — 93971 EXTREMITY STUDY: CPT | Mod: LT

## 2022-06-21 PROCEDURE — 99214 OFFICE O/P EST MOD 30 MIN: CPT

## 2022-06-21 RX ORDER — ENOXAPARIN SODIUM 30 MG/.3ML
30 INJECTION, SOLUTION SUBCUTANEOUS
Qty: 9 | Refills: 0 | Status: ACTIVE | COMMUNITY
Start: 2022-02-15

## 2022-06-21 RX ORDER — FAMOTIDINE 20 MG/1
20 TABLET, FILM COATED ORAL
Qty: 60 | Refills: 0 | Status: ACTIVE | COMMUNITY
Start: 2022-02-27

## 2022-06-21 RX ORDER — CLOBETASOL PROPIONATE 0.5 MG/G
0.05 CREAM TOPICAL
Qty: 30 | Refills: 0 | Status: ACTIVE | COMMUNITY
Start: 2022-04-06

## 2022-06-21 RX ORDER — ENOXAPARIN SODIUM 100 MG/ML
100 INJECTION, SOLUTION SUBCUTANEOUS
Qty: 30 | Refills: 0 | Status: ACTIVE | COMMUNITY
Start: 2022-02-15

## 2022-06-21 RX ORDER — ONDANSETRON 8 MG/1
8 TABLET ORAL
Qty: 30 | Refills: 0 | Status: ACTIVE | COMMUNITY
Start: 2022-03-07

## 2022-06-21 RX ORDER — SITAGLIPTIN 100 MG/1
100 TABLET, FILM COATED ORAL
Qty: 90 | Refills: 0 | Status: ACTIVE | COMMUNITY
Start: 2022-05-17

## 2022-06-21 NOTE — HISTORY OF PRESENT ILLNESS
[FreeTextEntry1] : [facility]  eval for left ileofem dvt    by LLE venous duplex sig from eiv, cfv, sfv, tpt, pop v , ptv\par CTV Abd/plevis sig for  thrombus above and below ivc filter, lt civ and eiv\par pt w hx of malignancy and CKD 4\par pt was placed on eliquis\par pt was also placed on lovenox by heme  onc for   malignancy status \par pt presents for re eval\par pt states that she is able to perform activities of daily living at home w about 85% functional status \par pt states lle swelling is much better  [de-identified] : pt states that she is feeling better but lle swelling and discomfort remains \par pt is on eliquis 5 qd in jason \par lovenox 130 mg sq in am

## 2022-06-21 NOTE — ASSESSMENT
[Arterial/Venous Disease] : arterial/venous disease [Medication Management] : medication management [FreeTextEntry1] : Impression clinically improving left ileofemoral dvt on eliquis and lovenox \par \par \par Plan Med Conservative management leg elevation, offered knee high compression stockings pt declined\par continue eliquis 5 mg qpm \par continue lovenox 130 mg sq qam \par f/u w oncologist for malignancy rx /monitoring \par d/w pt and daughter via tel to continue current management  till next f/u in \par 3mo sept 2022 w lle venous duplex s/o dvt \par based on findings and malignancy stataus and immunotherapy status will decide \par on long term  anticoag rx w either eliquis or lovenox or both\par daughter inquired re   renal insuff and eliquis dosing\par advised her to fas sr cr  info for review first\par she agrees to do so \par f/u 3mo sept 2022 w lle venous duplex s/o dvt \par \par \par \par \par Varicose veins are enlarged, twisted veins. Varicose veins are caused by increased blood pressure in the veins.  The blood moves towards the heart by 1-way valves in the veins. When the valves become weakened or damaged, blood can collect in the veins and pool in your lower legs (ankles). This causes the veins to become enlarged and incompetent with reflux. Sitting or standing for long periods can cause blood to pool in the leg veins, increasing the pressure within the veins. \par Risk factors for varicose veins or venous disease may include:  obesity, older age, standing or sitting for prolonged periods of time for several years, being female, pregnancy, taking oral contraceptive pills or hormone replacement, being inactive, and/or smoking. \par The most common symptoms of varicose veins are sensations in the legs, such as a heavy feeling, burning, and/or aching. However, each individual may experience symptoms differently.  Other symptoms may include:  color changes in the skin, sores on the legs, or rash.  Severe varicose veins or venous disease may eventually produce long-term mild swelling that can result in more serious skin and tissue problems, such as ulcers and non-healing sores.\par Varicose veins and venous disease are diagnosed by a complete medical history, physical examination, and diagnostic studies for varicose veins including duplex ultrasound and color-flow imaging.  \par Medical treatment for varicose veins and venous disease include:  compression stockings, sclerotherapy, endovenous ablation and/or surgical treatment with microphlebectomy.  \par \par

## 2022-06-21 NOTE — DATA REVIEWED
[FreeTextEntry1] : 2/1/2022  Venous Doppler LLE   no acute dvt svt \par                           \par                            LLE  chronic non occlusive  dvt in distal superf fem vein,  popliteal vein,\par \par 2/1/2022 Abdominal Venous Duplex  patent IVC and filter , Bilateral patent common Iliac veins  \par                                patent rt eiv\par                                Lt EIV w diminished phasicity from obstruction \par \par 6/21/2022 Venous Doppler LLE   no acute dvt svt\par                            LLE  chronic changes in distal superf fem vein,  \par                                      chronic recannaliz popliteal vein dvt\par \par

## 2022-06-21 NOTE — PHYSICAL EXAM
[Normal Breath Sounds] : Normal breath sounds [1+] : left 1+ [2+] : left 2+ [Ankle Swelling (On Exam)] : present [Ankle Swelling Bilaterally] : bilaterally  [Varicose Veins Of Lower Extremities] : bilaterally [] : bilaterally [Ankle Swelling On The Right] : mild [No HSM] : no hepatosplenomegaly [No Rash or Lesion] : No rash or lesion [Alert] : alert [Oriented to Person] : oriented to person [Oriented to Place] : oriented to place [Oriented to Time] : oriented to time [Calm] : calm [JVD] : no jugular venous distention  [Abdomen Masses] : No abdominal masses [Tender] : was nontender [de-identified] : nad [de-identified] : wnl [FreeTextEntry1] : Mild bilateral leg venous insufficiency \par w mild bilateral leg stasis dermatitis\par and mild rle and  mild to moderate left leg edema \par Multiple  bilateral leg small varicose  veins and spider veins  ant post medial calf and shin \par RLE Varicose veins measuring  1-2 mm in size on the calf /shin \par LLE Varicose veins measuring  1-2 mm in size on the calf /shin \par no wounds/ulcers\par \par  [de-identified] : ambulates slowly w walker  [de-identified] : Abdi Cranial nerves 2-12 abdi grossly intact [de-identified] : cooperative but slow to respond

## 2022-06-28 RX ORDER — ENOXAPARIN SODIUM 30 MG/.3ML
30 INJECTION, SOLUTION SUBCUTANEOUS DAILY
Qty: 3 | Refills: 6 | Status: ACTIVE | COMMUNITY
Start: 2022-06-28 | End: 1900-01-01

## 2022-06-28 RX ORDER — ENOXAPARIN SODIUM 100 MG/ML
100 INJECTION, SOLUTION SUBCUTANEOUS DAILY
Qty: 3 | Refills: 6 | Status: ACTIVE | COMMUNITY
Start: 2022-06-28 | End: 1900-01-01

## 2022-07-12 ENCOUNTER — NON-APPOINTMENT (OUTPATIENT)
Age: 87
End: 2022-07-12

## 2022-07-26 PROBLEM — C34.90 MALIGNANT NEOPLASM OF UNSPECIFIED PART OF UNSPECIFIED BRONCHUS OR LUNG: Chronic | Status: ACTIVE | Noted: 2022-02-12

## 2022-07-26 PROBLEM — N39.0 URINARY TRACT INFECTION, SITE NOT SPECIFIED: Chronic | Status: ACTIVE | Noted: 2022-02-12

## 2022-09-14 NOTE — H&P ADULT - PROBLEM SELECTOR PLAN 6
[FreeTextEntry1] : Alert, NAD. Non-verbal, poor eye contact or name response. Heart sounds NL. Neck FROM. PERRL, EOMI, face symmetric, hearing grossly intact. Tone, power, sensation, gait, DTRs NL. No nystagmus or tremor.  not insulin  ISS  monitor fingersticks  A1C  hold oral hypoglycemics not on insulin  ISS  monitor fingersticks  A1C  hold oral hypoglycemics - FS significantly elevated, not on insulin at home, possibly elevated in setting of COVID19 and rhabdomyolysis  - ISS  - monitor fingersticks  - A1C  - hold oral hypoglycemics

## 2022-09-27 ENCOUNTER — APPOINTMENT (OUTPATIENT)
Dept: VASCULAR SURGERY | Facility: CLINIC | Age: 87
End: 2022-09-27

## 2022-11-03 NOTE — PATIENT PROFILE ADULT - NSPROEXTENSIONSOFSELF_GEN_A_NUR
Modified Advancement Flap Text: The defect edges were debeveled with a #15 scalpel blade.  Given the location of the defect, shape of the defect and the proximity to free margins a modified advancement flap was deemed most appropriate.  Using a sterile surgical marker, an appropriate advancement flap was drawn incorporating the defect and placing the expected incisions within the relaxed skin tension lines where possible.    The area thus outlined was incised deep to adipose tissue with a #15 scalpel blade.  The skin margins were undermined to an appropriate distance in all directions utilizing iris scissors. none

## 2023-01-16 ENCOUNTER — INPATIENT (INPATIENT)
Facility: HOSPITAL | Age: 88
LOS: 6 days | Discharge: NURSING, REHAB OR LONG-TERM CARE | DRG: 853 | End: 2023-01-23
Attending: INTERNAL MEDICINE | Admitting: INTERNAL MEDICINE
Payer: MEDICARE

## 2023-01-16 VITALS
HEART RATE: 90 BPM | OXYGEN SATURATION: 97 % | TEMPERATURE: 98 F | SYSTOLIC BLOOD PRESSURE: 151 MMHG | DIASTOLIC BLOOD PRESSURE: 80 MMHG | RESPIRATION RATE: 18 BRPM

## 2023-01-16 DIAGNOSIS — Z95.0 PRESENCE OF CARDIAC PACEMAKER: Chronic | ICD-10-CM

## 2023-01-16 DIAGNOSIS — L03.90 CELLULITIS, UNSPECIFIED: ICD-10-CM

## 2023-01-16 LAB
ALBUMIN SERPL ELPH-MCNC: 3.3 G/DL — SIGNIFICANT CHANGE UP (ref 3.3–5)
ALP SERPL-CCNC: 73 U/L — SIGNIFICANT CHANGE UP (ref 40–120)
ALT FLD-CCNC: 5 U/L — LOW (ref 10–45)
ANION GAP SERPL CALC-SCNC: 10 MMOL/L — SIGNIFICANT CHANGE UP (ref 5–17)
AST SERPL-CCNC: 9 U/L — LOW (ref 10–40)
BASE EXCESS BLDV CALC-SCNC: -2.9 MMOL/L — LOW (ref -2–3)
BASOPHILS # BLD AUTO: 0.09 K/UL — SIGNIFICANT CHANGE UP (ref 0–0.2)
BASOPHILS NFR BLD AUTO: 0.5 % — SIGNIFICANT CHANGE UP (ref 0–2)
BILIRUB SERPL-MCNC: 0.6 MG/DL — SIGNIFICANT CHANGE UP (ref 0.2–1.2)
BUN SERPL-MCNC: 24 MG/DL — HIGH (ref 7–23)
CA-I SERPL-SCNC: 1.38 MMOL/L — HIGH (ref 1.15–1.33)
CALCIUM SERPL-MCNC: 10.3 MG/DL — SIGNIFICANT CHANGE UP (ref 8.4–10.5)
CHLORIDE BLDV-SCNC: 104 MMOL/L — SIGNIFICANT CHANGE UP (ref 96–108)
CHLORIDE SERPL-SCNC: 104 MMOL/L — SIGNIFICANT CHANGE UP (ref 96–108)
CO2 BLDV-SCNC: 24 MMOL/L — SIGNIFICANT CHANGE UP (ref 22–26)
CO2 SERPL-SCNC: 21 MMOL/L — LOW (ref 22–31)
CREAT SERPL-MCNC: 1.37 MG/DL — HIGH (ref 0.5–1.3)
EGFR: 38 ML/MIN/1.73M2 — LOW
EOSINOPHIL # BLD AUTO: 0.02 K/UL — SIGNIFICANT CHANGE UP (ref 0–0.5)
EOSINOPHIL NFR BLD AUTO: 0.1 % — SIGNIFICANT CHANGE UP (ref 0–6)
GAS PNL BLDV: 132 MMOL/L — LOW (ref 136–145)
GAS PNL BLDV: SIGNIFICANT CHANGE UP
GAS PNL BLDV: SIGNIFICANT CHANGE UP
GLUCOSE BLDV-MCNC: 310 MG/DL — HIGH (ref 70–99)
GLUCOSE SERPL-MCNC: 278 MG/DL — HIGH (ref 70–99)
HCO3 BLDV-SCNC: 23 MMOL/L — SIGNIFICANT CHANGE UP (ref 22–29)
HCT VFR BLD CALC: 38.2 % — SIGNIFICANT CHANGE UP (ref 34.5–45)
HCT VFR BLDA CALC: 37 % — SIGNIFICANT CHANGE UP (ref 34.5–46.5)
HGB BLD CALC-MCNC: 12.4 G/DL — SIGNIFICANT CHANGE UP (ref 11.7–16.1)
HGB BLD-MCNC: 12 G/DL — SIGNIFICANT CHANGE UP (ref 11.5–15.5)
IMM GRANULOCYTES NFR BLD AUTO: 0.8 % — SIGNIFICANT CHANGE UP (ref 0–0.9)
LACTATE BLDV-MCNC: 1.6 MMOL/L — SIGNIFICANT CHANGE UP (ref 0.5–2)
LYMPHOCYTES # BLD AUTO: 13.9 % — SIGNIFICANT CHANGE UP (ref 13–44)
LYMPHOCYTES # BLD AUTO: 2.29 K/UL — SIGNIFICANT CHANGE UP (ref 1–3.3)
MCHC RBC-ENTMCNC: 27.1 PG — SIGNIFICANT CHANGE UP (ref 27–34)
MCHC RBC-ENTMCNC: 31.4 GM/DL — LOW (ref 32–36)
MCV RBC AUTO: 86.4 FL — SIGNIFICANT CHANGE UP (ref 80–100)
MONOCYTES # BLD AUTO: 1.48 K/UL — HIGH (ref 0–0.9)
MONOCYTES NFR BLD AUTO: 9 % — SIGNIFICANT CHANGE UP (ref 2–14)
NEUTROPHILS # BLD AUTO: 12.43 K/UL — HIGH (ref 1.8–7.4)
NEUTROPHILS NFR BLD AUTO: 75.7 % — SIGNIFICANT CHANGE UP (ref 43–77)
NRBC # BLD: 0 /100 WBCS — SIGNIFICANT CHANGE UP (ref 0–0)
PCO2 BLDV: 41 MMHG — SIGNIFICANT CHANGE UP (ref 39–42)
PH BLDV: 7.35 — SIGNIFICANT CHANGE UP (ref 7.32–7.43)
PLATELET # BLD AUTO: 232 K/UL — SIGNIFICANT CHANGE UP (ref 150–400)
PO2 BLDV: 43 MMHG — SIGNIFICANT CHANGE UP (ref 25–45)
POTASSIUM BLDV-SCNC: 4.1 MMOL/L — SIGNIFICANT CHANGE UP (ref 3.5–5.1)
POTASSIUM SERPL-MCNC: 4.1 MMOL/L — SIGNIFICANT CHANGE UP (ref 3.5–5.3)
POTASSIUM SERPL-SCNC: 4.1 MMOL/L — SIGNIFICANT CHANGE UP (ref 3.5–5.3)
PROT SERPL-MCNC: 7 G/DL — SIGNIFICANT CHANGE UP (ref 6–8.3)
RBC # BLD: 4.42 M/UL — SIGNIFICANT CHANGE UP (ref 3.8–5.2)
RBC # FLD: 13.3 % — SIGNIFICANT CHANGE UP (ref 10.3–14.5)
SAO2 % BLDV: 71.4 % — SIGNIFICANT CHANGE UP (ref 67–88)
SARS-COV-2 RNA SPEC QL NAA+PROBE: SIGNIFICANT CHANGE UP
SODIUM SERPL-SCNC: 135 MMOL/L — SIGNIFICANT CHANGE UP (ref 135–145)
WBC # BLD: 16.44 K/UL — HIGH (ref 3.8–10.5)
WBC # FLD AUTO: 16.44 K/UL — HIGH (ref 3.8–10.5)

## 2023-01-16 PROCEDURE — 99285 EMERGENCY DEPT VISIT HI MDM: CPT

## 2023-01-16 PROCEDURE — 72131 CT LUMBAR SPINE W/O DYE: CPT | Mod: 26,MA

## 2023-01-16 PROCEDURE — 74176 CT ABD & PELVIS W/O CONTRAST: CPT | Mod: 26,MA

## 2023-01-16 RX ORDER — CEFEPIME 1 G/1
1000 INJECTION, POWDER, FOR SOLUTION INTRAMUSCULAR; INTRAVENOUS EVERY 8 HOURS
Refills: 0 | Status: DISCONTINUED | OUTPATIENT
Start: 2023-01-16 | End: 2023-01-17

## 2023-01-16 RX ORDER — DEXTROSE 50 % IN WATER 50 %
12.5 SYRINGE (ML) INTRAVENOUS ONCE
Refills: 0 | Status: DISCONTINUED | OUTPATIENT
Start: 2023-01-16 | End: 2023-01-23

## 2023-01-16 RX ORDER — DEXTROSE 50 % IN WATER 50 %
25 SYRINGE (ML) INTRAVENOUS ONCE
Refills: 0 | Status: DISCONTINUED | OUTPATIENT
Start: 2023-01-16 | End: 2023-01-23

## 2023-01-16 RX ORDER — AMLODIPINE BESYLATE 2.5 MG/1
5 TABLET ORAL DAILY
Refills: 0 | Status: DISCONTINUED | OUTPATIENT
Start: 2023-01-16 | End: 2023-01-23

## 2023-01-16 RX ORDER — VANCOMYCIN HCL 1 G
1000 VIAL (EA) INTRAVENOUS EVERY 24 HOURS
Refills: 0 | Status: DISCONTINUED | OUTPATIENT
Start: 2023-01-18 | End: 2023-01-23

## 2023-01-16 RX ORDER — VANCOMYCIN HCL 1 G
1000 VIAL (EA) INTRAVENOUS ONCE
Refills: 0 | Status: DISCONTINUED | OUTPATIENT
Start: 2023-01-17 | End: 2023-01-17

## 2023-01-16 RX ORDER — GLUCAGON INJECTION, SOLUTION 0.5 MG/.1ML
1 INJECTION, SOLUTION SUBCUTANEOUS ONCE
Refills: 0 | Status: DISCONTINUED | OUTPATIENT
Start: 2023-01-16 | End: 2023-01-23

## 2023-01-16 RX ORDER — SODIUM CHLORIDE 9 MG/ML
1000 INJECTION INTRAMUSCULAR; INTRAVENOUS; SUBCUTANEOUS ONCE
Refills: 0 | Status: COMPLETED | OUTPATIENT
Start: 2023-01-16 | End: 2023-01-16

## 2023-01-16 RX ORDER — CEFEPIME 1 G/1
1000 INJECTION, POWDER, FOR SOLUTION INTRAMUSCULAR; INTRAVENOUS ONCE
Refills: 0 | Status: COMPLETED | OUTPATIENT
Start: 2023-01-16 | End: 2023-01-16

## 2023-01-16 RX ORDER — SODIUM CHLORIDE 9 MG/ML
1000 INJECTION, SOLUTION INTRAVENOUS
Refills: 0 | Status: DISCONTINUED | OUTPATIENT
Start: 2023-01-16 | End: 2023-01-23

## 2023-01-16 RX ORDER — MEMANTINE HYDROCHLORIDE 10 MG/1
5 TABLET ORAL DAILY
Refills: 0 | Status: DISCONTINUED | OUTPATIENT
Start: 2023-01-16 | End: 2023-01-23

## 2023-01-16 RX ORDER — INSULIN GLARGINE 100 [IU]/ML
10 INJECTION, SOLUTION SUBCUTANEOUS AT BEDTIME
Refills: 0 | Status: DISCONTINUED | OUTPATIENT
Start: 2023-01-16 | End: 2023-01-23

## 2023-01-16 RX ORDER — APIXABAN 2.5 MG/1
5 TABLET, FILM COATED ORAL
Refills: 0 | Status: DISCONTINUED | OUTPATIENT
Start: 2023-01-16 | End: 2023-01-23

## 2023-01-16 RX ORDER — ESCITALOPRAM OXALATE 10 MG/1
5 TABLET, FILM COATED ORAL DAILY
Refills: 0 | Status: DISCONTINUED | OUTPATIENT
Start: 2023-01-16 | End: 2023-01-23

## 2023-01-16 RX ORDER — METRONIDAZOLE 500 MG
500 TABLET ORAL ONCE
Refills: 0 | Status: COMPLETED | OUTPATIENT
Start: 2023-01-16 | End: 2023-01-16

## 2023-01-16 RX ORDER — FAMOTIDINE 10 MG/ML
20 INJECTION INTRAVENOUS DAILY
Refills: 0 | Status: DISCONTINUED | OUTPATIENT
Start: 2023-01-16 | End: 2023-01-23

## 2023-01-16 RX ORDER — DEXTROSE 50 % IN WATER 50 %
15 SYRINGE (ML) INTRAVENOUS ONCE
Refills: 0 | Status: DISCONTINUED | OUTPATIENT
Start: 2023-01-16 | End: 2023-01-23

## 2023-01-16 RX ORDER — VANCOMYCIN HCL 1 G
1000 VIAL (EA) INTRAVENOUS ONCE
Refills: 0 | Status: COMPLETED | OUTPATIENT
Start: 2023-01-16 | End: 2023-01-16

## 2023-01-16 RX ORDER — ACETAMINOPHEN 500 MG
650 TABLET ORAL EVERY 6 HOURS
Refills: 0 | Status: DISCONTINUED | OUTPATIENT
Start: 2023-01-16 | End: 2023-01-23

## 2023-01-16 RX ORDER — ATORVASTATIN CALCIUM 80 MG/1
40 TABLET, FILM COATED ORAL AT BEDTIME
Refills: 0 | Status: DISCONTINUED | OUTPATIENT
Start: 2023-01-16 | End: 2023-01-23

## 2023-01-16 RX ORDER — INSULIN LISPRO 100/ML
VIAL (ML) SUBCUTANEOUS
Refills: 0 | Status: DISCONTINUED | OUTPATIENT
Start: 2023-01-16 | End: 2023-01-23

## 2023-01-16 RX ORDER — DONEPEZIL HYDROCHLORIDE 10 MG/1
10 TABLET, FILM COATED ORAL AT BEDTIME
Refills: 0 | Status: DISCONTINUED | OUTPATIENT
Start: 2023-01-16 | End: 2023-01-23

## 2023-01-16 RX ORDER — SODIUM CHLORIDE 9 MG/ML
1000 INJECTION, SOLUTION INTRAVENOUS
Refills: 0 | Status: DISCONTINUED | OUTPATIENT
Start: 2023-01-16 | End: 2023-01-17

## 2023-01-16 RX ORDER — DORZOLAMIDE HYDROCHLORIDE TIMOLOL MALEATE 20; 5 MG/ML; MG/ML
1 SOLUTION/ DROPS OPHTHALMIC
Refills: 0 | Status: DISCONTINUED | OUTPATIENT
Start: 2023-01-16 | End: 2023-01-23

## 2023-01-16 RX ADMIN — CEFEPIME 100 MILLIGRAM(S): 1 INJECTION, POWDER, FOR SOLUTION INTRAMUSCULAR; INTRAVENOUS at 17:02

## 2023-01-16 RX ADMIN — SODIUM CHLORIDE 1000 MILLILITER(S): 9 INJECTION INTRAMUSCULAR; INTRAVENOUS; SUBCUTANEOUS at 15:20

## 2023-01-16 RX ADMIN — INSULIN GLARGINE 10 UNIT(S): 100 INJECTION, SOLUTION SUBCUTANEOUS at 22:48

## 2023-01-16 RX ADMIN — Medication 100 MILLIGRAM(S): at 15:22

## 2023-01-16 RX ADMIN — Medication 250 MILLIGRAM(S): at 18:44

## 2023-01-16 RX ADMIN — DONEPEZIL HYDROCHLORIDE 10 MILLIGRAM(S): 10 TABLET, FILM COATED ORAL at 22:49

## 2023-01-16 RX ADMIN — ATORVASTATIN CALCIUM 40 MILLIGRAM(S): 80 TABLET, FILM COATED ORAL at 22:48

## 2023-01-16 NOTE — H&P ADULT - HISTORY OF PRESENT ILLNESS
87-year-old female, history of lung cancer currently on immunotherapy( [facility]), lethargic, bed bound due to weakness w present illness x 1 week, diabetes, dementia, presenting from home with a stage 2 sacral wound.  Noticed 1/14, 2 days ago by home nurse.  Seen by urgent care 1/16, referred here.  Felt subjective fevers earlier today.  No active vomiting, abdominal pain, chest pain, shortness of breath, dysuria, frequency, changes in bowel habits.  No history of similar issues.  She has also been feeling generally weak over the past few weeks to the point that she is having a hard time ambulating, typically ambulates at her normal baseline.

## 2023-01-16 NOTE — ED PROVIDER NOTE - NS ED ROS FT
GENERAL: no fever  EYES: no eye pain  HEENT: no neck pain  CARDIAC: no chest pain  PULMONARY: no SOB  GI: no abdominal pain  : no dysuria  SKIN: + sacral wound  NEURO: no headache  MSK: no new joint pain

## 2023-01-16 NOTE — ED PROVIDER NOTE - PHYSICAL EXAMINATION
Gen: NAD, non-toxic appearing  Head: normal appearing  HEENT: normal conjunctiva  Lung: no respiratory distress, speaking in full sentences, ctab     CV: regular rate and rhythm, no murmurs  Abd: soft, non distended, non tender   MSK: no visible deformities  Neuro: alert, no gross motor deficits  Skin:   There is a 5 cm area of erythema surrounding a central ulcerative wound, stage II.  There is tenderness surrounding the wound general edema at the area of the subcu around this.  No overt drainage.

## 2023-01-16 NOTE — ED PROVIDER NOTE - CADM POA CENTRAL LINE
"Cystoscopy  Date/Time: 3/24/2020 8:40 AM  Performed by: Jorge Bernard MD  Authorized by: Jorge Bernard MD     Consent Done?:  Yes (Written)  Time out: Immediately prior to procedure a "time out" was called to verify the correct patient, procedure, equipment, support staff and site/side marked as required.    Indications comment:  Upj obstruction  Position:  Supine  Anesthesia:  Intraurethral instillation  Patient sedated?: No    Preparation: Patient was prepped and draped in usual sterile fashion      Scope type:  Flexible cystoscope  Stent inserted: No    Stent removed: Yes    Stent encrusted: No    External exam normal: Yes    Digital exam performed: No    Urethra normal: Yes    Prostate normal: Yes  Bladder neck normal: Bladder neck normal   Bladder normal: Yes      Patient tolerance:  Patient tolerated the procedure well with no immediate complications     Right stent dced will order renal scan with lasix      "
No

## 2023-01-16 NOTE — ED ADULT TRIAGE NOTE - WAS YOUR LAST COVID-19 VACCINE GREATER THAN OR EQUAL TO TWO MONTHS AGO?
It sounds like she might need her anxiety plan adjusted? We could certainly help her with this   Yes

## 2023-01-16 NOTE — H&P ADULT - NSHPPHYSICALEXAM_GEN_ALL_CORE
T(C): 36.8 (01-16-23 @ 19:52), Max: 37.4 (01-16-23 @ 15:29)  HR: 87 (01-16-23 @ 19:52) (87 - 95)  BP: 144/84 (01-16-23 @ 19:52) (144/84 - 151/80)  RR: 15 (01-16-23 @ 19:52) (15 - 18)  SpO2: 97% (01-16-23 @ 19:52) (97% - 100%)    PHYSICAL EXAM:  GENERAL: NAD, well-developed  HEAD:  Atraumatic, Normocephalic  EYES: EOMI, PERRLA, conjunctiva and sclera clear  NECK: Supple, No JVD  CHEST/LUNG: Clear to auscultation bilaterally; No wheeze  HEART: Regular rate and rhythm; No murmurs, rubs, or gallops  ABDOMEN: Soft, Nontender, Nondistended; Bowel sounds present  EXTREMITIES:  2+ Peripheral Pulses, No clubbing, cyanosis, or edema  PSYCH: AAOx3  NEUROLOGY: non-focal  SKIN: No rashes or lesions

## 2023-01-16 NOTE — ED PROVIDER NOTE - OBJECTIVE STATEMENT
87-year-old female, history of lung cancer currently on immunotherapy, diabetes, dementia, presenting from home with a sacral wound.  Noticed Saturday, 2 days ago by home nurse.  Seen by urgent care, referred here.  Felt subjective fevers earlier today.  No active vomiting, abdominal pain, chest pain, shortness of breath, dysuria, frequency, changes in bowel habits.  No history of similar issues.  She has also been feeling generally weak over the past few weeks to the point that she is having a hard time ambulating, typically ambulates at her normal baseline.  Unknown allergy to iodine contrast and penicillin.

## 2023-01-16 NOTE — ED PROVIDER NOTE - PROGRESS NOTE DETAILS
Elie Dennis MD (PGY-2): CT demonstrates cellulitis without underlying soft tissue infection or free air suggestive of necrotizing process.  The patient is clinically stable.  Antibiotics given.  Family updated.  Admission for IV antibiotics and physical rehabilitation and patient with weakness.  Awaiting blood cultures and speciation of wound culture both sent to the lab.

## 2023-01-16 NOTE — ED PROVIDER NOTE - ATTENDING CONTRIBUTION TO CARE
Patient is a lung cancer on radiation, DVT w/ IVC filter previously on eliquis (not currently), T2DM not on insulin, HTN, obesity, Covid in February, remote MI s/p stent here for sacral wound evaluation. Patient is a lung cancer on radiation, DVT w/ IVC filter previously on eliquis (not currently), T2DM not on insulin, HTN, obesity, Covid in February, [facility] s/p stent here for sacral wound evaluation. Patient's daughter is at bedside.  Sacral wound was first noticed on Saturday by the home health aide.  Patient has never had this sacral wound in the past.  She is complaining of pain in that area.  When patient was asked about fever she said she woke up with wet with sweat this morning.  No nausea, vomiting, chills, urinary symptoms.  Patient was taken to urgent care for evaluation and sent to the emergency department for further testing and treatment.  Of note patient's daughter states that patient has had a decline in her ability to walk.  Today while attempting to walk from urgent care patient fell and landed on her bottom on the walker.  She denies hitting her head or passing out.  Daughter states that this has been progressive over the last week.  She states that her brother was very scared that patient was going to fall because of her difficulty in ambulating.    VS noted  Gen. no acute distress, Non toxic   HEENT: EOMI, mmm  Lungs: CTAB/L no C/ W /R   CVS: RRR   Abd; Soft non tender, non distended, sacrum with erythema, warmth, small open wound/ ulcer without drainage, tender to touch  Ext: no edema  Skin: no rash  Neuro: awake, alert, non focal, clear speech  a/p: sacral wound - patient has tenderness to the area. Differential includes new sacral ulcer vs cellulitis. Patient reports subjective fever at home. Plan for cultures, vanc/ zosyn given immunocompromised status and she is diabetic, CT A/P. Patient has also had progressive decline in ability to walk. She fell today and daughter is concerned that she is getting worse and needs a wheelchair. She will need PT/OT evaluation. She will need to be admitted. Will treat for cellulitis, pending cultures, CT A/P. If no evidence of this, patient will still need wound care nurse and PT/OT evaluation.   - Louise ROMAN

## 2023-01-16 NOTE — ED PROVIDER NOTE - CLINICAL SUMMARY MEDICAL DECISION MAKING FREE TEXT BOX
Elderly female with a history of dementia currently  on immunotherapy for lung cancer, presenting with a chief complaint of sacral wound.   equivocal exam, difficult to ascertain whether the surrounding soft tissue was truly infected, however given immunocompromise state and general high risk of the patient will empirically treat for same, cefepime, Flagyl, vancomycin in a patient with a penicillin allergy of unknown character.  Will obtain CT abdomen pelvis to assess for deep space tracking into the underlying tissue.

## 2023-01-16 NOTE — ED ADULT NURSE NOTE - OBJECTIVE STATEMENT
87 year old female presents to ED via walk in complaining of pressure injury. Per daughter at bedside, patient has 24 hour aide at home who noticed wound was weeping and bleeding on Saturday. Per daughter this wound is new of this week, went to urgent care and was sent to emergency room. Upon assessment A&O x4, bed bound. Stage 3 wound noted to right coccyx, about quarter sized, surrounded by non blanchable redness. Patient has no other complaints. Daughter at bedside. Bed locked and lowered. Comfort and safety measures maintained. 87 year old female presents to ED via walk in complaining of pressure injury. Per daughter at bedside, patient has 24 hour aide at home who noticed wound was weeping and bleeding on Saturday. Per daughter this wound is new of this week, went to urgent care and was sent to emergency room. Upon assessment A&O x2, bed bound. Stage 3 wound noted to right coccyx, about quarter sized, surrounded by non blanchable redness. Patient has no other complaints. Daughter at bedside. Bed locked and lowered. Comfort and safety measures maintained.

## 2023-01-16 NOTE — H&P ADULT - ASSESSMENT
87-year-old female, history of lung cancer currently on immunotherapy( MSK), lethargic, bed bound due to weakness w present illness x 1 week, diabetes, dementia, presenting from home with a stage 2 sacral wound.  Noticed 1/14, 2 days ago by home nurse.  Seen by urgent care 1/16, referred here.  Felt subjective fevers earlier today.  No active vomiting, abdominal pain, chest pain, shortness of breath, dysuria, frequency, changes in bowel habits.  No history of similar issues.  She has also been feeling generally weak over the past few weeks to the point that she is having a hard time ambulating, typically ambulates at her normal baseline.     Ct A/P, LS spine: DJD LS spine and cellulitis over buttocks, no abscesses, no OM  Cxs sent in the ED  also check UA  Jose, hydrate w 1/2 NS at 75 cc/hr  start Vanco, ID called, wound care called, ONC called  dvt ppx not necessary , ptn on chronic AC 2/2 DVT LE

## 2023-01-16 NOTE — ED PROVIDER NOTE - CHIEF COMPLAINT
The patient is a 87y Female complaining of  The patient is a 87y Female complaining of Sacral wound.

## 2023-01-17 DIAGNOSIS — F03.90 UNSPECIFIED DEMENTIA, UNSPECIFIED SEVERITY, WITHOUT BEHAVIORAL DISTURBANCE, PSYCHOTIC DISTURBANCE, MOOD DISTURBANCE, AND ANXIETY: ICD-10-CM

## 2023-01-17 DIAGNOSIS — L03.90 CELLULITIS, UNSPECIFIED: ICD-10-CM

## 2023-01-17 DIAGNOSIS — E11.9 TYPE 2 DIABETES MELLITUS WITHOUT COMPLICATIONS: ICD-10-CM

## 2023-01-17 LAB
-  COAGULASE NEGATIVE STAPHYLOCOCCUS: SIGNIFICANT CHANGE UP
A1C WITH ESTIMATED AVERAGE GLUCOSE RESULT: 7.4 % — HIGH (ref 4–5.6)
ANION GAP SERPL CALC-SCNC: 12 MMOL/L — SIGNIFICANT CHANGE UP (ref 5–17)
BUN SERPL-MCNC: 20 MG/DL — SIGNIFICANT CHANGE UP (ref 7–23)
CALCIUM SERPL-MCNC: 10 MG/DL — SIGNIFICANT CHANGE UP (ref 8.4–10.5)
CHLORIDE SERPL-SCNC: 107 MMOL/L — SIGNIFICANT CHANGE UP (ref 96–108)
CO2 SERPL-SCNC: 18 MMOL/L — LOW (ref 22–31)
CREAT SERPL-MCNC: 1.25 MG/DL — SIGNIFICANT CHANGE UP (ref 0.5–1.3)
EGFR: 42 ML/MIN/1.73M2 — LOW
ESTIMATED AVERAGE GLUCOSE: 166 MG/DL — HIGH (ref 68–114)
GLUCOSE BLDC GLUCOMTR-MCNC: 135 MG/DL — HIGH (ref 70–99)
GLUCOSE BLDC GLUCOMTR-MCNC: 152 MG/DL — HIGH (ref 70–99)
GLUCOSE BLDC GLUCOMTR-MCNC: 155 MG/DL — HIGH (ref 70–99)
GLUCOSE BLDC GLUCOMTR-MCNC: 211 MG/DL — HIGH (ref 70–99)
GLUCOSE SERPL-MCNC: 139 MG/DL — HIGH (ref 70–99)
GRAM STN FLD: SIGNIFICANT CHANGE UP
HCT VFR BLD CALC: 35.1 % — SIGNIFICANT CHANGE UP (ref 34.5–45)
HGB BLD-MCNC: 10.9 G/DL — LOW (ref 11.5–15.5)
MCHC RBC-ENTMCNC: 26.7 PG — LOW (ref 27–34)
MCHC RBC-ENTMCNC: 31.1 GM/DL — LOW (ref 32–36)
MCV RBC AUTO: 86 FL — SIGNIFICANT CHANGE UP (ref 80–100)
METHOD TYPE: SIGNIFICANT CHANGE UP
NRBC # BLD: 0 /100 WBCS — SIGNIFICANT CHANGE UP (ref 0–0)
PLATELET # BLD AUTO: 226 K/UL — SIGNIFICANT CHANGE UP (ref 150–400)
POTASSIUM SERPL-MCNC: 4 MMOL/L — SIGNIFICANT CHANGE UP (ref 3.5–5.3)
POTASSIUM SERPL-SCNC: 4 MMOL/L — SIGNIFICANT CHANGE UP (ref 3.5–5.3)
RBC # BLD: 4.08 M/UL — SIGNIFICANT CHANGE UP (ref 3.8–5.2)
RBC # FLD: 13.2 % — SIGNIFICANT CHANGE UP (ref 10.3–14.5)
SODIUM SERPL-SCNC: 137 MMOL/L — SIGNIFICANT CHANGE UP (ref 135–145)
SPECIMEN SOURCE: SIGNIFICANT CHANGE UP
TSH SERPL-MCNC: 1.34 UIU/ML — SIGNIFICANT CHANGE UP (ref 0.27–4.2)
WBC # BLD: 15.29 K/UL — HIGH (ref 3.8–10.5)
WBC # FLD AUTO: 15.29 K/UL — HIGH (ref 3.8–10.5)

## 2023-01-17 RX ORDER — VANCOMYCIN HCL 1 G
1250 VIAL (EA) INTRAVENOUS ONCE
Refills: 0 | Status: COMPLETED | OUTPATIENT
Start: 2023-01-17 | End: 2023-01-17

## 2023-01-17 RX ADMIN — Medication 1: at 11:59

## 2023-01-17 RX ADMIN — INSULIN GLARGINE 10 UNIT(S): 100 INJECTION, SOLUTION SUBCUTANEOUS at 22:31

## 2023-01-17 RX ADMIN — DONEPEZIL HYDROCHLORIDE 10 MILLIGRAM(S): 10 TABLET, FILM COATED ORAL at 21:49

## 2023-01-17 RX ADMIN — APIXABAN 5 MILLIGRAM(S): 2.5 TABLET, FILM COATED ORAL at 06:22

## 2023-01-17 RX ADMIN — Medication 166.67 MILLIGRAM(S): at 17:58

## 2023-01-17 RX ADMIN — CEFEPIME 100 MILLIGRAM(S): 1 INJECTION, POWDER, FOR SOLUTION INTRAMUSCULAR; INTRAVENOUS at 03:00

## 2023-01-17 RX ADMIN — AMLODIPINE BESYLATE 5 MILLIGRAM(S): 2.5 TABLET ORAL at 06:22

## 2023-01-17 RX ADMIN — FAMOTIDINE 20 MILLIGRAM(S): 10 INJECTION INTRAVENOUS at 12:00

## 2023-01-17 RX ADMIN — APIXABAN 5 MILLIGRAM(S): 2.5 TABLET, FILM COATED ORAL at 17:58

## 2023-01-17 RX ADMIN — MEMANTINE HYDROCHLORIDE 5 MILLIGRAM(S): 10 TABLET ORAL at 12:00

## 2023-01-17 RX ADMIN — SODIUM CHLORIDE 75 MILLILITER(S): 9 INJECTION, SOLUTION INTRAVENOUS at 06:21

## 2023-01-17 RX ADMIN — Medication 1: at 17:58

## 2023-01-17 RX ADMIN — ESCITALOPRAM OXALATE 5 MILLIGRAM(S): 10 TABLET, FILM COATED ORAL at 11:59

## 2023-01-17 RX ADMIN — CEFEPIME 100 MILLIGRAM(S): 1 INJECTION, POWDER, FOR SOLUTION INTRAMUSCULAR; INTRAVENOUS at 10:11

## 2023-01-17 RX ADMIN — DORZOLAMIDE HYDROCHLORIDE TIMOLOL MALEATE 1 DROP(S): 20; 5 SOLUTION/ DROPS OPHTHALMIC at 22:30

## 2023-01-17 RX ADMIN — ATORVASTATIN CALCIUM 40 MILLIGRAM(S): 80 TABLET, FILM COATED ORAL at 21:49

## 2023-01-17 RX ADMIN — DORZOLAMIDE HYDROCHLORIDE TIMOLOL MALEATE 1 DROP(S): 20; 5 SOLUTION/ DROPS OPHTHALMIC at 10:11

## 2023-01-17 NOTE — PATIENT PROFILE ADULT - FUNCTIONAL ASSESSMENT - BASIC MOBILITY 6.
Brother was seen today; mom req refill for this pt for ADHD med. I did not have time to review this chart at the time of the brother's visit. I do need to talk to mom before refilling med. I will call her in 2 d when I am back in the office.   2 = A lot of assistance

## 2023-01-17 NOTE — PROGRESS NOTE ADULT - SUBJECTIVE AND OBJECTIVE BOX
HPI:   Patient is a 87y female with PMH significant for obesity, early dementia, who has a full time aide for ADLs, walks with a walker, lung cancer with mets to pleura on immunotherapy ([facility]K), had been feeling generally weak over the past few weeks to the point that she was having a hard time ambulating. Her aide on 1/14 reported noticing a small bump on her left buttock. This progressively increased to a large size with pain as of 1/16/23. Taken to an C & sent to the ER.    Found with a temp of 99.4F with leukocytosis of 16.44K. She was given a dose of vanco, cefepime & flagyl. ID called. Wound was swabbed & cx now with Staph aureus, blood cx with 1/2 GPC in pairs.     REVIEW OF SYSTEMS:  All other review of systems negative (Comprehensive ROS)    PAST MEDICAL & SURGICAL HISTORY:  Dementia  Obesity  HTN (hypertension)  Diabetes  DVT, lower extremity  MI (myocardial infarction)  Lung cancer  UTI due to extended-spectrum beta lactamase (ESBL) producing Escherichia coli  Pacemaker      Allergies  Iodine (Hives)  IV contrast (Unknown)  Penicillin (Hives)      Antimicrobials Day #  : 2  cefepime   IVPB 1000 milliGRAM(s) IV Intermittent every 8 hours  vancomycin  IVPB 1000 milliGRAM(s) IV Intermittent once    Other Medications:  acetaminophen     Tablet .. 650 milliGRAM(s) Oral every 6 hours PRN  amLODIPine   Tablet 5 milliGRAM(s) Oral daily  apixaban 5 milliGRAM(s) Oral two times a day  atorvastatin 40 milliGRAM(s) Oral at bedtime  dextrose 5%. 1000 milliLiter(s) IV Continuous <Continuous>  dextrose 5%. 1000 milliLiter(s) IV Continuous <Continuous>  dextrose 50% Injectable 25 Gram(s) IV Push once  dextrose 50% Injectable 12.5 Gram(s) IV Push once  dextrose 50% Injectable 25 Gram(s) IV Push once  dextrose Oral Gel 15 Gram(s) Oral once PRN  donepezil 10 milliGRAM(s) Oral at bedtime  dorzolamide 2%/timolol 0.5% Ophthalmic Solution 1 Drop(s) Both EYES two times a day  escitalopram 5 milliGRAM(s) Oral daily  famotidine    Tablet 20 milliGRAM(s) Oral daily  glucagon  Injectable 1 milliGRAM(s) IntraMuscular once  insulin glargine Injectable (LANTUS) 10 Unit(s) SubCutaneous at bedtime  insulin lispro (ADMELOG) corrective regimen sliding scale   SubCutaneous three times a day before meals  memantine 5 milliGRAM(s) Oral daily      FAMILY HISTORY:  Unknown status of immunity to COVID-19 virus      SOCIAL HISTORY:  Smoking: No    ETOH: No    Drug Use: No  Lives with her son & a 24 hr helping aide    T(F): 98.8 (01-17-23 @ 04:38), Max: 99.4 (01-16-23 @ 15:29)  HR: 87 (01-17-23 @ 10:40)  BP: 125/78 (01-17-23 @ 10:40)  RR: 20 (01-17-23 @ 10:37)  SpO2: 93% (01-17-23 @ 10:40)  Wt(kg): --    PHYSICAL EXAM:  General: alert, no acute distress  Eyes:  anicteric, no conjunctival injection, no discharge  Oropharynx: no lesions or injection 	  Neck: supple  Lungs: clear to auscultation  Heart: regular rate and rhythm; no murmurs  Abdomen: soft, nondistended, nontender.  Skin: large left buttock cellulitis with bloody discharge from an area of skin breakdown.   Extremities: no clubbing, cyanosis, or edema  Neurologic: alert, oriented, moves all extremities    LAB RESULTS:                        10.9   15.29 )-----------( 226      ( 17 Jan 2023 07:14 )             35.1     01-17    137  |  107  |  20  ----------------------------<  139<H>  4.0   |  18<L>  |  1.25    Ca    10.0      17 Jan 2023 07:14    TPro  7.0  /  Alb  3.3  /  TBili  0.6  /  DBili  x   /  AST  9<L>  /  ALT  5<L>  /  AlkPhos  73  01-16    LIVER FUNCTIONS - ( 16 Jan 2023 15:44 )  Alb: 3.3 g/dL / Pro: 7.0 g/dL / ALK PHOS: 73 U/L / ALT: 5 U/L / AST: 9 U/L / GGT: x               MICROBIOLOGY:  RECENT CULTURES:  01-16 @ 15:19 Wound Wound     Moderate Staphylococcus aureus      01-16 @ 15:15 .Blood Blood     Growth in aerobic bottle: Gram positive cocci in pairs      RADIOLOGY REVIEWED:  < from: CT Abdomen and Pelvis No Cont (01.16.23 @ 17:01) >  IMPRESSION:  Cellulitis involving medial bilateral subcutaneous gluteal fat with   several air droplets noted in the medial superior right buttock. No   associated abscess    < end of copied text >

## 2023-01-17 NOTE — CONSULT NOTE ADULT - ASSESSMENT
Assessment  DMT2: 87y Female with DM T2 with hyperglycemia admitted with cellulitis, patient was on oral hypoglycemic agents at home, now on basal and insulin coverage, blood sugars improving, no hypoglycemic episode,  eating meals.  Patient is high risk with high level decision making due to uncontrolled diabetes, has increased risk for complications. Tight sugar control is not recommended for this patient.  Cellulitis: On medications, monitored, afebrile.  HTN: On antihypertensive medications, monitored, asymptomatic.              Melania Marques MD  Cell:  [phone]  Office: [phone]

## 2023-01-17 NOTE — CONSULT NOTE ADULT - SUBJECTIVE AND OBJECTIVE BOX
HPI:  87-year-old female, history of lung cancer currently on immunotherapy( [facility]), lethargic, bed bound due to weakness w present illness x 1 week, diabetes, dementia, presenting from home with a stage 2 sacral wound.  Noticed 1/14, 2 days ago by home nurse.  Seen by urgent care 1/16, referred here.  Felt subjective fevers earlier today.  No active vomiting, abdominal pain, chest pain, shortness of breath, dysuria, frequency, changes in bowel habits.  No history of similar issues.  She has also been feeling generally weak over the past few weeks to the point that she is having a hard time ambulating, typically ambulates at her normal baseline.  (16 Jan 2023 19:51)  Patient has history of diabetes, on oral meds at home, no recent hypoglycemic episodes. Patient follows up with PCP for diabetes management.    PAST MEDICAL & SURGICAL HISTORY:  Dementia      Obesity      HTN (hypertension)      Diabetes      DVT, lower extremity      MI (myocardial infarction)      Lung cancer      UTI due to extended-spectrum beta lactamase (ESBL) producing Escherichia coli      Pacemaker          FAMILY HISTORY:  Unknown status of immunity to COVID-19 virus        Social History:    Outpatient Medications:    MEDICATIONS  (STANDING):  amLODIPine   Tablet 5 milliGRAM(s) Oral daily  apixaban 5 milliGRAM(s) Oral two times a day  atorvastatin 40 milliGRAM(s) Oral at bedtime  dextrose 5%. 1000 milliLiter(s) (50 mL/Hr) IV Continuous <Continuous>  dextrose 5%. 1000 milliLiter(s) (100 mL/Hr) IV Continuous <Continuous>  dextrose 50% Injectable 25 Gram(s) IV Push once  dextrose 50% Injectable 12.5 Gram(s) IV Push once  dextrose 50% Injectable 25 Gram(s) IV Push once  donepezil 10 milliGRAM(s) Oral at bedtime  dorzolamide 2%/timolol 0.5% Ophthalmic Solution 1 Drop(s) Both EYES two times a day  escitalopram 5 milliGRAM(s) Oral daily  famotidine    Tablet 20 milliGRAM(s) Oral daily  glucagon  Injectable 1 milliGRAM(s) IntraMuscular once  insulin glargine Injectable (LANTUS) 10 Unit(s) SubCutaneous at bedtime  insulin lispro (ADMELOG) corrective regimen sliding scale   SubCutaneous three times a day before meals  memantine 5 milliGRAM(s) Oral daily  vancomycin  IVPB 1250 milliGRAM(s) IV Intermittent once    MEDICATIONS  (PRN):  acetaminophen     Tablet .. 650 milliGRAM(s) Oral every 6 hours PRN Temp greater or equal to 38C (100.4F), Mild Pain (1 - 3)  dextrose Oral Gel 15 Gram(s) Oral once PRN Blood Glucose LESS THAN 70 milliGRAM(s)/deciliter      Allergies    iodine (Hives)  IV contrast (Unknown)  penicillin (Hives)    Intolerances      Review of Systems:  Constitutional: No fever, no chills  Eyes: No blurry vision  Neuro: No tremors  HEENT: No pain, no neck swelling  Cardiovascular: No chest pain, no palpitations  Respiratory: No SOB, no cough  GI: No nausea, vomiting, abdominal pain  : No dysuria  Skin: no rash  MSK: Has leg swelling.  Psych: no depression  Endocrine: no polyuria, polydipsia    UNABLE TO OBTAIN    ALL OTHER SYSTEMS REVIEWED AND NEGATIVE        PHYSICAL EXAM:  VITALS: T(C): 36.8 (01-17-23 @ 16:02)  T(F): 98.2 (01-17-23 @ 16:02), Max: 98.8 (01-17-23 @ 04:38)  HR: 77 (01-17-23 @ 16:02) (77 - 93)  BP: 138/81 (01-17-23 @ 16:02) (118/73 - 144/84)  RR:  (15 - 20)  SpO2:  (93% - 97%)  Wt(kg): --  GENERAL: NAD, well-groomed, well-developed  EYES: No proptosis, no lid lag  HEENT:  Atraumatic, Normocephalic  THYROID: Normal size, no palpable nodules  RESPIRATORY: Clear to auscultation bilaterally; No rales, rhonchi, wheezing  CARDIOVASCULAR: Si S2, No murmurs;  GI: Soft, non distended, normal bowel sounds  SKIN: Dry, intact, No rashes or lesions  MUSCULOSKELETAL: Has BL lower extremity edema.  NEURO:  no tremor, sensation decreased in feet BL,    POCT Blood Glucose.: 152 mg/dL (01-17-23 @ 11:12)  POCT Blood Glucose.: 135 mg/dL (01-17-23 @ 07:57)                            10.9   15.29 )-----------( 226      ( 17 Jan 2023 07:14 )             35.1       01-17    137  |  107  |  20  ----------------------------<  139<H>  4.0   |  18<L>  |  1.25    eGFR: 42<L>    Ca    10.0      01-17    TPro  7.0  /  Alb  3.3  /  TBili  0.6  /  DBili  x   /  AST  9<L>  /  ALT  5<L>  /  AlkPhos  73  01-16      Thyroid Function Tests:  01-17 @ 10:44 TSH 1.34 FreeT4 -- T3 -- Anti TPO -- Anti Thyroglobulin Ab -- TSI --              Radiology:

## 2023-01-17 NOTE — PATIENT PROFILE ADULT - FALL HARM RISK - RISK INTERVENTIONS

## 2023-01-17 NOTE — PROGRESS NOTE ADULT - ASSESSMENT
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   Her aide on 1/14 reported noticing a small bump on her left buttock. This progressively increased to a large size with pain as of 1/16/23.  Taken to an C & sent to the ER.    Here found with a temp of 99.4F with leukocytosis of 16.44K.   She was given a dose of vanco, cefepime & flagyl.   Wound was swabbed & cx now with Staph aureus  Blood cx with 1/2 GPC in pairs.  Exam with left large area of buttock cellulitis with bloody discharge from an area of skin breakdown.     PLAN:  Continue vancomycin, following exam.   Stop Cefepime  Monitor vanco trough levels  Repeat blood cx   Follow ID of GPC in pairs from ER blood cx  Thanks will follow

## 2023-01-17 NOTE — PROGRESS NOTE ADULT - ASSESSMENT
87-year-old female, history of lung cancer currently on immunotherapy( [facility]), lethargic, bed bound due to weakness w present illness x 1 week, diabetes, dementia, presenting from home with a stage 2 sacral wound.  Noticed 1/14, 2 days ago by home nurse.  Seen by urgent care 1/16, referred here.  Felt subjective fevers earlier today.  No active vomiting, abdominal pain, chest pain, shortness of breath, dysuria, frequency, changes in bowel habits.  No history of similar issues.  She has also been feeling generally weak over the past few weeks to the point that she is having a hard time ambulating, typically ambulates at her normal baseline.     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 JOSE resolved, seen by renal, IVF DCed, seen by ONC, stage 4 metastatic lung Ca, Ptn has increased debility. awaiting wound and ID consults.

## 2023-01-17 NOTE — CONSULT NOTE ADULT - SUBJECTIVE AND OBJECTIVE BOX
HPI: Ms. Yanez is an 87 year-old woman with history of multiple medical issues including dementia, hypertension, type 2 diabetes mellitus, coronary artery disease, and lung cancer on immunotherapy. She presented yesterday to the [facility] ER with generalized weakness/lethargy for 1 week, and after being noted by her home nurse to have a stage 2 sacral wound.       PAST MEDICAL & SURGICAL HISTORY:  Dementia  Obesity  HTN  DM  DVT  CAD  Lung CA  ESBL UTI  PPM    Allergies  iodine (Hives)  IV contrast (Unknown)  penicillin (Hives)    SOCIAL HISTORY:  Denies ETOh,Smoking,     FAMILY HISTORY:  Unknown status of immunity to COVID-19 virus    REVIEW OF SYSTEMS:  CONSTITUTIONAL: (+)generalized weakness, (+)lethargy, +)fever  EYES/ENT: No visual changes;  No vertigo or throat pain   NECK: No pain or stiffness  RESPIRATORY: No cough, wheezing, hemoptysis; No shortness of breath  CARDIOVASCULAR: No chest pain or palpitations  GASTROINTESTINAL: No abdominal or epigastric pain. No nausea, vomiting, or hematemesis; No diarrhea or constipation. No melena or hematochezia.  GENITOURINARY: No dysuria, frequency or hematuria  NEUROLOGICAL: No numbness or weakness  SKIN: (+)sacral wound  All other review of systems is negative unless indicated above.    VITAL:  T(C): , Max: 37.4 (01-16-23 @ 15:29)  T(F): , Max: 99.4 (01-16-23 @ 15:29)  HR: 93 (01-17-23 @ 04:38)  BP: 118/73 (01-17-23 @ 04:38)  RR: 15 (01-17-23 @ 04:38)  SpO2: 95% (01-17-23 @ 04:38)    PHYSICAL EXAM:  Constitutional: NAD, Alert  HEENT: NCAT, DMM  Neck: Supple, No JVD  Respiratory: CTA-b/l  Cardiovascular: RRR s1s2, no m/r/g  Gastrointestinal: BS+, soft, NT/ND  Extremities: No peripheral edema b/l  Neurological: no focal deficits; strength grossly intact  Back: no CVAT b/l  Skin: No rashes, no nevi    LABS:                        10.9   15.29 )-----------( 226      ( 17 Jan 2023 07:14 )             35.1     Na(135)/K(4.1)/Cl(104)/HCO3(21)/BUN(24)/Cr(1.37)Glu(278)/Ca(10.3)/Mg(--)/PO4(--)    01-16 @ 15:44    (1/14/23) - BUN/creatinine: 37/1.49  (1/11/22) - BUN/creatinine: 35/1.92  (1/9/22) - BUN/creatinine: 46/2.30  (3/2/21) - BUN/creatinine: 22/1.10    (1/9/22) - UA - 30prot,3RBC, 317WBC         IMAGING:  < from: CT Abdomen and Pelvis w/ IV Cont (01.13.22 @ 22:29) >  No pulmonary embolism.  Spiculated mass in the left upper lobe without significant change from   prior PET CT 6/11/2021.  IVC filter with clot present within and slightly above the filter. Clot   below the filter as above.      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    With warm regards,    Rocky Clark MD   [facility]  Office: [phone]  Cell: [phone]             HPI: Ms. Yanez is an 87 year-old woman with history of multiple medical issues including dementia, hypertension, type 2 diabetes mellitus, coronary artery disease, and lung cancer on immunotherapy. She presented yesterday to the [facility] ER with generalized weakness/lethargy for 1 week, and after being noted by her home nurse to have a stage 2 sacral wound.     Ms. Yanez denies known history of CKD, ROBERTO, or hyponatremia.     PAST MEDICAL & SURGICAL HISTORY:  Dementia  Obesity  HTN  DM  DVT  CAD  Lung CA  ESBL UTI  PPM    Allergies  iodine (Hives)  IV contrast (Unknown)  penicillin (Hives)    SOCIAL HISTORY:  Denies ETOh,Smoking,     FAMILY HISTORY:  Unknown status of immunity to COVID-19 virus    REVIEW OF SYSTEMS:  CONSTITUTIONAL: (+)generalized weakness, (+)lethargy, +)fever  EYES/ENT: No visual changes;  No vertigo or throat pain   NECK: No pain or stiffness  RESPIRATORY: No cough, wheezing, hemoptysis; No shortness of breath  CARDIOVASCULAR: No chest pain or palpitations  GASTROINTESTINAL: No abdominal or epigastric pain. No nausea, vomiting, or hematemesis; No diarrhea or constipation. No melena or hematochezia.  GENITOURINARY: No dysuria, frequency or hematuria  NEUROLOGICAL: No numbness or weakness  SKIN: (+)sacral wound  All other review of systems is negative unless indicated above.    VITAL:  T(C): , Max: 37.4 (01-16-23 @ 15:29)  T(F): , Max: 99.4 (01-16-23 @ 15:29)  HR: 93 (01-17-23 @ 04:38)  BP: 118/73 (01-17-23 @ 04:38)  RR: 15 (01-17-23 @ 04:38)  SpO2: 95% (01-17-23 @ 04:38)    PHYSICAL EXAM:  Constitutional: NAD, Alert  HEENT: NCAT, DMM  Neck: Supple, No JVD  Respiratory: CTA-b/l  Cardiovascular: RRR s1s2, no m/r/g  Gastrointestinal: BS+, soft, NT/ND  Extremities: No peripheral edema b/l  Neurological: no focal deficits; strength grossly intact  Back: no CVAT b/l  Skin: (+)decub ulcer    LABS:                        10.9   15.29 )-----------( 226      ( 17 Jan 2023 07:14 )             35.1     Na(135)/K(4.1)/Cl(104)/HCO3(21)/BUN(24)/Cr(1.37)Glu(278)/Ca(10.3)/Mg(--)/PO4(--)    01-16 @ 15:44    (1/14/23) - BUN/creatinine: 37/1.49  (1/11/22) - BUN/creatinine: 35/1.92  (1/9/22) - BUN/creatinine: 46/2.30  (3/2/21) - BUN/creatinine: 22/1.10    (1/9/22) - UA - 30prot,3RBC, 317WBC         IMAGING:  < from: CT Abdomen and Pelvis w/ IV Cont (01.13.22 @ 22:29) >  No pulmonary embolism.  Spiculated mass in the left upper lobe without significant change from   prior PET CT 6/11/2021.  IVC filter with clot present within and slightly above the filter. Clot   below the filter as above.      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    With warm regards,    Rocky Clark MD   [facility]  Office: [phone]  Cell: [phone]

## 2023-01-17 NOTE — PHYSICAL THERAPY INITIAL EVALUATION ADULT - NSPTDISCHREC_GEN_A_CORE
DC pending completion of functional evaluation; possible subacute vs home PT services; spoke with daughter Emerson over phone regarding functional status and DC planning; expresses preference for home but understanding if TARYN is recommended (not great experience last TARYN); pt has pvt [facility]HA 5days (8-5pm) and weekends (8-5pm), son assists in PM for mobility/ADLs, owns rollator and shower chair; would benefit from transport WC for long distances and overall decreased strength/balance, daughter expresses incr weakness and difficulty with mobility and recent falls; CM Stefan aware

## 2023-01-17 NOTE — PHYSICAL THERAPY INITIAL EVALUATION ADULT - ADDITIONAL COMMENTS
Per pt and HHA and per pts dtr Emerson over phone, pt lives with son, +pvt HHA M-F 8-5pm, over weekend as well 8-5pm; +elevator access, +shower chair with tub; +rollator for ambulation; increased weakness and difficulty with mobility, recent falls, assist for ADLs, meal setup pt can feed self.

## 2023-01-17 NOTE — CONSULT NOTE ADULT - ASSESSMENT
Patient is a 87y old  Female who presents with a chief complaint of pressure injury    Lung Adenocarcinoma  --under the care of Dr Gurdeep Sharpe of [facility]  --stage 4, w/ mets to pleura   --currently on treatment w/ pembro LD: 1/9  --head CT done 1/3/23; shows no significant interval change and without evidence for acute intracranial hemorrhage or definite intracranial masses.   --ongoing care after discharge    pressure injury  --CT demonstrates cellulitis without underlying soft tissue infection or free air suggestive of necrotizing process.  --on IV abx  --care per primary team  --wound care recs appreciated                    will follow and coordinate care with [facility]    Maribel Tijerina NP  Hematology/ Oncology  [facility]  [phone] (office)  [phone] (alt office)  Evenings and weekends please call MD on call or office

## 2023-01-17 NOTE — CONSULT NOTE ADULT - SUBJECTIVE AND OBJECTIVE BOX
Patient is a 87y old  Female who presents with a chief complaint of pressure injury    Hematology/Oncology called to see patient who is under the care of Dr. Gurdeep Sharpe of [facility] [place] for the treatment of non-small cell-lung cancer    MEDICATIONS  (STANDING):  amLODIPine   Tablet 5 milliGRAM(s) Oral daily  apixaban 5 milliGRAM(s) Oral two times a day  atorvastatin 40 milliGRAM(s) Oral at bedtime  cefepime   IVPB 1000 milliGRAM(s) IV Intermittent every 8 hours  dextrose 5%. 1000 milliLiter(s) (50 mL/Hr) IV Continuous <Continuous>  dextrose 5%. 1000 milliLiter(s) (100 mL/Hr) IV Continuous <Continuous>  dextrose 50% Injectable 25 Gram(s) IV Push once  dextrose 50% Injectable 12.5 Gram(s) IV Push once  dextrose 50% Injectable 25 Gram(s) IV Push once  donepezil 10 milliGRAM(s) Oral at bedtime  dorzolamide 2%/timolol 0.5% Ophthalmic Solution 1 Drop(s) Both EYES two times a day  escitalopram 5 milliGRAM(s) Oral daily  famotidine    Tablet 20 milliGRAM(s) Oral daily  glucagon  Injectable 1 milliGRAM(s) IntraMuscular once  insulin glargine Injectable (LANTUS) 10 Unit(s) SubCutaneous at bedtime  insulin lispro (ADMELOG) corrective regimen sliding scale   SubCutaneous three times a day before meals  memantine 5 milliGRAM(s) Oral daily  vancomycin  IVPB 1000 milliGRAM(s) IV Intermittent once    MEDICATIONS  (PRN):  acetaminophen     Tablet .. 650 milliGRAM(s) Oral every 6 hours PRN Temp greater or equal to 38C (100.4F), Mild Pain (1 - 3)  dextrose Oral Gel 15 Gram(s) Oral once PRN Blood Glucose LESS THAN 70 milliGRAM(s)/deciliter      ROS  No fever, sweats, chills  No epistaxis, HA, sore throat  No CP, SOB, cough, sputum  No n/v/d, abd pain, melena, hematochezia  No edema  No rash  No anxiety  +back pain d/t pressure injury  No bleeding, bruising  No dysuria, hematuria    Vital Signs Last 24 Hrs  T(C): 37.1 (17 Jan 2023 04:38), Max: 37.4 (16 Jan 2023 15:29)  T(F): 98.8 (17 Jan 2023 04:38), Max: 99.4 (16 Jan 2023 15:29)  HR: 87 (17 Jan 2023 10:40) (87 - 95)  BP: 125/78 (17 Jan 2023 10:40) (118/73 - 151/80)  BP(mean): --  RR: 20 (17 Jan 2023 10:37) (15 - 20)  SpO2: 93% (17 Jan 2023 10:40) (93% - 100%)    Parameters below as of 17 Jan 2023 10:40  Patient On (Oxygen Delivery Method): room air      PE  NAD  Awake, alert  Anicteric, MMM  RRR  CTAB  Abd soft, NT, ND  No c/c/e  No rash grossly                            10.9   15.29 )-----------( 226      ( 17 Jan 2023 07:14 )             35.1       01-17    137  |  107  |  20  ----------------------------<  139<H>  4.0   |  18<L>  |  1.25    Ca    10.0      17 Jan 2023 07:14    TPro  7.0  /  Alb  3.3  /  TBili  0.6  /  DBili  x   /  AST  9<L>  /  ALT  5<L>  /  AlkPhos  73  01-16

## 2023-01-17 NOTE — PROGRESS NOTE ADULT - SUBJECTIVE AND OBJECTIVE BOX
Patient is a 87y old  Female who presents with a chief complaint of     SUBJECTIVE / OVERNIGHT EVENTS: ptn is sleeping, her aide is at bedside and states she generally ambulates w a walker. admitted for infected sacral stage 2 decub    MEDICATIONS  (STANDING):  amLODIPine   Tablet 5 milliGRAM(s) Oral daily  apixaban 5 milliGRAM(s) Oral two times a day  atorvastatin 40 milliGRAM(s) Oral at bedtime  dextrose 5%. 1000 milliLiter(s) (50 mL/Hr) IV Continuous <Continuous>  dextrose 5%. 1000 milliLiter(s) (100 mL/Hr) IV Continuous <Continuous>  dextrose 50% Injectable 25 Gram(s) IV Push once  dextrose 50% Injectable 12.5 Gram(s) IV Push once  dextrose 50% Injectable 25 Gram(s) IV Push once  donepezil 10 milliGRAM(s) Oral at bedtime  dorzolamide 2%/timolol 0.5% Ophthalmic Solution 1 Drop(s) Both EYES two times a day  escitalopram 5 milliGRAM(s) Oral daily  famotidine    Tablet 20 milliGRAM(s) Oral daily  glucagon  Injectable 1 milliGRAM(s) IntraMuscular once  insulin glargine Injectable (LANTUS) 10 Unit(s) SubCutaneous at bedtime  insulin lispro (ADMELOG) corrective regimen sliding scale   SubCutaneous three times a day before meals  memantine 5 milliGRAM(s) Oral daily  vancomycin  IVPB 1250 milliGRAM(s) IV Intermittent once    MEDICATIONS  (PRN):  acetaminophen     Tablet .. 650 milliGRAM(s) Oral every 6 hours PRN Temp greater or equal to 38C (100.4F), Mild Pain (1 - 3)  dextrose Oral Gel 15 Gram(s) Oral once PRN Blood Glucose LESS THAN 70 milliGRAM(s)/deciliter      Vital Signs Last 24 Hrs  T(F): 98.2 (01-17-23 @ 16:02), Max: 98.8 (01-17-23 @ 04:38)  HR: 77 (01-17-23 @ 16:02) (77 - 93)  BP: 138/81 (01-17-23 @ 16:02) (118/73 - 144/84)  RR: 19 (01-17-23 @ 16:02) (15 - 20)  SpO2: 95% (01-17-23 @ 16:02) (93% - 97%)  Telemetry:   CAPILLARY BLOOD GLUCOSE      POCT Blood Glucose.: 152 mg/dL (17 Jan 2023 11:12)  POCT Blood Glucose.: 135 mg/dL (17 Jan 2023 07:57)    I&O's Summary      PHYSICAL EXAM:  GENERAL: NAD, well-developed  HEAD:  Atraumatic, Normocephalic  EYES: EOMI, PERRLA, conjunctiva and sclera clear  NECK: Supple, No JVD  CHEST/LUNG: Clear to auscultation bilaterally; No wheeze  HEART: Regular rate and rhythm; No murmurs, rubs, or gallops  ABDOMEN: Soft, Nontender, Nondistended; Bowel sounds present  EXTREMITIES:  2+ Peripheral Pulses, No clubbing, cyanosis, or edema  PSYCH: AAOx3  NEUROLOGY: non-focal  SKIN: No rashes or lesions    LABS:                        10.9   15.29 )-----------( 226      ( 17 Jan 2023 07:14 )             35.1     01-17    137  |  107  |  20  ----------------------------<  139<H>  4.0   |  18<L>  |  1.25    Ca    10.0      17 Jan 2023 07:14    TPro  7.0  /  Alb  3.3  /  TBili  0.6  /  DBili  x   /  AST  9<L>  /  ALT  5<L>  /  AlkPhos  73  01-16              RADIOLOGY & ADDITIONAL TESTS:    Imaging Personally Reviewed:    Consultant(s) Notes Reviewed:      Care Discussed with Consultants/Other Providers:

## 2023-01-17 NOTE — PHYSICAL THERAPY INITIAL EVALUATION ADULT - PERTINENT HX OF CURRENT PROBLEM, REHAB EVAL
Pt is a 87-year-old female admitted to [facility] on 1/16/23 history of lung cancer currently on immunotherapy( [facility]), lethargic, bed bound due to weakness w present illness x 1 week, diabetes, dementia, presenting from home with a stage 2 sacral wound.  Noticed 1/14, 2 days ago by home nurse.  Seen by urgent care 1/16, referred here.  Felt subjective fevers earlier today.  No active vomiting, abdominal pain, chest pain, shortness of breath, dysuria, frequency, changes in bowel habits.  No history of similar issues.  She has also been feeling generally weak over the past few weeks to the point that she is having a hard time ambulating, typically ambulates at her normal baseline. Hospital course: CT abd/pelvis: Cellulitis involving medial bilateral subcutaneous gluteal fat with   several air droplets noted in the medial superior right buttock. No associated abscess. CT Lspine: No acute fracture. Grade 1 subluxation at L5-S1 with anterolisthesis  of L5 relative to S1 by approximately 2 mm. Multilevel degenerative changes as described level by level in detail   above.

## 2023-01-17 NOTE — PHYSICAL THERAPY INITIAL EVALUATION ADULT - GENERAL OBSERVATIONS, REHAB EVAL
Pt received in ED on stretcher, +IVL, +purewick, +pvt HHA at bedside, A&Ox2 (self, place). Pt a/w fever, +sacral wound on abx.

## 2023-01-18 LAB
-  AMPICILLIN/SULBACTAM: SIGNIFICANT CHANGE UP
-  CEFAZOLIN: SIGNIFICANT CHANGE UP
-  CLINDAMYCIN: SIGNIFICANT CHANGE UP
-  DAPTOMYCIN: SIGNIFICANT CHANGE UP
-  ERYTHROMYCIN: SIGNIFICANT CHANGE UP
-  GENTAMICIN: SIGNIFICANT CHANGE UP
-  LINEZOLID: SIGNIFICANT CHANGE UP
-  OXACILLIN: SIGNIFICANT CHANGE UP
-  PENICILLIN: SIGNIFICANT CHANGE UP
-  RIFAMPIN: SIGNIFICANT CHANGE UP
-  TETRACYCLINE: SIGNIFICANT CHANGE UP
-  TRIMETHOPRIM/SULFAMETHOXAZOLE: SIGNIFICANT CHANGE UP
-  VANCOMYCIN: SIGNIFICANT CHANGE UP
ANION GAP SERPL CALC-SCNC: 12 MMOL/L — SIGNIFICANT CHANGE UP (ref 5–17)
BUN SERPL-MCNC: 22 MG/DL — SIGNIFICANT CHANGE UP (ref 7–23)
CALCIUM SERPL-MCNC: 10.5 MG/DL — SIGNIFICANT CHANGE UP (ref 8.4–10.5)
CHLORIDE SERPL-SCNC: 105 MMOL/L — SIGNIFICANT CHANGE UP (ref 96–108)
CO2 SERPL-SCNC: 19 MMOL/L — LOW (ref 22–31)
CREAT SERPL-MCNC: 1.25 MG/DL — SIGNIFICANT CHANGE UP (ref 0.5–1.3)
CULTURE RESULTS: SIGNIFICANT CHANGE UP
CULTURE RESULTS: SIGNIFICANT CHANGE UP
EGFR: 42 ML/MIN/1.73M2 — LOW
GLUCOSE BLDC GLUCOMTR-MCNC: 144 MG/DL — HIGH (ref 70–99)
GLUCOSE BLDC GLUCOMTR-MCNC: 160 MG/DL — HIGH (ref 70–99)
GLUCOSE BLDC GLUCOMTR-MCNC: 194 MG/DL — HIGH (ref 70–99)
GLUCOSE BLDC GLUCOMTR-MCNC: 232 MG/DL — HIGH (ref 70–99)
GLUCOSE SERPL-MCNC: 126 MG/DL — HIGH (ref 70–99)
METHOD TYPE: SIGNIFICANT CHANGE UP
ORGANISM # SPEC MICROSCOPIC CNT: SIGNIFICANT CHANGE UP
POTASSIUM SERPL-MCNC: 3.9 MMOL/L — SIGNIFICANT CHANGE UP (ref 3.5–5.3)
POTASSIUM SERPL-SCNC: 3.9 MMOL/L — SIGNIFICANT CHANGE UP (ref 3.5–5.3)
SODIUM SERPL-SCNC: 136 MMOL/L — SIGNIFICANT CHANGE UP (ref 135–145)
SPECIMEN SOURCE: SIGNIFICANT CHANGE UP
SPECIMEN SOURCE: SIGNIFICANT CHANGE UP
VANCOMYCIN FLD-MCNC: 13.8 UG/ML — SIGNIFICANT CHANGE UP

## 2023-01-18 PROCEDURE — 99222 1ST HOSP IP/OBS MODERATE 55: CPT

## 2023-01-18 RX ORDER — CHLORHEXIDINE GLUCONATE 213 G/1000ML
1 SOLUTION TOPICAL
Refills: 0 | Status: DISCONTINUED | OUTPATIENT
Start: 2023-01-18 | End: 2023-01-23

## 2023-01-18 RX ADMIN — ESCITALOPRAM OXALATE 5 MILLIGRAM(S): 10 TABLET, FILM COATED ORAL at 13:08

## 2023-01-18 RX ADMIN — FAMOTIDINE 20 MILLIGRAM(S): 10 INJECTION INTRAVENOUS at 13:08

## 2023-01-18 RX ADMIN — AMLODIPINE BESYLATE 5 MILLIGRAM(S): 2.5 TABLET ORAL at 05:32

## 2023-01-18 RX ADMIN — DONEPEZIL HYDROCHLORIDE 10 MILLIGRAM(S): 10 TABLET, FILM COATED ORAL at 22:16

## 2023-01-18 RX ADMIN — Medication 250 MILLIGRAM(S): at 18:19

## 2023-01-18 RX ADMIN — Medication 2: at 13:07

## 2023-01-18 RX ADMIN — Medication 650 MILLIGRAM(S): at 11:32

## 2023-01-18 RX ADMIN — Medication 650 MILLIGRAM(S): at 10:36

## 2023-01-18 RX ADMIN — APIXABAN 5 MILLIGRAM(S): 2.5 TABLET, FILM COATED ORAL at 18:19

## 2023-01-18 RX ADMIN — APIXABAN 5 MILLIGRAM(S): 2.5 TABLET, FILM COATED ORAL at 05:32

## 2023-01-18 RX ADMIN — MEMANTINE HYDROCHLORIDE 5 MILLIGRAM(S): 10 TABLET ORAL at 13:08

## 2023-01-18 RX ADMIN — ATORVASTATIN CALCIUM 40 MILLIGRAM(S): 80 TABLET, FILM COATED ORAL at 22:16

## 2023-01-18 RX ADMIN — CHLORHEXIDINE GLUCONATE 1 APPLICATION(S): 213 SOLUTION TOPICAL at 18:20

## 2023-01-18 RX ADMIN — Medication 1: at 17:44

## 2023-01-18 RX ADMIN — INSULIN GLARGINE 10 UNIT(S): 100 INJECTION, SOLUTION SUBCUTANEOUS at 22:16

## 2023-01-18 RX ADMIN — DORZOLAMIDE HYDROCHLORIDE TIMOLOL MALEATE 1 DROP(S): 20; 5 SOLUTION/ DROPS OPHTHALMIC at 22:16

## 2023-01-18 RX ADMIN — DORZOLAMIDE HYDROCHLORIDE TIMOLOL MALEATE 1 DROP(S): 20; 5 SOLUTION/ DROPS OPHTHALMIC at 08:50

## 2023-01-18 NOTE — PROGRESS NOTE ADULT - SUBJECTIVE AND OBJECTIVE BOX
Chief complaint  Patient is a 87y old  Female who presents with a chief complaint of buttock wound (18 Jan 2023 14:03)   Review of systems  Patient in bed, looks comfortable.    Labs and Fingersticks  CAPILLARY BLOOD GLUCOSE      POCT Blood Glucose.: 160 mg/dL (18 Jan 2023 17:31)  POCT Blood Glucose.: 232 mg/dL (18 Jan 2023 12:48)  POCT Blood Glucose.: 144 mg/dL (18 Jan 2023 08:38)  POCT Blood Glucose.: 211 mg/dL (17 Jan 2023 21:56)      Anion Gap, Serum: 12 (01-18 @ 07:04)  Anion Gap, Serum: 12 (01-17 @ 07:14)      Calcium, Total Serum: 10.5 (01-18 @ 07:04)  Calcium, Total Serum: 10.0 (01-17 @ 07:14)          01-18    136  |  105  |  22  ----------------------------<  126<H>  3.9   |  19<L>  |  1.25    Ca    10.5      18 Jan 2023 07:04                          10.9   15.29 )-----------( 226      ( 17 Jan 2023 07:14 )             35.1     Medications  MEDICATIONS  (STANDING):  amLODIPine   Tablet 5 milliGRAM(s) Oral daily  apixaban 5 milliGRAM(s) Oral two times a day  atorvastatin 40 milliGRAM(s) Oral at bedtime  chlorhexidine 2% Cloths 1 Application(s) Topical <User Schedule>  dextrose 5%. 1000 milliLiter(s) (100 mL/Hr) IV Continuous <Continuous>  dextrose 5%. 1000 milliLiter(s) (50 mL/Hr) IV Continuous <Continuous>  dextrose 50% Injectable 25 Gram(s) IV Push once  dextrose 50% Injectable 12.5 Gram(s) IV Push once  dextrose 50% Injectable 25 Gram(s) IV Push once  donepezil 10 milliGRAM(s) Oral at bedtime  dorzolamide 2%/timolol 0.5% Ophthalmic Solution 1 Drop(s) Both EYES two times a day  escitalopram 5 milliGRAM(s) Oral daily  famotidine    Tablet 20 milliGRAM(s) Oral daily  glucagon  Injectable 1 milliGRAM(s) IntraMuscular once  insulin glargine Injectable (LANTUS) 10 Unit(s) SubCutaneous at bedtime  insulin lispro (ADMELOG) corrective regimen sliding scale   SubCutaneous three times a day before meals  memantine 5 milliGRAM(s) Oral daily  vancomycin  IVPB 1000 milliGRAM(s) IV Intermittent every 24 hours      Physical Exam  General: Patient comfortable in bed  Vital Signs Last 12 Hrs  T(F): 99.1 (01-18-23 @ 11:49), Max: 99.1 (01-18-23 @ 11:49)  HR: 74 (01-18-23 @ 11:49) (74 - 74)  BP: 118/75 (01-18-23 @ 11:49) (118/75 - 118/75)  BP(mean): --  RR: 18 (01-18-23 @ 11:49) (18 - 18)  SpO2: 97% (01-18-23 @ 11:49) (97% - 97%)  Neck: No palpable thyroid nodules.  CVS: S1S2, No murmurs  Respiratory: No wheezing, no crepitations  GI: Abdomen soft, bowel sounds positive  Musculoskeletal:  edema lower extremities.   Skin: No skin rashes, no ecchymosis           Chief complaint  Patient is a 87y old  Female who presents with a chief complaint of buttock wound (18 Jan 2023 14:03)   Review of systems  Patient in bed, looks comfortable.    Labs and Fingersticks  CAPILLARY BLOOD GLUCOSE      POCT Blood Glucose.: 160 mg/dL (18 Jan 2023 17:31)  POCT Blood Glucose.: 232 mg/dL (18 Jan 2023 12:48)  POCT Blood Glucose.: 144 mg/dL (18 Jan 2023 08:38)  POCT Blood Glucose.: 211 mg/dL (17 Jan 2023 21:56)      Anion Gap, Serum: 12 (01-18 @ 07:04)  Anion Gap, Serum: 12 (01-17 @ 07:14)      Calcium, Total Serum: 10.5 (01-18 @ 07:04)  Calcium, Total Serum: 10.0 (01-17 @ 07:14)          01-18    136  |  105  |  22  ----------------------------<  126<H>  3.9   |  19<L>  |  1.25    Ca    10.5      18 Jan 2023 07:04                          10.9   15.29 )-----------( 226      ( 17 Jan 2023 07:14 )             35.1     Medications  MEDICATIONS  (STANDING):  amLODIPine   Tablet 5 milliGRAM(s) Oral daily  apixaban 5 milliGRAM(s) Oral two times a day  atorvastatin 40 milliGRAM(s) Oral at bedtime  chlorhexidine 2% Cloths 1 Application(s) Topical <User Schedule>  dextrose 5%. 1000 milliLiter(s) (100 mL/Hr) IV Continuous <Continuous>  dextrose 5%. 1000 milliLiter(s) (50 mL/Hr) IV Continuous <Continuous>  dextrose 50% Injectable 25 Gram(s) IV Push once  dextrose 50% Injectable 12.5 Gram(s) IV Push once  dextrose 50% Injectable 25 Gram(s) IV Push once  donepezil 10 milliGRAM(s) Oral at bedtime  dorzolamide 2%/timolol 0.5% Ophthalmic Solution 1 Drop(s) Both EYES two times a day  escitalopram 5 milliGRAM(s) Oral daily  famotidine    Tablet 20 milliGRAM(s) Oral daily  glucagon  Injectable 1 milliGRAM(s) IntraMuscular once  insulin glargine Injectable (LANTUS) 10 Unit(s) SubCutaneous at bedtime  insulin lispro (ADMELOG) corrective regimen sliding scale   SubCutaneous three times a day before meals  memantine 5 milliGRAM(s) Oral daily  vancomycin  IVPB 1000 milliGRAM(s) IV Intermittent every 24 hours      Physical Exam  General: Patient comfortable in bed  Vital Signs Last 12 Hrs  T(F): 99.1 (01-18-23 @ 11:49), Max: 99.1 (01-18-23 @ 11:49)  HR: 74 (01-18-23 @ 11:49) (74 - 74)  BP: 118/75 (01-18-23 @ 11:49) (118/75 - 118/75)  BP(mean): --  RR: 18 (01-18-23 @ 11:49) (18 - 18)  SpO2: 97% (01-18-23 @ 11:49) (97% - 97%)  Neck: No palpable thyroid nodules.  CVS: S1S2, No murmurs  Respiratory: No wheezing, no crepitations  GI: Abdomen soft, bowel sounds positive  Musculoskeletal:  edema lower extremities.   Skin: No skin rashes, no ecchymosis

## 2023-01-18 NOTE — PROGRESS NOTE ADULT - SUBJECTIVE AND OBJECTIVE BOX
CC: f/u for left buttocks abscess    Patient reports: discomfort left buttocks    REVIEW OF SYSTEMS:  All other review of systems negative (Comprehensive ROS)    Antimicrobials Day #  :day 2  vancomycin  IVPB 1000 milliGRAM(s) IV Intermittent every 24 hours    Other Medications Reviewed  MEDICATIONS  (STANDING):  amLODIPine   Tablet 5 milliGRAM(s) Oral daily  apixaban 5 milliGRAM(s) Oral two times a day  atorvastatin 40 milliGRAM(s) Oral at bedtime  dextrose 5%. 1000 milliLiter(s) (50 mL/Hr) IV Continuous <Continuous>  dextrose 5%. 1000 milliLiter(s) (100 mL/Hr) IV Continuous <Continuous>  dextrose 50% Injectable 25 Gram(s) IV Push once  dextrose 50% Injectable 12.5 Gram(s) IV Push once  dextrose 50% Injectable 25 Gram(s) IV Push once  donepezil 10 milliGRAM(s) Oral at bedtime  dorzolamide 2%/timolol 0.5% Ophthalmic Solution 1 Drop(s) Both EYES two times a day  escitalopram 5 milliGRAM(s) Oral daily  famotidine    Tablet 20 milliGRAM(s) Oral daily  glucagon  Injectable 1 milliGRAM(s) IntraMuscular once  insulin glargine Injectable (LANTUS) 10 Unit(s) SubCutaneous at bedtime  insulin lispro (ADMELOG) corrective regimen sliding scale   SubCutaneous three times a day before meals  memantine 5 milliGRAM(s) Oral daily  vancomycin  IVPB 1000 milliGRAM(s) IV Intermittent every 24 hours    T(F): 97.6 (01-18-23 @ 04:17), Max: 98.6 (01-17-23 @ 20:10)  HR: 82 (01-18-23 @ 04:17)  BP: 122/71 (01-18-23 @ 04:17)  RR: 18 (01-18-23 @ 04:17)  SpO2: 98% (01-18-23 @ 04:17)  Wt(kg): --    PHYSICAL EXAM:  General: alert, no acute distress  Eyes:  anicteric, no conjunctival injection, no discharge  Oropharynx: no lesions or injection 	  Neck: supple, without adenopathy  Lungs: clear to auscultation  Heart: regular rate and rhythm; no murmur, rubs or gallops  Abdomen: soft, nondistended, nontender, without mass or organomegaly  Skin: left buttocks wound draining, moderate soft tissue induration around central drainage site  Extremities: no clubbing, cyanosis, or edema  Neurologic: alert, oriented, moves all extremities    LAB RESULTS:                        10.9   15.29 )-----------( 226      ( 17 Jan 2023 07:14 )             35.1     01-18    136  |  105  |  22  ----------------------------<  126<H>  3.9   |  19<L>  |  1.25    Ca    10.5      18 Jan 2023 07:04    TPro  7.0  /  Alb  3.3  /  TBili  0.6  /  DBili  x   /  AST  9<L>  /  ALT  5<L>  /  AlkPhos  73  01-16    LIVER FUNCTIONS - ( 16 Jan 2023 15:44 )  Alb: 3.3 g/dL / Pro: 7.0 g/dL / ALK PHOS: 73 U/L / ALT: 5 U/L / AST: 9 U/L / GGT: x             MICROBIOLOGY:  RECENT CULTURES:  01-16 @ 15:30 .Blood Blood     No growth to date.      01-16 @ 15:19 Wound Wound Methicillin resistant Staphylococcus aureus    Moderate Methicillin Resistant Staphylococcus aureus      01-16 @ 15:15 .Blood Blood Blood Culture PCR    Growth in aerobic bottle: Gram positive cocci in pairs  ***Blood Panel PCR results on this specimen are available  approximately 3 hours after the Gram stain result.***  Gram stain, PCR, and/or culture results may not always  correspond due to difference in methodologies.  ************************************************************  This PCR assay was performed by multiplex PCR. This  Assay tests for 66 bacterial and resistance gene targets.  Please refer to the [facility] test directory  at [email or web address] for details.    Growth in aerobic bottle: Gram positive cocci in pairs        RADIOLOGY REVIEWED:    < from: CT Abdomen and Pelvis No Cont (01.16.23 @ 17:01) >  IMPRESSION:  Cellulitis involving medial bilateral subcutaneous gluteal fat with   several air droplets noted in the medial superior right buttock. No   associated abscess    < end of copied text >

## 2023-01-18 NOTE — CONSULT NOTE ADULT - SUBJECTIVE AND OBJECTIVE BOX
Wound SURGERY CONSULT NOTE    HPI:  87-year-old female, history of lung cancer currently on immunotherapy( [facility]), lethargic, bed bound due to weakness w present illness x 1 week, diabetes, dementia, presenting from home with a stage 2 sacral wound.  Noticed 1/14, 2 days ago by home nurse.  Seen by urgent care 1/16, referred here.  Felt subjective fevers earlier today.  No active vomiting, abdominal pain, chest pain, shortness of breath, dysuria, frequency, changes in bowel habits.  No history of similar issues.  She has also been feeling generally weak over the past few weeks to the point that she is having a hard time ambulating, typically ambulates at her normal baseline.  (16 Jan 2023 19:51)      PAST MEDICAL & SURGICAL HISTORY:  Dementia      Obesity      HTN (hypertension)      Diabetes      DVT, lower extremity      MI (myocardial infarction)      Lung cancer      UTI due to extended-spectrum beta lactamase (ESBL) producing Escherichia coli      Pacemaker          REVIEW OF SYSTEMS      General:	    Skin/Breast:  	  Ophthalmologic:  	  ENMT:	    Respiratory and Thorax:  	  Cardiovascular:	    Gastrointestinal:	    Genitourinary:	    Musculoskeletal:	    Neurological:	    Psychiatric:	    Hematology/Lymphatics:	    Endocrine:	    Allergic/Immunologic:	  Pt unable to offer  Skin/ MSK: see HPI  All other systems negative    MEDICATIONS  (STANDING):  amLODIPine   Tablet 5 milliGRAM(s) Oral daily  apixaban 5 milliGRAM(s) Oral two times a day  atorvastatin 40 milliGRAM(s) Oral at bedtime  dextrose 5%. 1000 milliLiter(s) (50 mL/Hr) IV Continuous <Continuous>  dextrose 5%. 1000 milliLiter(s) (100 mL/Hr) IV Continuous <Continuous>  dextrose 50% Injectable 25 Gram(s) IV Push once  dextrose 50% Injectable 12.5 Gram(s) IV Push once  dextrose 50% Injectable 25 Gram(s) IV Push once  donepezil 10 milliGRAM(s) Oral at bedtime  dorzolamide 2%/timolol 0.5% Ophthalmic Solution 1 Drop(s) Both EYES two times a day  escitalopram 5 milliGRAM(s) Oral daily  famotidine    Tablet 20 milliGRAM(s) Oral daily  glucagon  Injectable 1 milliGRAM(s) IntraMuscular once  insulin glargine Injectable (LANTUS) 10 Unit(s) SubCutaneous at bedtime  insulin lispro (ADMELOG) corrective regimen sliding scale   SubCutaneous three times a day before meals  memantine 5 milliGRAM(s) Oral daily  vancomycin  IVPB 1000 milliGRAM(s) IV Intermittent every 24 hours    MEDICATIONS  (PRN):  acetaminophen     Tablet .. 650 milliGRAM(s) Oral every 6 hours PRN Temp greater or equal to 38C (100.4F), Mild Pain (1 - 3)  dextrose Oral Gel 15 Gram(s) Oral once PRN Blood Glucose LESS THAN 70 milliGRAM(s)/deciliter      Allergies    iodine (Hives)  IV contrast (Unknown)  penicillin (Hives)    Intolerances        SOCIAL HISTORY:  / /single/ ; (+)HHA/ lives in SNF; Former smoker, Denies smoking, ETOH, drugs    FAMILY HISTORY:  Unknown status of immunity to COVID-19 virus        Vital Signs Last 24 Hrs  T(C): 37.3 (18 Jan 2023 11:49), Max: 37.3 (18 Jan 2023 11:49)  T(F): 99.1 (18 Jan 2023 11:49), Max: 99.1 (18 Jan 2023 11:49)  HR: 74 (18 Jan 2023 11:49) (74 - 82)  BP: 118/75 (18 Jan 2023 11:49) (118/75 - 138/81)  BP(mean): --  RR: 18 (18 Jan 2023 11:49) (18 - 19)  SpO2: 97% (18 Jan 2023 11:49) (95% - 98%)    Parameters below as of 18 Jan 2023 11:49  Patient On (Oxygen Delivery Method): room air        NAD / gaurded but stable,  A&Ox3/ Alert/ Confused  cachectic/ MO/ Obese/ frail  WD/ WN/ WG/ Disheveled  Total Care Sport/ Versa Care P500 bed/ [place]    Cardiovascular: RRR (+)m    Respiratory: CTA    Gastrointestinal soft NT/ND (+)BS  (+)PEG (+)ostomy    Neurology  weakened strength & sensation grossly intact/ paraesthesia  nonverbal, no follow commands/ paraplegic    Musculoskeletal/Vascular:  ROM  >LE //BLE edema equal  DP/PT pulses palpable  BLE equally warm/ cool  no acute ischemia noted  hemosiderin staining  no deformities/ contractures    Skin:  moist w/ good turgor  frail,  ecchymosis w/o hematoma  serosanguinous drainage  No odor, erythema, increased warmth, tenderness, induration, fluctuance    LABS:  01-18    136  |  105  |  22  ----------------------------<  126<H>  3.9   |  19<L>  |  1.25    Ca    10.5      18 Jan 2023 07:04    TPro  7.0  /  Alb  3.3  /  TBili  0.6  /  DBili  x   /  AST  9<L>  /  ALT  5<L>  /  AlkPhos  73  01-16                          10.9   15.29 )-----------( 226      ( 17 Jan 2023 07:14 )             35.1           RADIOLOGY & ADDITIONAL STUDIES:  Cultures:    A/P:    Compression BLE  Consider ELIZABETH/PVR, XRay, Duplex, MRI, CT  BLE elevation  Abx per Medicine/ ID  Moisturize intact skin w/ SWEEN cream BID  con't Nutrition (as tolerated), Nutrition Consult  con't Offloading   con't Pericare  Care as per medicine will follow w/ you  Upon discharge f/u as outpatient at [facility] [address] [place] [address] [phone]  Seen w/ attng and D/w team  Thank you for this consult  Julia Velez PA-C [id number]     Wound Surgery Consult Note:    HPI:  87-year-old female, history of lung cancer currently on immunotherapy( [facility]), lethargic, bed bound due to weakness w present illness x 1 week, diabetes, dementia, presenting from home with a stage 2 sacral wound.  Noticed 1/14, 2 days ago by home nurse.  Seen by urgent care 1/16, referred here.  Felt subjective fevers earlier today.  No active vomiting, abdominal pain, chest pain, shortness of breath, dysuria, frequency, changes in bowel habits.  No history of similar issues.  She has also been feeling generally weak over the past few weeks to the point that she is having a hard time ambulating, typically ambulates at her normal baseline.  (16 Jan 2023 19:51)      PAST MEDICAL & SURGICAL HISTORY:  Dementia  Obesity  HTN (hypertension)  Diabetes  DVT, lower extremity  MI (myocardial infarction)  Lung cancer  UTI due to extended-spectrum beta lactamase (ESBL) producing Escherichia coli  Pacemaker    REVIEW OF SYSTEMS:    Constitutional:     [ ] negative [x ] fevers [ ] chills [ ] weight loss [ ] weight gain  HEENT:                  [x ] negative [ ] dry eyes [ ] eye irritation [ ] postnasal drip [ ] nasal congestion  CV:                         [x ] negative  [ ] chest pain [ ] orthopnea [ ] palpitations [ ] murmur  Resp:                     [x ] negative [ ] cough [ ] shortness of breath [ ] dyspnea [ ] wheezing [ ] sputum [ ] hemoptysis  GI:                          [x ] negative [ ] nausea [ ] vomiting [ ] diarrhea [ ] constipation [ ] abd pain [ ] dysphagia   :                        [ ] negative [ ] dysuria [ ] nocturia [ ] hematuria [ ] increased urinary frequency [ x] incontinence of urine  Musculoskeletal:      [x ] negative [ ] back pain [ ] myalgias [ ] arthralgias [ ] fracture  Skin:                       [ ] negative [ ] rash [ ] itch [x] wound  Neurological:        [x ] negative [ ] headache [ ] dizziness [ ] syncope [ ] weakness [ ] numbness  Psychiatric:           [x ] negative [ ] anxiety [ ] depression  Endocrine:            [ ] negative [x] diabetes [ ] thyroid problem  Heme/Lymph:      [x ] negative [ ] anemia [ ] bleeding problem  Allergic/Immune: [ x] negative [ ] itchy eyes [ ] nasal discharge [ ] hives [ ] angioedema    [x ] All other systems negative    MEDICATIONS  (STANDING):  amLODIPine   Tablet 5 milliGRAM(s) Oral daily  apixaban 5 milliGRAM(s) Oral two times a day  atorvastatin 40 milliGRAM(s) Oral at bedtime  dextrose 5%. 1000 milliLiter(s) (50 mL/Hr) IV Continuous <Continuous>  dextrose 5%. 1000 milliLiter(s) (100 mL/Hr) IV Continuous <Continuous>  dextrose 50% Injectable 25 Gram(s) IV Push once  dextrose 50% Injectable 12.5 Gram(s) IV Push once  dextrose 50% Injectable 25 Gram(s) IV Push once  donepezil 10 milliGRAM(s) Oral at bedtime  dorzolamide 2%/timolol 0.5% Ophthalmic Solution 1 Drop(s) Both EYES two times a day  escitalopram 5 milliGRAM(s) Oral daily  famotidine    Tablet 20 milliGRAM(s) Oral daily  glucagon  Injectable 1 milliGRAM(s) IntraMuscular once  insulin glargine Injectable (LANTUS) 10 Unit(s) SubCutaneous at bedtime  insulin lispro (ADMELOG) corrective regimen sliding scale   SubCutaneous three times a day before meals  memantine 5 milliGRAM(s) Oral daily  vancomycin  IVPB 1000 milliGRAM(s) IV Intermittent every 24 hours    MEDICATIONS  (PRN):  acetaminophen     Tablet .. 650 milliGRAM(s) Oral every 6 hours PRN Temp greater or equal to 38C (100.4F), Mild Pain (1 - 3)  dextrose Oral Gel 15 Gram(s) Oral once PRN Blood Glucose LESS THAN 70 milliGRAM(s)/deciliter    Allergies    iodine (Hives)  IV contrast (Unknown)  penicillin (Hives)    Intolerances    SOCIAL HISTORY:  , Denies smoking, ETOH, drugs    FAMILY HISTORY:  Unknown status of immunity to COVID-19 virus  no pertinent family history among first degree relatives    Vital Signs Last 24 Hrs  T(C): 37.3 (18 Jan 2023 11:49), Max: 37.3 (18 Jan 2023 11:49)  T(F): 99.1 (18 Jan 2023 11:49), Max: 99.1 (18 Jan 2023 11:49)  HR: 74 (18 Jan 2023 11:49) (74 - 82)  BP: 118/75 (18 Jan 2023 11:49) (118/75 - 138/81)  BP(mean): --  RR: 18 (18 Jan 2023 11:49) (18 - 19)  SpO2: 97% (18 Jan 2023 11:49) (95% - 98%)    Parameters below as of 18 Jan 2023 11:49  Patient On (Oxygen Delivery Method): room air    Physical Exam:  General: Alert, obese  Ophthamology: sclera clear  ENMT: moist mucous membranes, trachea midline  Respiratory: equal chest rise with respirations  Gastrointestinal: soft NT/ND  Neurology: nonverbal, not following commands  Psych: calm, appropriate  Musculoskeletal: no contractures  Vascular: BLE edema equal  Skin:  Right buttocks wound with wound bed 90% covered with slough, +TTP, + induration + erythema, prior to debridement, after debridement, wound bed is free of necrotic tissue, less induration,  L 3cm X W 3cm x D 1.5cm and tunneling at 5 oclock to 3 cm,  with beefy red visible wound bed, and large amount of serosanguinous drainage  Left buttock with corresponding kissing wound L 3cm xW 3cm x D 0.1, superficially denuded, no erythema, induration or TTP, wound bed is pink, free of necrotic tissue, scant serosanguinous drainage  No odor, increased warmth, fluctuance    LABS:  01-18    136  |  105  |  22  ----------------------------<  126<H>  3.9   |  19<L>  |  1.25    Ca    10.5      18 Jan 2023 07:04    TPro  7.0  /  Alb  3.3  /  TBili  0.6  /  DBili  x   /  AST  9<L>  /  ALT  5<L>  /  AlkPhos  73  01-16                          10.9   15.29 )-----------( 226      ( 17 Jan 2023 07:14 )             35.1           RADIOLOGY & ADDITIONAL STUDIES:    EXAM:  CT ABDOMEN AND PELVIS                          PROCEDURE DATE:  01/16/2023          INTERPRETATION:  CLINICAL INFORMATION: 87-year-old female with sacral   wound    COMPARISON: CT 01/13/2022    CONTRAST/COMPLICATIONS:  IV Contrast: NONE  Oral Contrast: NONE  Complications: None reported at time of study completion    PROCEDURE:  CT of the Abdomen and Pelvis was performed.  Sagittal and coronal reformats were performed.    FINDINGS:  LOWER CHEST: Intracardiac pacemaker leads.    LIVER: Within normal limits.  BILE DUCTS: Normal caliber.  GALLBLADDER: Within normal limits.  SPLEEN: Within normal limits.  PANCREAS: Within normal limits.  ADRENALS: Within normal limits.  KIDNEYS/URETERS: Right renal cyst.    BLADDER: Within normal limits.  REPRODUCTIVE ORGANS: Uterus and adnexa within normal limits.    BOWEL: No bowel obstruction. Appendix not visualized. No pericecal   inflammation.  PERITONEUM: No ascites.  VESSELS: Infrarenal inferior vena cava filter.  RETROPERITONEUM/LYMPH NODES: No lymphadenopathy.  ABDOMINAL WALL: Subcutaneous inflammation extending inferiorly from the   lower coccyx with several air droplets in the superior right gluteal fat.   Inflammation extends predominantly in the medial left buttock   subcutaneous fat. No associated abscess  BONES: Degenerative change.    IMPRESSION:  Cellulitis involving medial bilateral subcutaneous gluteal fat with   several air droplets noted in the medial superior right buttock. No   associated abscess     Wound Surgery Consult Note:    HPI:  87-year-old female, history of lung cancer currently on immunotherapy( [facility]), lethargic, bed bound due to weakness w present illness x 1 week, diabetes, dementia, presenting from home with a stage 2 sacral wound.  Noticed 1/14, 2 days ago by home nurse.  Seen by urgent care 1/16, referred here.  Felt subjective fevers earlier today.  No active vomiting, abdominal pain, chest pain, shortness of breath, dysuria, frequency, changes in bowel habits.  No history of similar issues.  She has also been feeling generally weak over the past few weeks to the point that she is having a hard time ambulating, typically ambulates at her normal baseline.  (16 Jan 2023 19:51)    Request for wound care consult for bilateral buttocks wounds received from Dr. Davis. Ms. Yanez was encountered on an alternating air with low air loss surface and was seen with Dr. Munoz. Ms. Yanez stated that she walks with a walker but not a lot. She is incontinent of urine and stool and states that she has had pain in her buttocks for about 1 month. On exam, the Left buttock had a wound with a wound bed 100% covered with slough and surrounding erythema, pain with palpation and induration. Wound debrided of subcutaneous tissue and loculations broken up by Dr. Munoz.    PAST MEDICAL & SURGICAL HISTORY:  Dementia  Obesity  HTN (hypertension)  Diabetes  DVT, lower extremity  MI (myocardial infarction)  Lung cancer  UTI due to extended-spectrum beta lactamase (ESBL) producing Escherichia coli  Pacemaker    REVIEW OF SYSTEMS:    Constitutional:     [ ] negative [x ] fevers [ ] chills [ ] weight loss [ ] weight gain  HEENT:                  [x ] negative [ ] dry eyes [ ] eye irritation [ ] postnasal drip [ ] nasal congestion  CV:                         [x ] negative  [ ] chest pain [ ] orthopnea [ ] palpitations [ ] murmur  Resp:                     [x ] negative [ ] cough [ ] shortness of breath [ ] dyspnea [ ] wheezing [ ] sputum [ ] hemoptysis  GI:                          [x ] negative [ ] nausea [ ] vomiting [ ] diarrhea [ ] constipation [ ] abd pain [ ] dysphagia   :                        [ ] negative [ ] dysuria [ ] nocturia [ ] hematuria [ ] increased urinary frequency [ x] incontinence of urine  Musculoskeletal:      [x ] negative [ ] back pain [ ] myalgias [ ] arthralgias [ ] fracture  Skin:                       [ ] negative [ ] rash [ ] itch [x] wound  Neurological:        [x ] negative [ ] headache [ ] dizziness [ ] syncope [ ] weakness [ ] numbness  Psychiatric:           [x ] negative [ ] anxiety [ ] depression  Endocrine:            [ ] negative [x] diabetes [ ] thyroid problem  Heme/Lymph:      [x ] negative [ ] anemia [ ] bleeding problem  Allergic/Immune: [ x] negative [ ] itchy eyes [ ] nasal discharge [ ] hives [ ] angioedema    [x ] All other systems negative    MEDICATIONS  (STANDING):  amLODIPine   Tablet 5 milliGRAM(s) Oral daily  apixaban 5 milliGRAM(s) Oral two times a day  atorvastatin 40 milliGRAM(s) Oral at bedtime  dextrose 5%. 1000 milliLiter(s) (50 mL/Hr) IV Continuous <Continuous>  dextrose 5%. 1000 milliLiter(s) (100 mL/Hr) IV Continuous <Continuous>  dextrose 50% Injectable 25 Gram(s) IV Push once  dextrose 50% Injectable 12.5 Gram(s) IV Push once  dextrose 50% Injectable 25 Gram(s) IV Push once  donepezil 10 milliGRAM(s) Oral at bedtime  dorzolamide 2%/timolol 0.5% Ophthalmic Solution 1 Drop(s) Both EYES two times a day  escitalopram 5 milliGRAM(s) Oral daily  famotidine    Tablet 20 milliGRAM(s) Oral daily  glucagon  Injectable 1 milliGRAM(s) IntraMuscular once  insulin glargine Injectable (LANTUS) 10 Unit(s) SubCutaneous at bedtime  insulin lispro (ADMELOG) corrective regimen sliding scale   SubCutaneous three times a day before meals  memantine 5 milliGRAM(s) Oral daily  vancomycin  IVPB 1000 milliGRAM(s) IV Intermittent every 24 hours    MEDICATIONS  (PRN):  acetaminophen     Tablet .. 650 milliGRAM(s) Oral every 6 hours PRN Temp greater or equal to 38C (100.4F), Mild Pain (1 - 3)  dextrose Oral Gel 15 Gram(s) Oral once PRN Blood Glucose LESS THAN 70 milliGRAM(s)/deciliter    Allergies    iodine (Hives)  IV contrast (Unknown)  penicillin (Hives)    Intolerances    SOCIAL HISTORY:  , Denies smoking, ETOH, drugs    FAMILY HISTORY:  Unknown status of immunity to COVID-19 virus  no pertinent family history among first degree relatives    Vital Signs Last 24 Hrs  T(C): 37.3 (18 Jan 2023 11:49), Max: 37.3 (18 Jan 2023 11:49)  T(F): 99.1 (18 Jan 2023 11:49), Max: 99.1 (18 Jan 2023 11:49)  HR: 74 (18 Jan 2023 11:49) (74 - 82)  BP: 118/75 (18 Jan 2023 11:49) (118/75 - 138/81)  BP(mean): --  RR: 18 (18 Jan 2023 11:49) (18 - 19)  SpO2: 97% (18 Jan 2023 11:49) (95% - 98%)    Parameters below as of 18 Jan 2023 11:49  Patient On (Oxygen Delivery Method): room air    Physical Exam:  General: Alert, obese  Ophthamology: sclera clear  ENMT: moist mucous membranes, trachea midline  Respiratory: equal chest rise with respirations  Gastrointestinal: soft NT/ND  Neurology: nonverbal, not following commands  Psych: calm, appropriate  Musculoskeletal: no contractures  Vascular: BLE edema equal  Skin:  Right buttocks wound with wound bed 90% covered with slough, +TTP, + induration + erythema, prior to debridement, after debridement, wound bed is free of necrotic tissue, less induration,  L 3cm X W 3cm x D 1.5cm and tunneling at 5 oclock to 3 cm,  with beefy red visible wound bed, and large amount of serosanguinous drainage  Left buttock with corresponding kissing wound L 3cm xW 3cm x D 0.1, superficially denuded, no erythema, induration or TTP, wound bed is pink, free of necrotic tissue, scant serosanguinous drainage  No odor, increased warmth, fluctuance    LABS:  01-18    136  |  105  |  22  ----------------------------<  126<H>  3.9   |  19<L>  |  1.25    Ca    10.5      18 Jan 2023 07:04    TPro  7.0  /  Alb  3.3  /  TBili  0.6  /  DBili  x   /  AST  9<L>  /  ALT  5<L>  /  AlkPhos  73  01-16                          10.9   15.29 )-----------( 226      ( 17 Jan 2023 07:14 )             35.1           RADIOLOGY & ADDITIONAL STUDIES:    EXAM:  CT ABDOMEN AND PELVIS                          PROCEDURE DATE:  01/16/2023          INTERPRETATION:  CLINICAL INFORMATION: 87-year-old female with sacral   wound    COMPARISON: CT 01/13/2022    CONTRAST/COMPLICATIONS:  IV Contrast: NONE  Oral Contrast: NONE  Complications: None reported at time of study completion    PROCEDURE:  CT of the Abdomen and Pelvis was performed.  Sagittal and coronal reformats were performed.    FINDINGS:  LOWER CHEST: Intracardiac pacemaker leads.    LIVER: Within normal limits.  BILE DUCTS: Normal caliber.  GALLBLADDER: Within normal limits.  SPLEEN: Within normal limits.  PANCREAS: Within normal limits.  ADRENALS: Within normal limits.  KIDNEYS/URETERS: Right renal cyst.    BLADDER: Within normal limits.  REPRODUCTIVE ORGANS: Uterus and adnexa within normal limits.    BOWEL: No bowel obstruction. Appendix not visualized. No pericecal   inflammation.  PERITONEUM: No ascites.  VESSELS: Infrarenal inferior vena cava filter.  RETROPERITONEUM/LYMPH NODES: No lymphadenopathy.  ABDOMINAL WALL: Subcutaneous inflammation extending inferiorly from the   lower coccyx with several air droplets in the superior right gluteal fat.   Inflammation extends predominantly in the medial left buttock   subcutaneous fat. No associated abscess  BONES: Degenerative change.    IMPRESSION:  Cellulitis involving medial bilateral subcutaneous gluteal fat with   several air droplets noted in the medial superior right buttock. No   associated abscess    Procedure Note:    Using aseptic technique, the left buttock wound was excisionally debrided of subcutaneous tissue with scissors and forceps. Patient tolerated well. EBL 2cc.    Preprocedure measurements:  L 3cm X W 3cm x D 0.1cm    Postprocedure measurements:  L 3cm X W 3cm x D 1.5cm and tunneling at 5 oclock to 3 cm   Wound Surgery Consult Note:    HPI:  87-year-old female, history of lung cancer currently on immunotherapy( [facility]), lethargic, bed bound due to weakness w present illness x 1 week, diabetes, dementia, presenting from home with a stage 2 sacral wound.  Noticed 1/14, 2 days ago by home nurse.  Seen by urgent care 1/16, referred here.  Felt subjective fevers earlier today.  No active vomiting, abdominal pain, chest pain, shortness of breath, dysuria, frequency, changes in bowel habits.  No history of similar issues.  She has also been feeling generally weak over the past few weeks to the point that she is having a hard time ambulating, typically ambulates at her normal baseline.  (16 Jan 2023 19:51)    Request for wound care consult for bilateral buttocks wounds received from Dr. Davis. Ms. Yanez was encountered on an alternating air with low air loss surface and was seen with Dr. Munoz. Ms. Yanez stated that she walks with a walker but not a lot. She is incontinent of urine and stool and states that she has had pain in her buttocks for about 1 month. On exam, the Left buttock had a wound with a wound bed 100% covered with slough and surrounding erythema, pain with palpation and induration. Wound debrided of subcutaneous tissue and loculations broken up by Dr. Munoz.    PAST MEDICAL & SURGICAL HISTORY:  Dementia  Obesity  HTN (hypertension)  Diabetes  DVT, lower extremity  MI (myocardial infarction)  Lung cancer  UTI due to extended-spectrum beta lactamase (ESBL) producing Escherichia coli  Pacemaker    REVIEW OF SYSTEMS:    Constitutional:     [ ] negative [x ] fevers [ ] chills [ ] weight loss [ ] weight gain  HEENT:                  [x ] negative [ ] dry eyes [ ] eye irritation [ ] postnasal drip [ ] nasal congestion  CV:                         [x ] negative  [ ] chest pain [ ] orthopnea [ ] palpitations [ ] murmur  Resp:                     [x ] negative [ ] cough [ ] shortness of breath [ ] dyspnea [ ] wheezing [ ] sputum [ ] hemoptysis  GI:                          [x ] negative [ ] nausea [ ] vomiting [ ] diarrhea [ ] constipation [ ] abd pain [ ] dysphagia   :                        [ ] negative [ ] dysuria [ ] nocturia [ ] hematuria [ ] increased urinary frequency [ x] incontinence of urine  Musculoskeletal:      [x ] negative [ ] back pain [ ] myalgias [ ] arthralgias [ ] fracture  Skin:                       [ ] negative [ ] rash [ ] itch [x] wound  Neurological:        [x ] negative [ ] headache [ ] dizziness [ ] syncope [ ] weakness [ ] numbness  Psychiatric:           [x ] negative [ ] anxiety [ ] depression  Endocrine:            [ ] negative [x] diabetes [ ] thyroid problem  Heme/Lymph:      [x ] negative [ ] anemia [ ] bleeding problem  Allergic/Immune: [ x] negative [ ] itchy eyes [ ] nasal discharge [ ] hives [ ] angioedema    [x ] All other systems negative    MEDICATIONS  (STANDING):  amLODIPine   Tablet 5 milliGRAM(s) Oral daily  apixaban 5 milliGRAM(s) Oral two times a day  atorvastatin 40 milliGRAM(s) Oral at bedtime  dextrose 5%. 1000 milliLiter(s) (50 mL/Hr) IV Continuous <Continuous>  dextrose 5%. 1000 milliLiter(s) (100 mL/Hr) IV Continuous <Continuous>  dextrose 50% Injectable 25 Gram(s) IV Push once  dextrose 50% Injectable 12.5 Gram(s) IV Push once  dextrose 50% Injectable 25 Gram(s) IV Push once  donepezil 10 milliGRAM(s) Oral at bedtime  dorzolamide 2%/timolol 0.5% Ophthalmic Solution 1 Drop(s) Both EYES two times a day  escitalopram 5 milliGRAM(s) Oral daily  famotidine    Tablet 20 milliGRAM(s) Oral daily  glucagon  Injectable 1 milliGRAM(s) IntraMuscular once  insulin glargine Injectable (LANTUS) 10 Unit(s) SubCutaneous at bedtime  insulin lispro (ADMELOG) corrective regimen sliding scale   SubCutaneous three times a day before meals  memantine 5 milliGRAM(s) Oral daily  vancomycin  IVPB 1000 milliGRAM(s) IV Intermittent every 24 hours    MEDICATIONS  (PRN):  acetaminophen     Tablet .. 650 milliGRAM(s) Oral every 6 hours PRN Temp greater or equal to 38C (100.4F), Mild Pain (1 - 3)  dextrose Oral Gel 15 Gram(s) Oral once PRN Blood Glucose LESS THAN 70 milliGRAM(s)/deciliter    Allergies    iodine (Hives)  IV contrast (Unknown)  penicillin (Hives)    Intolerances    SOCIAL HISTORY:  , Denies smoking, ETOH, drugs    FAMILY HISTORY:  Unknown status of immunity to COVID-19 virus  no pertinent family history among first degree relatives    Vital Signs Last 24 Hrs  T(C): 37.3 (18 Jan 2023 11:49), Max: 37.3 (18 Jan 2023 11:49)  T(F): 99.1 (18 Jan 2023 11:49), Max: 99.1 (18 Jan 2023 11:49)  HR: 74 (18 Jan 2023 11:49) (74 - 82)  BP: 118/75 (18 Jan 2023 11:49) (118/75 - 138/81)  BP(mean): --  RR: 18 (18 Jan 2023 11:49) (18 - 19)  SpO2: 97% (18 Jan 2023 11:49) (95% - 98%)    Parameters below as of 18 Jan 2023 11:49  Patient On (Oxygen Delivery Method): room air    Physical Exam:  General: Alert, obese  Ophthamology: sclera clear  ENMT: moist mucous membranes, trachea midline  Respiratory: equal chest rise with respirations  Gastrointestinal: soft NT/ND  Neurology: follows commands  Psych: calm, appropriate  Musculoskeletal: no contractures  Vascular: BLE edema equal  Skin:  Left  buttocks wound with wound bed 90% covered with slough with 2 small openings draining purulent fluid.  +TTP, + induration + erythema.  No fluctuance, crepitus or odor.  Wound probed with cotton tip applicator.  Undermining present.  Loculations broken up with cotton tip applicator and purulent drainage expressed.   Wound debrided (see procedure note below)  Wound after debridement, wound bed is free of necrotic tissue, less induration,  L 3cm X W 3cm x D 1.5cm and tunneling at 5 oclock to 3 cm,  with beefy red visible wound bed, and large amount of serosanguinous drainage  Right buttock with corresponding kissing wound L 3cm xW 3cm x D 0, superficially denuded, no erythema, induration or TTP, wound bed is pink, free of necrotic tissue, scant serous  drainage  No odor, increased warmth, fluctuance    Procedure Note:  Using aseptic technique, the left buttock wound underwent a selective excisional debridement of necrotic tissue up to and including subcutaneous tissue with scissors and forceps.  Patient tolerated well. EBL 2cc.  Hemostasis maintained  Preprocedure measurements:  L 3cm X W 3cm x D 0.1cm  Postprocedure measurements:  L 3cm X W 3cm x D 1.5cm and tunneling at 5 oclock to 3 cm        LABS:  01-18    136  |  105  |  22  ----------------------------<  126<H>  3.9   |  19<L>  |  1.25    Ca    10.5      18 Jan 2023 07:04    TPro  7.0  /  Alb  3.3  /  TBili  0.6  /  DBili  x   /  AST  9<L>  /  ALT  5<L>  /  AlkPhos  73  01-16                          10.9   15.29 )-----------( 226      ( 17 Jan 2023 07:14 )             35.1           RADIOLOGY & ADDITIONAL STUDIES:    EXAM:  CT ABDOMEN AND PELVIS                          PROCEDURE DATE:  01/16/2023          INTERPRETATION:  CLINICAL INFORMATION: 87-year-old female with sacral   wound    COMPARISON: CT 01/13/2022    CONTRAST/COMPLICATIONS:  IV Contrast: NONE  Oral Contrast: NONE  Complications: None reported at time of study completion    PROCEDURE:  CT of the Abdomen and Pelvis was performed.  Sagittal and coronal reformats were performed.    FINDINGS:  LOWER CHEST: Intracardiac pacemaker leads.    LIVER: Within normal limits.  BILE DUCTS: Normal caliber.  GALLBLADDER: Within normal limits.  SPLEEN: Within normal limits.  PANCREAS: Within normal limits.  ADRENALS: Within normal limits.  KIDNEYS/URETERS: Right renal cyst.    BLADDER: Within normal limits.  REPRODUCTIVE ORGANS: Uterus and adnexa within normal limits.    BOWEL: No bowel obstruction. Appendix not visualized. No pericecal   inflammation.  PERITONEUM: No ascites.  VESSELS: Infrarenal inferior vena cava filter.  RETROPERITONEUM/LYMPH NODES: No lymphadenopathy.  ABDOMINAL WALL: Subcutaneous inflammation extending inferiorly from the   lower coccyx with several air droplets in the superior right gluteal fat.   Inflammation extends predominantly in the medial left buttock   subcutaneous fat. No associated abscess  BONES: Degenerative change.    IMPRESSION:  Cellulitis involving medial bilateral subcutaneous gluteal fat with   several air droplets noted in the medial superior right buttock. No   associated abscess

## 2023-01-18 NOTE — PROGRESS NOTE ADULT - ASSESSMENT
Assessment  DMT2: 87y Female with DM T2 with hyperglycemia admitted with cellulitis, patient was on oral hypoglycemic agents at home, now on basal and insulin coverage, blood sugars improving, no hypoglycemic episode,  eating meals.    Cellulitis: On medications, monitored, afebrile.  HTN: On antihypertensive medications, monitored, asymptomatic.              Melania Marques MD  Cell:  [phone]  Office: [phone]               Assessment  DMT2: 87y Female with DM T2 with hyperglycemia admitted with cellulitis, patient was on oral hypoglycemic  agents at home, now on basal and insulin coverage, blood sugars improving, no hypoglycemic episode,  eating meals.    Cellulitis: On medications, monitored, afebrile.  HTN: On antihypertensive medications, monitored, asymptomatic.              Melania Marques MD  Cell:  [phone]  Office: [phone]

## 2023-01-18 NOTE — PROGRESS NOTE ADULT - ASSESSMENT
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   Her aide on 1/14 reported noticing a small bump on her left buttock. This progressively increased to a large size with pain as of 1/16/23.  Taken to an [facility] & sent to the ER.    Here found with a temp of 99.4F with leukocytosis of 16.44K.   She was given a dose of vanco, cefepime & flagyl.   Wound was swabbed & cx now with Staph aureus  Blood cx with 1/2 GPC in pairs.  Exam with left large area of buttock cellulitis with bloody discharge from an area of skin breakdown.   He blood isolate is a coag negative staph, this is likely a contaminant.  Her wound is growing MRSA  Cefepime was stopped 1/16  PLAN:  Continue vancomycin, following exam.   Monitor vanco trough levels-goal is 10-15, her level is 13.8, this is acceptable,  Repeat blood cx  but  Coag negative staph is likely a contaminant  Local wound care to left buttocks  Follow ID of GPC in pairs from ER blood cx  Thanks will follow

## 2023-01-18 NOTE — PROGRESS NOTE ADULT - SUBJECTIVE AND OBJECTIVE BOX
Patient is a 87y old  Female who presents with a chief complaint of Left buttocks infection (18 Jan 2023 11:36)    Patient seen and examined at bedside this morning. Patient noted resting comfortably in bed, offers no new complaints.     MEDICATIONS  (STANDING):  amLODIPine   Tablet 5 milliGRAM(s) Oral daily  apixaban 5 milliGRAM(s) Oral two times a day  atorvastatin 40 milliGRAM(s) Oral at bedtime  dextrose 5%. 1000 milliLiter(s) (50 mL/Hr) IV Continuous <Continuous>  dextrose 5%. 1000 milliLiter(s) (100 mL/Hr) IV Continuous <Continuous>  dextrose 50% Injectable 25 Gram(s) IV Push once  dextrose 50% Injectable 12.5 Gram(s) IV Push once  dextrose 50% Injectable 25 Gram(s) IV Push once  donepezil 10 milliGRAM(s) Oral at bedtime  dorzolamide 2%/timolol 0.5% Ophthalmic Solution 1 Drop(s) Both EYES two times a day  escitalopram 5 milliGRAM(s) Oral daily  famotidine    Tablet 20 milliGRAM(s) Oral daily  glucagon  Injectable 1 milliGRAM(s) IntraMuscular once  insulin glargine Injectable (LANTUS) 10 Unit(s) SubCutaneous at bedtime  insulin lispro (ADMELOG) corrective regimen sliding scale   SubCutaneous three times a day before meals  memantine 5 milliGRAM(s) Oral daily  vancomycin  IVPB 1000 milliGRAM(s) IV Intermittent every 24 hours    MEDICATIONS  (PRN):  acetaminophen     Tablet .. 650 milliGRAM(s) Oral every 6 hours PRN Temp greater or equal to 38C (100.4F), Mild Pain (1 - 3)  dextrose Oral Gel 15 Gram(s) Oral once PRN Blood Glucose LESS THAN 70 milliGRAM(s)/deciliter      ROS  No fever, sweats, chills  No epistaxis, HA, sore throat  No CP, SOB, cough, sputum  No n/v/d, abd pain, melena, hematochezia  No edema  No rash  No anxiety  No back pain, joint pain  No bleeding, bruising  No dysuria, hematuria    Vital Signs Last 24 Hrs  T(C): 37.3 (18 Jan 2023 11:49), Max: 37.3 (18 Jan 2023 11:49)  T(F): 99.1 (18 Jan 2023 11:49), Max: 99.1 (18 Jan 2023 11:49)  HR: 74 (18 Jan 2023 11:49) (74 - 82)  BP: 118/75 (18 Jan 2023 11:49) (118/75 - 138/81)  BP(mean): --  RR: 18 (18 Jan 2023 11:49) (18 - 19)  SpO2: 97% (18 Jan 2023 11:49) (95% - 98%)    Parameters below as of 18 Jan 2023 11:49  Patient On (Oxygen Delivery Method): room air        PE  NAD  Awake, alert  Anicteric, MMM  RRR  CTAB  Abd soft, NT, ND  No c/c/e  No rash grossly                            10.9   15.29 )-----------( 226      ( 17 Jan 2023 07:14 )             35.1       01-18    136  |  105  |  22  ----------------------------<  126<H>  3.9   |  19<L>  |  1.25    Ca    10.5      18 Jan 2023 07:04    TPro  7.0  /  Alb  3.3  /  TBili  0.6  /  DBili  x   /  AST  9<L>  /  ALT  5<L>  /  AlkPhos  73  01-16

## 2023-01-18 NOTE — CONSULT NOTE ADULT - ASSESSMENT
Impression:    Left Buttocks wound with soft tissue infection/cellulitis  Right buttocks stage 2 pressure injury present on admission  Incontinence of bowel and bladder  Incontinence Dermatitis    Recommend:  1.) topical therapy: Left buttock wound - irrigate with NS, pat dry, pack loosely with iodoform 1/4" strip packing, cover with DSD   2.) Incontinence Management - incontinence cleanser, pads, pericare BID, continue external female urinary catheter  3.) Maintain on an alternating air with low air loss surface  4.) Turn and reposition Q 2 hours  5.) Nutrition optimization  6.) Offload heels/feet with complete cair air fluidized boots; ensure that the soles of the feet are not resting on the foot board of the bed.    Care as per medicine. Will not actively follow but will remain available. Please recall for new issues or deterioration.  Upon discharge f/u as outpatient at [facility] [address] [place] [address] [phone]  Thank you for this consult  Charis Suazo, BRAULIO-C, CWOCN [id number] Impression:    Left Buttocks wound/unstageable pressure injury present on admission with soft tissue infection/cellulitis  Right buttocks stage 2 pressure injury present on admission  Incontinence of bowel and bladder  Incontinence Dermatitis    Recommend:  1.) topical therapy: Left buttock wound - irrigate with NS, pat dry, pack loosely with iodoform 1/4" strip packing, cover with DSD  twice daily  2.) Antibiotics per Medicine/ID  3.) Maintain on an alternating air with low air loss surface  4.) Turn and reposition Q 2 hours  5.) Nutrition optimization  6.) Offload heels/feet with complete cair air fluidized boots; ensure that the soles of the feet are not resting on the foot board of the bed.  7.) Incontinence Management - incontinence cleanser, pads, pericare BID, continue external female urinary catheter    Care as per medicine. Will follow with you.  Upon discharge f/u as outpatient at [facility] [address] [place] [address] [phone]  Thank you for this consult  Dr. Munoz discussed case with Dr. Davis. Case discussed with primary team ZAIRA Suazo, BRAULIO-C, CWOCN [id number] Impression:    Left Buttocks wound/unstageable pressure injury present on admission with soft tissue infection/cellulitis  Right buttocks stage 2 pressure injury present on admission  Incontinence of bowel and bladder  Incontinence Dermatitis    Recommend:  1.) topical therapy: Left buttock wound - irrigate with NS, pat dry, pack loosely with iodoform 1/4" strip packing, cover with DSD  twice daily  RIght buttocks wound - cleanse with NS, pat dry, apply allevyn foam dressing daily  2.) Antibiotics per Medicine/ID  3.) Maintain on an alternating air with low air loss surface  4.) Turn and reposition Q 2 hours  5.) Nutrition optimization  6.) Offload heels/feet with complete cair air fluidized boots; ensure that the soles of the feet are not resting on the foot board of the bed.  7.) Incontinence Management - incontinence cleanser, pads, pericare BID, continue external female urinary catheter    Care as per medicine. Will follow with you.  Upon discharge f/u as outpatient at [facility] [address] [place] [address] [phone]  Thank you for this consult  Dr. Munoz discussed case with Dr. Davis. Case discussed with primary team ZAIRA Suazo, NP-C, CWOCN [id number] Impression:    Left Buttocks wound/unstageable pressure injury present on admission with soft tissue infection/cellulitis/abscess  Right buttocks stage 2 pressure injury present on admission  Incontinence of bowel and bladder      Recommend:  1.) topical therapy: Left buttock wound - irrigate with NS, pat dry, pack loosely with iodoform 1/4" strip packing, cover with DSD  twice daily  RIght buttocks wound - cleanse with NS, pat dry, apply allevyn foam dressing daily  2.) Antibiotics per Medicine/ID  3.) Maintain on an alternating air with low air loss surface  4.) Turn and reposition Q 2 hours  5.) Nutrition optimization  6.) Offload heels/feet with complete cair air fluidized boots; ensure that the soles of the feet are not resting on the foot board of the bed.  7.) Incontinence Management - incontinence cleanser, pads, pericare BID, continue external female urinary catheter    Care as per medicine. Will follow with you.  Upon discharge f/u as outpatient at [facility] [address] [place] [address] [phone]  Thank you for this consult  Dr. Munoz discussed case with Dr. Davis. Case discussed with primary team ZAIRA Suazo, NP-C, CWOCN [id number]

## 2023-01-18 NOTE — PROGRESS NOTE ADULT - ASSESSMENT
Patient is a 87y old  Female who presents with a chief complaint of pressure injury    Lung Adenocarcinoma  --under the care of Dr Gurdeep Shapre of [facility]  --stage 4, w/ mets to pleura   --currently on treatment w/ pembro LD: 1/9  --head CT done 1/3/23; shows no significant interval change and without evidence for acute intracranial hemorrhage or definite intracranial masses.   --ongoing care after discharge    pressure injury  --CT demonstrates cellulitis without underlying soft tissue infection or free air suggestive of necrotizing process.  --on IV abx  --care per primary team  --wound + MRSA  --care per primary team                    will follow and coordinate care with [facility]    Maribel Tijerina NP  Hematology/ Oncology  [facility]  [phone] (office)  [phone] (alt office)  Evenings and weekends please call MD on call or office

## 2023-01-18 NOTE — PROGRESS NOTE ADULT - SUBJECTIVE AND OBJECTIVE BOX
Overnight events noted      VITAL:  T(C): , Max: 37 (01-17-23 @ 20:10)  T(F): , Max: 98.6 (01-17-23 @ 20:10)  HR: 82 (01-18-23 @ 04:17)  BP: 122/71 (01-18-23 @ 04:17)  BP(mean): --  RR: 18 (01-18-23 @ 04:17)  SpO2: 98% (01-18-23 @ 04:17)      PHYSICAL EXAM:  Constitutional: NAD, Alert  HEENT: NCAT, DMM  Neck: Supple, No JVD  Respiratory: CTA-b/l  Cardiovascular: RRR s1s2, no m/r/g  Gastrointestinal: BS+, soft, NT/ND  Extremities: No peripheral edema b/l  Neurological: no focal deficits; strength grossly intact  Back: no CVAT b/l  Skin: (+)decub ulcer    LABS:                        10.9   15.29 )-----------( 226      ( 17 Jan 2023 07:14 )             35.1     Na(136)/K(3.9)/Cl(105)/HCO3(19)/BUN(22)/Cr(1.25)Glu(126)/Ca(10.5)/Mg(--)/PO4(--)    01-18 @ 07:04  Na(137)/K(4.0)/Cl(107)/HCO3(18)/BUN(20)/Cr(1.25)Glu(139)/Ca(10.0)/Mg(--)/PO4(--)    01-17 @ 07:14  Na(135)/K(4.1)/Cl(104)/HCO3(21)/BUN(24)/Cr(1.37)Glu(278)/Ca(10.3)/Mg(--)/PO4(--)    01-16 @ 15:44      IMPRESSION: 87F w/ dementia, HTN, DM2, CAD, and lung CA-immunotherapy, 1/16/23 p/w weakness/lethargy/sacral ulcer    (1)Renal - nonproteinuric CKD3; resolving/resolved prerenal ROBERTO  (2)Hypercalcemia - mild - corrected Ca ~11 - due to immobilization?  (3)CV - acceptable BP/volume  (4)ID - infected sacral ulcer/cellulitis - on IV Vanco    RECOMMEND:  (1)No IVF/No diuretics  (2)Abx for GFR 40-50ml/min (present dosing is acceptable)  (3)BMP daily  (4)PTH, 25D, 1/25D, PO4 in a.m.      Rocky Clark MD  [facility]  Office: [phone]  Cell: [phone]       no complaints      VITAL:  T(C): , Max: 37 (01-17-23 @ 20:10)  T(F): , Max: 98.6 (01-17-23 @ 20:10)  HR: 82 (01-18-23 @ 04:17)  BP: 122/71 (01-18-23 @ 04:17)  BP(mean): --  RR: 18 (01-18-23 @ 04:17)  SpO2: 98% (01-18-23 @ 04:17)      PHYSICAL EXAM:  Constitutional: NAD, Alert  HEENT: NCAT, DMM  Neck: Supple, No JVD  Respiratory: CTA-b/l  Cardiovascular: RRR s1s2, no m/r/g  Gastrointestinal: BS+, soft, NT/ND  Extremities: No peripheral edema b/l  Neurological: no focal deficits; strength grossly intact  Back: no CVAT b/l  Skin: (+)decub ulcer    LABS:                        10.9   15.29 )-----------( 226      ( 17 Jan 2023 07:14 )             35.1     Na(136)/K(3.9)/Cl(105)/HCO3(19)/BUN(22)/Cr(1.25)Glu(126)/Ca(10.5)/Mg(--)/PO4(--)    01-18 @ 07:04  Na(137)/K(4.0)/Cl(107)/HCO3(18)/BUN(20)/Cr(1.25)Glu(139)/Ca(10.0)/Mg(--)/PO4(--)    01-17 @ 07:14  Na(135)/K(4.1)/Cl(104)/HCO3(21)/BUN(24)/Cr(1.37)Glu(278)/Ca(10.3)/Mg(--)/PO4(--)    01-16 @ 15:44      IMPRESSION: 87F w/ dementia, HTN, DM2, CAD, and lung CA-immunotherapy, 1/16/23 p/w weakness/lethargy/sacral ulcer    (1)Renal - nonproteinuric CKD3; resolving/resolved prerenal ROBERTO  (2)Hypercalcemia - mild - corrected Ca ~11 - due to immobilization?  (3)CV - acceptable BP/volume  (4)ID - infected sacral ulcer/cellulitis - on IV Vanco    RECOMMEND:  (1)No IVF/No diuretics  (2)Abx for GFR 40-50ml/min (present dosing is acceptable)  (3)BMP daily  (4)PTH, 25D, 1/25D, PO4 in a.m.      Rocky Clark MD  [facility]  Office: [phone]  Cell: [phone]

## 2023-01-18 NOTE — PROGRESS NOTE ADULT - ASSESSMENT
87-year-old female, history of lung cancer currently on immunotherapy( [facility]), lethargic, bed bound due to weakness w present illness x 1 week, diabetes, dementia, presenting from home with a stage 2 sacral wound.  Noticed 1/14, 2 days ago by home nurse.  Seen by urgent care 1/16, referred here.  Felt subjective fevers earlier today.  No active vomiting, abdominal pain, chest pain, shortness of breath, dysuria, frequency, changes in bowel habits.  No history of similar issues.  She has also been feeling generally weak over the past few weeks to the point that she is having a hard time ambulating, typically ambulates at her normal baseline.     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 JOSE resolved, seen by renal, IVF DCed, seen by ONC, stage 4 metastatic lung Ca, Ptn has increased debility. awaiting wound and ID consults.     1/18: ptn is awake, alert, wound care debrided sacral decub and packed a small abscess. cx growing MRSA, on Vanco

## 2023-01-18 NOTE — PROGRESS NOTE ADULT - PROBLEM SELECTOR PLAN 1
Will continue current insulin regimen for now. Will continue monitoring  blood sugars, will Follow up.  Continue Lantus 10 units qHS  Patient counseled for compliance with consistent low carb diet.

## 2023-01-18 NOTE — CONSULT NOTE ADULT - TIME BILLING
Patient seen and examined, agree with the above assessment and plan by ELMA Palomo.  88 yo F, pmhx lung cancer currently on immunotherapy( [facility]K), Aortic Stenosis, remote MI s/p stent, DVT s/p IVC filter on eliquis, bed bound due to weakness, diabetes, dementia, presents w/ stage 2 sacral wound.   CV status apears stable  No CHF  Can repeat TTE  If AS has progressed she is unliklely to be candidate for invasive mgmt given age, comorbidites and functional status.  Cont current mgmt  Wound care/iv abx per primary team

## 2023-01-18 NOTE — CONSULT NOTE ADULT - ASSESSMENT
Echo 1/10/22: Nml LV fxn, EF 65-70%, Mod AS      A/P  86 yo F, pmhx lung cancer currently on immunotherapy( [facility]K), Aortic Stenosis, remote MI s/p stent, DVT s/p IVC filter on eliquis, bed bound due to weakness, diabetes, dementia, presents w/ stage 2 sacral wound.       #Aortic Stenosis  -Moderate as seen on last echo from 1/2022  -Repeat echo    #CAD s/p stent & ppm  -Continue atorvastatin  -Consider ASA    #DVT  -Cont eliquis    #HTN  -BP stable  -Continue amlodipine    #Cellulitis  -2/2 sacral wound  -CT findings noted  -Abx per primary, id    #Lung Ca  -Mgmt per heme/onc

## 2023-01-18 NOTE — PROGRESS NOTE ADULT - SUBJECTIVE AND OBJECTIVE BOX
Patient is a 87y old  Female who presents with a chief complaint of infection (18 Jan 2023 18:32)      SUBJECTIVE / OVERNIGHT EVENTS: ptn is awake, alert, wound care debrided sacral decub and packed a small abscess. cx growing MRSA, on Vanco    MEDICATIONS  (STANDING):  amLODIPine   Tablet 5 milliGRAM(s) Oral daily  apixaban 5 milliGRAM(s) Oral two times a day  atorvastatin 40 milliGRAM(s) Oral at bedtime  chlorhexidine 2% Cloths 1 Application(s) Topical <User Schedule>  dextrose 5%. 1000 milliLiter(s) (100 mL/Hr) IV Continuous <Continuous>  dextrose 5%. 1000 milliLiter(s) (50 mL/Hr) IV Continuous <Continuous>  dextrose 50% Injectable 25 Gram(s) IV Push once  dextrose 50% Injectable 12.5 Gram(s) IV Push once  dextrose 50% Injectable 25 Gram(s) IV Push once  donepezil 10 milliGRAM(s) Oral at bedtime  dorzolamide 2%/timolol 0.5% Ophthalmic Solution 1 Drop(s) Both EYES two times a day  escitalopram 5 milliGRAM(s) Oral daily  famotidine    Tablet 20 milliGRAM(s) Oral daily  glucagon  Injectable 1 milliGRAM(s) IntraMuscular once  insulin glargine Injectable (LANTUS) 10 Unit(s) SubCutaneous at bedtime  insulin lispro (ADMELOG) corrective regimen sliding scale   SubCutaneous three times a day before meals  memantine 5 milliGRAM(s) Oral daily  vancomycin  IVPB 1000 milliGRAM(s) IV Intermittent every 24 hours    MEDICATIONS  (PRN):  acetaminophen     Tablet .. 650 milliGRAM(s) Oral every 6 hours PRN Temp greater or equal to 38C (100.4F), Mild Pain (1 - 3)  dextrose Oral Gel 15 Gram(s) Oral once PRN Blood Glucose LESS THAN 70 milliGRAM(s)/deciliter      Vital Signs Last 24 Hrs  T(F): 98.6 (01-18-23 @ 21:40), Max: 99.1 (01-18-23 @ 11:49)  HR: 75 (01-18-23 @ 21:40) (74 - 82)  BP: 140/76 (01-18-23 @ 21:40) (118/75 - 140/76)  RR: 18 (01-18-23 @ 21:40) (18 - 18)  SpO2: 98% (01-18-23 @ 21:40) (97% - 98%)  Telemetry:   CAPILLARY BLOOD GLUCOSE      POCT Blood Glucose.: 160 mg/dL (18 Jan 2023 17:31)  POCT Blood Glucose.: 232 mg/dL (18 Jan 2023 12:48)  POCT Blood Glucose.: 144 mg/dL (18 Jan 2023 08:38)  POCT Blood Glucose.: 211 mg/dL (17 Jan 2023 21:56)    I&O's Summary    17 Jan 2023 07:01  -  18 Jan 2023 07:00  --------------------------------------------------------  IN: 100 mL / OUT: 1 mL / NET: 99 mL    18 Jan 2023 07:01  -  18 Jan 2023 21:52  --------------------------------------------------------  IN: 480 mL / OUT: 2 mL / NET: 478 mL        PHYSICAL EXAM:  GENERAL: NAD, well-developed  HEAD:  Atraumatic, Normocephalic  EYES: EOMI, PERRLA, conjunctiva and sclera clear  NECK: Supple, No JVD  CHEST/LUNG: Clear to auscultation bilaterally; No wheeze  HEART: Regular rate and rhythm; No murmurs, rubs, or gallops  ABDOMEN: Soft, Nontender, Nondistended; Bowel sounds present  EXTREMITIES:  2+ Peripheral Pulses, No clubbing, cyanosis, or edema  PSYCH: AAOx3  NEUROLOGY: non-focal  SKIN: No rashes or lesions    LABS:                        10.9   15.29 )-----------( 226      ( 17 Jan 2023 07:14 )             35.1     01-18    136  |  105  |  22  ----------------------------<  126<H>  3.9   |  19<L>  |  1.25    Ca    10.5      18 Jan 2023 07:04                RADIOLOGY & ADDITIONAL TESTS:    Imaging Personally Reviewed:    Consultant(s) Notes Reviewed:      Care Discussed with Consultants/Other Providers:

## 2023-01-18 NOTE — CONSULT NOTE ADULT - NS ATTEND AMEND GEN_ALL_CORE FT
Pt seen and examined with ACP.  Assessment and plan reviewed and discussed.  Agree with above.  D/W Medical attending    I spent 55  minutes face to face w/ this pt of which more than 50% of the time was spent counseling & coordinating care of this pt.

## 2023-01-18 NOTE — CONSULT NOTE ADULT - SUBJECTIVE AND OBJECTIVE BOX
CARDIOLOGY CONSULT - Dr. Kelley         HPI:  87-year-old female, history of lung cancer currently on immunotherapy( [facility]), lethargic, bed bound due to weakness w present illness x 1 week, diabetes, dementia, presenting from home with a stage 2 sacral wound.  Noticed 1/14, 2 days ago by home nurse.  Seen by urgent care 1/16, referred here.  Felt subjective fevers earlier today.  No active vomiting, abdominal pain, chest pain, shortness of breath, dysuria, frequency, changes in bowel habits.  No history of similar issues.  She has also been feeling generally weak over the past few weeks to the point that she is having a hard time ambulating, typically ambulates at her normal baseline.      Presently, denies CP, SOB, palpitations and dizziness. She does not follow an outpatient cardiologist.      PAST MEDICAL & SURGICAL HISTORY:  Dementia      Obesity      HTN (hypertension)      Diabetes      DVT, lower extremity      MI (myocardial infarction)      Lung cancer      UTI due to extended-spectrum beta lactamase (ESBL) producing Escherichia coli      Pacemaker              PREVIOUS DIAGNOSTIC TESTING:    [x] Echocardiogram:  < from: Transthoracic Echocardiogram (01.10.22 @ 10:55) >  Conclusions:  1. Mitral annular calcification, otherwise normal mitral  valve. Minimal mitral regurgitation.  2. Calcified aortic valve. Peak transaortic valve gradient  equals 19 mm Hg, mean transaortic valve gradient equals 12  mm Hg, estimated aortic valve area equals 1.2 sqcm (by  continuity equation), aortic valve velocity time integral  equals 44 cm, consistent with moderate aortic stenosis.  Mild-moderate aortic regurgitation.  3. Normal left ventricular systolic function. No segmental  wall motion abnormalities.  4. Normal diastolic function  5. Normal right ventricular size and systolic function.  *** No previous Echo exam.    < end of copied text >    [ ]  Catheterization:    [ ] Stress Test:  	    MEDICATIONS:  Home Medications:  amLODIPine 5 mg oral tablet: 1 tab(s) orally once a day (16 Jan 2023 19:56)  atorvastatin 40 mg oral tablet: 1 tab(s) orally once a day (16 Jan 2023 19:22)  donepezil 10 mg oral tablet: 1 tab(s) orally once a day (at bedtime) (16 Jan 2023 19:56)  dorzolamide-timolol 2%-0.5% ophthalmic solution: 1 drop(s) in the left eye 2 times a day (16 Jan 2023 19:56)  Eliquis 5 mg oral tablet: 1 tab(s) orally 2 times a day (16 Jan 2023 19:56)  famotidine 20 mg oral tablet: 1 tab(s) orally once a day, As Needed (16 Jan 2023 19:56)  Januvia 100 mg oral tablet: 1 tab(s) orally once a day (16 Jan 2023 19:56)  Lexapro 5 mg oral tablet: 1 tab(s) orally once a day (16 Jan 2023 19:56)  memantine 5 mg oral tablet: 1 tab(s) orally once a day (16 Jan 2023 19:56)  Prandin 1 mg oral tablet: 1 tab(s) orally 3 times a day (before meals) (16 Jan 2023 19:56)  Tylenol 325 mg oral tablet: 2 tab(s) orally , As Needed (16 Jan 2023 19:56)      MEDICATIONS  (STANDING):  amLODIPine   Tablet 5 milliGRAM(s) Oral daily  apixaban 5 milliGRAM(s) Oral two times a day  atorvastatin 40 milliGRAM(s) Oral at bedtime  dextrose 5%. 1000 milliLiter(s) (50 mL/Hr) IV Continuous <Continuous>  dextrose 5%. 1000 milliLiter(s) (100 mL/Hr) IV Continuous <Continuous>  dextrose 50% Injectable 25 Gram(s) IV Push once  dextrose 50% Injectable 12.5 Gram(s) IV Push once  dextrose 50% Injectable 25 Gram(s) IV Push once  donepezil 10 milliGRAM(s) Oral at bedtime  dorzolamide 2%/timolol 0.5% Ophthalmic Solution 1 Drop(s) Both EYES two times a day  escitalopram 5 milliGRAM(s) Oral daily  famotidine    Tablet 20 milliGRAM(s) Oral daily  glucagon  Injectable 1 milliGRAM(s) IntraMuscular once  insulin glargine Injectable (LANTUS) 10 Unit(s) SubCutaneous at bedtime  insulin lispro (ADMELOG) corrective regimen sliding scale   SubCutaneous three times a day before meals  memantine 5 milliGRAM(s) Oral daily  vancomycin  IVPB 1000 milliGRAM(s) IV Intermittent every 24 hours      FAMILY HISTORY:  Unknown status of immunity to COVID-19 virus        SOCIAL HISTORY:    [x] Non-smoker  [ ] Smoker  [ ] Alcohol    Allergies    iodine (Hives)  IV contrast (Unknown)  penicillin (Hives)    Intolerances    	    REVIEW OF SYSTEMS:  CONSTITUTIONAL: No fever, weight loss. +Weakness  EYES: No eye pain, visual disturbances, or discharge  ENMT:  No difficulty hearing, tinnitus, vertigo; No sinus or throat pain  NECK: No pain or stiffness  RESPIRATORY: No cough, wheezing, chills or hemoptysis; No Shortness of Breath  CARDIOVASCULAR: No chest pain, palpitations, passing out, dizziness, or leg swelling  GASTROINTESTINAL: No abdominal or epigastric pain. No nausea, vomiting, or hematemesis; No diarrhea or constipation. No melena or hematochezia.  GENITOURINARY: No dysuria, frequency, hematuria, or incontinence  NEUROLOGICAL: No headaches, memory loss, loss of strength, numbness, or tremors  SKIN: +Sacral wound, sacral pain  	    [x] All others negative	  [ ] Unable to obtain    PHYSICAL EXAM:  T(C): 36.4 (01-18-23 @ 04:17), Max: 37 (01-17-23 @ 20:10)  HR: 82 (01-18-23 @ 04:17) (77 - 82)  BP: 122/71 (01-18-23 @ 04:17) (122/71 - 138/81)  RR: 18 (01-18-23 @ 04:17) (18 - 19)  SpO2: 98% (01-18-23 @ 04:17) (95% - 98%)  Wt(kg): --  I&O's Summary    17 Jan 2023 07:01  -  18 Jan 2023 07:00  --------------------------------------------------------  IN: 100 mL / OUT: 1 mL / NET: 99 mL    18 Jan 2023 07:01  -  18 Jan 2023 10:48  --------------------------------------------------------  IN: 240 mL / OUT: 0 mL / NET: 240 mL        Appearance: Elderly female	  Psychiatry: A & O x 3, Mood & affect appropriate  HEENT:   Normal oral mucosa, PERRL, EOMI	  Lymphatic: No lymphadenopathy  Cardiovascular: Normal S1 S2,RRR, No JVD, No murmurs  Respiratory: Lungs clear to auscultation b/l  Gastrointestinal:  Soft, Non-tender, + BS	  Skin: No rashes, No ecchymoses, No cyanosis	  Neurologic: Non-focal  Extremities: Normal range of motion, No clubbing, cyanosis or edema  Vascular: Peripheral pulses palpable 2+ bilaterally    TELEMETRY: 	    ECG:  	  RADIOLOGY:  < from: CT Lumbar Spine Reform No Cont (01.16.23 @ 17:00) >  IMPRESSION:    1. No acute fracture. Grade 1 subluxation at L5-S1 with anterolisthesis   of L5 relative to S1 by approximately 2 mm.    2. Multilevel degenerative changes as described level by level in detail   above. If there is clinical concern for radiculopathy, consider further   evaluation via MR imaging of the lumbar spine, provided the patient has   no contraindications..    < end of copied text >      < from: CT Head No Cont (01.11.22 @ 09:42) >  IMPRESSION:  -No hemorrhage or acute transcortical infarct.  -Chronic right ROULA/MCA watershed territory infarcts.  -Sequelae of prior traumatic brain injury including chronic anterior   inferior frontal and temporal pole contusions as well as old occipital   bone fracture.    < end of copied text >    < from: CT Abdomen and Pelvis No Cont (01.16.23 @ 17:01) >  IMPRESSION:  Cellulitis involving medial bilateral subcutaneous gluteal fat with   several air droplets noted in the medial superior right buttock. No   associated abscess    < end of copied text >      OTHER: 	  	  LABS:	 	    CARDIAC MARKERS:                                  10.9   15.29 )-----------( 226      ( 17 Jan 2023 07:14 )             35.1     01-18    136  |  105  |  22  ----------------------------<  126<H>  3.9   |  19<L>  |  1.25    Ca    10.5      18 Jan 2023 07:04    TPro  7.0  /  Alb  3.3  /  TBili  0.6  /  DBili  x   /  AST  9<L>  /  ALT  5<L>  /  AlkPhos  73  01-16      proBNP:   Lipid Profile:   HgA1c:   TSH:

## 2023-01-19 LAB
24R-OH-CALCIDIOL SERPL-MCNC: 20.5 NG/ML — LOW (ref 30–80)
ANION GAP SERPL CALC-SCNC: 10 MMOL/L — SIGNIFICANT CHANGE UP (ref 5–17)
BUN SERPL-MCNC: 26 MG/DL — HIGH (ref 7–23)
CALCIUM SERPL-MCNC: 9.7 MG/DL — SIGNIFICANT CHANGE UP (ref 8.4–10.5)
CALCIUM SERPL-MCNC: 9.8 MG/DL — SIGNIFICANT CHANGE UP (ref 8.4–10.5)
CHLORIDE SERPL-SCNC: 107 MMOL/L — SIGNIFICANT CHANGE UP (ref 96–108)
CO2 SERPL-SCNC: 21 MMOL/L — LOW (ref 22–31)
CREAT SERPL-MCNC: 1.2 MG/DL — SIGNIFICANT CHANGE UP (ref 0.5–1.3)
EGFR: 44 ML/MIN/1.73M2 — LOW
GLUCOSE BLDC GLUCOMTR-MCNC: 157 MG/DL — HIGH (ref 70–99)
GLUCOSE BLDC GLUCOMTR-MCNC: 168 MG/DL — HIGH (ref 70–99)
GLUCOSE BLDC GLUCOMTR-MCNC: 175 MG/DL — HIGH (ref 70–99)
GLUCOSE BLDC GLUCOMTR-MCNC: 208 MG/DL — HIGH (ref 70–99)
GLUCOSE SERPL-MCNC: 149 MG/DL — HIGH (ref 70–99)
GRAM STN FLD: SIGNIFICANT CHANGE UP
HCT VFR BLD CALC: 39.5 % — SIGNIFICANT CHANGE UP (ref 34.5–45)
HGB BLD-MCNC: 12.1 G/DL — SIGNIFICANT CHANGE UP (ref 11.5–15.5)
MCHC RBC-ENTMCNC: 26.2 PG — LOW (ref 27–34)
MCHC RBC-ENTMCNC: 30.6 GM/DL — LOW (ref 32–36)
MCV RBC AUTO: 85.5 FL — SIGNIFICANT CHANGE UP (ref 80–100)
MRSA PCR RESULT.: DETECTED
NRBC # BLD: 0 /100 WBCS — SIGNIFICANT CHANGE UP (ref 0–0)
PHOSPHATE SERPL-MCNC: 2.8 MG/DL — SIGNIFICANT CHANGE UP (ref 2.5–4.5)
PLATELET # BLD AUTO: 259 K/UL — SIGNIFICANT CHANGE UP (ref 150–400)
POTASSIUM SERPL-MCNC: 3.8 MMOL/L — SIGNIFICANT CHANGE UP (ref 3.5–5.3)
POTASSIUM SERPL-SCNC: 3.8 MMOL/L — SIGNIFICANT CHANGE UP (ref 3.5–5.3)
PTH-INTACT FLD-MCNC: 82 PG/ML — HIGH (ref 15–65)
RBC # BLD: 4.62 M/UL — SIGNIFICANT CHANGE UP (ref 3.8–5.2)
RBC # FLD: 13.2 % — SIGNIFICANT CHANGE UP (ref 10.3–14.5)
S AUREUS DNA NOSE QL NAA+PROBE: DETECTED
SODIUM SERPL-SCNC: 138 MMOL/L — SIGNIFICANT CHANGE UP (ref 135–145)
VIT D25+D1,25 OH+D1,25 PNL SERPL-MCNC: 50.9 PG/ML — SIGNIFICANT CHANGE UP (ref 19.9–79.3)
WBC # BLD: 8.88 K/UL — SIGNIFICANT CHANGE UP (ref 3.8–10.5)
WBC # FLD AUTO: 8.88 K/UL — SIGNIFICANT CHANGE UP (ref 3.8–10.5)

## 2023-01-19 PROCEDURE — 93306 TTE W/DOPPLER COMPLETE: CPT | Mod: 26

## 2023-01-19 PROCEDURE — 97597 DBRDMT OPN WND 1ST 20 CM/<: CPT

## 2023-01-19 PROCEDURE — 99232 SBSQ HOSP IP/OBS MODERATE 35: CPT | Mod: 25

## 2023-01-19 RX ORDER — ASCORBIC ACID 60 MG
500 TABLET,CHEWABLE ORAL DAILY
Refills: 0 | Status: DISCONTINUED | OUTPATIENT
Start: 2023-01-19 | End: 2023-01-23

## 2023-01-19 RX ADMIN — FAMOTIDINE 20 MILLIGRAM(S): 10 INJECTION INTRAVENOUS at 13:10

## 2023-01-19 RX ADMIN — Medication 1 TABLET(S): at 13:10

## 2023-01-19 RX ADMIN — ESCITALOPRAM OXALATE 5 MILLIGRAM(S): 10 TABLET, FILM COATED ORAL at 13:10

## 2023-01-19 RX ADMIN — Medication 1: at 08:52

## 2023-01-19 RX ADMIN — Medication 250 MILLIGRAM(S): at 17:41

## 2023-01-19 RX ADMIN — MEMANTINE HYDROCHLORIDE 5 MILLIGRAM(S): 10 TABLET ORAL at 13:10

## 2023-01-19 RX ADMIN — INSULIN GLARGINE 10 UNIT(S): 100 INJECTION, SOLUTION SUBCUTANEOUS at 22:02

## 2023-01-19 RX ADMIN — DONEPEZIL HYDROCHLORIDE 10 MILLIGRAM(S): 10 TABLET, FILM COATED ORAL at 22:02

## 2023-01-19 RX ADMIN — Medication 1: at 17:40

## 2023-01-19 RX ADMIN — APIXABAN 5 MILLIGRAM(S): 2.5 TABLET, FILM COATED ORAL at 17:42

## 2023-01-19 RX ADMIN — Medication 2: at 13:08

## 2023-01-19 RX ADMIN — ATORVASTATIN CALCIUM 40 MILLIGRAM(S): 80 TABLET, FILM COATED ORAL at 22:02

## 2023-01-19 RX ADMIN — Medication 500 MILLIGRAM(S): at 13:11

## 2023-01-19 RX ADMIN — DORZOLAMIDE HYDROCHLORIDE TIMOLOL MALEATE 1 DROP(S): 20; 5 SOLUTION/ DROPS OPHTHALMIC at 21:33

## 2023-01-19 RX ADMIN — Medication 650 MILLIGRAM(S): at 13:12

## 2023-01-19 RX ADMIN — APIXABAN 5 MILLIGRAM(S): 2.5 TABLET, FILM COATED ORAL at 05:49

## 2023-01-19 RX ADMIN — CHLORHEXIDINE GLUCONATE 1 APPLICATION(S): 213 SOLUTION TOPICAL at 05:49

## 2023-01-19 RX ADMIN — DORZOLAMIDE HYDROCHLORIDE TIMOLOL MALEATE 1 DROP(S): 20; 5 SOLUTION/ DROPS OPHTHALMIC at 08:53

## 2023-01-19 RX ADMIN — Medication 650 MILLIGRAM(S): at 16:00

## 2023-01-19 RX ADMIN — AMLODIPINE BESYLATE 5 MILLIGRAM(S): 2.5 TABLET ORAL at 05:49

## 2023-01-19 NOTE — DIETITIAN INITIAL EVALUATION ADULT - NSFNSADHERENCEPTAFT_GEN_A_CORE
Pt noted with history of diabetes. As per H&P, pt took Januvia at home for medicine management of DM. Most recent A1c:  7.4 % (01-17-23), indicates moderately well controlled blood glucose levels in the setting of advanced age (>87 y.o).

## 2023-01-19 NOTE — DIETITIAN INITIAL EVALUATION ADULT - OTHER INFO
Current Drug Dosing Weight: 87.9 kg/ 193.8 pounds  (1-17-23)  - Pt reported her UBW to be 200 pounds   - IBW: 100 pounds  %IBW: 194%  -Weight History as per Cierra LIN: 87.9 kg/ 193.8 pounds (1/9/22)  -Weight trend: Pt weight status appears stable over the past year, monitor weights as able during present admission

## 2023-01-19 NOTE — PROVIDER CONTACT NOTE (CRITICAL VALUE NOTIFICATION) - TEST AND RESULT REPORTED:
wound cultures  positive for MRSA
Blood culture from 1/16/23: positive for growth in aerobic bottle, gram positive cocci in pairs.
Blood culture from 1/19/2023 shows growth in anerobic bottles gram =ve cocci and clusters

## 2023-01-19 NOTE — DIETITIAN INITIAL EVALUATION ADULT - REASON FOR ADMISSION
Chart Reviewed, Events Noted  As per H&P, "87-year-old female, history of lung cancer currently on immunotherapy( [facility]), lethargic, bed bound due to weakness w present illness x 1 week, diabetes, dementia, presenting from home with a stage 2 sacral wound.  Noticed 1/14, 2 days ago by home nurse.  Seen by urgent care 1/16, referred here.  Felt subjective fevers earlier today.  No active vomiting, abdominal pain, chest pain, shortness of breath, dysuria, frequency, changes in bowel habits.  No history of similar issues.  She has also been feeling generally weak over the past few weeks to the point that she is having a hard time ambulating, typically ambulates at her normal baseline."

## 2023-01-19 NOTE — PROGRESS NOTE ADULT - SUBJECTIVE AND OBJECTIVE BOX
Overnight events noted      VITAL:  T(C): , Max: 37 (01-18-23 @ 21:40)  T(F): , Max: 98.6 (01-18-23 @ 21:40)  HR: 67 (01-19-23 @ 12:54)  BP: 160/75 (01-19-23 @ 12:54)  BP(mean): --  RR: 18 (01-19-23 @ 12:54)  SpO2: 97% (01-19-23 @ 12:54)      PHYSICAL EXAM:  Constitutional: NAD, Alert  HEENT: NCAT, DMM  Neck: Supple, No JVD  Respiratory: CTA-b/l  Cardiovascular: RRR s1s2, no m/r/g  Gastrointestinal: BS+, soft, NT/ND  Extremities: No peripheral edema b/l  Neurological: no focal deficits; strength grossly intact  Back: no CVAT b/l  Skin: (+)decub ulcer    LABS:                        12.1   8.88  )-----------( 259      ( 19 Jan 2023 07:05 )             39.5     Na(138)/K(3.8)/Cl(107)/HCO3(21)/BUN(26)/Cr(1.20)Glu(149)/Ca(9.7)/Mg(--)/PO4(2.8)    01-19 @ 07:05  Na(136)/K(3.9)/Cl(105)/HCO3(19)/BUN(22)/Cr(1.25)Glu(126)/Ca(10.5)/Mg(--)/PO4(--)    01-18 @ 07:04  Na(137)/K(4.0)/Cl(107)/HCO3(18)/BUN(20)/Cr(1.25)Glu(139)/Ca(10.0)/Mg(--)/PO4(--)    01-17 @ 07:14  Na(135)/K(4.1)/Cl(104)/HCO3(21)/BUN(24)/Cr(1.37)Glu(278)/Ca(10.3)/Mg(--)/PO4(--)    01-16 @ 15:44    25D - 20.5  1,25D - 50.9  PTH - 82  PO4 - 2.8      IMPRESSION: 87F w/ dementia, HTN, DM2, CAD, and lung CA-immunotherapy, 1/16/23 p/w weakness/lethargy/sacral ulcer    (1)Renal - nonproteinuric CKD3; resolved prerenal ROBERTO  (2)Hypercalcemia - improved  (3)CV - acceptable BP/volume, on monotherapy with Amlodipine  (4)ID - infected sacral ulcer/cellulitis - on IV Vanco    RECOMMEND:  (1)Reconsult as needed                Rocky Clark MD  [facility]  Office: [phone]  Cell: [phone]       no complaints      VITAL:  T(C): , Max: 37 (01-18-23 @ 21:40)  T(F): , Max: 98.6 (01-18-23 @ 21:40)  HR: 67 (01-19-23 @ 12:54)  BP: 160/75 (01-19-23 @ 12:54)  BP(mean): --  RR: 18 (01-19-23 @ 12:54)  SpO2: 97% (01-19-23 @ 12:54)      PHYSICAL EXAM:  Constitutional: NAD, Alert  HEENT: NCAT, DMM  Neck: Supple, No JVD  Respiratory: CTA-b/l  Cardiovascular: RRR s1s2, no m/r/g  Gastrointestinal: BS+, soft, NT/ND  Extremities: No peripheral edema b/l  Neurological: no focal deficits; strength grossly intact  Back: no CVAT b/l  Skin: (+)decub ulcer    LABS:                        12.1   8.88  )-----------( 259      ( 19 Jan 2023 07:05 )             39.5     Na(138)/K(3.8)/Cl(107)/HCO3(21)/BUN(26)/Cr(1.20)Glu(149)/Ca(9.7)/Mg(--)/PO4(2.8)    01-19 @ 07:05  Na(136)/K(3.9)/Cl(105)/HCO3(19)/BUN(22)/Cr(1.25)Glu(126)/Ca(10.5)/Mg(--)/PO4(--)    01-18 @ 07:04  Na(137)/K(4.0)/Cl(107)/HCO3(18)/BUN(20)/Cr(1.25)Glu(139)/Ca(10.0)/Mg(--)/PO4(--)    01-17 @ 07:14  Na(135)/K(4.1)/Cl(104)/HCO3(21)/BUN(24)/Cr(1.37)Glu(278)/Ca(10.3)/Mg(--)/PO4(--)    01-16 @ 15:44    25D - 20.5  1,25D - 50.9  PTH - 82  PO4 - 2.8      IMPRESSION: 87F w/ dementia, HTN, DM2, CAD, and lung CA-immunotherapy, 1/16/23 p/w weakness/lethargy/sacral ulcer    (1)Renal - nonproteinuric CKD3; resolved prerenal ROBERTO  (2)Hypercalcemia - improved  (3)CV - acceptable BP/volume, on monotherapy with Amlodipine  (4)ID - infected sacral ulcer/cellulitis - on IV Vanco    RECOMMEND:  (1)Reconsult as needed                Rocky Clark MD  [facility]  Office: [phone]  Cell: [phone]

## 2023-01-19 NOTE — PROGRESS NOTE ADULT - ASSESSMENT
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   Her aide on 1/14 reported noticing a small bump on her left buttock. This progressively increased to a large size with pain as of 1/16/23.  Taken to an [facility] & sent to the ER.    Here found with a temp of 99.4F with leukocytosis of 16.44K.   She was given a dose of vanco, cefepime & flagyl.   Wound was swabbed & cx now with Staph aureus  Blood cx with 1/2 GPC in pairs.  Exam with left large area of buttock cellulitis with bloody discharge from an area of skin breakdown.   He blood isolate is a coag negative staph, this is likely a contaminant  . Her repeat 1/17 blood cultures are negative.  Her wound is growing MRSA  Cefepime was stopped 1/16  She is on day 3 of vanco and abscess has been drained  PLAN:  Continue Vancomycin today  Monitor vanco trough levels-goal is 10-15, her level is 13.8, this is acceptable,  Repeat blood cx  but  Coag negative staph is likely a contaminant  Local wound care to left buttocks  I think she will be a candidate for switch to minocycline 100 po BID in next 1-2 days to complete a 7-10 day course of antibiotic therapy  Antibiotics are adjunctive to effective drainage.

## 2023-01-19 NOTE — DIETITIAN INITIAL EVALUATION ADULT - REASON INDICATOR FOR ASSESSMENT
Stage 2 Pressure Injury or greater  Information obtained from: Review of pt current medical record, interview with pt in her assigned room on [facility]

## 2023-01-19 NOTE — PROGRESS NOTE ADULT - ASSESSMENT
Left Buttocks wound/unstageable pressure injury present on admission with soft tissue infection/cellulitis/abscess  Right buttocks stage 2 pressure injury present on admission  Incontinence of bowel and bladder      Recommend:  1.)  Left buttock wound - irrigate with NS, pat dry, pack loosely with iodoform 1/4" strip packing, cover with DSD  twice daily  RIght buttocks wound -Cavilon QD  2.) Antibiotics per Medicine/ID  3.) Maintain on an alternating air with low air loss surface  4.) Turn and reposition Q 2 hours w/ assistive devices  5.) Nutrition optimization  6.) Offload heels/feet with complete cair air fluidized boots; ensure that the soles of the feet are not resting on the foot board of the bed.  7.) Incontinence Management - incontinence cleanser, pads, pericare BID, continue external female urinary catheter  8.) Hyperglycemia- ADA diet and   Care as per medicine. Will follow with you.  Upon discharge f/u as outpatient at [facility] [address] [phone]  d/w team, RN, and karthik Velez [place], [place] [id number]  I spent 35  minutes face to face w/ this pt of which more than 50% of the time was spent counseling & coordinating care of this pt.    87-year-old female, history of lung cancer currently on immunotherapy( [facility]), lethargic, bed bound due to weakness w present illness x 1 week, diabetes, dementia, presenting from home with a sacral wound.  Noticed 1/14, by nurse.  Seen by urgent care 1/16, referred here.  Felt subjective fevers earlier today.  No active vomiting, abdominal pain, chest pain, shortness of breath, dysuria, frequency, changes in bowel habits.  No history of similar issues.  She has also been feeling generally weak over the past few weeks to the point that she is having a hard time ambulating, typically ambulates at her normal baseline.     Wound Consult requested to assist w/ management of   Left Buttocks wound with soft tissue infection/cellulitis/abscess  Right buttocks wound healed  Incontinence of bowel and bladder      Recommend:  1.)  Left buttock wound - irrigate with NS, pat dry, pack loosely with iodoform 1/4" strip packing, cover with DSD  twice daily  RIght buttocks wound -Cavilon QD  2.) Antibiotics per Medicine/ID  3.) Maintain on an alternating air with low air loss surface  4.) Turn and reposition Q 2 hours w/ assistive devices  5.) Nutrition optimization  6.) Offload heels/feet with complete cair air fluidized boots; ensure that the soles of the feet are not resting on the foot board of the bed.  7.) Incontinence Management - incontinence cleanser, pads, pericare BID, continue external female urinary catheter  8.) Hyperglycemia- ADA diet and   Care as per medicine. Will follow with you.  Upon discharge f/u as outpatient at [facility] [address] [facility] [phone]  d/w team, RN, and attsuraj Velez [place], [place] [id number]  I spent 35 minutes face to face w/ this pt of which more than 50% of the time was spent counseling & coordinating care of this pt.

## 2023-01-19 NOTE — PROVIDER CONTACT NOTE (CRITICAL VALUE NOTIFICATION) - SITUATION
wound cultures  positive for MRSA
Blood culture from 1/16/23: positive for growth in aerobic bottle, gram positive cocci in pairs.
Patientg with positive Blood cx from 1/17/2023 growth in anerobic bottles gram positive cocci and clusters

## 2023-01-19 NOTE — PROGRESS NOTE ADULT - SUBJECTIVE AND OBJECTIVE BOX
[facility]-- WOUND TEAM -- FOLLOW UP NOTE  --------------------------------------------------------------------------------    24 hour events/subjective:          Diet:  Diet, Consistent Carbohydrate w/Evening Snack:   Low Sodium  Supplement Feeding Modality:  Oral  Glucerna Shake Cans or Servings Per Day:  1       Frequency:  Daily (01-19-23 @ 10:53)      ROS: General/ SKIN/ MSK/ Neuro/ GI see HPI  all other systems negative  pt unable to offer    ALLERGIES & MEDICATIONS  --------------------------------------------------------------------------------  Allergies    iodine (Hives)  IV contrast (Unknown)  penicillin (Hives)    Intolerances          STANDING INPATIENT MEDICATIONS    amLODIPine   Tablet 5 milliGRAM(s) Oral daily  apixaban 5 milliGRAM(s) Oral two times a day  ascorbic acid 500 milliGRAM(s) Oral daily  atorvastatin 40 milliGRAM(s) Oral at bedtime  chlorhexidine 2% Cloths 1 Application(s) Topical <User Schedule>  dextrose 5%. 1000 milliLiter(s) IV Continuous <Continuous>  dextrose 5%. 1000 milliLiter(s) IV Continuous <Continuous>  dextrose 50% Injectable 25 Gram(s) IV Push once  dextrose 50% Injectable 12.5 Gram(s) IV Push once  dextrose 50% Injectable 25 Gram(s) IV Push once  donepezil 10 milliGRAM(s) Oral at bedtime  dorzolamide 2%/timolol 0.5% Ophthalmic Solution 1 Drop(s) Both EYES two times a day  escitalopram 5 milliGRAM(s) Oral daily  famotidine    Tablet 20 milliGRAM(s) Oral daily  glucagon  Injectable 1 milliGRAM(s) IntraMuscular once  insulin glargine Injectable (LANTUS) 10 Unit(s) SubCutaneous at bedtime  insulin lispro (ADMELOG) corrective regimen sliding scale   SubCutaneous three times a day before meals  memantine 5 milliGRAM(s) Oral daily  multivitamin 1 Tablet(s) Oral daily  vancomycin  IVPB 1000 milliGRAM(s) IV Intermittent every 24 hours      PRN INPATIENT MEDICATION  acetaminophen     Tablet .. 650 milliGRAM(s) Oral every 6 hours PRN  dextrose Oral Gel 15 Gram(s) Oral once PRN        VITALS/PHYSICAL EXAM  --------------------------------------------------------------------------------  T(C): 36.6 (01-19-23 @ 12:54), Max: 37 (01-18-23 @ 21:40)  HR: 67 (01-19-23 @ 12:54) (67 - 75)  BP: 160/75 (01-19-23 @ 12:54) (140/76 - 160/75)  RR: 18 (01-19-23 @ 12:54) (18 - 18)  SpO2: 97% (01-19-23 @ 12:54) (97% - 98%)  Wt(kg): --  Height (cm): 152.4 (01-17-23 @ 20:10)  Weight (kg): 87.9 (01-17-23 @ 20:10)  BMI (kg/m2): 37.8 (01-17-23 @ 20:10)  BSA (m2): 1.84 (01-17-23 @ 20:10)      01-18-23 @ 07:01  -  01-19-23 @ 07:00  --------------------------------------------------------  IN: 580 mL / OUT: 3 mL / NET: 577 mL    01-19-23 @ 07:01  -  01-19-23 @ 16:00  --------------------------------------------------------  IN: 240 mL / OUT: 0 mL / NET: 240 mL            LABS/ CULTURES/ RADIOLOGY:              12.1   8.88  >-----------<  259      [01-19-23 @ 07:05]              39.5     138  |  107  |  26  ----------------------------<  149      [01-19-23 @ 07:05]  3.8   |  21  |  1.20        Ca     9.7     [01-19-23 @ 07:05]      Phos  2.8     [01-19-23 @ 07:05]                CAPILLARY BLOOD GLUCOSE      POCT Blood Glucose.: 208 mg/dL (19 Jan 2023 12:33)  POCT Blood Glucose.: 157 mg/dL (19 Jan 2023 08:34)  POCT Blood Glucose.: 194 mg/dL (18 Jan 2023 22:04)  POCT Blood Glucose.: 160 mg/dL (18 Jan 2023 17:31)          Intact PTH: 82 pg/mL (01-19-23 @ 07:05)    Vitamin D, 25-Hydroxy: 20.5 ng/mL (01-19-23 @ 07:05)        Culture - Blood (collected 01-17-23 @ 16:36)  Source: .Blood Blood-Peripheral  Preliminary Report (01-18-23 @ 23:01):    No growth to date.    Culture - Blood (collected 01-17-23 @ 16:30)  Source: .Blood Blood-Peripheral  Preliminary Report (01-18-23 @ 23:01):    No growth to date.    Culture - Blood (collected 01-16-23 @ 15:30)  Source: .Blood Blood  Preliminary Report (01-17-23 @ 18:02):    No growth to date.          A1C with Estimated Average Glucose Result: 7.4 % (01-17-23 @ 07:14)

## 2023-01-19 NOTE — PROGRESS NOTE ADULT - ASSESSMENT
Assessment  DMT2: 87y Female with DM T2 with hyperglycemia admitted with cellulitis, patient was on oral hypoglycemic  agents at home, now on basal and insulin coverage, blood sugars fluctuating, no hypoglycemic episode,  eating full meals.  Cellulitis: On medications, monitored, afebrile.  HTN: On antihypertensive medications, monitored, asymptomatic.              Melania Marques MD  Cell:  [phone]  Office: [phone]               Assessment  DMT2: 87y Female with DM T2 with hyperglycemia admitted with cellulitis, patient was on oral hypoglycemic  agents at home, now on basal  and insulin coverage, blood sugars fluctuating, no hypoglycemic episode,  eating full meals.  Cellulitis: On medications, monitored, afebrile.  HTN: On antihypertensive medications, monitored, asymptomatic.              Melania Marques MD  Cell:  [phone]  Office: [phone]

## 2023-01-19 NOTE — PROGRESS NOTE ADULT - SUBJECTIVE AND OBJECTIVE BOX
Patient is a 87y old  Female who presents with a chief complaint of left buttocks abscess (19 Jan 2023 17:43)      SUBJECTIVE / OVERNIGHT EVENTS: day 3 Iv Vanco for MRSA sacral decubitus abscess, on 1/21 will switch to po minocycline. will need outptn wound care f/u w wound care at home    MEDICATIONS  (STANDING):  amLODIPine   Tablet 5 milliGRAM(s) Oral daily  apixaban 5 milliGRAM(s) Oral two times a day  ascorbic acid 500 milliGRAM(s) Oral daily  atorvastatin 40 milliGRAM(s) Oral at bedtime  chlorhexidine 2% Cloths 1 Application(s) Topical <User Schedule>  dextrose 5%. 1000 milliLiter(s) (50 mL/Hr) IV Continuous <Continuous>  dextrose 5%. 1000 milliLiter(s) (100 mL/Hr) IV Continuous <Continuous>  dextrose 50% Injectable 25 Gram(s) IV Push once  dextrose 50% Injectable 12.5 Gram(s) IV Push once  dextrose 50% Injectable 25 Gram(s) IV Push once  donepezil 10 milliGRAM(s) Oral at bedtime  dorzolamide 2%/timolol 0.5% Ophthalmic Solution 1 Drop(s) Both EYES two times a day  escitalopram 5 milliGRAM(s) Oral daily  famotidine    Tablet 20 milliGRAM(s) Oral daily  glucagon  Injectable 1 milliGRAM(s) IntraMuscular once  insulin glargine Injectable (LANTUS) 10 Unit(s) SubCutaneous at bedtime  insulin lispro (ADMELOG) corrective regimen sliding scale   SubCutaneous three times a day before meals  memantine 5 milliGRAM(s) Oral daily  multivitamin 1 Tablet(s) Oral daily  vancomycin  IVPB 1000 milliGRAM(s) IV Intermittent every 24 hours    MEDICATIONS  (PRN):  acetaminophen     Tablet .. 650 milliGRAM(s) Oral every 6 hours PRN Temp greater or equal to 38C (100.4F), Mild Pain (1 - 3)  dextrose Oral Gel 15 Gram(s) Oral once PRN Blood Glucose LESS THAN 70 milliGRAM(s)/deciliter      Vital Signs Last 24 Hrs  T(F): 97.9 (01-19-23 @ 12:54), Max: 98.6 (01-18-23 @ 21:40)  HR: 67 (01-19-23 @ 12:54) (67 - 75)  BP: 160/75 (01-19-23 @ 12:54) (140/76 - 160/75)  RR: 18 (01-19-23 @ 12:54) (18 - 18)  SpO2: 97% (01-19-23 @ 12:54) (97% - 98%)  Telemetry:   CAPILLARY BLOOD GLUCOSE      POCT Blood Glucose.: 175 mg/dL (19 Jan 2023 16:57)  POCT Blood Glucose.: 208 mg/dL (19 Jan 2023 12:33)  POCT Blood Glucose.: 157 mg/dL (19 Jan 2023 08:34)  POCT Blood Glucose.: 194 mg/dL (18 Jan 2023 22:04)    I&O's Summary    18 Jan 2023 07:01  -  19 Jan 2023 07:00  --------------------------------------------------------  IN: 580 mL / OUT: 3 mL / NET: 577 mL    19 Jan 2023 07:01  -  19 Jan 2023 20:09  --------------------------------------------------------  IN: 420 mL / OUT: 0 mL / NET: 420 mL        PHYSICAL EXAM:  GENERAL: NAD, well-developed  HEAD:  Atraumatic, Normocephalic  EYES: EOMI, PERRLA, conjunctiva and sclera clear  NECK: Supple, No JVD  CHEST/LUNG: Clear to auscultation bilaterally; No wheeze  HEART: Regular rate and rhythm; No murmurs, rubs, or gallops  ABDOMEN: Soft, Nontender, Nondistended; Bowel sounds present  EXTREMITIES:  2+ Peripheral Pulses, No clubbing, cyanosis, or edema  PSYCH: AAOx3  NEUROLOGY: non-focal  SKIN: No rashes or lesions    LABS:                        12.1   8.88  )-----------( 259      ( 19 Jan 2023 07:05 )             39.5     01-19    138  |  107  |  26<H>  ----------------------------<  149<H>  3.8   |  21<L>  |  1.20    Ca    9.7      19 Jan 2023 07:05  Phos  2.8     01-19                RADIOLOGY & ADDITIONAL TESTS:    Imaging Personally Reviewed:    Consultant(s) Notes Reviewed:      Care Discussed with Consultants/Other Providers:

## 2023-01-19 NOTE — PROGRESS NOTE ADULT - SUBJECTIVE AND OBJECTIVE BOX
[facility]-- WOUND TEAM -- FOLLOW UP NOTE  --------------------------------------------------------------------------------    24 hour events/subjective:          Diet:  Diet, Consistent Carbohydrate w/Evening Snack:   Low Sodium  Supplement Feeding Modality:  Oral  Glucerna Shake Cans or Servings Per Day:  1       Frequency:  Daily (01-19-23 @ 10:53)      ROS: General/ SKIN/ MSK/ GI see HPI  all other systems negative      ALLERGIES & MEDICATIONS  --------------------------------------------------------------------------------  Allergies  iodine (Hives)  IV contrast (Unknown)  penicillin (Hives)      STANDING INPATIENT MEDICATIONS  amLODIPine   Tablet 5 milliGRAM(s) Oral daily  apixaban 5 milliGRAM(s) Oral two times a day  ascorbic acid 500 milliGRAM(s) Oral daily  atorvastatin 40 milliGRAM(s) Oral at bedtime  chlorhexidine 2% Cloths 1 Application(s) Topical <User Schedule>  dextrose 5%. 1000 milliLiter(s) IV Continuous <Continuous>  dextrose 5%. 1000 milliLiter(s) IV Continuous <Continuous>  dextrose 50% Injectable 25 Gram(s) IV Push once  dextrose 50% Injectable 12.5 Gram(s) IV Push once  dextrose 50% Injectable 25 Gram(s) IV Push once  donepezil 10 milliGRAM(s) Oral at bedtime  dorzolamide 2%/timolol 0.5% Ophthalmic Solution 1 Drop(s) Both EYES two times a day  escitalopram 5 milliGRAM(s) Oral daily  famotidine    Tablet 20 milliGRAM(s) Oral daily  glucagon  Injectable 1 milliGRAM(s) IntraMuscular once  insulin glargine Injectable (LANTUS) 10 Unit(s) SubCutaneous at bedtime  insulin lispro (ADMELOG) corrective regimen sliding scale   SubCutaneous three times a day before meals  memantine 5 milliGRAM(s) Oral daily  multivitamin 1 Tablet(s) Oral daily  vancomycin  IVPB 1000 milliGRAM(s) IV Intermittent every 24 hours      PRN INPATIENT MEDICATION  acetaminophen Tablet 650 milliGRAM(s) Oral every 6 hours PRN  dextrose Oral Gel 15 Gram(s) Oral once PRN        VITALS/PHYSICAL EXAM  --------------------------------------------------------------------------------  T(C): 36.6 (01-19-23 @ 12:54), Max: 37 (01-18-23 @ 21:40)  HR: 67 (01-19-23 @ 12:54) (67 - 75)  BP: 160/75 (01-19-23 @ 12:54) (140/76 - 160/75)  RR: 18 (01-19-23 @ 12:54) (18 - 18)  SpO2: 97% (01-19-23 @ 12:54) (97% - 98%)  Wt(kg): --  Height (cm): 152.4 (01-17-23 @ 20:10)  Weight (kg): 87.9 (01-17-23 @ 20:10)  BMI (kg/m2): 37.8 (01-17-23 @ 20:10)  BSA (m2): 1.84 (01-17-23 @ 20:10)      01-18-23 @ 07:01  -  01-19-23 @ 07:00  --------------------------------------------------------  IN: 580 mL / OUT: 3 mL / NET: 577 mL    01-19-23 @ 07:01  -  01-19-23 @ 15:15  --------------------------------------------------------  IN: 240 mL / OUT: 0 mL / NET: 240 mL            LABS/ CULTURES/ RADIOLOGY:              12.1   8.88  >-----------<  259      [01-19-23 @ 07:05]              39.5     138  |  107  |  26  ----------------------------<  149      [01-19-23 @ 07:05]  3.8   |  21  |  1.20        Ca     9.7     [01-19-23 @ 07:05]      Phos  2.8     [01-19-23 @ 07:05]      CAPILLARY BLOOD GLUCOSE  POCT Blood Glucose.: 208 mg/dL (19 Jan 2023 12:33)  POCT Blood Glucose.: 157 mg/dL (19 Jan 2023 08:34)  POCT Blood Glucose.: 194 mg/dL (18 Jan 2023 22:04)  POCT Blood Glucose.: 160 mg/dL (18 Jan 2023 17:31)      Culture - Blood (collected 01-17-23 @ 16:36)  Source: .Blood Blood-Peripheral  Preliminary Report (01-18-23 @ 23:01):    No growth to date.    Culture - Blood (collected 01-17-23 @ 16:30)  Source: .Blood Blood-Peripheral  Preliminary Report (01-18-23 @ 23:01):    No growth to date.    Culture - Blood (collected 01-16-23 @ 15:30)  Source: .Blood Blood  Preliminary Report (01-17-23 @ 18:02):    No growth to date.          A1C with Estimated Average Glucose Result: 7.4 % (01-17-23 @ 07:14)   [facility]-- WOUND TEAM -- FOLLOW UP NOTE  --------------------------------------------------------------------------------    24 hour events/subjective:    afebrile  tolerating po w/o n/v  mostly continent  ambulating w/ assist - PT eval noted  tolerating dressing changes  cx noted      Diet:  Diet, Consistent Carbohydrate w/Evening Snack:   Low Sodium  Supplement Feeding Modality:  Oral  Glucerna Shake Cans or Servings Per Day:  1       Frequency:  Daily (01-19-23 @ 10:53)      ROS: General/ SKIN/ MSK/ GI see HPI  all other systems negative      ALLERGIES & MEDICATIONS  --------------------------------------------------------------------------------  Allergies  iodine (Hives)  IV contrast (Unknown)  penicillin (Hives)      STANDING INPATIENT MEDICATIONS  amLODIPine   Tablet 5 milliGRAM(s) Oral daily  apixaban 5 milliGRAM(s) Oral two times a day  ascorbic acid 500 milliGRAM(s) Oral daily  atorvastatin 40 milliGRAM(s) Oral at bedtime  chlorhexidine 2% Cloths 1 Application(s) Topical <User Schedule>  dextrose 5%. 1000 milliLiter(s) IV Continuous <Continuous>  dextrose 5%. 1000 milliLiter(s) IV Continuous <Continuous>  dextrose 50% Injectable 25 Gram(s) IV Push once  dextrose 50% Injectable 12.5 Gram(s) IV Push once  dextrose 50% Injectable 25 Gram(s) IV Push once  donepezil 10 milliGRAM(s) Oral at bedtime  dorzolamide 2%/timolol 0.5% Ophthalmic Solution 1 Drop(s) Both EYES two times a day  escitalopram 5 milliGRAM(s) Oral daily  famotidine    Tablet 20 milliGRAM(s) Oral daily  glucagon  Injectable 1 milliGRAM(s) IntraMuscular once  insulin glargine Injectable (LANTUS) 10 Unit(s) SubCutaneous at bedtime  insulin lispro (ADMELOG) corrective regimen sliding scale   SubCutaneous three times a day before meals  memantine 5 milliGRAM(s) Oral daily  multivitamin 1 Tablet(s) Oral daily  vancomycin  IVPB 1000 milliGRAM(s) IV Intermittent every 24 hours      PRN INPATIENT MEDICATION  acetaminophen Tablet 650 milliGRAM(s) Oral every 6 hours PRN  dextrose Oral Gel 15 Gram(s) Oral once PRN        VITALS/PHYSICAL EXAM  --------------------------------------------------------------------------------  T(C): 36.6 (01-19-23 @ 12:54), Max: 37 (01-18-23 @ 21:40)  HR: 67 (01-19-23 @ 12:54) (67 - 75)  BP: 160/75 (01-19-23 @ 12:54) (140/76 - 160/75)  RR: 18 (01-19-23 @ 12:54) (18 - 18)  SpO2: 97% (01-19-23 @ 12:54) (97% - 98%)  Wt(kg): --  Height (cm): 152.4 (01-17-23 @ 20:10)  Weight (kg): 87.9 (01-17-23 @ 20:10)  BMI (kg/m2): 37.8 (01-17-23 @ 20:10)  BSA (m2): 1.84 (01-17-23 @ 20:10)      01-18-23 @ 07:01  -  01-19-23 @ 07:00  --------------------------------------------------------  IN: 580 mL / OUT: 3 mL / NET: 577 mL    01-19-23 @ 07:01  -  01-19-23 @ 15:15  --------------------------------------------------------  IN: 240 mL / OUT: 0 mL / NET: 240 mL      General: NAD, Alert, obese  Versa[employer] P500 bed  HEENT: NC/AT, mucosa moist, throat clear, EOMI,  sclera clear, trachea midline, neck supple  Neurology: weakened strength, Sensation grossly intact  Psych: calm, appropriate  Musculoskeletal: FROM  no contractures  Vascular: BLE edema equal, equally warm  Skin:  pale, frail, thin, ecchymosis w/o hematoma  Left  buttocks wound with wound bed lip of soft slough      3cm x 2.5cm x 1.5cm w/ 3cm tunnel at 5o'clock      minimal tenderness, fading blanchable erythema no increased warmth      induration / thickness of periwound noted      mucoserous drainage irrigated to clear    .  No fluctuance, crepitus or odor  Right buttock with corresponding kissing wound  3cm x 3cm x 0Cm healed hyperpigmented skin      No fluctuance, induration, crepitus, erythema, odor, drainage, nor erythema nor warmth  Procedure Note:  Using aseptic technique, the left buttock wound underwent a selective excisional debridement of lifting necrotic tissue up to and including subcutaneous tissue with scissors and forceps.  Patient tolerated well. EBL 2cc.  Hemostasis maintained no change in measurements        LABS/ CULTURES/ RADIOLOGY:              12.1   8.88  >-----------<  259      [01-19-23 @ 07:05]              39.5     138  |  107  |  26  ----------------------------<  149      [01-19-23 @ 07:05]  3.8   |  21  |  1.20        Ca     9.7     [01-19-23 @ 07:05]      Phos  2.8     [01-19-23 @ 07:05]      CAPILLARY BLOOD GLUCOSE  POCT Blood Glucose.: 208 mg/dL (19 Jan 2023 12:33)  POCT Blood Glucose.: 157 mg/dL (19 Jan 2023 08:34)  POCT Blood Glucose.: 194 mg/dL (18 Jan 2023 22:04)  POCT Blood Glucose.: 160 mg/dL (18 Jan 2023 17:31)      A1C with Estimated Average Glucose Result: 7.4 % (01-17-23 @ 07:14)    Culture - Blood (collected 01-17-23 @ 16:36)  Source: .Blood Blood-Peripheral  Preliminary Report (01-18-23 @ 23:01):    No growth to date.    Culture - Blood (collected 01-17-23 @ 16:30)  Source: .Blood Blood-Peripheral  Preliminary Report (01-18-23 @ 23:01):    No growth to date.    Culture - Blood (collected 01-16-23 @ 15:30)  Source: .Blood Blood  Preliminary Report (01-17-23 @ 18:02):    No growth to date.

## 2023-01-19 NOTE — PROVIDER CONTACT NOTE (CRITICAL VALUE NOTIFICATION) - ASSESSMENT
AOx2, no s/s chest pain/discomfort, resting stretcher.
patient stable, vitals stable, no acute distress noted.
stable

## 2023-01-19 NOTE — DIETITIAN INITIAL EVALUATION ADULT - PERTINENT MEDS FT
MEDICATIONS  (STANDING):  amLODIPine   Tablet 5 milliGRAM(s) Oral daily  apixaban 5 milliGRAM(s) Oral two times a day  atorvastatin 40 milliGRAM(s) Oral at bedtime  chlorhexidine 2% Cloths 1 Application(s) Topical <User Schedule>  dextrose 5%. 1000 milliLiter(s) (50 mL/Hr) IV Continuous <Continuous>  dextrose 5%. 1000 milliLiter(s) (100 mL/Hr) IV Continuous <Continuous>  dextrose 50% Injectable 25 Gram(s) IV Push once  dextrose 50% Injectable 12.5 Gram(s) IV Push once  dextrose 50% Injectable 25 Gram(s) IV Push once  donepezil 10 milliGRAM(s) Oral at bedtime  dorzolamide 2%/timolol 0.5% Ophthalmic Solution 1 Drop(s) Both EYES two times a day  escitalopram 5 milliGRAM(s) Oral daily  famotidine    Tablet 20 milliGRAM(s) Oral daily  glucagon  Injectable 1 milliGRAM(s) IntraMuscular once  insulin glargine Injectable (LANTUS) 10 Unit(s) SubCutaneous at bedtime  insulin lispro (ADMELOG) corrective regimen sliding scale   SubCutaneous three times a day before meals  memantine 5 milliGRAM(s) Oral daily  vancomycin  IVPB 1000 milliGRAM(s) IV Intermittent every 24 hours    MEDICATIONS  (PRN):  acetaminophen     Tablet .. 650 milliGRAM(s) Oral every 6 hours PRN Temp greater or equal to 38C (100.4F), Mild Pain (1 - 3)  dextrose Oral Gel 15 Gram(s) Oral once PRN Blood Glucose LESS THAN 70 milliGRAM(s)/deciliter

## 2023-01-19 NOTE — PROGRESS NOTE ADULT - SUBJECTIVE AND OBJECTIVE BOX
CC: f/u for left buttocks abscess    Patient reports: improvement in pain. S/P Iand D of abscess    REVIEW OF SYSTEMS:  All other review of systems negative (Comprehensive ROS)    Antimicrobials Day #  :day 3  vancomycin  IVPB 1000 milliGRAM(s) IV Intermittent every 24 hours    Other Medications Reviewed  MEDICATIONS  (STANDING):  amLODIPine   Tablet 5 milliGRAM(s) Oral daily  apixaban 5 milliGRAM(s) Oral two times a day  ascorbic acid 500 milliGRAM(s) Oral daily  atorvastatin 40 milliGRAM(s) Oral at bedtime  chlorhexidine 2% Cloths 1 Application(s) Topical <User Schedule>  dextrose 5%. 1000 milliLiter(s) (50 mL/Hr) IV Continuous <Continuous>  dextrose 5%. 1000 milliLiter(s) (100 mL/Hr) IV Continuous <Continuous>  dextrose 50% Injectable 25 Gram(s) IV Push once  dextrose 50% Injectable 12.5 Gram(s) IV Push once  dextrose 50% Injectable 25 Gram(s) IV Push once  donepezil 10 milliGRAM(s) Oral at bedtime  dorzolamide 2%/timolol 0.5% Ophthalmic Solution 1 Drop(s) Both EYES two times a day  escitalopram 5 milliGRAM(s) Oral daily  famotidine    Tablet 20 milliGRAM(s) Oral daily  glucagon  Injectable 1 milliGRAM(s) IntraMuscular once  insulin glargine Injectable (LANTUS) 10 Unit(s) SubCutaneous at bedtime  insulin lispro (ADMELOG) corrective regimen sliding scale   SubCutaneous three times a day before meals  memantine 5 milliGRAM(s) Oral daily  multivitamin 1 Tablet(s) Oral daily  vancomycin  IVPB 1000 milliGRAM(s) IV Intermittent every 24 hours    T(F): 97.9 (01-19-23 @ 12:54), Max: 98.6 (01-18-23 @ 21:40)  HR: 67 (01-19-23 @ 12:54)  BP: 160/75 (01-19-23 @ 12:54)  RR: 18 (01-19-23 @ 12:54)  SpO2: 97% (01-19-23 @ 12:54)  Wt(kg): --    PHYSICAL EXAM:  General: alert, no acute distress  Eyes:  anicteric, no conjunctival injection, no discharge  Oropharynx: no lesions or injection 	  Neck: supple, without adenopathy  Lungs: clear to auscultation  Heart: regular rate and rhythm; no murmur, rubs or gallops  Abdomen: soft, nondistended, nontender, without mass or organomegaly  Skin: no lesions  Extremities: no clubbing, cyanosis, or edema  Neurologic: alert, oriented, moves all extremities  Buttocks dressed  LAB RESULTS:                        12.1   8.88  )-----------( 259      ( 19 Jan 2023 07:05 )             39.5     01-19    138  |  107  |  26<H>  ----------------------------<  149<H>  3.8   |  21<L>  |  1.20    Ca    9.7      19 Jan 2023 07:05  Phos  2.8     01-19          MICROBIOLOGY:  RECENT CULTURES:  01-17 @ 16:36 .Blood Blood-Peripheral     No growth to date.      01-17 @ 16:30 .Blood Blood-Peripheral     No growth to date.      01-16 @ 15:30 .Blood Blood     No growth to date.      01-16 @ 15:19 Wound Wound Methicillin resistant Staphylococcus aureus    Moderate Methicillin Resistant Staphylococcus aureus      01-16 @ 15:15 .Blood Blood Blood Culture PCR    Growth in aerobic bottle: Staphylococcus haemolyticus  Coag Negative Staphylococcus  Single set isolate, possible contaminant. Contact  Microbiology if susceptibility testing clinically  indicated.  ***Blood Panel PCR results on this specimen are available  approximately 3 hours after the Gram stain result.***  Gram stain, PCR, and/or culture results may not always  correspond due to difference in methodologies.  ************************************************************  This PCR assay was performed by multiplex PCR. This  Assay tests for 66 bacterial and resistance gene targets.  Please refer to the [facility] test directory  at [email or web address] for details.    Growth in aerobic bottle: Gram positive cocci in pairs        RADIOLOGY REVIEWED:

## 2023-01-19 NOTE — PROGRESS NOTE ADULT - SUBJECTIVE AND OBJECTIVE BOX
CHIEF COMPLAINT  Infection    HISTORY OF PRESENT ILLNESS  PONCE MEJIA is a 87y Female who presents with a chief complaint of infection    No acute events. No complaints.    REVIEW OF SYSTEMS  A complete review of systems was performed; negative except per HPI    PHYSICAL EXAM  T(C): 36.8 (01-19-23 @ 04:28), Max: 37.3 (01-18-23 @ 11:49)  HR: 68 (01-19-23 @ 04:28) (68 - 75)  BP: 153/84 (01-19-23 @ 04:28) (118/75 - 153/84)  RR: 18 (01-19-23 @ 04:28) (18 - 18)  SpO2: 97% (01-19-23 @ 04:28) (97% - 98%)  Constitutional: alert, awake, in no acute distress  Eyes: PERRL, EOMI  HEENT: normocephalic, atraumatic  Neck: supple, non-tender  Cardiovascular: normal perfusion, no peripheral edema  Respiratory: normal respiratory efforts; no increased use of accessory muscles  Gastrointestinal: soft, non-tender  Musculoskeletal: normal range of motion, no deformities noted  Neurological: alert, CN II to XI grossly intact  Skin: warm, dry    LABORATORY DATA                        12.1   8.88  )-----------( 259      ( 19 Jan 2023 07:05 )             39.5     01-19    138  |  107  |  26<H>  ----------------------------<  149<H>  3.8   |  21<L>  |  1.20    Ca    9.7      19 Jan 2023 07:05  Phos  2.8     01-19

## 2023-01-19 NOTE — DIETITIAN INITIAL EVALUATION ADULT - EDUCATION DIETARY MODIFICATIONS
Encourage PO intake of protein-rich, nutrient dense food. Provided recommendations to optimize PO and protein intake,  to start with protein at meals, and sips of supplement throughout the day./teach back/(1) partially meets; needs review/practice/verbalization

## 2023-01-19 NOTE — DIETITIAN INITIAL EVALUATION ADULT - OTHER CALCULATIONS
Defer fluid needs to medical team  Estimated calorie & protein needs based on upper IBW range of 110 pounds

## 2023-01-19 NOTE — PROGRESS NOTE ADULT - ASSESSMENT
PONCE MEJIA is a 87y Female who presents with a chief complaint of infection    Metastatic Lung Cancer  - Patient follows with Dr. Gurdeep Sharpe, [facility].  - She is currently on pembrolizumab monotherapy, last dose in January 9th.  - No systemic therapy while inpatient or during rehabilitation.    Sacral Wound/Abscess/Cellulitis  - Wound care and infectious disease following.  - Wound culture growing S. aureus.   - Blood culture GPC likely contaminant per infectious disease.  - Continue vancomycin per infectious disease.    Will continue to follow.    Dallin Katz MD  Hematology/Oncology  O: [phone]

## 2023-01-19 NOTE — PROGRESS NOTE ADULT - PROBLEM SELECTOR PLAN 1
Will continue current insulin regimen for now. Will continue monitoring  blood sugars, will Follow up.  Continue Lantus 10 units qHS  Patient counseled for compliance with consistent low carb diet.    Discharge recs:  Suggest continue Home diabetic meds Prandin 1mg TID, decrease Januvia 50 mg qd  Follow up Endocrinology Will continue current insulin regimen for now. Will continue monitoring  blood sugars, will Follow up.  Patient counseled for compliance with consistent low carb diet.    Discharge recs:  Suggest continue Home diabetic meds Prandin 1mg TID, decrease Januvia 50 mg qd  Follow up Endocrinology

## 2023-01-19 NOTE — DIETITIAN INITIAL EVALUATION ADULT - NS FNS DIET ORDER
Diet, DASH/TLC:   Sodium & Cholesterol Restricted  Consistent Carbohydrate {Evening Snack} (CSTCHOSN) (01-16-23 @ 20:09) [Active]

## 2023-01-19 NOTE — PROGRESS NOTE ADULT - ASSESSMENT
87-year-old female, history of lung cancer currently on immunotherapy( MSK), lethargic, bed bound due to weakness w present illness x 1 week, diabetes, dementia, presenting from home with a stage 2 sacral wound.  Noticed 1/14, 2 days ago by home nurse.  Seen by urgent care 1/16, referred here.  Felt subjective fevers earlier today.  No active vomiting, abdominal pain, chest pain, shortness of breath, dysuria, frequency, changes in bowel habits.  No history of similar issues.  She has also been feeling generally weak over the past few weeks to the point that she is having a hard time ambulating, typically ambulates at her normal baseline.     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 JOSE resolved, seen by renal, IVF DCed, seen by ONC, stage 4 metastatic lung Ca, Ptn has increased debility. awaiting wound and ID consults.     1/18: ptn is awake, alert, wound care debrided sacral decub and packed a small abscess. cx growing MRSA, on Vanco    1/19: day 3 Iv Vanco for MRSA sacral decubitus abscess, on 1/21 will switch to po minocycline. will treat for total 10 days of ABx.  will need outptn wound care f/u w wound care at home

## 2023-01-19 NOTE — DIETITIAN INITIAL EVALUATION ADULT - PERTINENT LABORATORY DATA
01-19    138  |  107  |  26<H>  ----------------------------<  149<H>  3.8   |  21<L>  |  1.20    Ca    9.7      19 Jan 2023 07:05  Phos  2.8     01-19      POCT Blood Glucose.: 157 mg/dL (01-19-23 @ 08:34)  POCT Blood Glucose.: 194 mg/dL (01-18-23 @ 22:04)  POCT Blood Glucose.: 160 mg/dL (01-18-23 @ 17:31)  POCT Blood Glucose.: 232 mg/dL (01-18-23 @ 12:48)    A1C with Estimated Average Glucose Result: 7.4 % (01-17-23 @ 07:14)

## 2023-01-19 NOTE — DIETITIAN INITIAL EVALUATION ADULT - ADD RECOMMEND
1. Recommend d/c DASH restriction, provide instead Low sodium; continue Consistent Carbohydrate diet.   2. Defer diet/texture modification to medical team/SLP as indicated   3. Recommend Ensure Plus Max Protein 1x/Day (150 kcal, 30 gm pro) to help meet pt's increased nutrient needs  4. Consider adding multivitamin and vitamin C supplements if no medical contraindications to aid in wound healing.   5. Will trial diet mighty shakes 2x/day   6. Monitor PO intake, GI tolerance, skin integrity and labs. RD remains available if needed, pt is aware.  1. Recommend d/c DASH restriction, provide instead Low sodium; continue Consistent Carbohydrate diet.   2. Defer diet/texture modification to medical team/SLP as indicated   3. Recommend Glucerna 1x/Day (220 kcal, 10 gm pro) to help meet pt's increased nutrient needs  4. Consider adding multivitamin and vitamin C supplements if no medical contraindications to aid in wound healing.   5. Will trial diet mighty shakes 2x/day   6. Monitor PO intake, GI tolerance, skin integrity and labs. RD remains available if needed, pt is aware.

## 2023-01-19 NOTE — DIETITIAN INITIAL EVALUATION ADULT - ORAL INTAKE PTA/DIET HISTORY
Pt confirms no known food allergies/intolerances. Reports having a good appetite and PO intakes at home. Pt reports to limiting her salt intake PTA. Pt denies nausea, vomiting, diarrhea, or constipation. Denies difficulty chewing/swallowing. Denies any vitamin/mineral use at home, pt reports not taking oral nutritional supplement at home as well.

## 2023-01-19 NOTE — PROVIDER CONTACT NOTE (CRITICAL VALUE NOTIFICATION) - ACTION/TREATMENT ORDERED:
Continue present treatments. No new treatments ordered.
await orders. [facility] on [facility]

## 2023-01-19 NOTE — PROGRESS NOTE ADULT - SUBJECTIVE AND OBJECTIVE BOX
Chief complaint  Patient is a 87y old  Female who presents with a chief complaint of Chart Reviewed, Events Noted  As per H&P, "87-year-old female, history of lung cancer currently on immunotherapy( [facility]), lethargic, bed bound due to weakness w present illness x 1 week, diabetes, dementia, presenting from home with a stage 2 sacral wound.  Noticed 1/14, 2 days ago by home nurse.  Seen by urgent care 1/16, referred here.  Felt subjective fevers earlier today.  No active vomiting, abdominal pain, chest pain, shortness of breath, dysuria, frequency, changes in bowel habits.  No history of similar issues.  She has also been feeling generally weak over the past few weeks to the point that she is having a hard time ambulating, typically ambulates at her normal baseline."   (19 Jan 2023 10:29)    Patient in bed, looks comfortable. Offers no complaints     Labs and Fingersticks  CAPILLARY BLOOD GLUCOSE      POCT Blood Glucose.: 208 mg/dL (19 Jan 2023 12:33)  POCT Blood Glucose.: 157 mg/dL (19 Jan 2023 08:34)  POCT Blood Glucose.: 194 mg/dL (18 Jan 2023 22:04)  POCT Blood Glucose.: 160 mg/dL (18 Jan 2023 17:31)      Anion Gap, Serum: 10 (01-19 @ 07:05)  Anion Gap, Serum: 12 (01-18 @ 07:04)      Calcium, Total Serum: 9.7 (01-19 @ 07:05)  Calcium, Total Serum: 10.5 (01-18 @ 07:04)  Intact PTH: 82 *H* (01-19 @ 07:05)  Vitamin D, 1, 25-Dihydroxy: 50.9 (01-19 @ 07:05)          01-19    138  |  107  |  26<H>  ----------------------------<  149<H>  3.8   |  21<L>  |  1.20    Ca    9.7      19 Jan 2023 07:05  Phos  2.8     01-19                          12.1   8.88  )-----------( 259      ( 19 Jan 2023 07:05 )             39.5     Medications  MEDICATIONS  (STANDING):  amLODIPine   Tablet 5 milliGRAM(s) Oral daily  apixaban 5 milliGRAM(s) Oral two times a day  ascorbic acid 500 milliGRAM(s) Oral daily  atorvastatin 40 milliGRAM(s) Oral at bedtime  chlorhexidine 2% Cloths 1 Application(s) Topical <User Schedule>  dextrose 5%. 1000 milliLiter(s) (50 mL/Hr) IV Continuous <Continuous>  dextrose 5%. 1000 milliLiter(s) (100 mL/Hr) IV Continuous <Continuous>  dextrose 50% Injectable 25 Gram(s) IV Push once  dextrose 50% Injectable 12.5 Gram(s) IV Push once  dextrose 50% Injectable 25 Gram(s) IV Push once  donepezil 10 milliGRAM(s) Oral at bedtime  dorzolamide 2%/timolol 0.5% Ophthalmic Solution 1 Drop(s) Both EYES two times a day  escitalopram 5 milliGRAM(s) Oral daily  famotidine    Tablet 20 milliGRAM(s) Oral daily  glucagon  Injectable 1 milliGRAM(s) IntraMuscular once  insulin glargine Injectable (LANTUS) 10 Unit(s) SubCutaneous at bedtime  insulin lispro (ADMELOG) corrective regimen sliding scale   SubCutaneous three times a day before meals  memantine 5 milliGRAM(s) Oral daily  multivitamin 1 Tablet(s) Oral daily  vancomycin  IVPB 1000 milliGRAM(s) IV Intermittent every 24 hours      Physical Exam  General: Patient comfortable in bed  Vital Signs Last 12 Hrs  T(F): 97.9 (01-19-23 @ 12:54), Max: 98.3 (01-19-23 @ 10:35)  HR: 67 (01-19-23 @ 12:54) (67 - 72)  BP: 160/75 (01-19-23 @ 12:54) (146/82 - 160/75)  BP(mean): --  RR: 18 (01-19-23 @ 12:54) (18 - 18)  SpO2: 97% (01-19-23 @ 12:54) (97% - 97%)  CVS: S1S2, No murmurs  Respiratory: No wheezing, no crepitations  GI: Abdomen soft, bowel sounds positive  Musculoskeletal:  edema lower extremities.   Skin: No skin rashes, no ecchymosis             Chief complaint  Patient is a 87y old  Female who presents with a chief complaint of Chart Reviewed, Events Noted  As per H&P, "87-year-old female, history of lung cancer currently on immunotherapy( [facility]), lethargic, bed bound due to weakness w present illness x 1 week, diabetes, dementia, presenting from home with a stage 2 sacral wound.  Noticed 1/14, 2 days ago by home nurse.  Seen by urgent care 1/16, referred here.  Felt subjective fevers earlier today.  No active vomiting, abdominal pain, chest pain, shortness of breath, dysuria, frequency, changes in bowel habits.  No history of similar issues.  She has also been feeling generally weak over the past few weeks to the point that she is having a hard time ambulating, typically ambulates at her normal baseline."   (19 Jan 2023 10:29)    Patient in bed, looks comfortable. Offers no complaints     Labs and Fingersticks  CAPILLARY BLOOD GLUCOSE      POCT Blood Glucose.: 208 mg/dL (19 Jan 2023 12:33)  POCT Blood Glucose.: 157 mg/dL (19 Jan 2023 08:34)  POCT Blood Glucose.: 194 mg/dL (18 Jan 2023 22:04)  POCT Blood Glucose.: 160 mg/dL (18 Jan 2023 17:31)      Anion Gap, Serum: 10 (01-19 @ 07:05)  Anion Gap, Serum: 12 (01-18 @ 07:04)      Calcium, Total Serum: 9.7 (01-19 @ 07:05)  Calcium, Total Serum: 10.5 (01-18 @ 07:04)  Intact PTH: 82 *H* (01-19 @ 07:05)  Vitamin D, 1, 25-Dihydroxy: 50.9 (01-19 @ 07:05)          01-19    138  |  107  |  26<H>  ----------------------------<  149<H>  3.8   |  21<L>  |  1.20    Ca    9.7      19 Jan 2023 07:05  Phos  2.8     01-19                          12.1   8.88  )-----------( 259      ( 19 Jan 2023 07:05 )             39.5     Medications  MEDICATIONS  (STANDING):  amLODIPine   Tablet 5 milliGRAM(s) Oral daily  apixaban 5 milliGRAM(s) Oral two times a day  ascorbic acid 500 milliGRAM(s) Oral daily  atorvastatin 40 milliGRAM(s) Oral at bedtime  chlorhexidine 2% Cloths 1 Application(s) Topical <User Schedule>  dextrose 5%. 1000 milliLiter(s) (50 mL/Hr) IV Continuous <Continuous>  dextrose 5%. 1000 milliLiter(s) (100 mL/Hr) IV Continuous <Continuous>  dextrose 50% Injectable 25 Gram(s) IV Push once  dextrose 50% Injectable 12.5 Gram(s) IV Push once  dextrose 50% Injectable 25 Gram(s) IV Push once  donepezil 10 milliGRAM(s) Oral at bedtime  dorzolamide 2%/timolol 0.5% Ophthalmic Solution 1 Drop(s) Both EYES two times a day  escitalopram 5 milliGRAM(s) Oral daily  famotidine    Tablet 20 milliGRAM(s) Oral daily  glucagon  Injectable 1 milliGRAM(s) IntraMuscular once  insulin glargine Injectable (LANTUS) 10 Unit(s) SubCutaneous at bedtime  insulin lispro (ADMELOG) corrective regimen sliding scale   SubCutaneous three times a day before meals  memantine 5 milliGRAM(s) Oral daily  multivitamin 1 Tablet(s) Oral daily  vancomycin  IVPB 1000 milliGRAM(s) IV Intermittent every 24 hours      Physical Exam  General: Patient comfortable in bed  Vital Signs Last 12 Hrs  T(F): 97.9 (01-19-23 @ 12:54), Max: 98.3 (01-19-23 @ 10:35)  HR: 67 (01-19-23 @ 12:54) (67 - 72)  BP: 160/75 (01-19-23 @ 12:54) (146/82 - 160/75)  BP(mean): --  RR: 18 (01-19-23 @ 12:54) (18 - 18)  SpO2: 97% (01-19-23 @ 12:54) (97% - 97%)  CVS: S1S2, No murmurs  Respiratory: No wheezing, no crepitations  GI: Abdomen soft, bowel sounds positive  Musculoskeletal:  edema lower extremities.   Skin: No skin rashes, no ecchymosis

## 2023-01-20 LAB
CULTURE RESULTS: SIGNIFICANT CHANGE UP
GLUCOSE BLDC GLUCOMTR-MCNC: 170 MG/DL — HIGH (ref 70–99)
GLUCOSE BLDC GLUCOMTR-MCNC: 207 MG/DL — HIGH (ref 70–99)
GLUCOSE BLDC GLUCOMTR-MCNC: 257 MG/DL — HIGH (ref 70–99)
GLUCOSE BLDC GLUCOMTR-MCNC: 280 MG/DL — HIGH (ref 70–99)
HCT VFR BLD CALC: 39.8 % — SIGNIFICANT CHANGE UP (ref 34.5–45)
HGB BLD-MCNC: 12.3 G/DL — SIGNIFICANT CHANGE UP (ref 11.5–15.5)
MCHC RBC-ENTMCNC: 26.5 PG — LOW (ref 27–34)
MCHC RBC-ENTMCNC: 30.9 GM/DL — LOW (ref 32–36)
MCV RBC AUTO: 85.8 FL — SIGNIFICANT CHANGE UP (ref 80–100)
NRBC # BLD: 0 /100 WBCS — SIGNIFICANT CHANGE UP (ref 0–0)
PLATELET # BLD AUTO: 259 K/UL — SIGNIFICANT CHANGE UP (ref 150–400)
RBC # BLD: 4.64 M/UL — SIGNIFICANT CHANGE UP (ref 3.8–5.2)
RBC # FLD: 13.2 % — SIGNIFICANT CHANGE UP (ref 10.3–14.5)
SPECIMEN SOURCE: SIGNIFICANT CHANGE UP
VANCOMYCIN TROUGH SERPL-MCNC: 11 UG/ML — SIGNIFICANT CHANGE UP (ref 10–20)
WBC # BLD: 9.01 K/UL — SIGNIFICANT CHANGE UP (ref 3.8–10.5)
WBC # FLD AUTO: 9.01 K/UL — SIGNIFICANT CHANGE UP (ref 3.8–10.5)

## 2023-01-20 RX ORDER — MUPIROCIN 20 MG/G
1 OINTMENT TOPICAL
Refills: 0 | Status: DISCONTINUED | OUTPATIENT
Start: 2023-01-20 | End: 2023-01-23

## 2023-01-20 RX ADMIN — DORZOLAMIDE HYDROCHLORIDE TIMOLOL MALEATE 1 DROP(S): 20; 5 SOLUTION/ DROPS OPHTHALMIC at 22:46

## 2023-01-20 RX ADMIN — Medication 650 MILLIGRAM(S): at 07:30

## 2023-01-20 RX ADMIN — DORZOLAMIDE HYDROCHLORIDE TIMOLOL MALEATE 1 DROP(S): 20; 5 SOLUTION/ DROPS OPHTHALMIC at 09:32

## 2023-01-20 RX ADMIN — Medication 500 MILLIGRAM(S): at 12:48

## 2023-01-20 RX ADMIN — Medication 650 MILLIGRAM(S): at 17:30

## 2023-01-20 RX ADMIN — Medication 3: at 17:30

## 2023-01-20 RX ADMIN — ATORVASTATIN CALCIUM 40 MILLIGRAM(S): 80 TABLET, FILM COATED ORAL at 22:46

## 2023-01-20 RX ADMIN — MUPIROCIN 1 APPLICATION(S): 20 OINTMENT TOPICAL at 17:31

## 2023-01-20 RX ADMIN — Medication 250 MILLIGRAM(S): at 18:57

## 2023-01-20 RX ADMIN — ESCITALOPRAM OXALATE 5 MILLIGRAM(S): 10 TABLET, FILM COATED ORAL at 12:48

## 2023-01-20 RX ADMIN — Medication 3: at 12:46

## 2023-01-20 RX ADMIN — INSULIN GLARGINE 10 UNIT(S): 100 INJECTION, SOLUTION SUBCUTANEOUS at 22:47

## 2023-01-20 RX ADMIN — CHLORHEXIDINE GLUCONATE 1 APPLICATION(S): 213 SOLUTION TOPICAL at 06:13

## 2023-01-20 RX ADMIN — APIXABAN 5 MILLIGRAM(S): 2.5 TABLET, FILM COATED ORAL at 06:11

## 2023-01-20 RX ADMIN — APIXABAN 5 MILLIGRAM(S): 2.5 TABLET, FILM COATED ORAL at 17:30

## 2023-01-20 RX ADMIN — Medication 1: at 09:08

## 2023-01-20 RX ADMIN — MUPIROCIN 1 APPLICATION(S): 20 OINTMENT TOPICAL at 09:24

## 2023-01-20 RX ADMIN — FAMOTIDINE 20 MILLIGRAM(S): 10 INJECTION INTRAVENOUS at 12:48

## 2023-01-20 RX ADMIN — MEMANTINE HYDROCHLORIDE 5 MILLIGRAM(S): 10 TABLET ORAL at 12:48

## 2023-01-20 RX ADMIN — AMLODIPINE BESYLATE 5 MILLIGRAM(S): 2.5 TABLET ORAL at 06:12

## 2023-01-20 RX ADMIN — DONEPEZIL HYDROCHLORIDE 10 MILLIGRAM(S): 10 TABLET, FILM COATED ORAL at 22:46

## 2023-01-20 RX ADMIN — Medication 1 TABLET(S): at 12:48

## 2023-01-20 NOTE — PROGRESS NOTE ADULT - PROBLEM SELECTOR PLAN 1
Will continue current insulin regimen for now. Will continue monitoring  blood sugars, will Follow up.  Patient counseled for compliance with consistent low carb diet.    Discharge recs:  Suggest to be d/c on home diabetic meds, on decreased dose of Prandin 0.5 mg TID and decrease Januvia 50 mg qd as long as blood sugar levels are stable.   Follow up Endocrinology

## 2023-01-20 NOTE — PROGRESS NOTE ADULT - SUBJECTIVE AND OBJECTIVE BOX
Chief complaint  Patient is a 87y old  Female who presents with a chief complaint of left buttocks abscess (19 Jan 2023 17:43)    Patient in bed, looks comfortable.    Labs and Fingersticks  CAPILLARY BLOOD GLUCOSE      POCT Blood Glucose.: 280 mg/dL (20 Jan 2023 11:50)  POCT Blood Glucose.: 170 mg/dL (20 Jan 2023 08:59)  POCT Blood Glucose.: 168 mg/dL (19 Jan 2023 21:40)  POCT Blood Glucose.: 175 mg/dL (19 Jan 2023 16:57)      Anion Gap, Serum: 10 (01-19 @ 07:05)      Calcium, Total Serum: 9.7 (01-19 @ 07:05)  Intact PTH: 82 *H* (01-19 @ 07:05)  Vitamin D, 25-Hydroxy: 20.5 *L* (01-19 @ 07:05)  Vitamin D, 1, 25-Dihydroxy: 50.9 (01-19 @ 07:05)          01-19    138  |  107  |  26<H>  ----------------------------<  149<H>  3.8   |  21<L>  |  1.20    Ca    9.7      19 Jan 2023 07:05  Phos  2.8     01-19                          12.3   9.01  )-----------( 259      ( 20 Jan 2023 07:03 )             39.8     Medications  MEDICATIONS  (STANDING):  amLODIPine   Tablet 5 milliGRAM(s) Oral daily  apixaban 5 milliGRAM(s) Oral two times a day  ascorbic acid 500 milliGRAM(s) Oral daily  atorvastatin 40 milliGRAM(s) Oral at bedtime  chlorhexidine 2% Cloths 1 Application(s) Topical <User Schedule>  dextrose 5%. 1000 milliLiter(s) (50 mL/Hr) IV Continuous <Continuous>  dextrose 5%. 1000 milliLiter(s) (100 mL/Hr) IV Continuous <Continuous>  dextrose 50% Injectable 25 Gram(s) IV Push once  dextrose 50% Injectable 12.5 Gram(s) IV Push once  dextrose 50% Injectable 25 Gram(s) IV Push once  donepezil 10 milliGRAM(s) Oral at bedtime  dorzolamide 2%/timolol 0.5% Ophthalmic Solution 1 Drop(s) Both EYES two times a day  escitalopram 5 milliGRAM(s) Oral daily  famotidine    Tablet 20 milliGRAM(s) Oral daily  glucagon  Injectable 1 milliGRAM(s) IntraMuscular once  insulin glargine Injectable (LANTUS) 10 Unit(s) SubCutaneous at bedtime  insulin lispro (ADMELOG) corrective regimen sliding scale   SubCutaneous three times a day before meals  memantine 5 milliGRAM(s) Oral daily  multivitamin 1 Tablet(s) Oral daily  mupirocin 2% Nasal 1 Application(s) Both Nostrils two times a day  vancomycin  IVPB 1000 milliGRAM(s) IV Intermittent every 24 hours      Physical Exam  General: Patient comfortable in bed  Vital Signs Last 12 Hrs  T(F): 98.5 (01-20-23 @ 11:33), Max: 98.5 (01-20-23 @ 11:33)  HR: 60 (01-20-23 @ 11:33) (60 - 73)  BP: 170/60 (01-20-23 @ 11:33) (111/68 - 170/60)  BP(mean): --  RR: 18 (01-20-23 @ 11:33) (18 - 18)  SpO2: 100% (01-20-23 @ 11:33) (95% - 100%)  CVS: S1S2, No murmurs  Respiratory: No wheezing, no crepitations  GI: Abdomen soft, bowel sounds positive  Musculoskeletal:  edema lower extremities.   Skin: No skin rashes, no ecchymosis               Chief complaint  Patient is a 87y old  Female who presents with a chief complaint of left buttocks abscess (19 Jan 2023 17:43)    Patient in bed, looks comfortable.    Labs and Fingersticks  CAPILLARY BLOOD GLUCOSE      POCT Blood Glucose.: 280 mg/dL (20 Jan 2023 11:50)  POCT Blood Glucose.: 170 mg/dL (20 Jan 2023 08:59)  POCT Blood Glucose.: 168 mg/dL (19 Jan 2023 21:40)  POCT Blood Glucose.: 175 mg/dL (19 Jan 2023 16:57)      Anion Gap, Serum: 10 (01-19 @ 07:05)      Calcium, Total Serum: 9.7 (01-19 @ 07:05)  Intact PTH: 82 *H* (01-19 @ 07:05)  Vitamin D, 25-Hydroxy: 20.5 *L* (01-19 @ 07:05)  Vitamin D, 1, 25-Dihydroxy: 50.9 (01-19 @ 07:05)          01-19    138  |  107  |  26<H>  ----------------------------<  149<H>  3.8   |  21<L>  |  1.20    Ca    9.7      19 Jan 2023 07:05  Phos  2.8     01-19                          12.3   9.01  )-----------( 259      ( 20 Jan 2023 07:03 )             39.8     Medications  MEDICATIONS  (STANDING):  amLODIPine   Tablet 5 milliGRAM(s) Oral daily  apixaban 5 milliGRAM(s) Oral two times a day  ascorbic acid 500 milliGRAM(s) Oral daily  atorvastatin 40 milliGRAM(s) Oral at bedtime  chlorhexidine 2% Cloths 1 Application(s) Topical <User Schedule>  dextrose 5%. 1000 milliLiter(s) (50 mL/Hr) IV Continuous <Continuous>  dextrose 5%. 1000 milliLiter(s) (100 mL/Hr) IV Continuous <Continuous>  dextrose 50% Injectable 25 Gram(s) IV Push once  dextrose 50% Injectable 12.5 Gram(s) IV Push once  dextrose 50% Injectable 25 Gram(s) IV Push once  donepezil 10 milliGRAM(s) Oral at bedtime  dorzolamide 2%/timolol 0.5% Ophthalmic Solution 1 Drop(s) Both EYES two times a day  escitalopram 5 milliGRAM(s) Oral daily  famotidine    Tablet 20 milliGRAM(s) Oral daily  glucagon  Injectable 1 milliGRAM(s) IntraMuscular once  insulin glargine Injectable (LANTUS) 10 Unit(s) SubCutaneous at bedtime  insulin lispro (ADMELOG) corrective regimen sliding scale   SubCutaneous three times a day before meals  memantine 5 milliGRAM(s) Oral daily  multivitamin 1 Tablet(s) Oral daily  mupirocin 2% Nasal 1 Application(s) Both Nostrils two times a day  vancomycin  IVPB 1000 milliGRAM(s) IV Intermittent every 24 hours      Physical Exam  General: Patient comfortable in bed  Vital Signs Last 12 Hrs  T(F): 98.5 (01-20-23 @ 11:33), Max: 98.5 (01-20-23 @ 11:33)  HR: 60 (01-20-23 @ 11:33) (60 - 73)  BP: 170/60 (01-20-23 @ 11:33) (111/68 - 170/60)  BP(mean): --  RR: 18 (01-20-23 @ 11:33) (18 - 18)  SpO2: 100% (01-20-23 @ 11:33) (95% - 100%)  CVS: S1S2, No murmurs  Respiratory: No wheezing, no crepitations  GI: Abdomen soft, bowel sounds positive  Musculoskeletal:  edema lower extremities.   Skin: No skin rashes, no ecchymosis

## 2023-01-20 NOTE — PROGRESS NOTE ADULT - ASSESSMENT
PONCE MEJIA is a 87y Female who presents with a chief complaint of infection    Metastatic Lung Cancer  - Patient follows with Dr. Gurdeep Sharpe, [facility].  - She is currently on pembrolizumab monotherapy, last dose in January 9th.  - No systemic therapy while inpatient or during rehabilitation.    Sacral Wound/Abscess/Cellulitis  - Wound care and infectious disease following.  - Wound culture growing S. aureus.   - Blood culture GPC likely contaminant per infectious disease.  - Continue vancomycin per infectious disease.    Will continue to follow.    Maribel Tijerina NP  Hematology/ Oncology  [facility]  [phone] (office)  [phone] (alt office)  Evenings and weekends please call MD on call or office

## 2023-01-20 NOTE — PROGRESS NOTE ADULT - SUBJECTIVE AND OBJECTIVE BOX
Patient is a 87y old  Female who presents with a chief complaint of left buttocks abscess (19 Jan 2023 17:43)    Patient seen and examined this morning at bedside. Patient noted resting comfortably in bed, offers no new complaints.  at bedside    MEDICATIONS  (STANDING):  amLODIPine   Tablet 5 milliGRAM(s) Oral daily  apixaban 5 milliGRAM(s) Oral two times a day  ascorbic acid 500 milliGRAM(s) Oral daily  atorvastatin 40 milliGRAM(s) Oral at bedtime  chlorhexidine 2% Cloths 1 Application(s) Topical <User Schedule>  dextrose 5%. 1000 milliLiter(s) (50 mL/Hr) IV Continuous <Continuous>  dextrose 5%. 1000 milliLiter(s) (100 mL/Hr) IV Continuous <Continuous>  dextrose 50% Injectable 25 Gram(s) IV Push once  dextrose 50% Injectable 12.5 Gram(s) IV Push once  dextrose 50% Injectable 25 Gram(s) IV Push once  donepezil 10 milliGRAM(s) Oral at bedtime  dorzolamide 2%/timolol 0.5% Ophthalmic Solution 1 Drop(s) Both EYES two times a day  escitalopram 5 milliGRAM(s) Oral daily  famotidine    Tablet 20 milliGRAM(s) Oral daily  glucagon  Injectable 1 milliGRAM(s) IntraMuscular once  insulin glargine Injectable (LANTUS) 10 Unit(s) SubCutaneous at bedtime  insulin lispro (ADMELOG) corrective regimen sliding scale   SubCutaneous three times a day before meals  memantine 5 milliGRAM(s) Oral daily  multivitamin 1 Tablet(s) Oral daily  mupirocin 2% Nasal 1 Application(s) Both Nostrils two times a day  vancomycin  IVPB 1000 milliGRAM(s) IV Intermittent every 24 hours    MEDICATIONS  (PRN):  acetaminophen     Tablet .. 650 milliGRAM(s) Oral every 6 hours PRN Temp greater or equal to 38C (100.4F), Mild Pain (1 - 3)  dextrose Oral Gel 15 Gram(s) Oral once PRN Blood Glucose LESS THAN 70 milliGRAM(s)/deciliter      ROS  No fever, sweats, chills  No epistaxis, HA, sore throat  No CP, SOB, cough, sputum  No n/v/d, abd pain, melena, hematochezia  No edema  No rash  No anxiety  No back pain, joint pain  No bleeding, bruising  No dysuria, hematuria    Vital Signs Last 24 Hrs  T(C): 36.9 (20 Jan 2023 11:33), Max: 36.9 (20 Jan 2023 11:33)  T(F): 98.5 (20 Jan 2023 11:33), Max: 98.5 (20 Jan 2023 11:33)  HR: 60 (20 Jan 2023 11:33) (60 - 73)  BP: 170/60 (20 Jan 2023 11:33) (111/68 - 170/60)  BP(mean): --  RR: 18 (20 Jan 2023 11:33) (18 - 20)  SpO2: 100% (20 Jan 2023 11:33) (95% - 100%)    Parameters below as of 20 Jan 2023 11:33  Patient On (Oxygen Delivery Method): room air        PE  NAD  Awake, alert  Anicteric, MMM  RRR  CTAB  Abd soft, NT, ND  No c/c/e  No rash grossly                            12.3   9.01  )-----------( 259      ( 20 Jan 2023 07:03 )             39.8       01-19    138  |  107  |  26<H>  ----------------------------<  149<H>  3.8   |  21<L>  |  1.20    Ca    9.7      19 Jan 2023 07:05  Phos  2.8     01-19

## 2023-01-20 NOTE — PROGRESS NOTE ADULT - ASSESSMENT
87-year-old female, history of lung cancer currently on immunotherapy( [facility]), lethargic, bed bound due to weakness w present illness x 1 week, diabetes, dementia, presenting from home with a stage 2 sacral wound.  Noticed 1/14, 2 days ago by home nurse.  Seen by urgent care 1/16, referred here.  Felt subjective fevers earlier today.  No active vomiting, abdominal pain, chest pain, shortness of breath, dysuria, frequency, changes in bowel habits.  No history of similar issues.  She has also been feeling generally weak over the past few weeks to the point that she is having a hard time ambulating, typically ambulates at her normal baseline.     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 JOSE resolved, seen by renal, IVF DCed, seen by ONC, stage 4 metastatic lung Ca, Ptn has increased debility. awaiting wound and ID consults.     1/18: ptn is awake, alert, wound care debrided sacral decub and packed a small abscess. cx growing MRSA, on Vanco    1/19: day 3 Iv Vanco for MRSA sacral decubitus abscess, on 1/21 will switch to po minocycline. will treat for total 10 days of ABx.  will need outptn wound care f/u w wound care at home    1/20: on IV Vanco for MRSA sacral abscess, will switch to po in a few days as per ID. cont local wound care

## 2023-01-20 NOTE — PROGRESS NOTE ADULT - SUBJECTIVE AND OBJECTIVE BOX
Patient is a 87y old  Female who presents with a chief complaint of infection (20 Jan 2023 12:44)      SUBJECTIVE / OVERNIGHT EVENTS: no new events    MEDICATIONS  (STANDING):  amLODIPine   Tablet 5 milliGRAM(s) Oral daily  apixaban 5 milliGRAM(s) Oral two times a day  ascorbic acid 500 milliGRAM(s) Oral daily  atorvastatin 40 milliGRAM(s) Oral at bedtime  chlorhexidine 2% Cloths 1 Application(s) Topical <User Schedule>  dextrose 5%. 1000 milliLiter(s) (100 mL/Hr) IV Continuous <Continuous>  dextrose 5%. 1000 milliLiter(s) (50 mL/Hr) IV Continuous <Continuous>  dextrose 50% Injectable 25 Gram(s) IV Push once  dextrose 50% Injectable 12.5 Gram(s) IV Push once  dextrose 50% Injectable 25 Gram(s) IV Push once  donepezil 10 milliGRAM(s) Oral at bedtime  dorzolamide 2%/timolol 0.5% Ophthalmic Solution 1 Drop(s) Both EYES two times a day  escitalopram 5 milliGRAM(s) Oral daily  famotidine    Tablet 20 milliGRAM(s) Oral daily  glucagon  Injectable 1 milliGRAM(s) IntraMuscular once  insulin glargine Injectable (LANTUS) 10 Unit(s) SubCutaneous at bedtime  insulin lispro (ADMELOG) corrective regimen sliding scale   SubCutaneous three times a day before meals  memantine 5 milliGRAM(s) Oral daily  multivitamin 1 Tablet(s) Oral daily  mupirocin 2% Nasal 1 Application(s) Both Nostrils two times a day  vancomycin  IVPB 1000 milliGRAM(s) IV Intermittent every 24 hours    MEDICATIONS  (PRN):  acetaminophen     Tablet .. 650 milliGRAM(s) Oral every 6 hours PRN Temp greater or equal to 38C (100.4F), Mild Pain (1 - 3)  dextrose Oral Gel 15 Gram(s) Oral once PRN Blood Glucose LESS THAN 70 milliGRAM(s)/deciliter      Vital Signs Last 24 Hrs  T(F): 98.5 (01-20-23 @ 11:33), Max: 98.5 (01-20-23 @ 11:33)  HR: 60 (01-20-23 @ 11:33) (60 - 73)  BP: 170/60 (01-20-23 @ 11:33) (111/68 - 170/60)  RR: 18 (01-20-23 @ 11:33) (18 - 20)  SpO2: 100% (01-20-23 @ 11:33) (95% - 100%)  Telemetry:   CAPILLARY BLOOD GLUCOSE      POCT Blood Glucose.: 280 mg/dL (20 Jan 2023 11:50)  POCT Blood Glucose.: 170 mg/dL (20 Jan 2023 08:59)  POCT Blood Glucose.: 168 mg/dL (19 Jan 2023 21:40)  POCT Blood Glucose.: 175 mg/dL (19 Jan 2023 16:57)    I&O's Summary    19 Jan 2023 07:01  -  20 Jan 2023 07:00  --------------------------------------------------------  IN: 480 mL / OUT: 800 mL / NET: -320 mL        PHYSICAL EXAM:  GENERAL: NAD, well-developed  HEAD:  Atraumatic, Normocephalic  EYES: EOMI, PERRLA, conjunctiva and sclera clear  NECK: Supple, No JVD  CHEST/LUNG: Clear to auscultation bilaterally; No wheeze  HEART: Regular rate and rhythm; No murmurs, rubs, or gallops  ABDOMEN: Soft, Nontender, Nondistended; Bowel sounds present  EXTREMITIES:  2+ Peripheral Pulses, No clubbing, cyanosis, or edema  PSYCH: AAOx3  NEUROLOGY: non-focal  SKIN: No rashes or lesions    LABS:                        12.3   9.01  )-----------( 259      ( 20 Jan 2023 07:03 )             39.8     01-19    138  |  107  |  26<H>  ----------------------------<  149<H>  3.8   |  21<L>  |  1.20    Ca    9.7      19 Jan 2023 07:05  Phos  2.8     01-19                RADIOLOGY & ADDITIONAL TESTS:    Imaging Personally Reviewed:    Consultant(s) Notes Reviewed:      Care Discussed with Consultants/Other Providers:

## 2023-01-20 NOTE — PROGRESS NOTE ADULT - ASSESSMENT
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   Her aide on 1/14 reported noticing a small bump on her left buttock. This progressively increased to a large size with pain as of 1/16/23.  Taken to an [facility] [place] & sent to the ER.    Here found with a temp of 99.4F with leukocytosis of 16.44K.   She was given a dose of vanco, cefepime & flagyl.   Wound was swabbed & cx now with MRSA  Exam had revealed left large area of buttock cellulitis with bloody discharge from an area of skin breakdown.   He blood isolate is a coag negative staph, this is likely a contaminant.   Cefepime was stopped 1/16 - not needed  She is on day #4 of vanco and abscess has been drained  But now her repeat 1/17 blood cultures are isolating 1/4 GPC again.  Vanco trough levels of 13.8 is acceptable,    PLAN:  Follow blood cx from 1/17/23  Continue IV Vancomycin pending ID of GPC from blood cx from 1/17/23  Local wound care to left buttocks  She will be a candidate for switch to minocycline 100 po BID if blood isolate from 1/27 is felt to be a contaminant  Antibiotics are adjunctive to effective drainage.

## 2023-01-20 NOTE — PROGRESS NOTE ADULT - TIME BILLING
agree with above PA note.  88 yo F, pmhx lung cancer currently on immunotherapy( [facility]), Aortic Stenosis, remote MI s/p stent, DVT s/p IVC filter on eliquis, bed bound due to weakness, diabetes, dementia, presents w/ stage 2 sacral wound.     CV status apears stable  not ideal candidate for invasive AS tx given age, comorbidites and functional status.  Cont current mgmt  Wound care/iv abx per primary team

## 2023-01-20 NOTE — PROGRESS NOTE ADULT - SUBJECTIVE AND OBJECTIVE BOX
CARDIOLOGY FOLLOW UP - Dr. Kelley  DATE OF SERVICE: 1/20/23    CC  No CV complaints    REVIEW OF SYSTEMS:  CONSTITUTIONAL: No fever, weight loss, or fatigue  RESPIRATORY: No cough, wheezing, chills or hemoptysis; No Shortness of Breath  CARDIOVASCULAR: No chest pain, palpitations, passing out, dizziness, or leg swelling  GASTROINTESTINAL: No abdominal or epigastric pain. No nausea, vomiting, or hematemesis; No diarrhea or constipation. No melena or hematochezia.  VASCULAR: No edema     PHYSICAL EXAM:  T(C): 36.9 (01-20-23 @ 11:33), Max: 36.9 (01-20-23 @ 11:33)  HR: 60 (01-20-23 @ 11:33) (60 - 73)  BP: 170/60 (01-20-23 @ 11:33) (111/68 - 170/60)  RR: 18 (01-20-23 @ 11:33) (18 - 20)  SpO2: 100% (01-20-23 @ 11:33) (95% - 100%)  Wt(kg): --  I&O's Summary    19 Jan 2023 07:01  -  20 Jan 2023 07:00  --------------------------------------------------------  IN: 480 mL / OUT: 800 mL / NET: -320 mL        Appearance: Elderly female	  Cardiovascular: Normal S1 S2,RRR, No JVD, No murmurs  Respiratory: Lungs clear to auscultation	  Gastrointestinal:  Soft, Non-tender, + BS	  Extremities: Normal range of motion, No clubbing, cyanosis or edema      Home Medications:  amLODIPine 5 mg oral tablet: 1 tab(s) orally once a day (16 Jan 2023 19:56)  atorvastatin 40 mg oral tablet: 1 tab(s) orally once a day (16 Jan 2023 19:22)  donepezil 10 mg oral tablet: 1 tab(s) orally once a day (at bedtime) (16 Jan 2023 19:56)  dorzolamide-timolol 2%-0.5% ophthalmic solution: 1 drop(s) in the left eye 2 times a day (16 Jan 2023 19:56)  Eliquis 5 mg oral tablet: 1 tab(s) orally 2 times a day (16 Jan 2023 19:56)  famotidine 20 mg oral tablet: 1 tab(s) orally once a day, As Needed (16 Jan 2023 19:56)  Januvia 100 mg oral tablet: 1 tab(s) orally once a day (16 Jan 2023 19:56)  Lexapro 5 mg oral tablet: 1 tab(s) orally once a day (16 Jan 2023 19:56)  memantine 5 mg oral tablet: 1 tab(s) orally once a day (16 Jan 2023 19:56)  Prandin 1 mg oral tablet: 1 tab(s) orally 3 times a day (before meals) (16 Jan 2023 19:56)  Tylenol 325 mg oral tablet: 2 tab(s) orally , As Needed (16 Jan 2023 19:56)      MEDICATIONS  (STANDING):  amLODIPine   Tablet 5 milliGRAM(s) Oral daily  apixaban 5 milliGRAM(s) Oral two times a day  ascorbic acid 500 milliGRAM(s) Oral daily  atorvastatin 40 milliGRAM(s) Oral at bedtime  chlorhexidine 2% Cloths 1 Application(s) Topical <User Schedule>  dextrose 5%. 1000 milliLiter(s) (100 mL/Hr) IV Continuous <Continuous>  dextrose 5%. 1000 milliLiter(s) (50 mL/Hr) IV Continuous <Continuous>  dextrose 50% Injectable 25 Gram(s) IV Push once  dextrose 50% Injectable 12.5 Gram(s) IV Push once  dextrose 50% Injectable 25 Gram(s) IV Push once  donepezil 10 milliGRAM(s) Oral at bedtime  dorzolamide 2%/timolol 0.5% Ophthalmic Solution 1 Drop(s) Both EYES two times a day  escitalopram 5 milliGRAM(s) Oral daily  famotidine    Tablet 20 milliGRAM(s) Oral daily  glucagon  Injectable 1 milliGRAM(s) IntraMuscular once  insulin glargine Injectable (LANTUS) 10 Unit(s) SubCutaneous at bedtime  insulin lispro (ADMELOG) corrective regimen sliding scale   SubCutaneous three times a day before meals  memantine 5 milliGRAM(s) Oral daily  multivitamin 1 Tablet(s) Oral daily  mupirocin 2% Nasal 1 Application(s) Both Nostrils two times a day  vancomycin  IVPB 1000 milliGRAM(s) IV Intermittent every 24 hours      TELEMETRY: 	    ECG:  	  RADIOLOGY:   DIAGNOSTIC TESTING:  [ ] Echocardiogram:  [ ]  Catheterization:  [ ] Stress Test:    OTHER: 	    LABS:	 	                            12.3   9.01  )-----------( 259      ( 20 Jan 2023 07:03 )             39.8     01-19    138  |  107  |  26<H>  ----------------------------<  149<H>  3.8   |  21<L>  |  1.20    Ca    9.7      19 Jan 2023 07:05  Phos  2.8     01-19

## 2023-01-20 NOTE — PROGRESS NOTE ADULT - ASSESSMENT
Echo 1/10/22: Nml LV fxn, EF 65-70%, Mod AS    Echo 1/19/23: Mod AS, Nml LV fxn EF 75%, LVH    A/P  88 yo F, pmhx lung cancer currently on immunotherapy( [facility]), Aortic Stenosis, remote MI s/p stent, DVT s/p IVC filter on eliquis, bed bound due to weakness, diabetes, dementia, presents w/ stage 2 sacral wound.       #Aortic Stenosis  -Moderate as seen on last echo from 1/2022  -Repeat echo c/w mod AS and nml lv fxn    #CAD s/p stent & ppm  -Continue atorvastatin  -Consider ASA    #DVT  -Cont eliquis    #HTN  -BP stable  -Continue amlodipine    #Cellulitis  -2/2 sacral wound  -CT findings noted  -Abx per primary, id    #Lung Ca  -Mgmt per heme/onc

## 2023-01-20 NOTE — PROGRESS NOTE ADULT - ASSESSMENT
Assessment  DMT2: 87y Female with DM T2 with hyperglycemia admitted with cellulitis, patient was on oral hypoglycemic  agents at home, now on basal  and insulin coverage, blood sugars fluctuating, no hypoglycemic episode,  eating full meals.  Cellulitis: On medications, treatment, monitored, afebrile.  HTN: On antihypertensive medications, monitored, asymptomatic.              Melania Marques MD  Cell:  [phone]  Office: [phone]               Assessment  DMT2: 87y Female with DM T2 with hyperglycemia admitted with cellulitis, patient was on oral hypoglycemic  agents at home, now on basal  and insulin coverage, blood sugars fluctuating,  no hypoglycemic episode,  eating full meals.  Cellulitis: On medications, treatment, monitored, afebrile.  HTN: On antihypertensive medications, monitored, asymptomatic.              Melania Marques MD  Cell:  [phone]  Office: [phone]

## 2023-01-20 NOTE — PROGRESS NOTE ADULT - SUBJECTIVE AND OBJECTIVE BOX
CC: f/u for left buttock MRSA abscess     Patient reports that her pain is better controlled    REVIEW OF SYSTEMS:  All other review of systems negative i    Antimicrobials Day #  : 4  vancomycin  IVPB 1000 milliGRAM(s) IV Intermittent every 24 hours    Other Medications Reviewed    T(F): 98 (01-20-23 @ 05:43), Max: 98.3 (01-19-23 @ 10:35)  HR: 73 (01-20-23 @ 05:43)  BP: 111/68 (01-20-23 @ 05:43)  RR: 18 (01-20-23 @ 05:43)  SpO2: 95% (01-20-23 @ 05:43)  Wt(kg): --    PHYSICAL EXAM:  General: alert, no acute distress  Eyes:  anicteric, no conjunctival injection, no discharge  Neck: supple  Lungs: clear to auscultation  Heart: regular rate and rhythm; no murmurs  Abdomen: soft, nondistended, nontender  Skin: no lesions  Extremities: no clubbing, cyanosis, or edema  Neurologic: alert, oriented, moves all extremities  Buttocks abscess indurated, TTP, wick inside the wound, no gross drainage      LAB RESULTS:                        12.3   9.01  )-----------( 259      ( 20 Jan 2023 07:03 )             39.8     01-19    138  |  107  |  26<H>  ----------------------------<  149<H>  3.8   |  21<L>  |  1.20    Ca    9.7      19 Jan 2023 07:05  Phos  2.8     01-19          MICROBIOLOGY:  RECENT CULTURES:  01-17 @ 16:36 .Blood Blood-Peripheral     Growth in anaerobic bottle: Gram Positive Cocci in Clusters    Growth in anaerobic bottle: Gram Positive Cocci in Clusters    01-17 @ 16:30 .Blood Blood-Peripheral     No growth to date.      01-16 @ 15:30 .Blood Blood     No growth to date.      01-16 @ 15:19 Wound Methicillin resistant Staphylococcus aureus    Moderate Methicillin Resistant Staphylococcus aureus      01-16 @ 15:15 .Blood culture PCR    Growth in aerobic bottle: Staphylococcus haemolyticus  Coag Negative Staphylococcus  Single set isolate, possible contaminant. Contact  Microbiology if susceptibility testing clinically  indicated.  ***Blood Panel PCR results on this specimen are available  approximately 3 hours after the Gram stain result.***  Gram stain, PCR, and/or culture results may not always  correspond due to difference in methodologies.  ************************************************************  This PCR assay was performed by multiplex PCR. This  Assay tests for 66 bacterial and resistance gene targets.  Please refer to the [facility] test directory  at [email or web address] for details.    Growth in aerobic bottle: Gram positive cocci in pairs              RADIOLOGY REVIEWED:

## 2023-01-21 LAB
ANION GAP SERPL CALC-SCNC: 10 MMOL/L — SIGNIFICANT CHANGE UP (ref 5–17)
BUN SERPL-MCNC: 29 MG/DL — HIGH (ref 7–23)
CALCIUM SERPL-MCNC: 10.1 MG/DL — SIGNIFICANT CHANGE UP (ref 8.4–10.5)
CHLORIDE SERPL-SCNC: 107 MMOL/L — SIGNIFICANT CHANGE UP (ref 96–108)
CO2 SERPL-SCNC: 24 MMOL/L — SIGNIFICANT CHANGE UP (ref 22–31)
CREAT SERPL-MCNC: 1.15 MG/DL — SIGNIFICANT CHANGE UP (ref 0.5–1.3)
CULTURE RESULTS: SIGNIFICANT CHANGE UP
EGFR: 47 ML/MIN/1.73M2 — LOW
GLUCOSE BLDC GLUCOMTR-MCNC: 166 MG/DL — HIGH (ref 70–99)
GLUCOSE BLDC GLUCOMTR-MCNC: 171 MG/DL — HIGH (ref 70–99)
GLUCOSE BLDC GLUCOMTR-MCNC: 187 MG/DL — HIGH (ref 70–99)
GLUCOSE BLDC GLUCOMTR-MCNC: 265 MG/DL — HIGH (ref 70–99)
GLUCOSE SERPL-MCNC: 136 MG/DL — HIGH (ref 70–99)
POTASSIUM SERPL-MCNC: 4.1 MMOL/L — SIGNIFICANT CHANGE UP (ref 3.5–5.3)
POTASSIUM SERPL-SCNC: 4.1 MMOL/L — SIGNIFICANT CHANGE UP (ref 3.5–5.3)
SODIUM SERPL-SCNC: 141 MMOL/L — SIGNIFICANT CHANGE UP (ref 135–145)
SPECIMEN SOURCE: SIGNIFICANT CHANGE UP

## 2023-01-21 RX ADMIN — Medication 1: at 12:44

## 2023-01-21 RX ADMIN — FAMOTIDINE 20 MILLIGRAM(S): 10 INJECTION INTRAVENOUS at 12:47

## 2023-01-21 RX ADMIN — MUPIROCIN 1 APPLICATION(S): 20 OINTMENT TOPICAL at 17:16

## 2023-01-21 RX ADMIN — MEMANTINE HYDROCHLORIDE 5 MILLIGRAM(S): 10 TABLET ORAL at 12:47

## 2023-01-21 RX ADMIN — DORZOLAMIDE HYDROCHLORIDE TIMOLOL MALEATE 1 DROP(S): 20; 5 SOLUTION/ DROPS OPHTHALMIC at 22:22

## 2023-01-21 RX ADMIN — APIXABAN 5 MILLIGRAM(S): 2.5 TABLET, FILM COATED ORAL at 17:16

## 2023-01-21 RX ADMIN — Medication 1: at 08:11

## 2023-01-21 RX ADMIN — Medication 650 MILLIGRAM(S): at 17:08

## 2023-01-21 RX ADMIN — DORZOLAMIDE HYDROCHLORIDE TIMOLOL MALEATE 1 DROP(S): 20; 5 SOLUTION/ DROPS OPHTHALMIC at 11:34

## 2023-01-21 RX ADMIN — APIXABAN 5 MILLIGRAM(S): 2.5 TABLET, FILM COATED ORAL at 05:28

## 2023-01-21 RX ADMIN — Medication 500 MILLIGRAM(S): at 12:47

## 2023-01-21 RX ADMIN — Medication 650 MILLIGRAM(S): at 15:27

## 2023-01-21 RX ADMIN — Medication 1 TABLET(S): at 12:47

## 2023-01-21 RX ADMIN — ESCITALOPRAM OXALATE 5 MILLIGRAM(S): 10 TABLET, FILM COATED ORAL at 12:47

## 2023-01-21 RX ADMIN — Medication 1: at 17:16

## 2023-01-21 RX ADMIN — INSULIN GLARGINE 10 UNIT(S): 100 INJECTION, SOLUTION SUBCUTANEOUS at 22:20

## 2023-01-21 RX ADMIN — MUPIROCIN 1 APPLICATION(S): 20 OINTMENT TOPICAL at 05:28

## 2023-01-21 RX ADMIN — CHLORHEXIDINE GLUCONATE 1 APPLICATION(S): 213 SOLUTION TOPICAL at 05:31

## 2023-01-21 RX ADMIN — Medication 250 MILLIGRAM(S): at 18:29

## 2023-01-21 RX ADMIN — AMLODIPINE BESYLATE 5 MILLIGRAM(S): 2.5 TABLET ORAL at 05:33

## 2023-01-21 RX ADMIN — DONEPEZIL HYDROCHLORIDE 10 MILLIGRAM(S): 10 TABLET, FILM COATED ORAL at 22:21

## 2023-01-21 RX ADMIN — ATORVASTATIN CALCIUM 40 MILLIGRAM(S): 80 TABLET, FILM COATED ORAL at 22:22

## 2023-01-21 NOTE — PROGRESS NOTE ADULT - ASSESSMENT
87-year-old female, history of lung cancer currently on immunotherapy( [facility]), lethargic, bed bound due to weakness w present illness x 1 week, diabetes, dementia, presenting from home with a stage 2 sacral wound.  Noticed 1/14, 2 days ago by home nurse.  Seen by urgent care 1/16, referred here.  Felt subjective fevers earlier today.  No active vomiting, abdominal pain, chest pain, shortness of breath, dysuria, frequency, changes in bowel habits.  No history of similar issues.  She has also been feeling generally weak over the past few weeks to the point that she is having a hard time ambulating, typically ambulates at her normal baseline.     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 JOSE resolved, seen by renal, IVF DCed, seen by ONC, stage 4 metastatic lung Ca, Ptn has increased debility. awaiting wound and ID consults.     1/18: ptn is awake, alert, wound care debrided sacral decub and packed a small abscess. cx growing MRSA, on Vanco    1/19: day 3 Iv Vanco for MRSA sacral decubitus abscess, on 1/21 will switch to po minocycline. will treat for total 10 days of ABx.  will need outptn wound care f/u w wound care at home    1/20: on IV Vanco for MRSA sacral abscess, will switch to po in a few days as per ID. cont local wound care    1/21: PT to make final recs, ptn can be switched to po ABx when ready for DC

## 2023-01-21 NOTE — PROGRESS NOTE ADULT - SUBJECTIVE AND OBJECTIVE BOX
CHIEF COMPLAINT  Infection    HISTORY OF PRESENT ILLNESS  PONCE MEJIA is a 87y Female who presents with a chief complaint of infection    No acute events. No complaints.    REVIEW OF SYSTEMS  A complete review of systems was performed; negative except per HPI    PHYSICAL EXAM  T(C): 36.7 (01-21-23 @ 05:25), Max: 36.7 (01-20-23 @ 20:53)  HR: 63 (01-21-23 @ 05:25) (63 - 75)  BP: 157/84 (01-21-23 @ 05:25) (124/74 - 157/84)  RR: 18 (01-21-23 @ 05:25) (18 - 18)  SpO2: 99% (01-21-23 @ 05:25) (97% - 99%)  Constitutional: alert, awake, in no acute distress  Eyes: PERRL, EOMI  HEENT: normocephalic, atraumatic  Neck: supple, non-tender  Cardiovascular: normal perfusion, no peripheral edema  Respiratory: normal respiratory efforts; no increased use of accessory muscles  Gastrointestinal: soft, non-tender  Musculoskeletal: normal range of motion, no deformities noted  Neurological: alert, CN II to XI grossly intact  Skin: warm, dry    LABORATORY DATA                        12.3   9.01  )-----------( 259      ( 20 Jan 2023 07:03 )             39.8     01-21    141  |  107  |  29<H>  ----------------------------<  136<H>  4.1   |  24  |  1.15    Ca    10.1      21 Jan 2023 07:21

## 2023-01-21 NOTE — PROGRESS NOTE ADULT - SUBJECTIVE AND OBJECTIVE BOX
CC: f/u for left buttock MRSA abscess    Patient reports no real buttock pain     REVIEW OF SYSTEMS:  All other review of systems negative (Comprehensive ROS)    Antimicrobials Day #  : 5  vancomycin  IVPB 1000 milliGRAM(s) IV Intermittent every 24 hours    Other Medications Reviewed    T(F): 98.5 (01-21-23 @ 08:12), Max: 98.5 (01-21-23 @ 08:12)  HR: 70 (01-21-23 @ 08:12)  BP: 152/80 (01-21-23 @ 08:12)  RR: 18 (01-21-23 @ 08:12)  SpO2: 98% (01-21-23 @ 08:12)  Wt(kg): --    PHYSICAL EXAM:  General: alert, no acute distress  Eyes:  anicteric, no conjunctival injection, no discharge  Neck: supple  Lungs: clear to auscultation  Heart: regular rate and rhythm; no murmurs  Abdomen: soft, nondistended, nontender  Skin: no lesions  Extremities: no clubbing, cyanosis, or edema  Neurologic: alert, oriented, moves all extremities  Buttocks abscess indurated, TTP, wick inside the wound, no gross drainage    LAB RESULTS:                        12.3   9.01  )-----------( 259      ( 20 Jan 2023 07:03 )             39.8     01-21    141  |  107  |  29<H>  ----------------------------<  136<H>  4.1   |  24  |  1.15    Ca    10.1      21 Jan 2023 07:21          MICROBIOLOGY:  RECENT CULTURES:  01-19 @ 21:12 .Blood Blood-Peripheral     No growth to date.      01-17 @ 16:36 .Blood Blood-Peripheral     Growth in anaerobic bottle: Staphylococcus hominis "Susceptibilities not  performed"    Growth in anaerobic bottle: Gram Positive Cocci in Clusters    01-17 @ 16:30 .Blood Blood-Peripheral     No growth to date.                RADIOLOGY REVIEWED:

## 2023-01-21 NOTE — PROGRESS NOTE ADULT - SUBJECTIVE AND OBJECTIVE BOX
CARDIOLOGY FOLLOW UP - Dr. Kelley  Date of Service: 1/21/23  CC: no events    Review of Systems:  Constitutional: No fever, weight loss, or fatigue  Respiratory: No cough, wheezing, or hemoptysis, no shortness of breath  Cardiovascular: No chest pain, palpitations, passing out, dizziness, or leg swelling  Gastrointestinal: No abd or epigastric pain. No nausea, vomiting, or hematemesis; no diarrhea or consiptaiton, no melena or hematochezia  Vascular: No edema     TELEMETRY:    PHYSICAL EXAM:  T(C): 36.7 (01-21-23 @ 05:25), Max: 36.9 (01-20-23 @ 11:33)  HR: 63 (01-21-23 @ 05:25) (60 - 75)  BP: 157/84 (01-21-23 @ 05:25) (124/74 - 170/60)  RR: 18 (01-21-23 @ 05:25) (18 - 18)  SpO2: 99% (01-21-23 @ 05:25) (97% - 100%)  Wt(kg): --  I&O's Summary    20 Jan 2023 07:01  -  21 Jan 2023 07:00  --------------------------------------------------------  IN: 300 mL / OUT: 500 mL / NET: -200 mL        Appearance: Normal	  Cardiovascular: Normal S1 S2,RRR, No JVD, No murmurs  Respiratory: Lungs clear to auscultation	  Gastrointestinal:  Soft, Non-tender, + BS	  Extremities: Normal range of motion, No clubbing, cyanosis or edema  Vascular: Peripheral pulses palpable 2+ bilaterally       Home Medications:  amLODIPine 5 mg oral tablet: 1 tab(s) orally once a day (16 Jan 2023 19:56)  atorvastatin 40 mg oral tablet: 1 tab(s) orally once a day (16 Jan 2023 19:22)  donepezil 10 mg oral tablet: 1 tab(s) orally once a day (at bedtime) (16 Jan 2023 19:56)  dorzolamide-timolol 2%-0.5% ophthalmic solution: 1 drop(s) in the left eye 2 times a day (16 Jan 2023 19:56)  Eliquis 5 mg oral tablet: 1 tab(s) orally 2 times a day (16 Jan 2023 19:56)  famotidine 20 mg oral tablet: 1 tab(s) orally once a day, As Needed (16 Jan 2023 19:56)  Januvia 100 mg oral tablet: 1 tab(s) orally once a day (16 Jan 2023 19:56)  Lexapro 5 mg oral tablet: 1 tab(s) orally once a day (16 Jan 2023 19:56)  memantine 5 mg oral tablet: 1 tab(s) orally once a day (16 Jan 2023 19:56)  Prandin 1 mg oral tablet: 1 tab(s) orally 3 times a day (before meals) (16 Jan 2023 19:56)  Tylenol 325 mg oral tablet: 2 tab(s) orally , As Needed (16 Jan 2023 19:56)        MEDICATIONS  (STANDING):  amLODIPine   Tablet 5 milliGRAM(s) Oral daily  apixaban 5 milliGRAM(s) Oral two times a day  ascorbic acid 500 milliGRAM(s) Oral daily  atorvastatin 40 milliGRAM(s) Oral at bedtime  chlorhexidine 2% Cloths 1 Application(s) Topical <User Schedule>  dextrose 5%. 1000 milliLiter(s) (50 mL/Hr) IV Continuous <Continuous>  dextrose 5%. 1000 milliLiter(s) (100 mL/Hr) IV Continuous <Continuous>  dextrose 50% Injectable 25 Gram(s) IV Push once  dextrose 50% Injectable 12.5 Gram(s) IV Push once  dextrose 50% Injectable 25 Gram(s) IV Push once  donepezil 10 milliGRAM(s) Oral at bedtime  dorzolamide 2%/timolol 0.5% Ophthalmic Solution 1 Drop(s) Both EYES two times a day  escitalopram 5 milliGRAM(s) Oral daily  famotidine    Tablet 20 milliGRAM(s) Oral daily  glucagon  Injectable 1 milliGRAM(s) IntraMuscular once  insulin glargine Injectable (LANTUS) 10 Unit(s) SubCutaneous at bedtime  insulin lispro (ADMELOG) corrective regimen sliding scale   SubCutaneous three times a day before meals  memantine 5 milliGRAM(s) Oral daily  multivitamin 1 Tablet(s) Oral daily  mupirocin 2% Nasal 1 Application(s) Both Nostrils two times a day  vancomycin  IVPB 1000 milliGRAM(s) IV Intermittent every 24 hours        EKG:  RADIOLOGY:  DIAGNOSTIC TESTING:  [ ] Echocardiogram:  [ ] Catherterization:  [ ] Stress Test:  OTHER:     LABS:	 	                          12.3   9.01  )-----------( 259      ( 20 Jan 2023 07:03 )             39.8     01-21    141  |  107  |  29<H>  ----------------------------<  136<H>  4.1   |  24  |  1.15    Ca    10.1      21 Jan 2023 07:21            CARDIAC MARKERS:

## 2023-01-21 NOTE — PROGRESS NOTE ADULT - SUBJECTIVE AND OBJECTIVE BOX
Patient is a 87y old  Female who presents with a chief complaint of infection (20 Jan 2023 12:44)      SUBJECTIVE / OVERNIGHT EVENTS: no new events, PT to make final recs    MEDICATIONS  (STANDING):  amLODIPine   Tablet 5 milliGRAM(s) Oral daily  apixaban 5 milliGRAM(s) Oral two times a day  ascorbic acid 500 milliGRAM(s) Oral daily  atorvastatin 40 milliGRAM(s) Oral at bedtime  chlorhexidine 2% Cloths 1 Application(s) Topical <User Schedule>  dextrose 5%. 1000 milliLiter(s) (50 mL/Hr) IV Continuous <Continuous>  dextrose 5%. 1000 milliLiter(s) (100 mL/Hr) IV Continuous <Continuous>  dextrose 50% Injectable 25 Gram(s) IV Push once  dextrose 50% Injectable 12.5 Gram(s) IV Push once  dextrose 50% Injectable 25 Gram(s) IV Push once  donepezil 10 milliGRAM(s) Oral at bedtime  dorzolamide 2%/timolol 0.5% Ophthalmic Solution 1 Drop(s) Both EYES two times a day  escitalopram 5 milliGRAM(s) Oral daily  famotidine    Tablet 20 milliGRAM(s) Oral daily  glucagon  Injectable 1 milliGRAM(s) IntraMuscular once  insulin glargine Injectable (LANTUS) 10 Unit(s) SubCutaneous at bedtime  insulin lispro (ADMELOG) corrective regimen sliding scale   SubCutaneous three times a day before meals  memantine 5 milliGRAM(s) Oral daily  multivitamin 1 Tablet(s) Oral daily  mupirocin 2% Nasal 1 Application(s) Both Nostrils two times a day  vancomycin  IVPB 1000 milliGRAM(s) IV Intermittent every 24 hours    MEDICATIONS  (PRN):  acetaminophen     Tablet .. 650 milliGRAM(s) Oral every 6 hours PRN Temp greater or equal to 38C (100.4F), Mild Pain (1 - 3)  dextrose Oral Gel 15 Gram(s) Oral once PRN Blood Glucose LESS THAN 70 milliGRAM(s)/deciliter      Vital Signs Last 24 Hrs  T(F): 98.6 (01-21-23 @ 21:11), Max: 98.6 (01-21-23 @ 21:11)  HR: 70 (01-21-23 @ 21:11) (63 - 70)  BP: 146/78 (01-21-23 @ 21:11) (146/78 - 157/84)  RR: 18 (01-21-23 @ 21:11) (18 - 18)  SpO2: 98% (01-21-23 @ 21:11) (98% - 99%)  Telemetry:   CAPILLARY BLOOD GLUCOSE      POCT Blood Glucose.: 265 mg/dL (21 Jan 2023 22:11)  POCT Blood Glucose.: 171 mg/dL (21 Jan 2023 17:08)  POCT Blood Glucose.: 187 mg/dL (21 Jan 2023 12:41)  POCT Blood Glucose.: 166 mg/dL (21 Jan 2023 08:11)    I&O's Summary    20 Jan 2023 07:01  -  21 Jan 2023 07:00  --------------------------------------------------------  IN: 300 mL / OUT: 500 mL / NET: -200 mL    21 Jan 2023 07:01  -  21 Jan 2023 22:46  --------------------------------------------------------  IN: 840 mL / OUT: 200 mL / NET: 640 mL        PHYSICAL EXAM:  GENERAL: NAD, well-developed  HEAD:  Atraumatic, Normocephalic  EYES: EOMI, PERRLA, conjunctiva and sclera clear  NECK: Supple, No JVD  CHEST/LUNG: Clear to auscultation bilaterally; No wheeze  HEART: Regular rate and rhythm; No murmurs, rubs, or gallops  ABDOMEN: Soft, Nontender, Nondistended; Bowel sounds present  EXTREMITIES:  2+ Peripheral Pulses, No clubbing, cyanosis, or edema  PSYCH: AAOx3  NEUROLOGY: non-focal  SKIN: No rashes or lesions    LABS:                        12.3   9.01  )-----------( 259      ( 20 Jan 2023 07:03 )             39.8     01-21    141  |  107  |  29<H>  ----------------------------<  136<H>  4.1   |  24  |  1.15    Ca    10.1      21 Jan 2023 07:21                RADIOLOGY & ADDITIONAL TESTS:    Imaging Personally Reviewed:    Consultant(s) Notes Reviewed:      Care Discussed with Consultants/Other Providers:

## 2023-01-21 NOTE — PROGRESS NOTE ADULT - ASSESSMENT
Echo 1/10/22: Nml LV fxn, EF 65-70%, Mod AS    Echo 1/19/23: Mod AS, Nml LV fxn EF 75%, LVH    A/P  88 yo F, pmhx lung cancer currently on immunotherapy( [facility]), Aortic Stenosis, remote MI s/p stent, DVT s/p IVC filter on eliquis, bed bound due to weakness, diabetes, dementia, presents w/ stage 2 sacral wound.       #Aortic Stenosis  -Moderate as seen on last echo from 1/2022  -Repeat echo c/w mod AS and nml lv fxn    #CAD s/p stent & ppm  -Continue atorvastatin  -Consider ASA    #DVT  -Cont eliquis    #HTN  -BP stable  -Continue amlodipine    #Cellulitis  -2/2 sacral wound  -CT findings noted  -Abx per primary, id    #Lung Ca  -Mgmt per heme/onc    35 minutes spent on total encounter; more than 50% of the visit was spent counseling and/or coordinating care by the attending physician.

## 2023-01-21 NOTE — PROGRESS NOTE ADULT - ASSESSMENT
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   Her aide on 1/14 reported noticing a small bump on her left buttock. This progressively increased to a large size with pain as of 1/16/23.  Taken to an [facility] & sent to the ER.    Here found with a temp of 99.4F with leukocytosis of 16.44K.   She was given a dose of vanco, cefepime & flagyl.   Wound was swabbed & cx with MRSA  Exam had revealed left large area of buttock cellulitis with bloody discharge from an area of skin breakdown.   Cefepime was stopped 1/16 - not needed  She is on day # 5 of vanco and abscess has been drained  Vanco trough levels of 13.8 is acceptable  Blood cx from 1/16 recovered 1/4 Staph haemolyticus & repeated on 1/17 blood cultures with Staph hominis - these are c/w contaminants.  Blood cx from 1/19/23 - NGTD    PLAN:  Follow blood cx from 1/19/23  Different types of Coag neg Staph earlier in blood cx are c/w contaminants  Local wound care to left buttock abscess area  Can switch to minocycline 100 po BID whenever ready to go home for another 1 wk, when ready to go home  Antibiotics are adjunctive to effective drainage.

## 2023-01-21 NOTE — PROGRESS NOTE ADULT - ASSESSMENT
PONCE MEJIA is a 87y Female who presents with a chief complaint of infection    Metastatic Lung Cancer  - Patient follows with Dr. Gurdeep Sharpe, [facility].  - She is currently on pembrolizumab monotherapy, last dose in January 9th.  - No systemic therapy while inpatient or during rehabilitation.    Sacral Wound/Abscess/Cellulitis  - Wound care and infectious disease following.  - Wound culture growing S. aureus.   - Blood culture GPC likely contaminant per infectious disease. Blood cultures from 1/19 no growth to date.  - Continue vancomycin per infectious disease. Consider switching to minocycline per recommendations.    Will continue to follow.    Dallin Kazt MD  Hematology/Oncology  O: [phone] none

## 2023-01-21 NOTE — PROGRESS NOTE ADULT - ASSESSMENT
Assessment  DMT2: 87y Female with DM T2 with hyperglycemia admitted with cellulitis, patient was on oral hypoglycemic  agents at home, now on basal  and insulin coverage, blood sugars are trending down, no hypoglycemic episode.  Cellulitis: On medications, treatment, monitored, afebrile.  HTN: On antihypertensive medications, monitored, asymptomatic.              Melania Marques MD  Cell:  [phone]  Office: [phone]

## 2023-01-22 LAB
CULTURE RESULTS: SIGNIFICANT CHANGE UP
GLUCOSE BLDC GLUCOMTR-MCNC: 156 MG/DL — HIGH (ref 70–99)
GLUCOSE BLDC GLUCOMTR-MCNC: 189 MG/DL — HIGH (ref 70–99)
GLUCOSE BLDC GLUCOMTR-MCNC: 215 MG/DL — HIGH (ref 70–99)
GLUCOSE BLDC GLUCOMTR-MCNC: 231 MG/DL — HIGH (ref 70–99)
SPECIMEN SOURCE: SIGNIFICANT CHANGE UP

## 2023-01-22 RX ADMIN — ESCITALOPRAM OXALATE 5 MILLIGRAM(S): 10 TABLET, FILM COATED ORAL at 12:54

## 2023-01-22 RX ADMIN — AMLODIPINE BESYLATE 5 MILLIGRAM(S): 2.5 TABLET ORAL at 06:09

## 2023-01-22 RX ADMIN — MUPIROCIN 1 APPLICATION(S): 20 OINTMENT TOPICAL at 18:00

## 2023-01-22 RX ADMIN — INSULIN GLARGINE 10 UNIT(S): 100 INJECTION, SOLUTION SUBCUTANEOUS at 21:39

## 2023-01-22 RX ADMIN — Medication 500 MILLIGRAM(S): at 12:54

## 2023-01-22 RX ADMIN — CHLORHEXIDINE GLUCONATE 1 APPLICATION(S): 213 SOLUTION TOPICAL at 06:12

## 2023-01-22 RX ADMIN — DORZOLAMIDE HYDROCHLORIDE TIMOLOL MALEATE 1 DROP(S): 20; 5 SOLUTION/ DROPS OPHTHALMIC at 21:39

## 2023-01-22 RX ADMIN — ATORVASTATIN CALCIUM 40 MILLIGRAM(S): 80 TABLET, FILM COATED ORAL at 21:39

## 2023-01-22 RX ADMIN — Medication 250 MILLIGRAM(S): at 17:59

## 2023-01-22 RX ADMIN — MEMANTINE HYDROCHLORIDE 5 MILLIGRAM(S): 10 TABLET ORAL at 12:54

## 2023-01-22 RX ADMIN — Medication 2: at 17:51

## 2023-01-22 RX ADMIN — DORZOLAMIDE HYDROCHLORIDE TIMOLOL MALEATE 1 DROP(S): 20; 5 SOLUTION/ DROPS OPHTHALMIC at 08:49

## 2023-01-22 RX ADMIN — DONEPEZIL HYDROCHLORIDE 10 MILLIGRAM(S): 10 TABLET, FILM COATED ORAL at 21:39

## 2023-01-22 RX ADMIN — Medication 1 TABLET(S): at 12:54

## 2023-01-22 RX ADMIN — MUPIROCIN 1 APPLICATION(S): 20 OINTMENT TOPICAL at 06:12

## 2023-01-22 RX ADMIN — APIXABAN 5 MILLIGRAM(S): 2.5 TABLET, FILM COATED ORAL at 06:09

## 2023-01-22 RX ADMIN — APIXABAN 5 MILLIGRAM(S): 2.5 TABLET, FILM COATED ORAL at 17:53

## 2023-01-22 RX ADMIN — FAMOTIDINE 20 MILLIGRAM(S): 10 INJECTION INTRAVENOUS at 12:55

## 2023-01-22 RX ADMIN — Medication 1: at 12:48

## 2023-01-22 RX ADMIN — Medication 650 MILLIGRAM(S): at 19:00

## 2023-01-22 RX ADMIN — Medication 650 MILLIGRAM(S): at 18:01

## 2023-01-22 RX ADMIN — Medication 1: at 08:48

## 2023-01-22 NOTE — PROGRESS NOTE ADULT - SUBJECTIVE AND OBJECTIVE BOX
Chief complaint    Patient is a 87y old  Female who presents with a chief complaint of infection (20 Jan 2023 12:44)   Review of systems  Patient in bed, appears comfortable.    Labs and Fingersticks  CAPILLARY BLOOD GLUCOSE      POCT Blood Glucose.: 189 mg/dL (22 Jan 2023 12:15)  POCT Blood Glucose.: 156 mg/dL (22 Jan 2023 08:06)  POCT Blood Glucose.: 265 mg/dL (21 Jan 2023 22:11)  POCT Blood Glucose.: 171 mg/dL (21 Jan 2023 17:08)      Anion Gap, Serum: 10 (01-21 @ 07:21)      Calcium, Total Serum: 10.1 (01-21 @ 07:21)          01-21    141  |  107  |  29<H>  ----------------------------<  136<H>  4.1   |  24  |  1.15    Ca    10.1      21 Jan 2023 07:21      Medications  MEDICATIONS  (STANDING):  amLODIPine   Tablet 5 milliGRAM(s) Oral daily  apixaban 5 milliGRAM(s) Oral two times a day  ascorbic acid 500 milliGRAM(s) Oral daily  atorvastatin 40 milliGRAM(s) Oral at bedtime  chlorhexidine 2% Cloths 1 Application(s) Topical <User Schedule>  dextrose 5%. 1000 milliLiter(s) (100 mL/Hr) IV Continuous <Continuous>  dextrose 5%. 1000 milliLiter(s) (50 mL/Hr) IV Continuous <Continuous>  dextrose 50% Injectable 25 Gram(s) IV Push once  dextrose 50% Injectable 12.5 Gram(s) IV Push once  dextrose 50% Injectable 25 Gram(s) IV Push once  donepezil 10 milliGRAM(s) Oral at bedtime  dorzolamide 2%/timolol 0.5% Ophthalmic Solution 1 Drop(s) Both EYES two times a day  escitalopram 5 milliGRAM(s) Oral daily  famotidine    Tablet 20 milliGRAM(s) Oral daily  glucagon  Injectable 1 milliGRAM(s) IntraMuscular once  insulin glargine Injectable (LANTUS) 10 Unit(s) SubCutaneous at bedtime  insulin lispro (ADMELOG) corrective regimen sliding scale   SubCutaneous three times a day before meals  memantine 5 milliGRAM(s) Oral daily  multivitamin 1 Tablet(s) Oral daily  mupirocin 2% Nasal 1 Application(s) Both Nostrils two times a day  vancomycin  IVPB 1000 milliGRAM(s) IV Intermittent every 24 hours      Physical Exam  General: Patient appears comfortable.  Vital Signs Last 12 Hrs  T(F): 98.4 (01-22-23 @ 12:45), Max: 98.4 (01-22-23 @ 12:45)  HR: 69 (01-22-23 @ 12:45) (66 - 69)  BP: 137/84 (01-22-23 @ 12:45) (137/84 - 139/83)  BP(mean): --  RR: 18 (01-22-23 @ 12:45) (18 - 18)  SpO2: 97% (01-22-23 @ 12:45) (97% - 97%)  Neck: No palpable thyroid nodules.  CVS: S1S2, No murmurs  Respiratory: No wheezing, no crepitations  GI: Abdomen soft, non tender.  Musculoskeletal:  edema lower extremities.     Diagnostics

## 2023-01-22 NOTE — PROGRESS NOTE ADULT - ASSESSMENT
PONCE MEJIA is a 87y Female who presents with a chief complaint of infection    Metastatic Lung Cancer  - Patient follows with Dr. Gurdeep Sharpe, [facility].  - She is currently on pembrolizumab monotherapy, last dose in January 9th.  - No systemic therapy while inpatient or during rehabilitation.    Sacral Wound/Abscess/Cellulitis  - Wound care and infectious disease following.  - Wound culture growing S. aureus.   - Blood culture GPC likely contaminant per infectious disease. Blood cultures from 1/19 no growth to date.  - Continue vancomycin per infectious disease. Switch to minocycline once ready for discharge.  - Continue wound care and drainage.    Will continue to follow.    Dallin Katz MD  Hematology/Oncology  O: [phone]

## 2023-01-22 NOTE — PROGRESS NOTE ADULT - ASSESSMENT
Assessment  DMT2: 87y Female with DM T2 with hyperglycemia admitted with cellulitis, patient was on oral hypoglycemic  agents at home, on basal  and insulin coverage, sugars are improving, she is eating meals, no hypoglycemic episode.  Cellulitis: On medications, treatment, monitored, afebrile.  HTN: On antihypertensive medications, monitored, asymptomatic.              Melania Marques MD  Cell:  [phone]  Office: [phone]

## 2023-01-22 NOTE — PROGRESS NOTE ADULT - SUBJECTIVE AND OBJECTIVE BOX
CHIEF COMPLAINT  Infection    HISTORY OF PRESENT ILLNESS  PONCE MEJIA is a 87y Female who presents with a chief complaint of infection    No acute events. No complaints.    REVIEW OF SYSTEMS  A complete review of systems was performed; negative except per HPI    PHYSICAL EXAM  T(C): 36.9 (01-22-23 @ 12:45), Max: 37 (01-21-23 @ 21:11)  HR: 69 (01-22-23 @ 12:45) (66 - 70)  BP: 137/84 (01-22-23 @ 12:45) (137/84 - 146/78)  RR: 18 (01-22-23 @ 12:45) (18 - 18)  SpO2: 97% (01-22-23 @ 12:45) (97% - 98%)  Constitutional: alert, awake, in no acute distress  Eyes: PERRL, EOMI  HEENT: normocephalic, atraumatic  Neck: supple, non-tender  Cardiovascular: normal perfusion, no peripheral edema  Respiratory: normal respiratory efforts; no increased use of accessory muscles  Gastrointestinal: soft, non-tender  Musculoskeletal: normal range of motion, no deformities noted  Neurological: alert, CN II to XI grossly intact  Skin: warm, dry    LABORATORY DATA    01-21    141  |  107  |  29<H>  ----------------------------<  136<H>  4.1   |  24  |  1.15    Ca    10.1      21 Jan 2023 07:21

## 2023-01-22 NOTE — PROGRESS NOTE ADULT - SUBJECTIVE AND OBJECTIVE BOX
CARDIOLOGY FOLLOW UP - Dr. Kelley  Date of Service: 1/22/23  CC: no events    Review of Systems:  Constitutional: No fever, weight loss, or fatigue  Respiratory: No cough, wheezing, or hemoptysis, no shortness of breath  Cardiovascular: No chest pain, palpitations, passing out, dizziness, or leg swelling  Gastrointestinal: No abd or epigastric pain. No nausea, vomiting, or hematemesis; no diarrhea or consiptaiton, no melena or hematochezia  Vascular: No edema     TELEMETRY:    PHYSICAL EXAM:  T(C): 36.6 (01-22-23 @ 05:39), Max: 37 (01-21-23 @ 21:11)  HR: 66 (01-22-23 @ 05:39) (66 - 70)  BP: 139/83 (01-22-23 @ 05:39) (139/83 - 146/78)  RR: 18 (01-22-23 @ 05:39) (18 - 18)  SpO2: 97% (01-22-23 @ 05:39) (97% - 98%)  Wt(kg): --  I&O's Summary    21 Jan 2023 07:01  -  22 Jan 2023 07:00  --------------------------------------------------------  IN: 840 mL / OUT: 950 mL / NET: -110 mL        Appearance: Normal	  Cardiovascular: Normal S1 S2,RRR, No JVD, No murmurs  Respiratory: Lungs clear to auscultation	  Gastrointestinal:  Soft, Non-tender, + BS	  Extremities: Normal range of motion, No clubbing, cyanosis or edema  Vascular: Peripheral pulses palpable 2+ bilaterally       Home Medications:  amLODIPine 5 mg oral tablet: 1 tab(s) orally once a day (16 Jan 2023 19:56)  atorvastatin 40 mg oral tablet: 1 tab(s) orally once a day (16 Jan 2023 19:22)  donepezil 10 mg oral tablet: 1 tab(s) orally once a day (at bedtime) (16 Jan 2023 19:56)  dorzolamide-timolol 2%-0.5% ophthalmic solution: 1 drop(s) in the left eye 2 times a day (16 Jan 2023 19:56)  Eliquis 5 mg oral tablet: 1 tab(s) orally 2 times a day (16 Jan 2023 19:56)  famotidine 20 mg oral tablet: 1 tab(s) orally once a day, As Needed (16 Jan 2023 19:56)  Januvia 100 mg oral tablet: 1 tab(s) orally once a day (16 Jan 2023 19:56)  Lexapro 5 mg oral tablet: 1 tab(s) orally once a day (16 Jan 2023 19:56)  memantine 5 mg oral tablet: 1 tab(s) orally once a day (16 Jan 2023 19:56)  Prandin 1 mg oral tablet: 1 tab(s) orally 3 times a day (before meals) (16 Jan 2023 19:56)  Tylenol 325 mg oral tablet: 2 tab(s) orally , As Needed (16 Jan 2023 19:56)        MEDICATIONS  (STANDING):  amLODIPine   Tablet 5 milliGRAM(s) Oral daily  apixaban 5 milliGRAM(s) Oral two times a day  ascorbic acid 500 milliGRAM(s) Oral daily  atorvastatin 40 milliGRAM(s) Oral at bedtime  chlorhexidine 2% Cloths 1 Application(s) Topical <User Schedule>  dextrose 5%. 1000 milliLiter(s) (100 mL/Hr) IV Continuous <Continuous>  dextrose 5%. 1000 milliLiter(s) (50 mL/Hr) IV Continuous <Continuous>  dextrose 50% Injectable 25 Gram(s) IV Push once  dextrose 50% Injectable 12.5 Gram(s) IV Push once  dextrose 50% Injectable 25 Gram(s) IV Push once  donepezil 10 milliGRAM(s) Oral at bedtime  dorzolamide 2%/timolol 0.5% Ophthalmic Solution 1 Drop(s) Both EYES two times a day  escitalopram 5 milliGRAM(s) Oral daily  famotidine    Tablet 20 milliGRAM(s) Oral daily  glucagon  Injectable 1 milliGRAM(s) IntraMuscular once  insulin glargine Injectable (LANTUS) 10 Unit(s) SubCutaneous at bedtime  insulin lispro (ADMELOG) corrective regimen sliding scale   SubCutaneous three times a day before meals  memantine 5 milliGRAM(s) Oral daily  multivitamin 1 Tablet(s) Oral daily  mupirocin 2% Nasal 1 Application(s) Both Nostrils two times a day  vancomycin  IVPB 1000 milliGRAM(s) IV Intermittent every 24 hours        EKG:  RADIOLOGY:  DIAGNOSTIC TESTING:  [ ] Echocardiogram:  [ ] Catherterization:  [ ] Stress Test:  OTHER:     LABS:	 	      01-21    141  |  107  |  29<H>  ----------------------------<  136<H>  4.1   |  24  |  1.15    Ca    10.1      21 Jan 2023 07:21            CARDIAC MARKERS:

## 2023-01-22 NOTE — PROGRESS NOTE ADULT - ASSESSMENT
Echo 1/10/22: Nml LV fxn, EF 65-70%, Mod AS    Echo 1/19/23: Mod AS, Nml LV fxn EF 75%, LVH    A/P  86 yo F, pmhx lung cancer currently on immunotherapy( [facility]), Aortic Stenosis, remote MI s/p stent, DVT s/p IVC filter on eliquis, bed bound due to weakness, diabetes, dementia, presents w/ stage 2 sacral wound.       #Aortic Stenosis  -Moderate as seen on last echo from 1/2022  -Repeat echo c/w mod AS and nml lv fxn    #CAD s/p stent & ppm  -Continue atorvastatin  -Consider ASA    #DVT  -Cont eliquis    #HTN  -BP stable  -Continue amlodipine    #Cellulitis  -2/2 sacral wound  -CT findings noted  -Abx per primary, id    #Lung Ca  -Mgmt per heme/onc    35 minutes spent on total encounter; more than 50% of the visit was spent counseling and/or coordinating care by the attending physician.

## 2023-01-22 NOTE — PROGRESS NOTE ADULT - ASSESSMENT
87-year-old female, history of lung cancer currently on immunotherapy( [facility]), lethargic, bed bound due to weakness w present illness x 1 week, diabetes, dementia, presenting from home with a stage 2 sacral wound.  Noticed 1/14, 2 days ago by home nurse.  Seen by urgent care 1/16, referred here.  Felt subjective fevers earlier today.  No active vomiting, abdominal pain, chest pain, shortness of breath, dysuria, frequency, changes in bowel habits.  No history of similar issues.  She has also been feeling generally weak over the past few weeks to the point that she is having a hard time ambulating, typically ambulates at her normal baseline.     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 JOSE resolved, seen by renal, IVF DCed, seen by ONC, stage 4 metastatic lung Ca, Ptn has increased debility. awaiting wound and ID consults.     1/18: ptn is awake, alert, wound care debrided sacral decub and packed a small abscess. cx growing MRSA, on Vanco    1/19: day 3 Iv Vanco for MRSA sacral decubitus abscess, on 1/21 will switch to po minocycline. will treat for total 10 days of ABx.  will need outptn wound care f/u w wound care at home    1/20: on IV Vanco for MRSA sacral abscess, will switch to po in a few days as per ID. cont local wound care    1/21-22: PT to make final recs, ptn can be switched to po ABx when ready for DC

## 2023-01-22 NOTE — PROGRESS NOTE ADULT - SUBJECTIVE AND OBJECTIVE BOX
Patient is a 87y old  Female who presents with a chief complaint of infection (20 Jan 2023 12:44)      SUBJECTIVE / OVERNIGHT EVENTS: dc planning home w home PT and home wound care, cont ABx    MEDICATIONS  (STANDING):  amLODIPine   Tablet 5 milliGRAM(s) Oral daily  apixaban 5 milliGRAM(s) Oral two times a day  ascorbic acid 500 milliGRAM(s) Oral daily  atorvastatin 40 milliGRAM(s) Oral at bedtime  chlorhexidine 2% Cloths 1 Application(s) Topical <User Schedule>  dextrose 5%. 1000 milliLiter(s) (50 mL/Hr) IV Continuous <Continuous>  dextrose 5%. 1000 milliLiter(s) (100 mL/Hr) IV Continuous <Continuous>  dextrose 50% Injectable 25 Gram(s) IV Push once  dextrose 50% Injectable 12.5 Gram(s) IV Push once  dextrose 50% Injectable 25 Gram(s) IV Push once  donepezil 10 milliGRAM(s) Oral at bedtime  dorzolamide 2%/timolol 0.5% Ophthalmic Solution 1 Drop(s) Both EYES two times a day  escitalopram 5 milliGRAM(s) Oral daily  famotidine    Tablet 20 milliGRAM(s) Oral daily  glucagon  Injectable 1 milliGRAM(s) IntraMuscular once  insulin glargine Injectable (LANTUS) 10 Unit(s) SubCutaneous at bedtime  insulin lispro (ADMELOG) corrective regimen sliding scale   SubCutaneous three times a day before meals  memantine 5 milliGRAM(s) Oral daily  multivitamin 1 Tablet(s) Oral daily  mupirocin 2% Nasal 1 Application(s) Both Nostrils two times a day  vancomycin  IVPB 1000 milliGRAM(s) IV Intermittent every 24 hours    MEDICATIONS  (PRN):  acetaminophen     Tablet .. 650 milliGRAM(s) Oral every 6 hours PRN Temp greater or equal to 38C (100.4F), Mild Pain (1 - 3)  dextrose Oral Gel 15 Gram(s) Oral once PRN Blood Glucose LESS THAN 70 milliGRAM(s)/deciliter      Vital Signs Last 24 Hrs  T(F): 98.4 (01-22-23 @ 12:45), Max: 98.6 (01-21-23 @ 21:11)  HR: 69 (01-22-23 @ 12:45) (66 - 70)  BP: 137/84 (01-22-23 @ 12:45) (137/84 - 146/78)  RR: 18 (01-22-23 @ 12:45) (18 - 18)  SpO2: 97% (01-22-23 @ 12:45) (97% - 98%)  Telemetry:   CAPILLARY BLOOD GLUCOSE      POCT Blood Glucose.: 189 mg/dL (22 Jan 2023 12:15)  POCT Blood Glucose.: 156 mg/dL (22 Jan 2023 08:06)  POCT Blood Glucose.: 265 mg/dL (21 Jan 2023 22:11)  POCT Blood Glucose.: 171 mg/dL (21 Jan 2023 17:08)    I&O's Summary    21 Jan 2023 07:01  -  22 Jan 2023 07:00  --------------------------------------------------------  IN: 840 mL / OUT: 950 mL / NET: -110 mL    22 Jan 2023 07:01  -  22 Jan 2023 15:34  --------------------------------------------------------  IN: 440 mL / OUT: 0 mL / NET: 440 mL        PHYSICAL EXAM:  GENERAL: NAD, well-developed  HEAD:  Atraumatic, Normocephalic  EYES: EOMI, PERRLA, conjunctiva and sclera clear  NECK: Supple, No JVD  CHEST/LUNG: Clear to auscultation bilaterally; No wheeze  HEART: Regular rate and rhythm; No murmurs, rubs, or gallops  ABDOMEN: Soft, Nontender, Nondistended; Bowel sounds present  EXTREMITIES:  2+ Peripheral Pulses, No clubbing, cyanosis, or edema  PSYCH: AAOx3  NEUROLOGY: non-focal  SKIN: No rashes or lesions    LABS:    01-21    141  |  107  |  29<H>  ----------------------------<  136<H>  4.1   |  24  |  1.15    Ca    10.1      21 Jan 2023 07:21                RADIOLOGY & ADDITIONAL TESTS:    Imaging Personally Reviewed:    Consultant(s) Notes Reviewed:      Care Discussed with Consultants/Other Providers:

## 2023-01-23 ENCOUNTER — TRANSCRIPTION ENCOUNTER (OUTPATIENT)
Age: 88
End: 2023-01-23

## 2023-01-23 VITALS
SYSTOLIC BLOOD PRESSURE: 156 MMHG | RESPIRATION RATE: 18 BRPM | OXYGEN SATURATION: 99 % | HEART RATE: 70 BPM | DIASTOLIC BLOOD PRESSURE: 82 MMHG | TEMPERATURE: 98 F

## 2023-01-23 LAB
GLUCOSE BLDC GLUCOMTR-MCNC: 157 MG/DL — HIGH (ref 70–99)
GLUCOSE BLDC GLUCOMTR-MCNC: 208 MG/DL — HIGH (ref 70–99)
GLUCOSE BLDC GLUCOMTR-MCNC: 231 MG/DL — HIGH (ref 70–99)
GLUCOSE BLDC GLUCOMTR-MCNC: 267 MG/DL — HIGH (ref 70–99)
SARS-COV-2 RNA SPEC QL NAA+PROBE: SIGNIFICANT CHANGE UP

## 2023-01-23 PROCEDURE — 83036 HEMOGLOBIN GLYCOSYLATED A1C: CPT

## 2023-01-23 PROCEDURE — 97110 THERAPEUTIC EXERCISES: CPT

## 2023-01-23 PROCEDURE — 87040 BLOOD CULTURE FOR BACTERIA: CPT

## 2023-01-23 PROCEDURE — 82306 VITAMIN D 25 HYDROXY: CPT

## 2023-01-23 PROCEDURE — 87186 SC STD MICRODIL/AGAR DIL: CPT

## 2023-01-23 PROCEDURE — 96374 THER/PROPH/DIAG INJ IV PUSH: CPT

## 2023-01-23 PROCEDURE — 82947 ASSAY GLUCOSE BLOOD QUANT: CPT

## 2023-01-23 PROCEDURE — 99285 EMERGENCY DEPT VISIT HI MDM: CPT | Mod: 25

## 2023-01-23 PROCEDURE — 85027 COMPLETE CBC AUTOMATED: CPT

## 2023-01-23 PROCEDURE — 36415 COLL VENOUS BLD VENIPUNCTURE: CPT

## 2023-01-23 PROCEDURE — 87150 DNA/RNA AMPLIFIED PROBE: CPT

## 2023-01-23 PROCEDURE — 87640 STAPH A DNA AMP PROBE: CPT

## 2023-01-23 PROCEDURE — 80202 ASSAY OF VANCOMYCIN: CPT

## 2023-01-23 PROCEDURE — 85014 HEMATOCRIT: CPT

## 2023-01-23 PROCEDURE — 82310 ASSAY OF CALCIUM: CPT

## 2023-01-23 PROCEDURE — 96375 TX/PRO/DX INJ NEW DRUG ADDON: CPT

## 2023-01-23 PROCEDURE — 97162 PT EVAL MOD COMPLEX 30 MIN: CPT

## 2023-01-23 PROCEDURE — 84132 ASSAY OF SERUM POTASSIUM: CPT

## 2023-01-23 PROCEDURE — 84443 ASSAY THYROID STIM HORMONE: CPT

## 2023-01-23 PROCEDURE — 84100 ASSAY OF PHOSPHORUS: CPT

## 2023-01-23 PROCEDURE — 82330 ASSAY OF CALCIUM: CPT

## 2023-01-23 PROCEDURE — 80048 BASIC METABOLIC PNL TOTAL CA: CPT

## 2023-01-23 PROCEDURE — 85018 HEMOGLOBIN: CPT

## 2023-01-23 PROCEDURE — 82652 VIT D 1 25-DIHYDROXY: CPT

## 2023-01-23 PROCEDURE — 85025 COMPLETE CBC W/AUTO DIFF WBC: CPT

## 2023-01-23 PROCEDURE — 84295 ASSAY OF SERUM SODIUM: CPT

## 2023-01-23 PROCEDURE — 82962 GLUCOSE BLOOD TEST: CPT

## 2023-01-23 PROCEDURE — U0005: CPT

## 2023-01-23 PROCEDURE — 93306 TTE W/DOPPLER COMPLETE: CPT

## 2023-01-23 PROCEDURE — 80053 COMPREHEN METABOLIC PANEL: CPT

## 2023-01-23 PROCEDURE — 97530 THERAPEUTIC ACTIVITIES: CPT

## 2023-01-23 PROCEDURE — 87635 SARS-COV-2 COVID-19 AMP PRB: CPT

## 2023-01-23 PROCEDURE — 87077 CULTURE AEROBIC IDENTIFY: CPT

## 2023-01-23 PROCEDURE — 87641 MR-STAPH DNA AMP PROBE: CPT

## 2023-01-23 PROCEDURE — 82803 BLOOD GASES ANY COMBINATION: CPT

## 2023-01-23 PROCEDURE — U0003: CPT

## 2023-01-23 PROCEDURE — 82435 ASSAY OF BLOOD CHLORIDE: CPT

## 2023-01-23 PROCEDURE — 87070 CULTURE OTHR SPECIMN AEROBIC: CPT

## 2023-01-23 PROCEDURE — 83605 ASSAY OF LACTIC ACID: CPT

## 2023-01-23 PROCEDURE — 97116 GAIT TRAINING THERAPY: CPT

## 2023-01-23 PROCEDURE — 83970 ASSAY OF PARATHORMONE: CPT

## 2023-01-23 PROCEDURE — 74176 CT ABD & PELVIS W/O CONTRAST: CPT | Mod: MA

## 2023-01-23 RX ORDER — MINOCYCLINE HYDROCHLORIDE 45 MG/1
100 TABLET, EXTENDED RELEASE ORAL
Refills: 0 | Status: DISCONTINUED | OUTPATIENT
Start: 2023-01-23 | End: 2023-01-23

## 2023-01-23 RX ORDER — SITAGLIPTIN 50 MG/1
1 TABLET, FILM COATED ORAL
Qty: 30 | Refills: 0
Start: 2023-01-23 | End: 2023-02-21

## 2023-01-23 RX ORDER — REPAGLINIDE 1 MG/1
1 TABLET ORAL
Qty: 0 | Refills: 0 | DISCHARGE

## 2023-01-23 RX ORDER — SITAGLIPTIN 50 MG/1
1 TABLET, FILM COATED ORAL
Qty: 0 | Refills: 0 | DISCHARGE

## 2023-01-23 RX ORDER — MINOCYCLINE HYDROCHLORIDE 45 MG/1
1 TABLET, EXTENDED RELEASE ORAL
Qty: 0 | Refills: 0 | DISCHARGE
Start: 2023-01-23

## 2023-01-23 RX ORDER — MUPIROCIN 20 MG/G
1 OINTMENT TOPICAL
Qty: 1 | Refills: 0
Start: 2023-01-23 | End: 2023-01-24

## 2023-01-23 RX ORDER — ASCORBIC ACID 60 MG
1 TABLET,CHEWABLE ORAL
Qty: 0 | Refills: 0 | DISCHARGE
Start: 2023-01-23

## 2023-01-23 RX ORDER — FAMOTIDINE 10 MG/ML
1 INJECTION INTRAVENOUS
Qty: 0 | Refills: 0 | DISCHARGE
Start: 2023-01-23

## 2023-01-23 RX ORDER — FAMOTIDINE 10 MG/ML
1 INJECTION INTRAVENOUS
Qty: 0 | Refills: 0 | DISCHARGE

## 2023-01-23 RX ORDER — REPAGLINIDE 1 MG/1
1 TABLET ORAL
Qty: 90 | Refills: 0
Start: 2023-01-23 | End: 2023-02-21

## 2023-01-23 RX ADMIN — Medication 3: at 12:45

## 2023-01-23 RX ADMIN — APIXABAN 5 MILLIGRAM(S): 2.5 TABLET, FILM COATED ORAL at 17:53

## 2023-01-23 RX ADMIN — CHLORHEXIDINE GLUCONATE 1 APPLICATION(S): 213 SOLUTION TOPICAL at 06:01

## 2023-01-23 RX ADMIN — MUPIROCIN 1 APPLICATION(S): 20 OINTMENT TOPICAL at 05:58

## 2023-01-23 RX ADMIN — AMLODIPINE BESYLATE 5 MILLIGRAM(S): 2.5 TABLET ORAL at 06:00

## 2023-01-23 RX ADMIN — Medication 2: at 17:53

## 2023-01-23 RX ADMIN — APIXABAN 5 MILLIGRAM(S): 2.5 TABLET, FILM COATED ORAL at 06:00

## 2023-01-23 RX ADMIN — Medication 650 MILLIGRAM(S): at 08:47

## 2023-01-23 RX ADMIN — FAMOTIDINE 20 MILLIGRAM(S): 10 INJECTION INTRAVENOUS at 12:47

## 2023-01-23 RX ADMIN — Medication 1: at 08:48

## 2023-01-23 RX ADMIN — DORZOLAMIDE HYDROCHLORIDE TIMOLOL MALEATE 1 DROP(S): 20; 5 SOLUTION/ DROPS OPHTHALMIC at 08:48

## 2023-01-23 RX ADMIN — MINOCYCLINE HYDROCHLORIDE 100 MILLIGRAM(S): 45 TABLET, EXTENDED RELEASE ORAL at 17:52

## 2023-01-23 RX ADMIN — MUPIROCIN 1 APPLICATION(S): 20 OINTMENT TOPICAL at 17:57

## 2023-01-23 RX ADMIN — Medication 500 MILLIGRAM(S): at 12:46

## 2023-01-23 RX ADMIN — Medication 1 TABLET(S): at 12:47

## 2023-01-23 RX ADMIN — ESCITALOPRAM OXALATE 5 MILLIGRAM(S): 10 TABLET, FILM COATED ORAL at 12:46

## 2023-01-23 RX ADMIN — MEMANTINE HYDROCHLORIDE 5 MILLIGRAM(S): 10 TABLET ORAL at 12:47

## 2023-01-23 NOTE — PROGRESS NOTE ADULT - SUBJECTIVE AND OBJECTIVE BOX
CC: f/u for left sided buttocks MRSA abscess    Patient reports: she still c/p buttocks pain    REVIEW OF SYSTEMS:  All other review of systems negative (Comprehensive ROS)    Antimicrobials Day #  :day 7  vancomycin  IVPB 1000 milliGRAM(s) IV Intermittent every 24 hours    Other Medications Reviewed  MEDICATIONS  (STANDING):  amLODIPine   Tablet 5 milliGRAM(s) Oral daily  apixaban 5 milliGRAM(s) Oral two times a day  ascorbic acid 500 milliGRAM(s) Oral daily  atorvastatin 40 milliGRAM(s) Oral at bedtime  chlorhexidine 2% Cloths 1 Application(s) Topical <User Schedule>  dextrose 5%. 1000 milliLiter(s) (100 mL/Hr) IV Continuous <Continuous>  dextrose 5%. 1000 milliLiter(s) (50 mL/Hr) IV Continuous <Continuous>  dextrose 50% Injectable 25 Gram(s) IV Push once  dextrose 50% Injectable 12.5 Gram(s) IV Push once  dextrose 50% Injectable 25 Gram(s) IV Push once  donepezil 10 milliGRAM(s) Oral at bedtime  dorzolamide 2%/timolol 0.5% Ophthalmic Solution 1 Drop(s) Both EYES two times a day  escitalopram 5 milliGRAM(s) Oral daily  famotidine    Tablet 20 milliGRAM(s) Oral daily  glucagon  Injectable 1 milliGRAM(s) IntraMuscular once  insulin glargine Injectable (LANTUS) 10 Unit(s) SubCutaneous at bedtime  insulin lispro (ADMELOG) corrective regimen sliding scale   SubCutaneous three times a day before meals  memantine 5 milliGRAM(s) Oral daily  multivitamin 1 Tablet(s) Oral daily  mupirocin 2% Nasal 1 Application(s) Both Nostrils two times a day  vancomycin  IVPB 1000 milliGRAM(s) IV Intermittent every 24 hours    T(F): 98.2 (01-23-23 @ 05:50), Max: 98.4 (01-22-23 @ 12:45)  HR: 72 (01-23-23 @ 09:08)  BP: 156/82 (01-23-23 @ 09:08)  RR: 18 (01-23-23 @ 09:08)  SpO2: 99% (01-23-23 @ 09:08)  Wt(kg): --    PHYSICAL EXAM:  General: alert, no acute distress  Eyes:  anicteric, no conjunctival injection, no discharge  Oropharynx: no lesions or injection 	  Neck: supple, without adenopathy  Lungs: clear to auscultation  Heart: regular rate and rhythm; no murmur, rubs or gallops  Abdomen: soft, nondistended, nontender, without mass or organomegaly  Skin: buttocks lesion well drained, minimal induration  Extremities: no clubbing, cyanosis, or edema  Neurologic: alert, oriented, moves all extremities    LAB RESULTS:              MICROBIOLOGY:  RECENT CULTURES:  01-19 @ 21:12 .Blood Blood-Peripheral     No growth to date.    1/4 bottles of 1/17 blood cultures with a coag negative staph      RADIOLOGY REVIEWED:

## 2023-01-23 NOTE — PROGRESS NOTE ADULT - ASSESSMENT
PONCE MEJIA is a 87y Female who presents with a chief complaint of infection    Metastatic Lung Cancer  - Patient follows with Dr. Gurdeep Sharpe, [facility].  - She is currently on pembrolizumab monotherapy, last dose in January 9th.  - No systemic therapy while inpatient or during rehabilitation.    Sacral Wound/Abscess/Cellulitis  - Wound care and infectious disease following.  - Wound culture growing S. aureus.   - Blood culture GPC likely contaminant per infectious disease. Blood cultures from 1/19 no growth to date.  - Continue vancomycin per infectious disease. Switch to minocycline once ready for discharge.  - Continue wound care and drainage.    Discharge planning in process.    Will continue to follow.    Dallin Katz MD  Hematology/Oncology  O: [phone]

## 2023-01-23 NOTE — DISCHARGE NOTE PROVIDER - HOSPITAL COURSE
87-year-old female, history of lung cancer currently on immunotherapy( [facility]), lethargic, bed bound due to weakness w present illness x 1 week, diabetes, dementia, presenting from home with a stage 2 sacral wound.  Noticed 1/14, 2 days ago by home nurse.  Seen by urgent care 1/16, referred here.  Felt subjective fevers earlier today.  No active vomiting, abdominal pain, chest pain, shortness of breath, dysuria, frequency, changes in bowel habits.  No history of similar issues.  She has also been feeling generally weak over the past few weeks to the point that she is having a hard time ambulating, typically ambulates at her normal baseline.     Left Buttock abscess:  Ct A/P, LS spine: DJD LS spine and cellulitis over buttocks, no abscesses, no OM  Cxs sent in the ED  start Vanco, ID called, wound care called, ONC called  She was given a dose of vanco, cefepime & flagyl.   Wound was swabbed & cx with MRSA  Exam had revealed left large area of buttock cellulitis with bloody discharge from an area of skin breakdown.   Cefepime was stopped 1/16 - not needed  She is on day # 5 o7 vanco and abscess has been drained  Vanco trough levels of 13.8 is acceptable  Blood cx from 1/16 recovered 1/4 Staph haemolyticus & repeated on 1/17 blood cultures with Staph hominis - these are c/w contaminants.  Blood cx from 1/19/23 - NGTD  Switched to minocycline today to avoid potential vanco toxicities, 5 additional days adequate      Diabetes:  Suggest to be d/c on home diabetic meds, on decreased dose of Prandin 0.5 mg TID and decrease Januvia 50 mg qd as long as blood sugar levels are stable.   Follow up Endocrinology.    Metastatic lung cancer:  - Patient follows with Dr. Gurdeep Sharpe, [facility].  - She is currently on pembrolizumab monotherapy, last dose in January 9th.  - No systemic therapy while inpatient or during rehabilitation.      #Aortic Stenosis  -Moderate as seen on last echo from 1/2022  -Repeat echo c/w mod AS and nml lv fxn    #CAD s/p stent & ppm  -Continue atorvastatin  -Consider ASA    #DVT  -Cont eliquis    #HTN  -BP stable  -Continue amlodipine    Left Buttocks wound/unstageable pressure injury:  present on admission with soft tissue infection/cellulitis/abscess  Right buttocks stage 2 pressure injury present on admission  Incontinence of bowel and bladder  Recommend:  1.) topical therapy: Left buttock wound - irrigate with NS, pat dry, pack loosely with iodoform 1/4" strip packing, cover with DSD  twice daily  RIght buttocks wound - cleanse with NS, pat dry, apply allevyn foam dressing daily  2.) Antibiotics per Medicine/ID  3.) Maintain on an alternating air with low air loss surface  4.) Turn and reposition Q 2 hours  5.) Nutrition optimization  6.) Offload heels/feet with complete cair air fluidized boots; ensure that the soles of the feet are not resting on the foot board of the bed.  7.) Incontinence Management - incontinence cleanser, pads, pericare BID, continue external female urinary catheter      Patient is medically cleared and stable for discharge. Discussed with . 87-year-old female, history of lung cancer currently on immunotherapy( [facility]), lethargic, bed bound due to weakness w present illness x 1 week, diabetes, dementia, presenting from home with a stage 2 sacral wound.  Noticed 1/14, 2 days ago by home nurse.  Seen by urgent care 1/16, referred here.  Felt subjective fevers earlier today.  No active vomiting, abdominal pain, chest pain, shortness of breath, dysuria, frequency, changes in bowel habits.  No history of similar issues.  She has also been feeling generally weak over the past few weeks to the point that she is having a hard time ambulating, typically ambulates at her normal baseline.     Left Buttock abscess:  Ct A/P, LS spine: DJD LS spine and cellulitis over buttocks, no abscesses, no OM  Cxs sent in the ED  start Vanco, ID called, wound care called, ONC called  She was given a dose of vanco, cefepime & flagyl.   Wound was swabbed & cx with MRSA  Exam had revealed left large area of buttock cellulitis with bloody discharge from an area of skin breakdown.   Cefepime was stopped 1/16 - not needed  She was also on vanco and abscess has been drained  Blood cx from 1/16 recovered 1/4 Staph haemolyticus & repeated on 1/17 blood cultures with Staph hominis - these are c/w contaminants.  Blood cx from 1/19/23 - NGTD  Switched to minocycline today to avoid potential vanco toxicities, 5 additional days adequate      Diabetes:  Suggest to be d/c on home diabetic meds, decreased dose of Prandin 0.5 mg TID and decrease Januvia 50 mg qd as long as blood sugar levels are stable.   Follow up Endocrinology.    Metastatic lung cancer:  - Patient follows with Dr. Gurdeep Sharpe, [facility].  - She is currently on pembrolizumab monotherapy, last dose in January 9th.  - No systemic therapy while inpatient or during rehabilitation.      #Aortic Stenosis  -Moderate as seen on last echo from 1/2022  -Repeat echo c/w mod AS and nml lv fxn    #CAD s/p stent & ppm  -Continue atorvastatin  -Consider ASA    #DVT  -Cont eliquis    #HTN  -BP stable  -Continue amlodipine    Left Buttocks wound/unstageable pressure injury:  present on admission with soft tissue infection/cellulitis/abscess  Right buttocks stage 2 pressure injury present on admission  Incontinence of bowel and bladder  Recommend:  1.) topical therapy: Left buttock wound - irrigate with NS, pat dry, pack loosely with iodoform 1/4" strip packing, cover with DSD  twice daily  RIght buttocks wound - cleanse with NS, pat dry, apply allevyn foam dressing daily  2.) Antibiotics per Medicine/ID  3.) Maintain on an alternating air with low air loss surface  4.) Turn and reposition Q 2 hours  5.) Nutrition optimization  6.) Offload heels/feet with complete cair air fluidized boots; ensure that the soles of the feet are not resting on the foot board of the bed.  7.) Incontinence Management - incontinence cleanser, pads, pericare BID, continue external female urinary catheter      Patient is medically cleared and stable for discharge. Discussed with .   Patient will need outpatient wound care f/u w/ wound care at home. CM at [facility] to arrange. 87-year-old female, history of lung cancer currently on immunotherapy( [facility]), lethargic, bed bound due to weakness w present illness x 1 week, diabetes, dementia, presenting from home with a stage 2 sacral wound.  Noticed 1/14, 2 days ago by home nurse.  Seen by urgent care 1/16, referred here.  Felt subjective fevers earlier today.  No active vomiting, abdominal pain, chest pain, shortness of breath, dysuria, frequency, changes in bowel habits.  No history of similar issues.  She has also been feeling generally weak over the past few weeks to the point that she is having a hard time ambulating, typically ambulates at her normal baseline.     Left Buttock abscess:  admitted with sepsis, that resolved with antibiotics  admitted with lethargy due to an infection not encephalopathy  Ct A/P, LS spine: DJD LS spine and cellulitis over buttocks, no abscesses, no OM, but wound care consult detected an abscess and drained it  Cxs sent in the ED  start Vanco, ID called, wound care called, ONC called  She was given a dose of vanco, cefepime & flagyl.   Wound was swabbed & cx with MRSA  Exam had revealed left large area of buttock cellulitis with bloody discharge from an area of skin breakdown.   Cefepime was stopped 1/16 - not needed  She was also on vanco and abscess has been drained  Blood cx from 1/16 recovered 1/4 Staph haemolyticus & repeated on 1/17 blood cultures with Staph hominis - these are c/w contaminants.  Blood cx from 1/19/23 - NGTD  Switched to minocycline today to avoid potential vanco toxicities, 5 additional days adequate      Diabetes:  Suggest to be d/c on home diabetic meds, decreased dose of Prandin 0.5 mg TID and decrease Januvia 50 mg qd as long as blood sugar levels are stable.   Follow up Endocrinology.    Metastatic lung cancer:  - Patient follows with Dr. Gurdeep Sharpe, [facility].  - She is currently on pembrolizumab monotherapy, last dose in January 9th.  - No systemic therapy while inpatient or during rehabilitation.      #Aortic Stenosis  -Moderate as seen on last echo from 1/2022  -Repeat echo c/w mod AS and nml lv fxn    #CAD s/p stent & ppm  -Continue atorvastatin  -Consider ASA    #DVT  -Cont eliquis    #HTN  -BP stable  -Continue amlodipine    Left Buttocks wound/stage 2 right sacral pressure injury, stage 4 left buttock sacral pressure injury  present on admission with soft tissue infection/cellulitis/abscess  Right buttocks stage 2 pressure injury present on admission  Incontinence of bowel and bladder  Recommend:  1.) topical therapy: Left buttock wound - irrigate with NS, pat dry, pack loosely with iodoform 1/4" strip packing, cover with DSD  twice daily  RIght buttocks wound - cleanse with NS, pat dry, apply allevyn foam dressing daily  2.) Antibiotics per Medicine/ID  3.) Maintain on an alternating air with low air loss surface  4.) Turn and reposition Q 2 hours  5.) Nutrition optimization  6.) Offload heels/feet with complete cair air fluidized boots; ensure that the soles of the feet are not resting on the foot board of the bed.  7.) Incontinence Management - incontinence cleanser, pads, pericare BID, continue external female urinary catheter      Patient is medically cleared and stable for discharge. Discussed with .   Patient will need outpatient wound care f/u w/ wound care at home. CM at [facility] to arrange.

## 2023-01-23 NOTE — PROGRESS NOTE ADULT - ASSESSMENT
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   Her aide on 1/14 reported noticing a small bump on her left buttock. This progressively increased to a large size with pain as of 1/16/23.  [place] [facility] [place].    Here found with a temp of 99.4F with leukocytosis of 16.44K.   She was given a dose of vanco, cefepime & flagyl.   Wound was swabbed & cx with MRSA  Exam had revealed left large area of buttock cellulitis with bloody discharge from an area of skin breakdown.   Cefepime was stopped 1/16 - not needed  She is on day # 5 o7 vanco and abscess has been drained  Vanco trough levels of 13.8 is acceptable  Blood cx from 1/16 recovered 1/4 Staph haemolyticus & repeated on 1/17 blood cultures with Staph hominis - these are c/w contaminants.  Blood cx from 1/19/23 - NGTD    PLAN:  Follow blood cx from 1/19/23  Different types of Coag neg Staph earlier in blood cx are c/w contaminants  Local wound care to left buttock abscess area  Can switch to minocycline 100 po BID at any time  Will switch to minocycline today to avoid potential vanco toxicities  5 additional days adequate

## 2023-01-23 NOTE — DISCHARGE NOTE PROVIDER - NSDCMRMEDTOKEN_GEN_ALL_CORE_FT
amLODIPine 5 mg oral tablet: 1 tab(s) orally once a day  atorvastatin 40 mg oral tablet: 1 tab(s) orally once a day  donepezil 10 mg oral tablet: 1 tab(s) orally once a day (at bedtime)  dorzolamide-timolol 2%-0.5% ophthalmic solution: 1 drop(s) in the left eye 2 times a day  Eliquis 5 mg oral tablet: 1 tab(s) orally 2 times a day  famotidine 20 mg oral tablet: 1 tab(s) orally once a day, As Needed  Januvia 100 mg oral tablet: 1 tab(s) orally once a day  Lexapro 5 mg oral tablet: 1 tab(s) orally once a day  memantine 5 mg oral tablet: 1 tab(s) orally once a day  Prandin 1 mg oral tablet: 1 tab(s) orally 3 times a day (before meals)  Tylenol 325 mg oral tablet: 2 tab(s) orally , As Needed   amLODIPine 5 mg oral tablet: 1 tab(s) orally once a day  ascorbic acid 500 mg oral tablet: 1 tab(s) orally once a day  atorvastatin 40 mg oral tablet: 1 tab(s) orally once a day  donepezil 10 mg oral tablet: 1 tab(s) orally once a day (at bedtime)  dorzolamide-timolol 2%-0.5% ophthalmic solution: 1 drop(s) in the left eye 2 times a day  Eliquis 5 mg oral tablet: 1 tab(s) orally 2 times a day  famotidine 20 mg oral tablet: 1 tab(s) orally once a day  Januvia 100 mg oral tablet: 1 tab(s) orally once a day  Januvia 50 mg oral tablet: 1 tab(s) orally once a day   Lexapro 5 mg oral tablet: 1 tab(s) orally once a day  memantine 5 mg oral tablet: 1 tab(s) orally once a day  minocycline 100 mg oral capsule: 1 cap(s) orally 2 times a day, stop after 5 days   Multiple Vitamins oral tablet: 1 tab(s) orally once a day  Prandin 1 mg oral tablet: 1 tab(s) orally 3 times a day (before meals)  repaglinide 0.5 mg oral tablet: 1 tab(s) orally 3 times a day   Tylenol 325 mg oral tablet: 2 tab(s) orally , As Needed   amLODIPine 5 mg oral tablet: 1 tab(s) orally once a day  ascorbic acid 500 mg oral tablet: 1 tab(s) orally once a day  atorvastatin 40 mg oral tablet: 1 tab(s) orally once a day  donepezil 10 mg oral tablet: 1 tab(s) orally once a day (at bedtime)  dorzolamide-timolol 2%-0.5% ophthalmic solution: 1 drop(s) in the left eye 2 times a day  Eliquis 5 mg oral tablet: 1 tab(s) orally 2 times a day  famotidine 20 mg oral tablet: 1 tab(s) orally once a day  Januvia 50 mg oral tablet: 1 tab(s) orally once a day   Lexapro 5 mg oral tablet: 1 tab(s) orally once a day  memantine 5 mg oral tablet: 1 tab(s) orally once a day  minocycline 100 mg oral capsule: 1 cap(s) orally 2 times a day, stop after 5 days   Multiple Vitamins oral tablet: 1 tab(s) orally once a day  mupirocin 2% topical cream: 1 application nasal 2 times a day   mupirocin 2% topical cream: 1 application in each nostril 2 times a day  for 2 days   repaglinide 0.5 mg oral tablet: 1 tab(s) orally 3 times a day   Tylenol 325 mg oral tablet: 2 tab(s) orally , As Needed

## 2023-01-23 NOTE — DISCHARGE NOTE PROVIDER - CARE PROVIDER_API CALL
lashonda Sharpe  [address] [place] [address]  Phone: [phone]  Fax: (   )    -  Follow Up Time: 1 week   lashonda Sharpe  [address] [place] [address]  Phone: [phone]  Fax: (   )    -  Follow Up Time: 1 week    TERRENCE DALE  Internal Medicine  [address] [place] [id number]  Phone: ()-  Fax: ()-  Follow Up Time: 1 week

## 2023-01-23 NOTE — DISCHARGE NOTE PROVIDER - NSDCCPCAREPLAN_GEN_ALL_CORE_FT
PRINCIPAL DISCHARGE DIAGNOSIS  Diagnosis: Cellulitis  Assessment and Plan of Treatment: Ct A/P, LS spine: DJD LS spine and cellulitis over buttocks, no abscesses, no OM  Cxs sent in the ED  start Vanco, ID called, wound care called, ONC called  She was given a dose of vanco, cefepime & flagyl.   Wound was swabbed & cx with MRSA  Exam had revealed left large area of buttock cellulitis with bloody discharge from an area of skin breakdown.   Cefepime was stopped 1/16 - not needed  She is on day # 5 o7 vanco and abscess has been drained  Blood cx from 1/16 recovered 1/4 Staph haemolyticus & repeated on 1/17 blood cultures with Staph hominis - these are c/w contaminants.  Blood cx from 1/19/23 - NGTD  Switched to minocycline today to avoid potential vanco toxicities, 5 additional days adequate  ##present on admission with soft tissue infection/cellulitis/abscess  Right buttocks stage 2 pressure injury present on admission  Incontinence of bowel and bladder  Recommend:  1.) topical therapy: Left buttock wound - irrigate with NS, pat dry, pack loosely with iodoform 1/4" strip packing, cover with DSD  twice daily  RIght buttocks wound - cleanse with NS, pat dry, apply allevyn foam dressing daily  2.) Antibiotics per Medicine/ID  3.) Maintain on an alternating air with low air loss surface  4.) Turn and reposition Q 2 hours  5.) Nutrition optimization  6.) Offload heels/feet with complete cair air fluidized boots; ensure that the soles of the feet are not resting on the foot board of the bed.  7.) Incontinence Management - incontinence cleanser, pads, pericare BID, continue external female urinary catheter      SECONDARY DISCHARGE DIAGNOSES  Diagnosis: Diabetes  Assessment and Plan of Treatment: Prandin 0.5 mg TID and decrease Januvia 50 mg qd as long as blood sugar levels are stable.   Follow up Endocrinology.    Diagnosis: Lung cancer  Assessment and Plan of Treatment: Please follow up with Dr. Gurdeep Sharpe, [facility].  - No systemic therapy while inpatient or during rehabilitation.    Diagnosis: Aortic stenosis  Assessment and Plan of Treatment: -Moderate as seen on last echo from 1/2022  -Repeat echo c/w mod AS and nml lv fxn    Diagnosis: CAD (coronary artery disease)  Assessment and Plan of Treatment: -Continue atorvastatin  -Consider ASA    Diagnosis: Hypertension  Assessment and Plan of Treatment: -BP stable  -Continue amlodipine    Diagnosis: Deep vein thrombosis (DVT)  Assessment and Plan of Treatment: on eliquis

## 2023-01-23 NOTE — PROGRESS NOTE ADULT - ASSESSMENT
87-year-old female, history of lung cancer currently on immunotherapy( [facility]), lethargic, bed bound due to weakness w present illness x 1 week, diabetes, dementia, presenting from home with a stage 2 sacral wound.  Noticed 1/14, 2 days ago by home nurse.  Seen by urgent care 1/16, referred here.  Felt subjective fevers earlier today.  No active vomiting, abdominal pain, chest pain, shortness of breath, dysuria, frequency, changes in bowel habits.  No history of similar issues.  She has also been feeling generally weak over the past few weeks to the point that she is having a hard time ambulating, typically ambulates at her normal baseline.     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 JOSE resolved, seen by renal, IVF DCed, seen by ONC, stage 4 metastatic lung Ca, Ptn has increased debility. awaiting wound and ID consults.     1/18: ptn is awake, alert, wound care debrided sacral decub and packed a small abscess. cx growing MRSA, on Vanco    1/19: day 3 Iv Vanco for MRSA sacral decubitus abscess, on 1/21 will switch to po minocycline. will treat for total 10 days of ABx.  will need outptn wound care f/u w wound care at home    1/20: on IV Vanco for MRSA sacral abscess, will switch to po in a few days as per ID. cont local wound care    1/21-22: PT to make final recs, ptn can be switched to po ABx when ready for DC  1/23: ptn is awaiting dc to [facility]

## 2023-01-23 NOTE — DISCHARGE NOTE PROVIDER - PROVIDER TOKENS
FREE:[LAST:[Ng],FIRST:[lashonda HUBBARD],PHONE:[phone]],FAX:[(   )    -],ADDRESS:[[address] [place] [address]],FOLLOWUP:[1 week]] FREE:[LAST:[Ng],FIRST:[lashonda HUBBARD],PHONE:[phone]],FAX:[(   )    -],ADDRESS:[[address] [place] [address]],FOLLOWUP:[1 week]],PROVIDER:[TOKEN:[[id number]],FOLLOWUP:[1 week]]

## 2023-01-23 NOTE — PROGRESS NOTE ADULT - PROBLEM SELECTOR PLAN 3
Continue medications, monitoring, FU primary team recommendations. .
Suggest continue medications, monitoring, FU primary team recommendations. .
Suggest continue medications, monitoring, FU primary team recommendations. .
Continue medications, monitoring, FU primary team recommendations. .
Suggest continue medications, monitoring, FU primary team recommendations. .
Suggest continue medications, monitoring, FU primary team recommendations. .

## 2023-01-23 NOTE — PROGRESS NOTE ADULT - SUBJECTIVE AND OBJECTIVE BOX
Chief complaint    Patient is a 87y old  Female who presents with a chief complaint of infection (20 Jan 2023 12:44)   Review of systems  Patient in bed, appears comfortable.    Labs and Fingersticks  CAPILLARY BLOOD GLUCOSE      POCT Blood Glucose.: 157 mg/dL (23 Jan 2023 08:33)  POCT Blood Glucose.: 215 mg/dL (22 Jan 2023 21:31)  POCT Blood Glucose.: 231 mg/dL (22 Jan 2023 17:04)  POCT Blood Glucose.: 189 mg/dL (22 Jan 2023 12:15)                        Medications  MEDICATIONS  (STANDING):  amLODIPine   Tablet 5 milliGRAM(s) Oral daily  apixaban 5 milliGRAM(s) Oral two times a day  ascorbic acid 500 milliGRAM(s) Oral daily  atorvastatin 40 milliGRAM(s) Oral at bedtime  chlorhexidine 2% Cloths 1 Application(s) Topical <User Schedule>  dextrose 5%. 1000 milliLiter(s) (100 mL/Hr) IV Continuous <Continuous>  dextrose 5%. 1000 milliLiter(s) (50 mL/Hr) IV Continuous <Continuous>  dextrose 50% Injectable 25 Gram(s) IV Push once  dextrose 50% Injectable 12.5 Gram(s) IV Push once  dextrose 50% Injectable 25 Gram(s) IV Push once  donepezil 10 milliGRAM(s) Oral at bedtime  dorzolamide 2%/timolol 0.5% Ophthalmic Solution 1 Drop(s) Both EYES two times a day  escitalopram 5 milliGRAM(s) Oral daily  famotidine    Tablet 20 milliGRAM(s) Oral daily  glucagon  Injectable 1 milliGRAM(s) IntraMuscular once  insulin glargine Injectable (LANTUS) 10 Unit(s) SubCutaneous at bedtime  insulin lispro (ADMELOG) corrective regimen sliding scale   SubCutaneous three times a day before meals  memantine 5 milliGRAM(s) Oral daily  multivitamin 1 Tablet(s) Oral daily  mupirocin 2% Nasal 1 Application(s) Both Nostrils two times a day  vancomycin  IVPB 1000 milliGRAM(s) IV Intermittent every 24 hours      Physical Exam  General: Patient appears comfortable.  Vital Signs Last 12 Hrs  T(F): 98.2 (01-23-23 @ 05:50), Max: 98.2 (01-23-23 @ 05:50)  HR: 72 (01-23-23 @ 09:08) (72 - 72)  BP: 156/82 (01-23-23 @ 09:08) (141/80 - 156/82)  BP(mean): --  RR: 18 (01-23-23 @ 09:08) (18 - 18)  SpO2: 99% (01-23-23 @ 09:08) (99% - 99%)  Neck: No palpable thyroid nodules.  CVS: S1S2, No murmurs  Respiratory: No wheezing, no crepitations  GI: Abdomen soft, non tender.  Musculoskeletal:  edema lower extremities.     Diagnostics             Chief complaint    Patient is a 87y old  Female who presents with a chief complaint of infection (20 Jan 2023 12:44)   Review of systems  Patient in bed, appears comfortable.    Labs and Fingersticks  CAPILLARY BLOOD GLUCOSE      POCT Blood Glucose.: 157 mg/dL (23 Jan 2023 08:33)  POCT Blood Glucose.: 215 mg/dL (22 Jan 2023 21:31)  POCT Blood Glucose.: 231 mg/dL (22 Jan 2023 17:04)  POCT Blood Glucose.: 189 mg/dL (22 Jan 2023 12:15)    Medications  MEDICATIONS  (STANDING):  amLODIPine   Tablet 5 milliGRAM(s) Oral daily  apixaban 5 milliGRAM(s) Oral two times a day  ascorbic acid 500 milliGRAM(s) Oral daily  atorvastatin 40 milliGRAM(s) Oral at bedtime  chlorhexidine 2% Cloths 1 Application(s) Topical <User Schedule>  dextrose 5%. 1000 milliLiter(s) (100 mL/Hr) IV Continuous <Continuous>  dextrose 5%. 1000 milliLiter(s) (50 mL/Hr) IV Continuous <Continuous>  dextrose 50% Injectable 25 Gram(s) IV Push once  dextrose 50% Injectable 12.5 Gram(s) IV Push once  dextrose 50% Injectable 25 Gram(s) IV Push once  donepezil 10 milliGRAM(s) Oral at bedtime  dorzolamide 2%/timolol 0.5% Ophthalmic Solution 1 Drop(s) Both EYES two times a day  escitalopram 5 milliGRAM(s) Oral daily  famotidine    Tablet 20 milliGRAM(s) Oral daily  glucagon  Injectable 1 milliGRAM(s) IntraMuscular once  insulin glargine Injectable (LANTUS) 10 Unit(s) SubCutaneous at bedtime  insulin lispro (ADMELOG) corrective regimen sliding scale   SubCutaneous three times a day before meals  memantine 5 milliGRAM(s) Oral daily  multivitamin 1 Tablet(s) Oral daily  mupirocin 2% Nasal 1 Application(s) Both Nostrils two times a day  vancomycin  IVPB 1000 milliGRAM(s) IV Intermittent every 24 hours      Physical Exam  General: Patient appears comfortable.  Vital Signs Last 12 Hrs  T(F): 98.2 (01-23-23 @ 05:50), Max: 98.2 (01-23-23 @ 05:50)  HR: 72 (01-23-23 @ 09:08) (72 - 72)  BP: 156/82 (01-23-23 @ 09:08) (141/80 - 156/82)  BP(mean): --  RR: 18 (01-23-23 @ 09:08) (18 - 18)  SpO2: 99% (01-23-23 @ 09:08) (99% - 99%)  Neck: No palpable thyroid nodules.  CVS: S1S2, No murmurs  Respiratory: No wheezing, no crepitations  GI: Abdomen soft, non tender.  Musculoskeletal:  edema lower extremities.     Diagnostics           Chief complaint    Patient is a 87y old  Female who presents with a chief complaint of infection (20 Jan 2023 12:44)   Review of systems  Patient in bed, appears comfortable.    Labs and Fingersticks  CAPILLARY BLOOD GLUCOSE      POCT Blood Glucose.: 157 mg/dL (23 Jan 2023 08:33)  POCT Blood Glucose.: 215 mg/dL (22 Jan 2023 21:31)  POCT Blood Glucose.: 231 mg/dL (22 Jan 2023 17:04)  POCT Blood Glucose.: 189 mg/dL (22 Jan 2023 12:15)    Medications  MEDICATIONS  (STANDING):  amLODIPine   Tablet 5 milliGRAM(s) Oral daily  apixaban 5 milliGRAM(s) Oral two times a day  ascorbic acid 500 milliGRAM(s) Oral daily  atorvastatin 40 milliGRAM(s) Oral at bedtime  chlorhexidine 2% Cloths 1 Application(s) Topical <User Schedule>  dextrose 5%. 1000 milliLiter(s) (100 mL/Hr) IV Continuous <Continuous>  dextrose 5%. 1000 milliLiter(s) (50 mL/Hr) IV Continuous <Continuous>  dextrose 50% Injectable 25 Gram(s) IV Push once  dextrose 50% Injectable 12.5 Gram(s) IV Push once  dextrose 50% Injectable 25 Gram(s) IV Push once  donepezil 10 milliGRAM(s) Oral at bedtime  dorzolamide 2%/timolol 0.5% Ophthalmic Solution 1 Drop(s) Both EYES two times a day  escitalopram 5 milliGRAM(s) Oral daily  famotidine    Tablet 20 milliGRAM(s) Oral daily  glucagon  Injectable 1 milliGRAM(s) IntraMuscular once  insulin glargine Injectable (LANTUS) 10 Unit(s) SubCutaneous at bedtime  insulin lispro (ADMELOG) corrective regimen sliding scale   SubCutaneous three times a day before meals  memantine 5 milliGRAM(s) Oral daily  multivitamin 1 Tablet(s) Oral daily  mupirocin 2% Nasal 1 Application(s) Both Nostrils two times a day  vancomycin  IVPB 1000 milliGRAM(s) IV Intermittent every 24 hours      Physical Exam  General: Patient appears comfortable.  Vital Signs Last 12 Hrs  T(F): 98.2 (01-23-23 @ 05:50), Max: 98.2 (01-23-23 @ 05:50)  HR: 72 (01-23-23 @ 09:08) (72 - 72)  BP: 156/82 (01-23-23 @ 09:08) (141/80 - 156/82)  BP(mean): --  RR: 18 (01-23-23 @ 09:08) (18 - 18)  SpO2: 99% (01-23-23 @ 09:08) (99% - 99%)  Neck: No palpable thyroid nodules.  CVS: S1S2, No murmurs  Respiratory: No wheezing, no crepitations  GI: Abdomen soft, non tender.  Musculoskeletal:  edema lower extremities.     Diagnostics

## 2023-01-23 NOTE — PROGRESS NOTE ADULT - NS ATTEND AMEND GEN_ALL_CORE FT
Patient with persistent positive blood cultures, 1/17 with gm positive cocci in clusters, cultures from 1/16 with CNS, wound with Staph aureus.    Concerned that she does have a right mediport- not accessed.  Repeat cultures from 1/19 are pending and she remains on IV vanco, trough on 1/18 therapeutic.  TTE noted from 1/19.  Infectious disease is following. Wound care is following as well.
Chart, labs, vitals, radiology reviewed. Above H&P reviewed and edited where appropriate. Agree with history and physical exam. Agree with assessment and plan. I reviewed the overnight course of events and discussed the care with the patient/ family.  35 minutes spent on total encounter of which more than fifty percent of the encounter was spent counseling and/or coordinating care by the attending physician.

## 2023-01-23 NOTE — PROGRESS NOTE ADULT - PROVIDER SPECIALTY LIST ADULT
Cardiology
Endocrinology
Infectious Disease
Internal Medicine
Nephrology
Wound Care
Cardiology
Cardiology
Heme/Onc
Infectious Disease
Internal Medicine
Nephrology
Wound Care
Cardiology
Heme/Onc
Infectious Disease
Infectious Disease
Internal Medicine
Internal Medicine
Endocrinology

## 2023-01-23 NOTE — PROGRESS NOTE ADULT - SUBJECTIVE AND OBJECTIVE BOX
Patient is a 87y old  Female who presents with a chief complaint of buttocks abscess (23 Jan 2023 11:40)      SUBJECTIVE / OVERNIGHT EVENTS: ptn is awaiting dc to [facility]    MEDICATIONS  (STANDING):  amLODIPine   Tablet 5 milliGRAM(s) Oral daily  apixaban 5 milliGRAM(s) Oral two times a day  ascorbic acid 500 milliGRAM(s) Oral daily  atorvastatin 40 milliGRAM(s) Oral at bedtime  chlorhexidine 2% Cloths 1 Application(s) Topical <User Schedule>  dextrose 5%. 1000 milliLiter(s) (100 mL/Hr) IV Continuous <Continuous>  dextrose 5%. 1000 milliLiter(s) (50 mL/Hr) IV Continuous <Continuous>  dextrose 50% Injectable 25 Gram(s) IV Push once  dextrose 50% Injectable 12.5 Gram(s) IV Push once  dextrose 50% Injectable 25 Gram(s) IV Push once  donepezil 10 milliGRAM(s) Oral at bedtime  dorzolamide 2%/timolol 0.5% Ophthalmic Solution 1 Drop(s) Both EYES two times a day  escitalopram 5 milliGRAM(s) Oral daily  famotidine    Tablet 20 milliGRAM(s) Oral daily  glucagon  Injectable 1 milliGRAM(s) IntraMuscular once  insulin glargine Injectable (LANTUS) 10 Unit(s) SubCutaneous at bedtime  insulin lispro (ADMELOG) corrective regimen sliding scale   SubCutaneous three times a day before meals  memantine 5 milliGRAM(s) Oral daily  minocycline 100 milliGRAM(s) Oral two times a day  multivitamin 1 Tablet(s) Oral daily  mupirocin 2% Nasal 1 Application(s) Both Nostrils two times a day    MEDICATIONS  (PRN):  acetaminophen     Tablet .. 650 milliGRAM(s) Oral every 6 hours PRN Temp greater or equal to 38C (100.4F), Mild Pain (1 - 3)  dextrose Oral Gel 15 Gram(s) Oral once PRN Blood Glucose LESS THAN 70 milliGRAM(s)/deciliter      Vital Signs Last 24 Hrs  T(F): 98.2 (01-23-23 @ 11:56), Max: 98.2 (01-23-23 @ 05:50)  HR: 70 (01-23-23 @ 11:56) (70 - 72)  BP: 156/82 (01-23-23 @ 11:56) (128/66 - 156/82)  RR: 18 (01-23-23 @ 11:56) (18 - 18)  SpO2: 99% (01-23-23 @ 11:56) (97% - 99%)  Telemetry:   CAPILLARY BLOOD GLUCOSE      POCT Blood Glucose.: 231 mg/dL (23 Jan 2023 16:58)  POCT Blood Glucose.: 267 mg/dL (23 Jan 2023 12:18)  POCT Blood Glucose.: 157 mg/dL (23 Jan 2023 08:33)  POCT Blood Glucose.: 215 mg/dL (22 Jan 2023 21:31)    I&O's Summary    22 Jan 2023 07:01  -  23 Jan 2023 07:00  --------------------------------------------------------  IN: 980 mL / OUT: 1000 mL / NET: -20 mL    23 Jan 2023 07:01  -  23 Jan 2023 20:13  --------------------------------------------------------  IN: 600 mL / OUT: 150 mL / NET: 450 mL        PHYSICAL EXAM:  GENERAL: NAD, well-developed  HEAD:  Atraumatic, Normocephalic  EYES: EOMI, PERRLA, conjunctiva and sclera clear  NECK: Supple, No JVD  CHEST/LUNG: Clear to auscultation bilaterally; No wheeze  HEART: Regular rate and rhythm; No murmurs, rubs, or gallops  ABDOMEN: Soft, Nontender, Nondistended; Bowel sounds present  EXTREMITIES:  2+ Peripheral Pulses, No clubbing, cyanosis, or edema  PSYCH: AAOx3  NEUROLOGY: non-focal  SKIN: No rashes or lesions

## 2023-01-23 NOTE — PROGRESS NOTE ADULT - ASSESSMENT
Assessment  DMT2: 87y Female with DM T2 with hyperglycemia admitted with cellulitis, patient was on oral hypoglycemic  agents at home, on basal  and insulin coverage, sugars are improving, planing DC to rehab, remain iun isolation.  Cellulitis: On medications, treatment, monitored, afebrile.  HTN: On antihypertensive medications, monitored, asymptomatic.              Melania Marques MD  Cell:  [phone]  Office: [phone]               Assessment  DMT2: 87y Female with DM T2 with hyperglycemia admitted with cellulitis, patient was on oral hypoglycemic  agents at home, on basal  and insulin coverage, sugars are improving,  planing DC to rehab, remain iun isolation.  Cellulitis: On medications, treatment, monitored, afebrile.  HTN: On antihypertensive medications, monitored, asymptomatic.              Melania Marques MD  Cell:  [phone]  Office: [phone]               Assessment  DMT2: 87y Female with DM T2 with hyperglycemia admitted with cellulitis, patient was on oral hypoglycemic  agents at home, on basal  and insulin coverage, sugars are improving, planing DC to rehab, remain iun isolation.  Cellulitis: On medications, treatment, monitored, afebrile.  HTN: On antihypertensive medications, monitored, asymptomatic.              Melania Marques MD  Cell:  [phone]  Office: [phone]

## 2023-01-23 NOTE — PROGRESS NOTE ADULT - SUBJECTIVE AND OBJECTIVE BOX
CHIEF COMPLAINT  Infection    HISTORY OF PRESENT ILLNESS  PONCE MEJIA is a 87y Female who presents with a chief complaint of infection    No acute events. No complaints.    REVIEW OF SYSTEMS  A complete review of systems was performed; negative except per HPI    PHYSICAL EXAM  T(C): 36.8 (01-23-23 @ 05:50), Max: 36.9 (01-22-23 @ 12:45)  HR: 72 (01-23-23 @ 09:08) (69 - 72)  BP: 156/82 (01-23-23 @ 09:08) (128/66 - 156/82)  RR: 18 (01-23-23 @ 09:08) (18 - 18)  SpO2: 99% (01-23-23 @ 09:08) (97% - 99%)  Constitutional: alert, awake, in no acute distress  Eyes: PERRL, EOMI  HEENT: normocephalic, atraumatic  Neck: supple, non-tender  Cardiovascular: normal perfusion, no peripheral edema  Respiratory: normal respiratory efforts; no increased use of accessory muscles  Gastrointestinal: soft, non-tender  Musculoskeletal: normal range of motion, no deformities noted  Neurological: alert, CN II to XI grossly intact  Skin: warm, dry    LABORATORY DATA  None

## 2023-01-23 NOTE — DISCHARGE NOTE NURSING/CASE MANAGEMENT/SOCIAL WORK - PATIENT PORTAL LINK FT
You can access the FollowMyHealth Patient Portal offered by [facility] by registering at the following website: [email or web address]. By joining [employer]’s FollowMyHealth portal, you will also be able to view your health information using other applications (apps) compatible with our system.

## 2023-01-25 LAB
CULTURE RESULTS: SIGNIFICANT CHANGE UP
CULTURE RESULTS: SIGNIFICANT CHANGE UP
SPECIMEN SOURCE: SIGNIFICANT CHANGE UP
SPECIMEN SOURCE: SIGNIFICANT CHANGE UP

## 2023-06-08 NOTE — DIETITIAN INITIAL EVALUATION ADULT. - CALCULATED TO (G/KG)
Called patient with ultra low risk mammaprint. Talked about AI. She sees Dr. Izzy Mitchell next week. She was appreciative.
124.56

## 2023-08-17 NOTE — PHYSICAL THERAPY INITIAL EVALUATION ADULT - PHYSICAL ASSIST/NONPHYSICAL ASSIST: STAND/SIT, REHAB EVAL
verbal cues/nonverbal cues (demo/gestures)/1 person assist Mucosal Advancement Flap Text: Given the location of the defect, shape of the defect and the proximity to free margins a mucosal advancement flap was deemed most appropriate. Incisions were made with a 15 blade scalpel in the appropriate fashion along the cutaneous vermilion border and the mucosal lip. The remaining actinically damaged mucosal tissue was excised.  The mucosal advancement flap was then elevated to the gingival sulcus with care taken to preserve the neurovascular structures and advanced into the primary defect. Care was taken to ensure that precise realignment of the vermilion border was achieved. Adjacent tissue was incised and carried over to close the defect.

## 2024-01-01 NOTE — PATIENT PROFILE ADULT - SAFE PLACE TO LIVE
[facility] discharge summary received, uploaded to media, and placed in Dr. Vivas's box.   
Patient will need follow ups- a virtual soon- monday at 11:30am and will need to overbook on monday May 13th- coming to main campus for a pulm appt already     Spoke to mom and scheduled appointments.     Future Appointments   Date Time Provider Department Center   2024 11:30 AM Delicia Vivas MD [facility]   2024 10:00 AM Broderick Portillo MD [facility] PEDPULM [facility]   2024 11:00 AM Delicia Vivas MD [facility]   2024  8:00 AM PEDS ECHO, NELSON [facility] PED [facility]   2024  8:30 AM Sarah Tubbs MD [facility] PED [facility]             
no

## 2024-05-22 ENCOUNTER — TRANSCRIPTION ENCOUNTER (OUTPATIENT)
Age: 89
End: 2024-05-22

## 2024-05-22 ENCOUNTER — INPATIENT (INPATIENT)
Facility: HOSPITAL | Age: 89
LOS: 4 days | Discharge: HOME HEALTH | DRG: 690 | End: 2024-05-27
Attending: INTERNAL MEDICINE | Admitting: INTERNAL MEDICINE
Payer: MEDICARE

## 2024-05-22 VITALS
WEIGHT: 179.9 LBS | RESPIRATION RATE: 16 BRPM | DIASTOLIC BLOOD PRESSURE: 80 MMHG | HEIGHT: 60 IN | HEART RATE: 70 BPM | OXYGEN SATURATION: 96 % | TEMPERATURE: 97 F | SYSTOLIC BLOOD PRESSURE: 133 MMHG

## 2024-05-22 DIAGNOSIS — N39.0 URINARY TRACT INFECTION, SITE NOT SPECIFIED: ICD-10-CM

## 2024-05-22 DIAGNOSIS — Z95.0 PRESENCE OF CARDIAC PACEMAKER: Chronic | ICD-10-CM

## 2024-05-22 LAB
ALBUMIN SERPL ELPH-MCNC: 3.6 G/DL — SIGNIFICANT CHANGE UP (ref 3.3–5)
ALP SERPL-CCNC: 78 U/L — SIGNIFICANT CHANGE UP (ref 40–120)
ALT FLD-CCNC: 18 U/L — SIGNIFICANT CHANGE UP (ref 10–45)
ANION GAP SERPL CALC-SCNC: 12 MMOL/L — SIGNIFICANT CHANGE UP (ref 5–17)
APPEARANCE UR: ABNORMAL
APTT BLD: 32.2 SEC — SIGNIFICANT CHANGE UP (ref 24.5–35.6)
AST SERPL-CCNC: 16 U/L — SIGNIFICANT CHANGE UP (ref 10–40)
BACTERIA # UR AUTO: ABNORMAL /HPF
BASE EXCESS BLDV CALC-SCNC: -1.1 MMOL/L — SIGNIFICANT CHANGE UP (ref -2–3)
BASE EXCESS BLDV CALC-SCNC: 0.3 MMOL/L — SIGNIFICANT CHANGE UP (ref -2–3)
BASOPHILS # BLD AUTO: 0.08 K/UL — SIGNIFICANT CHANGE UP (ref 0–0.2)
BASOPHILS NFR BLD AUTO: 0.8 % — SIGNIFICANT CHANGE UP (ref 0–2)
BILIRUB SERPL-MCNC: 0.2 MG/DL — SIGNIFICANT CHANGE UP (ref 0.2–1.2)
BILIRUB UR-MCNC: NEGATIVE — SIGNIFICANT CHANGE UP
BUN SERPL-MCNC: 34 MG/DL — HIGH (ref 7–23)
CA-I SERPL-SCNC: 1.37 MMOL/L — HIGH (ref 1.15–1.33)
CA-I SERPL-SCNC: 1.4 MMOL/L — HIGH (ref 1.15–1.33)
CALCIUM SERPL-MCNC: 10.1 MG/DL — SIGNIFICANT CHANGE UP (ref 8.4–10.5)
CAST: 14 /LPF — HIGH (ref 0–4)
CHLORIDE BLDV-SCNC: 103 MMOL/L — SIGNIFICANT CHANGE UP (ref 96–108)
CHLORIDE BLDV-SCNC: 104 MMOL/L — SIGNIFICANT CHANGE UP (ref 96–108)
CHLORIDE SERPL-SCNC: 101 MMOL/L — SIGNIFICANT CHANGE UP (ref 96–108)
CO2 BLDV-SCNC: 27 MMOL/L — HIGH (ref 22–26)
CO2 BLDV-SCNC: 28 MMOL/L — HIGH (ref 22–26)
CO2 SERPL-SCNC: 23 MMOL/L — SIGNIFICANT CHANGE UP (ref 22–31)
COLOR SPEC: SIGNIFICANT CHANGE UP
CREAT SERPL-MCNC: 1.4 MG/DL — HIGH (ref 0.5–1.3)
DIFF PNL FLD: ABNORMAL
EGFR: 36 ML/MIN/1.73M2 — LOW
EOSINOPHIL # BLD AUTO: 0.19 K/UL — SIGNIFICANT CHANGE UP (ref 0–0.5)
EOSINOPHIL NFR BLD AUTO: 1.9 % — SIGNIFICANT CHANGE UP (ref 0–6)
FLUAV AG NPH QL: SIGNIFICANT CHANGE UP
FLUBV AG NPH QL: SIGNIFICANT CHANGE UP
GAS PNL BLDV: 135 MMOL/L — LOW (ref 136–145)
GAS PNL BLDV: 136 MMOL/L — SIGNIFICANT CHANGE UP (ref 136–145)
GAS PNL BLDV: SIGNIFICANT CHANGE UP
GAS PNL BLDV: SIGNIFICANT CHANGE UP
GLUCOSE BLDV-MCNC: 122 MG/DL — HIGH (ref 70–99)
GLUCOSE BLDV-MCNC: 202 MG/DL — HIGH (ref 70–99)
GLUCOSE SERPL-MCNC: 196 MG/DL — HIGH (ref 70–99)
GLUCOSE UR QL: NEGATIVE MG/DL — SIGNIFICANT CHANGE UP
HCO3 BLDV-SCNC: 25 MMOL/L — SIGNIFICANT CHANGE UP (ref 22–29)
HCO3 BLDV-SCNC: 26 MMOL/L — SIGNIFICANT CHANGE UP (ref 22–29)
HCT VFR BLD CALC: 39.2 % — SIGNIFICANT CHANGE UP (ref 34.5–45)
HCT VFR BLDA CALC: 37 % — SIGNIFICANT CHANGE UP (ref 34.5–46.5)
HCT VFR BLDA CALC: 39 % — SIGNIFICANT CHANGE UP (ref 34.5–46.5)
HGB BLD CALC-MCNC: 12.4 G/DL — SIGNIFICANT CHANGE UP (ref 11.7–16.1)
HGB BLD CALC-MCNC: 13 G/DL — SIGNIFICANT CHANGE UP (ref 11.7–16.1)
HGB BLD-MCNC: 12.3 G/DL — SIGNIFICANT CHANGE UP (ref 11.5–15.5)
IMM GRANULOCYTES NFR BLD AUTO: 0.3 % — SIGNIFICANT CHANGE UP (ref 0–0.9)
INR BLD: 1.35 RATIO — HIGH (ref 0.85–1.18)
KETONES UR-MCNC: NEGATIVE MG/DL — SIGNIFICANT CHANGE UP
LACTATE BLDV-MCNC: 1.3 MMOL/L — SIGNIFICANT CHANGE UP (ref 0.5–2)
LACTATE BLDV-MCNC: 2.8 MMOL/L — HIGH (ref 0.5–2)
LEUKOCYTE ESTERASE UR-ACNC: ABNORMAL
LYMPHOCYTES # BLD AUTO: 3.66 K/UL — HIGH (ref 1–3.3)
LYMPHOCYTES # BLD AUTO: 37.1 % — SIGNIFICANT CHANGE UP (ref 13–44)
MCHC RBC-ENTMCNC: 27.5 PG — SIGNIFICANT CHANGE UP (ref 27–34)
MCHC RBC-ENTMCNC: 31.4 GM/DL — LOW (ref 32–36)
MCV RBC AUTO: 87.5 FL — SIGNIFICANT CHANGE UP (ref 80–100)
MONOCYTES # BLD AUTO: 0.95 K/UL — HIGH (ref 0–0.9)
MONOCYTES NFR BLD AUTO: 9.6 % — SIGNIFICANT CHANGE UP (ref 2–14)
NEUTROPHILS # BLD AUTO: 4.96 K/UL — SIGNIFICANT CHANGE UP (ref 1.8–7.4)
NEUTROPHILS NFR BLD AUTO: 50.3 % — SIGNIFICANT CHANGE UP (ref 43–77)
NITRITE UR-MCNC: POSITIVE
NRBC # BLD: 0 /100 WBCS — SIGNIFICANT CHANGE UP (ref 0–0)
PCO2 BLDV: 48 MMHG — HIGH (ref 39–42)
PCO2 BLDV: 48 MMHG — HIGH (ref 39–42)
PH BLDV: 7.33 — SIGNIFICANT CHANGE UP (ref 7.32–7.43)
PH BLDV: 7.35 — SIGNIFICANT CHANGE UP (ref 7.32–7.43)
PH UR: 5.5 — SIGNIFICANT CHANGE UP (ref 5–8)
PLATELET # BLD AUTO: 251 K/UL — SIGNIFICANT CHANGE UP (ref 150–400)
PO2 BLDV: 40 MMHG — SIGNIFICANT CHANGE UP (ref 25–45)
PO2 BLDV: 45 MMHG — SIGNIFICANT CHANGE UP (ref 25–45)
POTASSIUM BLDV-SCNC: 3.9 MMOL/L — SIGNIFICANT CHANGE UP (ref 3.5–5.1)
POTASSIUM BLDV-SCNC: 4.4 MMOL/L — SIGNIFICANT CHANGE UP (ref 3.5–5.1)
POTASSIUM SERPL-MCNC: 4.3 MMOL/L — SIGNIFICANT CHANGE UP (ref 3.5–5.3)
POTASSIUM SERPL-SCNC: 4.3 MMOL/L — SIGNIFICANT CHANGE UP (ref 3.5–5.3)
PROT SERPL-MCNC: 6.5 G/DL — SIGNIFICANT CHANGE UP (ref 6–8.3)
PROT UR-MCNC: SIGNIFICANT CHANGE UP MG/DL
PROTHROM AB SERPL-ACNC: 14.1 SEC — HIGH (ref 9.5–13)
RBC # BLD: 4.48 M/UL — SIGNIFICANT CHANGE UP (ref 3.8–5.2)
RBC # FLD: 13.6 % — SIGNIFICANT CHANGE UP (ref 10.3–14.5)
RBC CASTS # UR COMP ASSIST: 1 /HPF — SIGNIFICANT CHANGE UP (ref 0–4)
REVIEW: SIGNIFICANT CHANGE UP
RSV RNA NPH QL NAA+NON-PROBE: SIGNIFICANT CHANGE UP
SAO2 % BLDV: 66.1 % — LOW (ref 67–88)
SAO2 % BLDV: 71.8 % — SIGNIFICANT CHANGE UP (ref 67–88)
SARS-COV-2 RNA SPEC QL NAA+PROBE: SIGNIFICANT CHANGE UP
SODIUM SERPL-SCNC: 136 MMOL/L — SIGNIFICANT CHANGE UP (ref 135–145)
SP GR SPEC: 1.02 — SIGNIFICANT CHANGE UP (ref 1–1.03)
SQUAMOUS # UR AUTO: 2 /HPF — SIGNIFICANT CHANGE UP (ref 0–5)
TROPONIN T, HIGH SENSITIVITY RESULT: 22 NG/L — SIGNIFICANT CHANGE UP (ref 0–51)
UROBILINOGEN FLD QL: 0.2 MG/DL — SIGNIFICANT CHANGE UP (ref 0.2–1)
WBC # BLD: 9.87 K/UL — SIGNIFICANT CHANGE UP (ref 3.8–10.5)
WBC # FLD AUTO: 9.87 K/UL — SIGNIFICANT CHANGE UP (ref 3.8–10.5)
WBC UR QL: 458 /HPF — HIGH (ref 0–5)

## 2024-05-22 PROCEDURE — 70450 CT HEAD/BRAIN W/O DYE: CPT | Mod: 26

## 2024-05-22 PROCEDURE — 99285 EMERGENCY DEPT VISIT HI MDM: CPT | Mod: FS

## 2024-05-22 PROCEDURE — 71250 CT THORAX DX C-: CPT | Mod: 26

## 2024-05-22 PROCEDURE — 71045 X-RAY EXAM CHEST 1 VIEW: CPT | Mod: 26

## 2024-05-22 RX ORDER — CEFTRIAXONE 500 MG/1
1000 INJECTION, POWDER, FOR SOLUTION INTRAMUSCULAR; INTRAVENOUS EVERY 24 HOURS
Refills: 0 | Status: COMPLETED | OUTPATIENT
Start: 2024-05-22 | End: 2024-05-25

## 2024-05-22 RX ORDER — INSULIN LISPRO 100/ML
VIAL (ML) SUBCUTANEOUS AT BEDTIME
Refills: 0 | Status: DISCONTINUED | OUTPATIENT
Start: 2024-05-22 | End: 2024-05-27

## 2024-05-22 RX ORDER — DORZOLAMIDE HYDROCHLORIDE TIMOLOL MALEATE 20; 5 MG/ML; MG/ML
1 SOLUTION/ DROPS OPHTHALMIC
Qty: 0 | Refills: 0 | DISCHARGE

## 2024-05-22 RX ORDER — GLUCAGON INJECTION, SOLUTION 0.5 MG/.1ML
1 INJECTION, SOLUTION SUBCUTANEOUS ONCE
Refills: 0 | Status: DISCONTINUED | OUTPATIENT
Start: 2024-05-22 | End: 2024-05-27

## 2024-05-22 RX ORDER — SODIUM CHLORIDE 9 MG/ML
500 INJECTION INTRAMUSCULAR; INTRAVENOUS; SUBCUTANEOUS ONCE
Refills: 0 | Status: COMPLETED | OUTPATIENT
Start: 2024-05-22 | End: 2024-05-22

## 2024-05-22 RX ORDER — INSULIN LISPRO 100/ML
VIAL (ML) SUBCUTANEOUS
Refills: 0 | Status: DISCONTINUED | OUTPATIENT
Start: 2024-05-22 | End: 2024-05-27

## 2024-05-22 RX ORDER — DONEPEZIL HYDROCHLORIDE 10 MG/1
1 TABLET, FILM COATED ORAL
Qty: 0 | Refills: 0 | DISCHARGE

## 2024-05-22 RX ORDER — REPAGLINIDE 1 MG/1
1 TABLET ORAL
Refills: 0 | DISCHARGE

## 2024-05-22 RX ORDER — ESCITALOPRAM OXALATE 10 MG/1
1 TABLET, FILM COATED ORAL
Qty: 0 | Refills: 0 | DISCHARGE

## 2024-05-22 RX ORDER — SODIUM CHLORIDE 9 MG/ML
1000 INJECTION, SOLUTION INTRAVENOUS
Refills: 0 | Status: DISCONTINUED | OUTPATIENT
Start: 2024-05-22 | End: 2024-05-27

## 2024-05-22 RX ORDER — OXYBUTYNIN CHLORIDE 5 MG
1 TABLET ORAL
Refills: 0 | DISCHARGE

## 2024-05-22 RX ORDER — DEXTROSE 50 % IN WATER 50 %
12.5 SYRINGE (ML) INTRAVENOUS ONCE
Refills: 0 | Status: DISCONTINUED | OUTPATIENT
Start: 2024-05-22 | End: 2024-05-27

## 2024-05-22 RX ORDER — SITAGLIPTIN 50 MG/1
1 TABLET, FILM COATED ORAL
Refills: 0 | DISCHARGE

## 2024-05-22 RX ORDER — MUPIROCIN 20 MG/G
1 OINTMENT TOPICAL
Refills: 0 | DISCHARGE

## 2024-05-22 RX ORDER — APIXABAN 2.5 MG/1
1 TABLET, FILM COATED ORAL
Qty: 0 | Refills: 0 | DISCHARGE

## 2024-05-22 RX ORDER — DEXTROSE 50 % IN WATER 50 %
25 SYRINGE (ML) INTRAVENOUS ONCE
Refills: 0 | Status: DISCONTINUED | OUTPATIENT
Start: 2024-05-22 | End: 2024-05-27

## 2024-05-22 RX ORDER — CEFTRIAXONE 500 MG/1
1000 INJECTION, POWDER, FOR SOLUTION INTRAMUSCULAR; INTRAVENOUS ONCE
Refills: 0 | Status: COMPLETED | OUTPATIENT
Start: 2024-05-22 | End: 2024-05-22

## 2024-05-22 RX ORDER — ATORVASTATIN CALCIUM 80 MG/1
1 TABLET, FILM COATED ORAL
Qty: 0 | Refills: 0 | DISCHARGE

## 2024-05-22 RX ORDER — DEXTROSE 10 % IN WATER 10 %
125 INTRAVENOUS SOLUTION INTRAVENOUS ONCE
Refills: 0 | Status: DISCONTINUED | OUTPATIENT
Start: 2024-05-22 | End: 2024-05-27

## 2024-05-22 RX ORDER — DEXTROSE 50 % IN WATER 50 %
15 SYRINGE (ML) INTRAVENOUS ONCE
Refills: 0 | Status: DISCONTINUED | OUTPATIENT
Start: 2024-05-22 | End: 2024-05-27

## 2024-05-22 RX ORDER — HYDROCORTISONE 1 %
1 OINTMENT (GRAM) TOPICAL
Refills: 0 | DISCHARGE

## 2024-05-22 RX ORDER — AMLODIPINE BESYLATE 2.5 MG/1
1 TABLET ORAL
Qty: 0 | Refills: 0 | DISCHARGE

## 2024-05-22 RX ORDER — MEMANTINE HYDROCHLORIDE 10 MG/1
1 TABLET ORAL
Qty: 0 | Refills: 0 | DISCHARGE

## 2024-05-22 RX ORDER — MEMANTINE HYDROCHLORIDE 10 MG/1
1 TABLET ORAL
Refills: 0 | DISCHARGE

## 2024-05-22 RX ADMIN — SODIUM CHLORIDE 500 MILLILITER(S): 9 INJECTION INTRAMUSCULAR; INTRAVENOUS; SUBCUTANEOUS at 19:01

## 2024-05-22 RX ADMIN — CEFTRIAXONE 100 MILLIGRAM(S): 500 INJECTION, POWDER, FOR SOLUTION INTRAMUSCULAR; INTRAVENOUS at 20:10

## 2024-05-22 NOTE — ED ADULT NURSE NOTE - NSFALLHARMRISKINTERV_ED_ALL_ED
Assistance OOB with selected safe patient handling equipment if applicable/Assistance with ambulation/Communicate risk of Fall with Harm to all staff, patient, and family/Monitor gait and stability/Provide visual cue: red socks, yellow wristband, yellow gown, etc/Reinforce activity limits and safety measures with patient and family/Bed in lowest position, wheels locked, appropriate side rails in place/Call bell, personal items and telephone in reach/Instruct patient to call for assistance before getting out of bed/chair/stretcher/Non-slip footwear applied when patient is off stretcher/[place] to call system/Physically safe environment - no spills, clutter or unnecessary equipment/Purposeful Proactive Rounding/Room/bathroom lighting operational, light cord in reach

## 2024-05-22 NOTE — ED PROVIDER NOTE - ATTENDING APP SHARED VISIT CONTRIBUTION OF CARE
PCP last admitted to Dr. 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  Patient comes to the ER via EMS for complaint of weakness for the past 3 days.  Patient had complained of chills 2 days ago has progressed now barely able to stand with aid and using walker.  No chest pain no shortness of breath no cough no belly pain no nausea vomiting diarrhea.  Daughter has noted that there is a decreased urine output without dysuria hematuria or back pain.  Physical exam elderly woman awake alert oriented x 2, gave year as 2022.  HEENT normocephalic atraumatic, neck supple.  Cardiovascular no rubs gallops murmurs.  Abdomen soft positive bowel sounds no CVA tenderness neuro GCS 15 speech fluent moves all extremities.  Jeff Gonzalez MD, Facep

## 2024-05-22 NOTE — ED ADULT NURSE NOTE - OBJECTIVE STATEMENT
88 y/o female presents to ED via EMS from home c/o generalized weakness, lethargy. PMH of lung ca on chemo/immunotherapy with R chest wall Power injectable port. Denies n/v/d, fever, chills, cough, abd pain, urinary symptoms. A&Ox2 to person and place but not time. Breathing unlabored, abd obese but nontender nondistended, incontinent x2, afebrile rectally, skin warm dry and intact. Family member at bedside.

## 2024-05-22 NOTE — H&P ADULT - NSHPLABSRESULTS_GEN_ALL_CORE
LABS:                        12.3   9.87  )-----------( 251      ( 22 May 2024 18:43 )             39.2         136  |  101  |  34<H>  ----------------------------<  196<H>  4.3   |  23  |  1.40<H>    Ca    10.1      22 May 2024 18:43    TPro  6.5  /  Alb  3.6  /  TBili  0.2  /  DBili  x   /  AST  16  /  ALT  18  /  AlkPhos  78  -    PT/INR - ( 22 May 2024 18:43 )   PT: 14.1 sec;   INR: 1.35 ratio         PTT - ( 22 May 2024 18:43 )  PTT:32.2 sec  Urinalysis Basic - ( 22 May 2024 19:42 )    Color: Dark Yellow / Appearance: Turbid / S.017 / pH: x  Gluc: x / Ketone: Negative mg/dL  / Bili: Negative / Urobili: 0.2 mg/dL   Blood: x / Protein: Trace mg/dL / Nitrite: Positive   Leuk Esterase: Large / RBC: 1 /HPF /  /HPF   Sq Epi: x / Non Sq Epi: 2 /HPF / Bacteria: Many /HPF            RADIOLOGY & ADDITIONAL TESTS:

## 2024-05-22 NOTE — ED PROVIDER NOTE - OBJECTIVE STATEMENT
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 Patient has HHA who reported she had chills/shaking two days ago and then had difficulty getting patient out of bed this morning. Whenever she would walk her legs would buckle

## 2024-05-22 NOTE — H&P ADULT - ASSESSMENT
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 Patient has HHA who reported she had chills/shaking two days ago and then had difficulty getting patient out of bed this morning. Whenever she would walk her legs would buckle    UTI  - ceftriaxone x 3 days  - follow up urine culture    generalized weakness 2/2 deconditioning  - PT eval    diabetes  - fs qid  - hgb a1c  - insulin ss    depression  - lexapro    HLD  - lipitor    HTN  - norvasc    dvt   - c/w eliquis

## 2024-05-22 NOTE — ED PROVIDER NOTE - PROGRESS NOTE DETAILS
Discussed with Dr Nehemias Gonzalez MD, Facep ELMA Doyle: had four beats non sustained vtach made Dr Del Cid aware and switched to tele

## 2024-05-22 NOTE — ED ADULT NURSE NOTE - PAIN RATING/NUMBER SCALE (0-10): ACTIVITY
DISPLAY PLAN FREE TEXT DISPLAY PLAN FREE TEXT 0 (no pain/absence of nonverbal indicators of pain) DISPLAY PLAN FREE TEXT DISPLAY PLAN FREE TEXT DISPLAY PLAN FREE TEXT DISPLAY PLAN FREE TEXT DISPLAY PLAN FREE TEXT DISPLAY PLAN FREE TEXT DISPLAY PLAN FREE TEXT DISPLAY PLAN FREE TEXT DISPLAY PLAN FREE TEXT

## 2024-05-22 NOTE — ED PROVIDER NOTE - CLINICAL SUMMARY MEDICAL DECISION MAKING FREE TEXT BOX
Elderly woman past medical history adenopathy of the lung with mets to pleura on immunotherapy last 2 months ago comes in with 3 days worth of progressive weakness and difficulty standing.  Concerns for progression of disease infection dehydration plan check basic labs urine EKG  troponin, CT head chest, treat IV fluids with probable admission  Jeff Gonzalez MD, Facep

## 2024-05-23 LAB
A1C WITH ESTIMATED AVERAGE GLUCOSE RESULT: 6.4 % — HIGH (ref 4–5.6)
ANION GAP SERPL CALC-SCNC: 12 MMOL/L — SIGNIFICANT CHANGE UP (ref 5–17)
BUN SERPL-MCNC: 29 MG/DL — HIGH (ref 7–23)
CALCIUM SERPL-MCNC: 9.9 MG/DL — SIGNIFICANT CHANGE UP (ref 8.4–10.5)
CHLORIDE SERPL-SCNC: 107 MMOL/L — SIGNIFICANT CHANGE UP (ref 96–108)
CO2 SERPL-SCNC: 23 MMOL/L — SIGNIFICANT CHANGE UP (ref 22–31)
CREAT SERPL-MCNC: 1.25 MG/DL — SIGNIFICANT CHANGE UP (ref 0.5–1.3)
EGFR: 42 ML/MIN/1.73M2 — LOW
ESTIMATED AVERAGE GLUCOSE: 137 MG/DL — HIGH (ref 68–114)
FOLATE SERPL-MCNC: >20 NG/ML — SIGNIFICANT CHANGE UP
GLUCOSE BLDC GLUCOMTR-MCNC: 122 MG/DL — HIGH (ref 70–99)
GLUCOSE BLDC GLUCOMTR-MCNC: 126 MG/DL — HIGH (ref 70–99)
GLUCOSE BLDC GLUCOMTR-MCNC: 195 MG/DL — HIGH (ref 70–99)
GLUCOSE BLDC GLUCOMTR-MCNC: 198 MG/DL — HIGH (ref 70–99)
GLUCOSE BLDC GLUCOMTR-MCNC: 90 MG/DL — SIGNIFICANT CHANGE UP (ref 70–99)
GLUCOSE SERPL-MCNC: 79 MG/DL — SIGNIFICANT CHANGE UP (ref 70–99)
HCT VFR BLD CALC: 41.6 % — SIGNIFICANT CHANGE UP (ref 34.5–45)
HGB BLD-MCNC: 13 G/DL — SIGNIFICANT CHANGE UP (ref 11.5–15.5)
MAGNESIUM SERPL-MCNC: 2 MG/DL — SIGNIFICANT CHANGE UP (ref 1.6–2.6)
MCHC RBC-ENTMCNC: 27.7 PG — SIGNIFICANT CHANGE UP (ref 27–34)
MCHC RBC-ENTMCNC: 31.3 GM/DL — LOW (ref 32–36)
MCV RBC AUTO: 88.7 FL — SIGNIFICANT CHANGE UP (ref 80–100)
NRBC # BLD: 0 /100 WBCS — SIGNIFICANT CHANGE UP (ref 0–0)
PHOSPHATE SERPL-MCNC: 3 MG/DL — SIGNIFICANT CHANGE UP (ref 2.5–4.5)
PLATELET # BLD AUTO: 256 K/UL — SIGNIFICANT CHANGE UP (ref 150–400)
POTASSIUM SERPL-MCNC: 4 MMOL/L — SIGNIFICANT CHANGE UP (ref 3.5–5.3)
POTASSIUM SERPL-SCNC: 4 MMOL/L — SIGNIFICANT CHANGE UP (ref 3.5–5.3)
PROCALCITONIN SERPL-MCNC: 0.06 NG/ML — SIGNIFICANT CHANGE UP (ref 0.02–0.1)
RBC # BLD: 4.69 M/UL — SIGNIFICANT CHANGE UP (ref 3.8–5.2)
RBC # FLD: 13.7 % — SIGNIFICANT CHANGE UP (ref 10.3–14.5)
SODIUM SERPL-SCNC: 142 MMOL/L — SIGNIFICANT CHANGE UP (ref 135–145)
TSH SERPL-MCNC: 2.41 UIU/ML — SIGNIFICANT CHANGE UP (ref 0.27–4.2)
VIT B12 SERPL-MCNC: 1347 PG/ML — HIGH (ref 232–1245)
WBC # BLD: 10.1 K/UL — SIGNIFICANT CHANGE UP (ref 3.8–10.5)
WBC # FLD AUTO: 10.1 K/UL — SIGNIFICANT CHANGE UP (ref 3.8–10.5)

## 2024-05-23 PROCEDURE — 71046 X-RAY EXAM CHEST 2 VIEWS: CPT | Mod: 26

## 2024-05-23 RX ORDER — CHLORHEXIDINE GLUCONATE 213 G/1000ML
1 SOLUTION TOPICAL DAILY
Refills: 0 | Status: DISCONTINUED | OUTPATIENT
Start: 2024-05-23 | End: 2024-05-27

## 2024-05-23 RX ORDER — AMLODIPINE BESYLATE 2.5 MG/1
5 TABLET ORAL DAILY
Refills: 0 | Status: DISCONTINUED | OUTPATIENT
Start: 2024-05-23 | End: 2024-05-27

## 2024-05-23 RX ORDER — DONEPEZIL HYDROCHLORIDE 10 MG/1
10 TABLET, FILM COATED ORAL AT BEDTIME
Refills: 0 | Status: DISCONTINUED | OUTPATIENT
Start: 2024-05-23 | End: 2024-05-27

## 2024-05-23 RX ORDER — MEMANTINE HYDROCHLORIDE 10 MG/1
10 TABLET ORAL
Refills: 0 | Status: DISCONTINUED | OUTPATIENT
Start: 2024-05-23 | End: 2024-05-27

## 2024-05-23 RX ORDER — ASCORBIC ACID 60 MG
500 TABLET,CHEWABLE ORAL DAILY
Refills: 0 | Status: DISCONTINUED | OUTPATIENT
Start: 2024-05-23 | End: 2024-05-27

## 2024-05-23 RX ORDER — ESCITALOPRAM OXALATE 10 MG/1
5 TABLET, FILM COATED ORAL DAILY
Refills: 0 | Status: DISCONTINUED | OUTPATIENT
Start: 2024-05-23 | End: 2024-05-27

## 2024-05-23 RX ORDER — APIXABAN 2.5 MG/1
5 TABLET, FILM COATED ORAL EVERY 12 HOURS
Refills: 0 | Status: DISCONTINUED | OUTPATIENT
Start: 2024-05-23 | End: 2024-05-27

## 2024-05-23 RX ORDER — FAMOTIDINE 10 MG/ML
20 INJECTION INTRAVENOUS DAILY
Refills: 0 | Status: DISCONTINUED | OUTPATIENT
Start: 2024-05-23 | End: 2024-05-27

## 2024-05-23 RX ADMIN — MEMANTINE HYDROCHLORIDE 10 MILLIGRAM(S): 10 TABLET ORAL at 17:08

## 2024-05-23 RX ADMIN — CEFTRIAXONE 100 MILLIGRAM(S): 500 INJECTION, POWDER, FOR SOLUTION INTRAMUSCULAR; INTRAVENOUS at 19:33

## 2024-05-23 RX ADMIN — FAMOTIDINE 20 MILLIGRAM(S): 10 INJECTION INTRAVENOUS at 15:09

## 2024-05-23 RX ADMIN — APIXABAN 5 MILLIGRAM(S): 2.5 TABLET, FILM COATED ORAL at 17:08

## 2024-05-23 RX ADMIN — DONEPEZIL HYDROCHLORIDE 10 MILLIGRAM(S): 10 TABLET, FILM COATED ORAL at 21:28

## 2024-05-23 RX ADMIN — Medication 1: at 17:11

## 2024-05-23 NOTE — DIETITIAN INITIAL EVALUATION ADULT - PERTINENT LABORATORY DATA
05-23    142  |  107  |  29<H>  ----------------------------<  79  4.0   |  23  |  1.25    Ca    9.9      23 May 2024 06:45  Phos  3.0     05-23  Mg     2.0     05-23    TPro  6.5  /  Alb  3.6  /  TBili  0.2  /  DBili  x   /  AST  16  /  ALT  18  /  AlkPhos  78  05-22  POCT Blood Glucose.: 90 mg/dL (05-23-24 @ 08:15)  A1C with Estimated Average Glucose Result: 6.4 % (05-23-24 @ 06:45)

## 2024-05-23 NOTE — DIETITIAN INITIAL EVALUATION ADULT - SIGNS/SYMPTOMS
History & Physical Reviewed:   Pregnant/Lactating:  ·  Are You Pregnant no   ·  Are You Currently Breastfeeding no     I have reviewed the History and Physical dated:  04-Aug-2023   History and Physical reviewed and relevant findings noted. Patient examined to review pertinent physical  findings.: No significant changes   Home Medications Reviewed: no changes noted   Allergies Reviewed: no changes noted     Consent:   COVID-19 Consent:  ·  COVID-19 Risk Consent Surgeon has reviewed key risks related to the risk of ana laura COVID-19 and if they contract COVID-19 what the risks are.       Electronic Signatures:  Joe Courtney)  (Signed 11-Aug-2023 07:22)   Authored: History & Physical Reviewed, Consent, Note  Completion      Last Updated: 11-Aug-2023 07:22 by Joe Courtney)    pressure injuries

## 2024-05-23 NOTE — DIETITIAN INITIAL EVALUATION ADULT - REASON FOR ADMISSION
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

## 2024-05-23 NOTE — DIETITIAN INITIAL EVALUATION ADULT - OTHER INFO
dosing wt: 81.6 kg (5/22)  wt hx per chart review: 87.9 kg (1/19/23) and (1/9/22) *7% wt loss x > 1 year, not clinically significant  no [facility] wt hx to assess  UBW: unknown

## 2024-05-23 NOTE — DIETITIAN INITIAL EVALUATION ADULT - NSFNSGIIOFT_GEN_A_CORE
No N/V/D/C reported. No BM documented since admission on RN flowsheets. Pt not ordered for bowel regimen.

## 2024-05-23 NOTE — CONSULT NOTE ADULT - ASSESSMENT
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    Stage IV lung cancer  - CT chest: No evidence of active pneumonia. Left upper lobe opacity mildly increased in size, unclear if combination of posttreatment changes and atelectasis or progression of known lung mass. Correlation with more recent imaging would be of benefit.  - CTH: Stable exam. No acute intracranial hemorrhage, hydrocephalus, vasogenic   edema or extra-axial collection. 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 There is concern for intracranial metastatic disease consider further evaluation with contrast-enhanced brain MRI.  - [place] [facility] [place] and requesting comparison of imaging.  - Per daughter, unsure if patient has prior MRI. No known brain metastasis  - Will follow up with further recommendations once records/imaging obtained. 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    Stage IV lung cancer  - CT chest: No evidence of active pneumonia. Left upper lobe opacity mildly increased in size, unclear if combination of posttreatment changes and atelectasis or progression of known lung mass. Correlation with more recent imaging would be of benefit.  - CTH: Stable exam. No acute intracranial hemorrhage, hydrocephalus, vasogenic   edema or extra-axial collection. 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 There is concern for intracranial metastatic disease consider further evaluation with contrast-enhanced brain MRI.  - [place] with [facility] [place]. Last on pembro q 6 weeks given on 1/8/2024, currently on treatment break. Awaiting records and requesting comparison of imaging.  - Per daughter, unsure if patient has prior MRI. No known brain metastasis  - Will follow up with further recommendations once records/imaging obtained. 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    Stage IV lung cancer  - CT chest: No evidence of active pneumonia. Left upper lobe opacity mildly increased in size, unclear if combination of posttreatment changes and atelectasis or progression of known lung mass. Correlation with more recent imaging would be of benefit.  - CTH: Stable exam. No acute intracranial hemorrhage, hydrocephalus, vasogenic edema or extra-axial collection. 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 There is concern for intracranial metastatic disease consider further evaluation with contrast-enhanced brain MRI.  - Follows with [facility] [place]. Last on pembro q 6 weeks given on 1/8/2024, currently on treatment break. Awaiting records and requesting comparison of imaging.  - Per daughter, unsure if patient has prior MRI. No known brain metastasis  - Will follow up with further recommendations once records/imaging obtained. 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    Stage IV lung cancer  - CT chest: No evidence of active pneumonia. Left upper lobe opacity mildly increased in size, unclear if combination of posttreatment changes and atelectasis or progression of known lung mass. Correlation with more recent imaging would be of benefit.  - CTH: Stable exam. No acute intracranial hemorrhage, hydrocephalus, vasogenic edema or extra-axial collection. 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 There is concern for intracranial metastatic disease consider further evaluation with contrast-enhanced brain MRI.  [facility] [place] [facility] read - since Jan 8, 2024, no change, no acute intracranial abnormality.   - Follows with [facility] [place] with [place]. Sharpe. Last on pembro q 6 weeks given on 1/8/2024, currently on treatment break. Requested comparison of imaging at [facility].  - PET CT 3/2: Since September 25, 2023 no FDG avidity suspicious for malignancy.  - CTH 1/8/2024 (full report above): Since January 3, 2023, No significant interval change, no evidence of acute intracranial abnormality or new mass effect.    UTI  - Follow up blood/urine cultures. Currently on antibiotics; positive urinalysis.    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    Stage IV lung cancer  - CT chest: No evidence of active pneumonia. Left upper lobe opacity mildly increased in size, unclear if combination of posttreatment changes and atelectasis or progression of known lung mass. Correlation with more recent imaging would be of benefit.  - CTH: Stable exam. No acute intracranial hemorrhage, hydrocephalus, vasogenic edema or extra-axial collection. 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 There is concern for intracranial metastatic disease consider further evaluation with contrast-enhanced brain MRI.  [facility] [place] since Jan 8, 2024, no change, no acute intracranial abnormality.   - Follows with [facility] [place] with [place]. Sharpe. Last on pembro q 6 weeks given on 1/8/2024, currently on treatment break. Requested comparison of [place] at [facility]  - PET CT 3/2: Since September 25, 2023 no FDG avidity suspicious for malignancy.  - CTH 1/8/2024 (full report above): Since January 3, 2023, No significant interval change, no evidence of acute intracranial abnormality or new mass effect.  - If weakness does not improve with treatment of UTI, consider MRI head if PPM compatible and patient able to tolerate    UTI  - Follow up blood/urine cultures. Currently on antibiotics; positive urinalysis.    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    Stage IV lung cancer  - CT chest: No evidence of active pneumonia. Left upper lobe opacity mildly increased in size, unclear if combination of posttreatment changes and atelectasis or progression of known lung mass. Correlation with more recent imaging would be of benefit.  - CTH: Stable exam. No acute intracranial hemorrhage, hydrocephalus, vasogenic edema or extra-axial collection. 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 There is concern for intracranial metastatic disease consider further evaluation with contrast-enhanced brain MRI.  [facility] read - since Jan 8, 2024, no change, no acute intracranial abnormality.   - [place] [facility] [place]. Last on pembro q 6 weeks given on 1/8/2024, currently on treatment break. Requested comparison of imaging at [facility].  - PET CT 3/2: Since September 25, 2023 no FDG avidity suspicious for malignancy.  - CTH 1/8/2024 (full report above): Since January 3, 2023, No significant interval change, no evidence of acute intracranial abnormality or new mass effect.  - F/u MRI head    UTI  - Follow up blood/urine cultures. Currently on antibiotics; positive urinalysis.    Neno Campo PA-C  Hematology/Oncology  [facility]  [phone] (office)

## 2024-05-23 NOTE — DIETITIAN INITIAL EVALUATION ADULT - ENERGY INTAKE
Pt on consistent carbohydrate diet which is appropriate 2/2 DM. On ISS. May benefit from Glucerna shakes to assist with meeting increased nutrient needs. Labs reviewed, WNL.

## 2024-05-23 NOTE — PHARMACOTHERAPY INTERVENTION NOTE - COMMENTS
88 YO F on  amLODIPine 5 mg oral tablet: 1 tab(s) orally once a day  donepezil 10 mg oral tablet: 1 tab(s) orally once a day (at bedtime)  Eliquis 5 mg oral tablet: 1 tab(s) orally 2 times a day  famotidine 20 mg oral tablet: 1 tab(s) orally once a day  hydrocortisone 2.5% topical cream: Apply topically to affected area 2 times a day  Januvia 100 mg oral tablet: 1 tab(s) orally once a day  Lexapro 5 mg oral tablet: 1 tab(s) orally once a day  memantine 14 mg oral capsule, extended release: 1 cap(s) orally once a day  mupirocin 2% topical ointment: Apply topically to affected area 2 times a day  oxyBUTYnin 5 mg oral tablet: 1 tab(s) orally 2 times a day  repaglinide 2 mg oral tablet: 1 tab(s) orally 3 times a day  Tylenol 325 mg oral tablet: 2 tab(s) orally , As Needed    Home medications not resumed. Please consider resuming if no ocntraindications.    Sunita Bonilla, PharmD, BCPS  Clinical Pharmacy Specialist  [employer] (preferred) or [phone]  88 YO F on the following home medication not resumed here:    amLODIPine 5 mg oral tablet: 1 tab(s) orally once a day  donepezil 10 mg oral tablet: 1 tab(s) orally once a day (at bedtime)  Eliquis 5 mg oral tablet: 1 tab(s) orally 2 times a day  famotidine 20 mg oral tablet: 1 tab(s) orally once a day  hydrocortisone 2.5% topical cream: Apply topically to affected area 2 times a day  Januvia 100 mg oral tablet: 1 tab(s) orally once a day  Lexapro 5 mg oral tablet: 1 tab(s) orally once a day  memantine 14 mg oral capsule, extended release: 1 cap(s) orally once a day  mupirocin 2% topical ointment: Apply topically to affected area 2 times a day  oxyBUTYnin 5 mg oral tablet: 1 tab(s) orally 2 times a day  repaglinide 2 mg oral tablet: 1 tab(s) orally 3 times a day  Tylenol 325 mg oral tablet: 2 tab(s) orally , As Needed    Home medications not resumed. Please consider resuming if no contraindications    Sunita Bonilla, PharmD, BCPS  Clinical Pharmacy Specialist  [employer] (preferred) or [phone]

## 2024-05-23 NOTE — PHYSICAL THERAPY INITIAL EVALUATION ADULT - ADDITIONAL COMMENTS
Pt states she lives with son on 5th floor apartment with elevator access, was using rolling walker, has w/c and hospital bed. Pt also reports she has HHA that arrives at 8am although unable to clarify for how long who assists with ADL's.

## 2024-05-23 NOTE — DIETITIAN INITIAL EVALUATION ADULT - NSFNSADHERENCEPTAFT_GEN_A_CORE
Unknown therapeutic diet PTA. HbA1c 6.4% (5/23) indicating adequate glycemic control. Pt on Januvia and Repaglinide PTA.

## 2024-05-23 NOTE — DISCHARGE NOTE PROVIDER - NSDCCPCAREPLAN_GEN_ALL_CORE_FT
PRINCIPAL DISCHARGE DIAGNOSIS  Diagnosis: Acute UTI  Assessment and Plan of Treatment: You were treated in the hopital with antibiotics. You should follow up with your primary care provider for repeat urinalysis to be sure the infection has resolved. This was ecoli for which you were fully treated.   Urinary Tract Infection  A urinary tract infection (UTI) is an infection of any part of the urinary tract, which includes the kidneys, ureters, bladder, and urethra. Risk factors include ignoring your need to urinate, wiping back to front if female, being an uncircumcised male, and having diabetes or a weak immune system. Symptoms include frequent urination, pain or burning with urination, foul smelling urine, cloudy urine, pain in the lower abdomen, blood in the urine, and fever. If you were prescribed an antibiotic medicine, take it as told by your health care provider. Do not stop taking the antibiotic even if you start to feel better.  SEEK IMMEDIATE MEDICAL CARE IF YOU HAVE ANY OF THE FOLLOWING SYMPTOMS: severe back or abdominal pain, fever, inability to keep fluids or medicine down, dizziness/lightheadedness, or a change in mental status.       PRINCIPAL DISCHARGE DIAGNOSIS  Diagnosis: Acute UTI  Assessment and Plan of Treatment: You were treated in the hopital with antibiotics. You should follow up with your primary care provider for repeat urinalysis to be sure the infection has resolved. This was ecoli for which you were fully treated.   Urinary Tract Infection  A urinary tract infection (UTI) is an infection of any part of the urinary tract, which includes the kidneys, ureters, bladder, and urethra. Risk factors include ignoring your need to urinate, wiping back to front if female, being an uncircumcised male, and having diabetes or a weak immune system. Symptoms include frequent urination, pain or burning with urination, foul smelling urine, cloudy urine, pain in the lower abdomen, blood in the urine, and fever. If you were prescribed an antibiotic medicine, take it as told by your health care provider. Do not stop taking the antibiotic even if you start to feel better.  SEEK IMMEDIATE MEDICAL CARE IF YOU HAVE ANY OF THE FOLLOWING SYMPTOMS: severe back or abdominal pain, fever, inability to keep fluids or medicine down, dizziness/lightheadedness, or a change in mental status.        SECONDARY DISCHARGE DIAGNOSES  Diagnosis: Weakness generalized  Assessment and Plan of Treatment: You have weakness due to your advanced oncologic diseaese. You were seen here by an oncologist and an MR of your brain was done which did not any brain cancer. You should continue to keep your appointments with your outpatient oncologist. Keep your  [place] [facility] [place] Sharpe. Last on pembro q 6 weeks given on 1/8/2024, you are currently on a treatment break. Please notify your provider of any changes to your status    Diagnosis: Diabetes mellitus  Assessment and Plan of Treatment: Monitor your sugar and keep a, low salt, fat and carbohydrate diet, minimize glucose intake.  Exercise daily for at least 30 minutes and weight loss.  Follow up with primary care physician and endocrinologist for routine Hemoglobin A1C checks and management.  Follow up with your ophthalmologist for routine yearly vision exams.     PRINCIPAL DISCHARGE DIAGNOSIS  Diagnosis: Acute UTI  Assessment and Plan of Treatment: You were treated in the hopital with antibiotics. You should follow up with your primary care provider for repeat urinalysis to be sure the infection has resolved. This was ecoli for which you were fully treated.   Urinary Tract Infection  A urinary tract infection (UTI) is an infection of any part of the urinary tract, which includes the kidneys, ureters, bladder, and urethra. Risk factors include ignoring your need to urinate, wiping back to front if female, being an uncircumcised male, and having diabetes or a weak immune system. Symptoms include frequent urination, pain or burning with urination, foul smelling urine, cloudy urine, pain in the lower abdomen, blood in the urine, and fever. If you were prescribed an antibiotic medicine, take it as told by your health care provider. Do not stop taking the antibiotic even if you start to feel better.  SEEK IMMEDIATE MEDICAL CARE IF YOU HAVE ANY OF THE FOLLOWING SYMPTOMS: severe back or abdominal pain, fever, inability to keep fluids or medicine down, dizziness/lightheadedness, or a change in mental status.        SECONDARY DISCHARGE DIAGNOSES  Diagnosis: Weakness generalized  Assessment and Plan of Treatment: You have weakness due to your advanced oncologic diseaese. You were seen here by an oncologist and an MR of your brain was done which did not any brain cancer. You should continue to keep your appointments with your outpatient oncologist. [place] [facility] [place]. Last on pembro q 6 weeks given on 1/8/2024, you are currently on a treatment break. Please notify your provider of any changes to your status. Continue to take multivitamins and Vitamin C at home    Diagnosis: Diabetes mellitus  Assessment and Plan of Treatment: Monitor your sugar and keep a, low salt, fat and carbohydrate diet, minimize glucose intake.  Exercise daily for at least 30 minutes and weight loss.  Follow up with primary care physician and endocrinologist for routine Hemoglobin A1C checks and management.  Follow up with your ophthalmologist for routine yearly vision exams.

## 2024-05-23 NOTE — ADVANCED PRACTICE NURSE CONSULT - RECOMMEDATIONS
Impression:     B/L heel hyperpigmentation, cannot rule out a deep tissue injury present on admission   B/L buttocks/sacral hyperpigmentation, cannot rule out a deep tissue injury present on admission  B/L buttocks/sacral hypopigmentation, present on admission    Recommendations:     1) turn and position q2 and PRN utilizing offloading assistive devices  2) routine pericare daily and PRN soiling  3) encourage optimal nutrition  4) waffle cushion when oob to chair  5) B/L LE complete cair air fluidized boots or maribel-lock pillow to offload heels/feet  6) triad protective barrier cream to B/L buttocks/sacrum daily and PRN soiling  7) incontinence management - consider external urinary catheter to divert urine from skin if incontinent    Plan discussed with ERNESTO Wray at bedside     For questions/comments regarding the recommendations in this consult, please contact Janell at [phone]. Thank you!

## 2024-05-23 NOTE — DIETITIAN INITIAL EVALUATION ADULT - PERTINENT MEDS FT
MEDICATIONS  (STANDING):  cefTRIAXone   IVPB 1000 milliGRAM(s) IV Intermittent every 24 hours  dextrose 10% Bolus 125 milliLiter(s) IV Bolus once  dextrose 5%. 1000 milliLiter(s) (100 mL/Hr) IV Continuous <Continuous>  dextrose 5%. 1000 milliLiter(s) (50 mL/Hr) IV Continuous <Continuous>  dextrose 50% Injectable 25 Gram(s) IV Push once  dextrose 50% Injectable 12.5 Gram(s) IV Push once  glucagon  Injectable 1 milliGRAM(s) IntraMuscular once  insulin lispro (ADMELOG) corrective regimen sliding scale   SubCutaneous three times a day before meals  insulin lispro (ADMELOG) corrective regimen sliding scale   SubCutaneous at bedtime    MEDICATIONS  (PRN):  dextrose Oral Gel 15 Gram(s) Oral once PRN Blood Glucose LESS THAN 70 milliGRAM(s)/deciliter

## 2024-05-23 NOTE — CONSULT NOTE ADULT - SUBJECTIVE AND OBJECTIVE BOX
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 Patient has HHA who reported she had chills/shaking two days ago and then had difficulty getting patient out of bed this morning. Whenever she would walk her legs would buckle (22 May 2024 21:16)    Heme/onc consulted on this 88 y/o female with stage IV lung cancer, follows with [facility] [place].     PAST MEDICAL & SURGICAL HISTORY:  Dementia      Obesity      HTN (hypertension)      Diabetes      DVT, lower extremity      MI (myocardial infarction)      Lung cancer      UTI due to extended-spectrum beta lactamase (ESBL) producing Escherichia coli      Pacemaker          FAMILY HISTORY:  Unknown status of immunity to COVID-19 virus        Alochol: Denied  Smoking: Nonsmoker  Drug Use: Denied  Marital Status:         Allergies    iodine (Hives)  IV contrast (Unknown)  penicillin (Hives)    Intolerances        MEDICATIONS  (STANDING):  amLODIPine   Tablet 5 milliGRAM(s) Oral daily  apixaban 5 milliGRAM(s) Oral every 12 hours  ascorbic acid 500 milliGRAM(s) Oral daily  cefTRIAXone   IVPB 1000 milliGRAM(s) IV Intermittent every 24 hours  chlorhexidine 2% Cloths 1 Application(s) Topical daily  dextrose 10% Bolus 125 milliLiter(s) IV Bolus once  dextrose 5%. 1000 milliLiter(s) (100 mL/Hr) IV Continuous <Continuous>  dextrose 5%. 1000 milliLiter(s) (50 mL/Hr) IV Continuous <Continuous>  dextrose 50% Injectable 25 Gram(s) IV Push once  dextrose 50% Injectable 12.5 Gram(s) IV Push once  donepezil 10 milliGRAM(s) Oral at bedtime  escitalopram 5 milliGRAM(s) Oral daily  famotidine    Tablet 20 milliGRAM(s) Oral daily  glucagon  Injectable 1 milliGRAM(s) IntraMuscular once  insulin lispro (ADMELOG) corrective regimen sliding scale   SubCutaneous three times a day before meals  insulin lispro (ADMELOG) corrective regimen sliding scale   SubCutaneous at bedtime  memantine 10 milliGRAM(s) Oral two times a day  multivitamin 1 Tablet(s) Oral daily    MEDICATIONS  (PRN):  dextrose Oral Gel 15 Gram(s) Oral once PRN Blood Glucose LESS THAN 70 milliGRAM(s)/deciliter      ROS  No fever, sweats, chills  No epistaxis, HA, sore throat  No CP, SOB, cough, sputum  No n/v/d, abd pain, melena, hematochezia  lower extremity weakness, difficulty walking  No rash  No anxiety  No back pain, joint pain  No bleeding, bruising  No dysuria, hematuria. + Incontinence    T(C): 36.9 (05-23-24 @ 11:57), Max: 37.5 (05-22-24 @ 18:36)  HR: 71 (05-23-24 @ 11:57) (61 - 75)  BP: 151/73 (05-23-24 @ 11:57) (130/64 - 151/73)  RR: 18 (05-23-24 @ 11:57) (16 - 20)  SpO2: 97% (05-23-24 @ 11:57) (96% - 98%)  Wt(kg): --    PE  NAD  Awake  Anicteric, MMM  RRR  CTAB  Abd soft, NT, ND  No c/c/e                          13.0   10.10 )-----------( 256      ( 23 May 2024 06:45 )             41.6       05-23    142  |  107  |  29<H>  ----------------------------<  79  4.0   |  23  |  1.25    Ca    9.9      23 May 2024 06:45  Phos  3.0     05-23  Mg     2.0     05-23    TPro  6.5  /  Alb  3.6  /  TBili  0.2  /  DBili  x   /  AST  16  /  ALT  18  /  AlkPhos  78  05-22   Reason for consult: Lung cancer    HPI: 89-year-old female PMHx 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 Patient has HHA who reported she had chills/shaking two days ago and then had difficulty getting patient out of bed this morning. Whenever she would walk her legs would buckle (22 May 2024 21:16)    Heme/onc consulted on this 88 y/o female with stage IV lung cancer, [place] [facility] [place] Last on pembro q 6 weeks given on 1/8/2024, currently on treatment break.    PAST MEDICAL & SURGICAL HISTORY:  Dementia      Obesity      HTN (hypertension)      Diabetes      DVT, lower extremity      MI (myocardial infarction)      Lung cancer      UTI due to extended-spectrum beta lactamase (ESBL) producing Escherichia coli      Pacemaker          FAMILY HISTORY:  Unknown status of immunity to COVID-19 virus        Alochol: Denied  Smoking: Nonsmoker  Drug Use: Denied  Marital Status:         Allergies    iodine (Hives)  IV contrast (Unknown)  penicillin (Hives)    Intolerances        MEDICATIONS  (STANDING):  amLODIPine   Tablet 5 milliGRAM(s) Oral daily  apixaban 5 milliGRAM(s) Oral every 12 hours  ascorbic acid 500 milliGRAM(s) Oral daily  cefTRIAXone   IVPB 1000 milliGRAM(s) IV Intermittent every 24 hours  chlorhexidine 2% Cloths 1 Application(s) Topical daily  dextrose 10% Bolus 125 milliLiter(s) IV Bolus once  dextrose 5%. 1000 milliLiter(s) (100 mL/Hr) IV Continuous <Continuous>  dextrose 5%. 1000 milliLiter(s) (50 mL/Hr) IV Continuous <Continuous>  dextrose 50% Injectable 25 Gram(s) IV Push once  dextrose 50% Injectable 12.5 Gram(s) IV Push once  donepezil 10 milliGRAM(s) Oral at bedtime  escitalopram 5 milliGRAM(s) Oral daily  famotidine    Tablet 20 milliGRAM(s) Oral daily  glucagon  Injectable 1 milliGRAM(s) IntraMuscular once  insulin lispro (ADMELOG) corrective regimen sliding scale   SubCutaneous three times a day before meals  insulin lispro (ADMELOG) corrective regimen sliding scale   SubCutaneous at bedtime  memantine 10 milliGRAM(s) Oral two times a day  multivitamin 1 Tablet(s) Oral daily    MEDICATIONS  (PRN):  dextrose Oral Gel 15 Gram(s) Oral once PRN Blood Glucose LESS THAN 70 milliGRAM(s)/deciliter      ROS  No fever, sweats, chills  No epistaxis, HA, sore throat  No CP, SOB, cough, sputum  No n/v/d, abd pain, melena, hematochezia  lower extremity weakness, difficulty walking  No rash  No anxiety  No back pain, joint pain  No bleeding, bruising  No dysuria, hematuria. + Incontinence    T(C): 36.9 (05-23-24 @ 11:57), Max: 37.5 (05-22-24 @ 18:36)  HR: 71 (05-23-24 @ 11:57) (61 - 75)  BP: 151/73 (05-23-24 @ 11:57) (130/64 - 151/73)  RR: 18 (05-23-24 @ 11:57) (16 - 20)  SpO2: 97% (05-23-24 @ 11:57) (96% - 98%)  Wt(kg): --    PE  NAD  Awake  Anicteric, MMM  RRR  CTAB  Abd soft, NT, ND  No c/c/e                          13.0   10.10 )-----------( 256      ( 23 May 2024 06:45 )             41.6       05-23    142  |  107  |  29<H>  ----------------------------<  79  4.0   |  23  |  1.25    Ca    9.9      23 May 2024 06:45  Phos  3.0     05-23  Mg     2.0     05-23    TPro  6.5  /  Alb  3.6  /  TBili  0.2  /  DBili  x   /  AST  16  /  ALT  18  /  AlkPhos  78  05-22   Reason for consult: Lung cancer    HPI: 89-year-old female PMHx 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 Patient has HHA who reported she had chills/shaking two days ago and then had difficulty getting patient out of bed this morning. Whenever she would walk her legs would buckle (22 May 2024 21:16)    Heme/onc consulted on this 88 y/o female with stage IV lung cancer, follows with [facility] [place]. Last on pembro q 6 weeks given on 2024, currently on treatment break.    PAST MEDICAL & SURGICAL HISTORY:  Dementia      Obesity      HTN (hypertension)      Diabetes      DVT, lower extremity      MI (myocardial infarction)      Lung cancer      UTI due to extended-spectrum beta lactamase (ESBL) producing Escherichia coli      Pacemaker          FAMILY HISTORY:  Unknown status of immunity to COVID-19 virus        Alochol: Denied  Smoking: Nonsmoker  Drug Use: Denied  Marital Status:         Allergies    iodine (Hives)  IV contrast (Unknown)  penicillin (Hives)    Intolerances        MEDICATIONS  (STANDING):  amLODIPine   Tablet 5 milliGRAM(s) Oral daily  apixaban 5 milliGRAM(s) Oral every 12 hours  ascorbic acid 500 milliGRAM(s) Oral daily  cefTRIAXone   IVPB 1000 milliGRAM(s) IV Intermittent every 24 hours  chlorhexidine 2% Cloths 1 Application(s) Topical daily  dextrose 10% Bolus 125 milliLiter(s) IV Bolus once  dextrose 5%. 1000 milliLiter(s) (100 mL/Hr) IV Continuous <Continuous>  dextrose 5%. 1000 milliLiter(s) (50 mL/Hr) IV Continuous <Continuous>  dextrose 50% Injectable 25 Gram(s) IV Push once  dextrose 50% Injectable 12.5 Gram(s) IV Push once  donepezil 10 milliGRAM(s) Oral at bedtime  escitalopram 5 milliGRAM(s) Oral daily  famotidine    Tablet 20 milliGRAM(s) Oral daily  glucagon  Injectable 1 milliGRAM(s) IntraMuscular once  insulin lispro (ADMELOG) corrective regimen sliding scale   SubCutaneous three times a day before meals  insulin lispro (ADMELOG) corrective regimen sliding scale   SubCutaneous at bedtime  memantine 10 milliGRAM(s) Oral two times a day  multivitamin 1 Tablet(s) Oral daily    MEDICATIONS  (PRN):  dextrose Oral Gel 15 Gram(s) Oral once PRN Blood Glucose LESS THAN 70 milliGRAM(s)/deciliter      ROS  No fever, sweats, chills  No epistaxis, HA, sore throat  No CP, SOB, cough, sputum  No n/v/d, abd pain, melena, hematochezia  lower extremity weakness, difficulty walking  No rash  No anxiety  No back pain, joint pain  No bleeding, bruising  No dysuria, hematuria. + Incontinence    T(C): 36.9 (24 @ 11:57), Max: 37.5 (24 @ 18:36)  HR: 71 (24 @ 11:57) (61 - 75)  BP: 151/73 (24 @ 11:57) (130/64 - 151/73)  RR: 18 (24 @ 11:57) (16 - 20)  SpO2: 97% (24 @ 11:57) (96% - 98%)  Wt(kg): --    PE  NAD  Awake  Anicteric, MMM  RRR  CTAB  Abd soft, NT, ND  No c/c/e                          13.0   10.10 )-----------( 256      ( 23 May 2024 06:45 )             41.6           142  |  107  |  29<H>  ----------------------------<  79  4.0   |  23  |  1.25    Ca    9.9      23 May 2024 06:45  Phos  3.0       Mg     2.0         TPro  6.5  /  Alb  3.6  /  TBili  0.2  /  DBili  x   /  AST  16  /  ALT  18  /  AlkPhos  78       [facility]   DEPARTMENT OF RADIOLOGY   [facility] [address] [address] [phone]     COMPUTED TOMOGRAPHY   Report of Consultation     Name: PONCE MEJIA Acc No: [id number]   MRN: [id number] Date of Service: 2024   : 1935 Sex: F Pt Loc: [facility]   Ordered by: MAYO SOTO MD Date of Report: 2024 11:48 AM   Procedure: CT BRAIN W/O CON Account: [id number]   PRID #: [id number]     FINAL REPORT   2024 CT head   CLINICAL STATEMENT: Stage IV lung adenocarcinoma. Radiation to left lung   mass completed in . Evaluate extent of disease assessment.     TECHNIQUE: CT head without intravenous contrast, protocol #1, using axial   images and coronal and sagittal reformations.     RADIATION DOSE (DLP): 958 mGy-cm   COMPARISON: CT head January 3, 2023.   CORRELATION: None.   FINDINGS:   BRAIN: Unchanged volume loss. Unchanged ventricles and sulci in keeping   with diffuse parenchymal volume loss. No acute hydrocephalus. Unchanged   scattered regions of encephalomalacia from chronic appearing infarctions   involving the bilateral anteroinferior frontal lobes as well as right   temporal, parietal and occipital lobes. Unchanged scattered additional   lucent foci within the periventricular and subcortical white matter,   nonspecific, however favored to reflect sequela of chronic microvascular   ischemic changes and small old infarcts. No acute intracranial hemorrhage   or acute territorial infarct. No extra-axial fluid collection.     No definite intracranial mass, noting that noncontrast CT is suboptimal   for the evaluation for intracranial metastasis. No midline shift or   herniation.     OTHER: Again status post right ocular lens replacement. No orbital masses.   Slightly decreased mild mucosal thickening of the paranasal sinuses. No   focal suspicious expansile or destructive osseous lesion. Intracranial   atherosclerotic vascular calcifications are again noted.     IMPRESSION:   Since January 3, 2023,   No significant interval change, no evidence of acute intracranial   abnormality or new mass effect.     Diag.   Rad.   ** Continued on next page **   [facility]   COMPUTED TOMOGRAPHY Acc No: [id number]   Patient: PONCE MEJIA   MRN: [id number] Date of Service: 2024   Account: [id number]   Physician: MAYO SOTO     -------------------------------------- Page 2 of 2 ------   FINAL REPORT     Dictated By: PRITESH JAVED MD   Staff Radiologist: PRITESH JAVED MD     I attest that the above IMPRESSION is based upon my personal examination of   the entire imaging study and that I have reviewed and approved this report.     The following terms are used in [facility] reports   (except those of breast imaging studies)   to convey the radiologist's level of certainty for a given interpretation.     Consistent with > 90%   Suspicious for/Probable/Probably approx 75%   Possible/Possibly approx 50%   Less likely approx 25%   Unlikely < 10%     Electronically Signed By: PRITESH JAVED MD (2024 11:48AM)   ACC NUMBER: [id number]   MEDICAL RECORD NUMBER: [id number]     Diag.   Rad.   ** End of Report **

## 2024-05-23 NOTE — DISCHARGE NOTE PROVIDER - HOSPITAL COURSE
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 Patient has HHA who reported she had chills/shaking two days ago and then had difficulty getting patient out of bed this morning. Whenever she would walk her legs would buckle. < from: CT Head No Cont (05.22.24 @ 23:41) >      CT Head No Cont (05.22.24 @ 23:41)   Stable exam. No acute intracranial hemorrhage, hydrocephalus, vasogenic   edema or extra-axial collection. Chronic infarcts and sequela of prior   traumatic injury similar to the prior exam. There is concern for   intracranial metastatic disease consider further evaluation with   contrast-enhanced brain MRI.       Xray Chest 1 View- PORTABLE-Urgent (05.22.24 @ 18:43)   Clear lungs.         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 Patient has HHA who reported she had chills/shaking two days ago and then had difficulty getting patient out of bed this morning. Whenever she would walk her legs would buckle.       CT Head No Cont (05.22.24 @ 23:41)   Stable exam. No acute intracranial hemorrhage, hydrocephalus, vasogenic   edema or extra-axial collection. Chronic infarcts and sequela of prior   traumatic injury similar to the prior exam. There is concern for   intracranial metastatic disease consider further evaluation with   contrast-enhanced brain MRI.       Xray Chest 1 View- PORTABLE-Urgent (05.22.24 @ 18:43)   Clear lungs.         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 Patient has HHA who reported she had chills/shaking two days ago and then had difficulty getting patient out of bed this morning. Whenever she would walk her legs would buckle (22 May 2024 21:16)    Hospital Course:  UTI: s/p ceftriaxone x 3d sensitive to ecoli     Generalized weakness 2/2 advanced malignancy:   - oncology consult continue with outpatient care   - MR of brain did not show mets    Diabetes: continue with outpatient medication     Depression:  lexapro    HLD: lipitor    HTN: norvasc    Important Medication Changes and Reason:  No changes     Active or Pending Issues Requiring Follow-up:  Urinalysis to ensure cleared infection  Outpatient Oncology follow up     Advanced Directives:   [ X] Full code  [ ] DNR  [ ] Hospice    Discharge Diagnoses:  Urinary Tract Infection   Generalized Weakness    CT Head No Cont (05.22.24 @ 23:41)   Stable exam. No acute intracranial hemorrhage, hydrocephalus, vasogenic   edema or extra-axial collection. Chronic infarcts and sequela of prior   traumatic injury similar to the prior exam. There is concern for   intracranial metastatic disease consider further evaluation with   contrast-enhanced brain MRI.       Xray Chest 1 View- PORTABLE-Urgent (05.22.24 @ 18:43)   Clear lungs.    Case discussed with attending. Pt is cleared for discharge with Home PT care of HHA. All medications continued as prescribed

## 2024-05-23 NOTE — DIETITIAN INITIAL EVALUATION ADULT - ADD RECOMMEND
1) continue consistent carbohydrate diet  2) recommend glucerna shakes 2x/day  3) recommend MVI and vit C for wound healing  4) RD to obtain hx and nutrition-focused physical examination as able to assess for malnutrition  5) Monitor PO intake, weight, labs, skin, GI status, diet

## 2024-05-23 NOTE — ADVANCED PRACTICE NURSE CONSULT - REASON FOR CONSULT
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 Patient has HHA who reported she had chills/shaking two days ago and then had difficulty getting patient out of bed this morning. Whenever she would walk her legs would buckle
Floor

## 2024-05-23 NOTE — ADVANCED PRACTICE NURSE CONSULT - ASSESSMENT
Patient encountered on [address]. When wound care RN arrived on unit, patient was found lying in a low air loss pressure redistribution support surface style bed with breakfast tray at bedside. Patient was alert and gave consent to skin consult. Ms Yanez is unable to turn independently and staff assistance x 2 was provided. Once turned, urinary incontinence was noted and pericare was provided, purewick external female urinary device in place diverting urine from skin. The wound care RN was able to visualize an area of persistent nonblanchable dark discoloration over B/L buttocks/sacral skin, area measures approximately 4cm x 12cm x 0cm- cannot rule out a deep tissue injury present on admission. Within this location, there is hypopigmentation on B/L buttocks noted to measure approximately 2cm x 2cm x 0cm - with an intact skin bridge. Hypopigmentation is indicative of a prior skin injury that has since healed- a skin injury is more likely to occur in the same area as a prior skin injury due to the reduced tensile strength of the replaced tissue. B/L heels with hyperpigmentation measuring approximately 3cm x 3cm x 0cm, cannot rule out a deep tissue injury present on admission. Once consult was complete, patient was educated regarding the need for routine turning and positioning to prevent pressure injuries and patient was placed in an upright position to eat breakfast.

## 2024-05-23 NOTE — DIETITIAN INITIAL EVALUATION ADULT - NSFNSPHYEXAMSKINFT_GEN_A_CORE
Pressure Injury 1: Bilateral:, buttocks, sacrum, Suspected deep tissue injury  Pressure Injury 2: Right:, heel, Suspected deep tissue injury, hyperpigmentation cannot rule uot a DTI POA  Pressure Injury 3: Left:, heel, Suspected deep tissue injury, hyperpigmentation, can't rule out DTI POA  Confirmed by WOCN 5/23

## 2024-05-23 NOTE — DIETITIAN INITIAL EVALUATION ADULT - ORAL INTAKE PTA/DIET HISTORY
Chart reviewed, events noted. Pt sleeping/with dementia and unable to participate with interview. Per chart review of previous RD notes, Pt was on regular consistency diet and liquids with good PO intake. NKFA. No issues chewing/swallowing.

## 2024-05-23 NOTE — PHYSICAL THERAPY INITIAL EVALUATION ADULT - PERTINENT HX OF CURRENT PROBLEM, REHAB EVAL
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 Patient has HHA who reported she had chills/shaking two days ago and then had difficulty getting patient out of bed this morning. Whenever she would walk her legs would buckle. Pt admitted with UTI and generalized weakness 2/2 deconditioning.   5/22 CXR - Clear lungs.   5/22 CT head - Stable exam. No acute intracranial hemorrhage, hydrocephalus, vasogenic edema or extra-axial collection. 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 There is concern for intracranial metastatic disease consider further evaluation with contrast-enhanced brain MRI.  5/22 CT Chest - No evidence of active pneumonia. Left upper lobe opacity mildly increased in size, unclear if combination of posttreatment changes and atelectasis or progression of known lung   mass. Correlation with more recent imaging would be of benefit. 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 Patient has HHA who reported she had chills/shaking two days ago and then had difficulty getting patient out of bed this morning. Whenever she would walk her legs would buckle. Pt admitted with UTI and generalized weakness 2/2 deconditioning.   5/22 CXR - Clear lungs.   5/22 CT head - Stable exam. No acute intracranial hemorrhage, hydrocephalus, vasogenic edema or extra-axial collection. 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 There is concern for intracranial metastatic disease consider further evaluation with contrast-enhanced brain MRI.  5/22 CT Chest - No evidence of active pneumonia. Left upper lobe opacity mildly increased in size, unclear if combination of posttreatment changes and atelectasis or progression of known lung   mass. Correlation with more recent imaging would be of benefit.

## 2024-05-23 NOTE — DISCHARGE NOTE PROVIDER - NSDCMRMEDTOKEN_GEN_ALL_CORE_FT
amLODIPine 5 mg oral tablet: 1 tab(s) orally once a day  donepezil 10 mg oral tablet: 1 tab(s) orally once a day (at bedtime)  Eliquis 5 mg oral tablet: 1 tab(s) orally 2 times a day  famotidine 20 mg oral tablet: 1 tab(s) orally once a day  hydrocortisone 2.5% topical cream: Apply topically to affected area 2 times a day  Januvia 100 mg oral tablet: 1 tab(s) orally once a day  Lexapro 5 mg oral tablet: 1 tab(s) orally once a day  memantine 14 mg oral capsule, extended release: 1 cap(s) orally once a day  mupirocin 2% topical ointment: Apply topically to affected area 2 times a day  oxyBUTYnin 5 mg oral tablet: 1 tab(s) orally 2 times a day  repaglinide 2 mg oral tablet: 1 tab(s) orally 3 times a day  Tylenol 325 mg oral tablet: 2 tab(s) orally , As Needed   amLODIPine 5 mg oral tablet: 1 tab(s) orally once a day  ascorbic acid 500 mg oral tablet: 1 tab(s) orally once a day  donepezil 10 mg oral tablet: 1 tab(s) orally once a day (at bedtime)  Eliquis 5 mg oral tablet: 1 tab(s) orally 2 times a day  famotidine 20 mg oral tablet: 1 tab(s) orally once a day  hydrocortisone 2.5% topical cream: Apply topically to affected area 2 times a day  Januvia 100 mg oral tablet: 1 tab(s) orally once a day  Lexapro 5 mg oral tablet: 1 tab(s) orally once a day  memantine 14 mg oral capsule, extended release: 1 cap(s) orally once a day  Multiple Vitamins oral tablet: 1 tab(s) orally once a day  mupirocin 2% topical ointment: Apply topically to affected area 2 times a day  oxyBUTYnin 5 mg oral tablet: 1 tab(s) orally 2 times a day  repaglinide 2 mg oral tablet: 1 tab(s) orally 3 times a day

## 2024-05-24 LAB
ANION GAP SERPL CALC-SCNC: 12 MMOL/L — SIGNIFICANT CHANGE UP (ref 5–17)
BUN SERPL-MCNC: 26 MG/DL — HIGH (ref 7–23)
CALCIUM SERPL-MCNC: 9.8 MG/DL — SIGNIFICANT CHANGE UP (ref 8.4–10.5)
CHLORIDE SERPL-SCNC: 106 MMOL/L — SIGNIFICANT CHANGE UP (ref 96–108)
CO2 SERPL-SCNC: 22 MMOL/L — SIGNIFICANT CHANGE UP (ref 22–31)
CREAT SERPL-MCNC: 1.34 MG/DL — HIGH (ref 0.5–1.3)
EGFR: 38 ML/MIN/1.73M2 — LOW
GLUCOSE BLDC GLUCOMTR-MCNC: 137 MG/DL — HIGH (ref 70–99)
GLUCOSE BLDC GLUCOMTR-MCNC: 158 MG/DL — HIGH (ref 70–99)
GLUCOSE BLDC GLUCOMTR-MCNC: 163 MG/DL — HIGH (ref 70–99)
GLUCOSE BLDC GLUCOMTR-MCNC: 210 MG/DL — HIGH (ref 70–99)
GLUCOSE SERPL-MCNC: 160 MG/DL — HIGH (ref 70–99)
HCT VFR BLD CALC: 43.9 % — SIGNIFICANT CHANGE UP (ref 34.5–45)
HGB BLD-MCNC: 13.9 G/DL — SIGNIFICANT CHANGE UP (ref 11.5–15.5)
MCHC RBC-ENTMCNC: 27.7 PG — SIGNIFICANT CHANGE UP (ref 27–34)
MCHC RBC-ENTMCNC: 31.7 GM/DL — LOW (ref 32–36)
MCV RBC AUTO: 87.6 FL — SIGNIFICANT CHANGE UP (ref 80–100)
MRSA PCR RESULT.: DETECTED
NRBC # BLD: 0 /100 WBCS — SIGNIFICANT CHANGE UP (ref 0–0)
PLATELET # BLD AUTO: 240 K/UL — SIGNIFICANT CHANGE UP (ref 150–400)
POTASSIUM SERPL-MCNC: 4.4 MMOL/L — SIGNIFICANT CHANGE UP (ref 3.5–5.3)
POTASSIUM SERPL-SCNC: 4.4 MMOL/L — SIGNIFICANT CHANGE UP (ref 3.5–5.3)
RBC # BLD: 5.01 M/UL — SIGNIFICANT CHANGE UP (ref 3.8–5.2)
RBC # FLD: 13.6 % — SIGNIFICANT CHANGE UP (ref 10.3–14.5)
S AUREUS DNA NOSE QL NAA+PROBE: DETECTED
SODIUM SERPL-SCNC: 140 MMOL/L — SIGNIFICANT CHANGE UP (ref 135–145)
WBC # BLD: 9.84 K/UL — SIGNIFICANT CHANGE UP (ref 3.8–10.5)
WBC # FLD AUTO: 9.84 K/UL — SIGNIFICANT CHANGE UP (ref 3.8–10.5)

## 2024-05-24 PROCEDURE — 70553 MRI BRAIN STEM W/O & W/DYE: CPT | Mod: 26

## 2024-05-24 PROCEDURE — 93286 PERI-PX EVAL PM/LDLS PM IP: CPT | Mod: 26,76

## 2024-05-24 RX ORDER — MUPIROCIN 20 MG/G
1 OINTMENT TOPICAL
Refills: 0 | Status: DISCONTINUED | OUTPATIENT
Start: 2024-05-24 | End: 2024-05-27

## 2024-05-24 RX ADMIN — MUPIROCIN 1 APPLICATION(S): 20 OINTMENT TOPICAL at 17:40

## 2024-05-24 RX ADMIN — Medication 1: at 17:41

## 2024-05-24 RX ADMIN — CHLORHEXIDINE GLUCONATE 1 APPLICATION(S): 213 SOLUTION TOPICAL at 13:14

## 2024-05-24 RX ADMIN — Medication 500 MILLIGRAM(S): at 13:10

## 2024-05-24 RX ADMIN — AMLODIPINE BESYLATE 5 MILLIGRAM(S): 2.5 TABLET ORAL at 06:00

## 2024-05-24 RX ADMIN — Medication 2: at 13:09

## 2024-05-24 RX ADMIN — DONEPEZIL HYDROCHLORIDE 10 MILLIGRAM(S): 10 TABLET, FILM COATED ORAL at 22:19

## 2024-05-24 RX ADMIN — Medication 1 TABLET(S): at 13:10

## 2024-05-24 RX ADMIN — APIXABAN 5 MILLIGRAM(S): 2.5 TABLET, FILM COATED ORAL at 06:00

## 2024-05-24 RX ADMIN — MEMANTINE HYDROCHLORIDE 10 MILLIGRAM(S): 10 TABLET ORAL at 17:30

## 2024-05-24 RX ADMIN — APIXABAN 5 MILLIGRAM(S): 2.5 TABLET, FILM COATED ORAL at 17:30

## 2024-05-24 RX ADMIN — MEMANTINE HYDROCHLORIDE 10 MILLIGRAM(S): 10 TABLET ORAL at 06:00

## 2024-05-24 RX ADMIN — FAMOTIDINE 20 MILLIGRAM(S): 10 INJECTION INTRAVENOUS at 13:10

## 2024-05-24 RX ADMIN — ESCITALOPRAM OXALATE 5 MILLIGRAM(S): 10 TABLET, FILM COATED ORAL at 13:10

## 2024-05-24 RX ADMIN — CEFTRIAXONE 100 MILLIGRAM(S): 500 INJECTION, POWDER, FOR SOLUTION INTRAMUSCULAR; INTRAVENOUS at 17:53

## 2024-05-24 NOTE — PROCEDURE NOTE - ADDITIONAL PROCEDURE DETAILS
PRE MRI  1) Presenting rhythm: SR in the 80's  2) Measured data WNL, NL PM function, Not PM dependent.  3) Since 1/6/2022: total  2.6%, AS-VS 95.7%, AT/AF burden <0.1%  4) Changes made: MRI SureScan turned on and pacing mode programmed to ODO  5) Call post MRI to reprogram pacemaker.     CHASIDY Hong, NP-C  [id number]
Post MRI  1) Presenting rhythm: SR in the 80's  2) Measured data WNL, NL PM function, Not PM dependent.  3) Since 1/6/2022: total  2.6%, AS-VS 95.7%, AT/AF burden <0.1%  4) Changes made: MRI SureScan turned off and pacing mode changed back to DDD  bpm    SNicole Hong, NP-C  [id number]

## 2024-05-25 LAB
ANION GAP SERPL CALC-SCNC: 11 MMOL/L — SIGNIFICANT CHANGE UP (ref 5–17)
ANION GAP SERPL CALC-SCNC: 12 MMOL/L — SIGNIFICANT CHANGE UP (ref 5–17)
BUN SERPL-MCNC: 23 MG/DL — SIGNIFICANT CHANGE UP (ref 7–23)
BUN SERPL-MCNC: 32 MG/DL — HIGH (ref 7–23)
CALCIUM SERPL-MCNC: 10 MG/DL — SIGNIFICANT CHANGE UP (ref 8.4–10.5)
CALCIUM SERPL-MCNC: 9.6 MG/DL — SIGNIFICANT CHANGE UP (ref 8.4–10.5)
CHLORIDE SERPL-SCNC: 109 MMOL/L — HIGH (ref 96–108)
CHLORIDE SERPL-SCNC: 109 MMOL/L — HIGH (ref 96–108)
CO2 SERPL-SCNC: 22 MMOL/L — SIGNIFICANT CHANGE UP (ref 22–31)
CO2 SERPL-SCNC: 22 MMOL/L — SIGNIFICANT CHANGE UP (ref 22–31)
CREAT SERPL-MCNC: 1.11 MG/DL — SIGNIFICANT CHANGE UP (ref 0.5–1.3)
CREAT SERPL-MCNC: 1.47 MG/DL — HIGH (ref 0.5–1.3)
EGFR: 34 ML/MIN/1.73M2 — LOW
EGFR: 48 ML/MIN/1.73M2 — LOW
GLUCOSE BLDC GLUCOMTR-MCNC: 115 MG/DL — HIGH (ref 70–99)
GLUCOSE BLDC GLUCOMTR-MCNC: 185 MG/DL — HIGH (ref 70–99)
GLUCOSE BLDC GLUCOMTR-MCNC: 188 MG/DL — HIGH (ref 70–99)
GLUCOSE BLDC GLUCOMTR-MCNC: 203 MG/DL — HIGH (ref 70–99)
GLUCOSE SERPL-MCNC: 121 MG/DL — HIGH (ref 70–99)
GLUCOSE SERPL-MCNC: 202 MG/DL — HIGH (ref 70–99)
MAGNESIUM SERPL-MCNC: 2 MG/DL — SIGNIFICANT CHANGE UP (ref 1.6–2.6)
PHOSPHATE SERPL-MCNC: 2.6 MG/DL — SIGNIFICANT CHANGE UP (ref 2.5–4.5)
POTASSIUM SERPL-MCNC: 4.1 MMOL/L — SIGNIFICANT CHANGE UP (ref 3.5–5.3)
POTASSIUM SERPL-MCNC: 4.5 MMOL/L — SIGNIFICANT CHANGE UP (ref 3.5–5.3)
POTASSIUM SERPL-SCNC: 4.1 MMOL/L — SIGNIFICANT CHANGE UP (ref 3.5–5.3)
POTASSIUM SERPL-SCNC: 4.5 MMOL/L — SIGNIFICANT CHANGE UP (ref 3.5–5.3)
SODIUM SERPL-SCNC: 142 MMOL/L — SIGNIFICANT CHANGE UP (ref 135–145)
SODIUM SERPL-SCNC: 143 MMOL/L — SIGNIFICANT CHANGE UP (ref 135–145)

## 2024-05-25 RX ADMIN — MUPIROCIN 1 APPLICATION(S): 20 OINTMENT TOPICAL at 17:31

## 2024-05-25 RX ADMIN — FAMOTIDINE 20 MILLIGRAM(S): 10 INJECTION INTRAVENOUS at 11:35

## 2024-05-25 RX ADMIN — Medication 1 TABLET(S): at 11:38

## 2024-05-25 RX ADMIN — MEMANTINE HYDROCHLORIDE 10 MILLIGRAM(S): 10 TABLET ORAL at 05:58

## 2024-05-25 RX ADMIN — APIXABAN 5 MILLIGRAM(S): 2.5 TABLET, FILM COATED ORAL at 05:58

## 2024-05-25 RX ADMIN — ESCITALOPRAM OXALATE 5 MILLIGRAM(S): 10 TABLET, FILM COATED ORAL at 11:35

## 2024-05-25 RX ADMIN — Medication 2: at 11:37

## 2024-05-25 RX ADMIN — DONEPEZIL HYDROCHLORIDE 10 MILLIGRAM(S): 10 TABLET, FILM COATED ORAL at 21:54

## 2024-05-25 RX ADMIN — MEMANTINE HYDROCHLORIDE 10 MILLIGRAM(S): 10 TABLET ORAL at 17:25

## 2024-05-25 RX ADMIN — MUPIROCIN 1 APPLICATION(S): 20 OINTMENT TOPICAL at 05:58

## 2024-05-25 RX ADMIN — AMLODIPINE BESYLATE 5 MILLIGRAM(S): 2.5 TABLET ORAL at 05:58

## 2024-05-25 RX ADMIN — APIXABAN 5 MILLIGRAM(S): 2.5 TABLET, FILM COATED ORAL at 17:31

## 2024-05-25 RX ADMIN — Medication 1: at 17:29

## 2024-05-25 RX ADMIN — CHLORHEXIDINE GLUCONATE 1 APPLICATION(S): 213 SOLUTION TOPICAL at 11:37

## 2024-05-25 RX ADMIN — Medication 500 MILLIGRAM(S): at 11:38

## 2024-05-25 RX ADMIN — CEFTRIAXONE 100 MILLIGRAM(S): 500 INJECTION, POWDER, FOR SOLUTION INTRAMUSCULAR; INTRAVENOUS at 17:33

## 2024-05-25 NOTE — PROVIDER CONTACT NOTE (OTHER) - BACKGROUND
Patient admitted for UTI and generalized weakness. As per tele tech, patient with h/o PVCs during hospital course, but stated that there are slightly more than usual 5/25 evening.

## 2024-05-26 LAB
-  AMOXICILLIN/CLAVULANIC ACID: SIGNIFICANT CHANGE UP
-  AMPICILLIN/SULBACTAM: SIGNIFICANT CHANGE UP
-  AMPICILLIN: SIGNIFICANT CHANGE UP
-  AZTREONAM: SIGNIFICANT CHANGE UP
-  CEFAZOLIN: SIGNIFICANT CHANGE UP
-  CEFEPIME: SIGNIFICANT CHANGE UP
-  CEFOXITIN: SIGNIFICANT CHANGE UP
-  CEFTRIAXONE: SIGNIFICANT CHANGE UP
-  CEFUROXIME: SIGNIFICANT CHANGE UP
-  CIPROFLOXACIN: SIGNIFICANT CHANGE UP
-  ERTAPENEM: SIGNIFICANT CHANGE UP
-  GENTAMICIN: SIGNIFICANT CHANGE UP
-  IMIPENEM: SIGNIFICANT CHANGE UP
-  LEVOFLOXACIN: SIGNIFICANT CHANGE UP
-  MEROPENEM: SIGNIFICANT CHANGE UP
-  NITROFURANTOIN: SIGNIFICANT CHANGE UP
-  PIPERACILLIN/TAZOBACTAM: SIGNIFICANT CHANGE UP
-  TOBRAMYCIN: SIGNIFICANT CHANGE UP
-  TRIMETHOPRIM/SULFAMETHOXAZOLE: SIGNIFICANT CHANGE UP
GLUCOSE BLDC GLUCOMTR-MCNC: 133 MG/DL — HIGH (ref 70–99)
GLUCOSE BLDC GLUCOMTR-MCNC: 204 MG/DL — HIGH (ref 70–99)
GLUCOSE BLDC GLUCOMTR-MCNC: 211 MG/DL — HIGH (ref 70–99)
GLUCOSE BLDC GLUCOMTR-MCNC: 221 MG/DL — HIGH (ref 70–99)
METHOD TYPE: SIGNIFICANT CHANGE UP

## 2024-05-26 RX ORDER — CEFTRIAXONE 500 MG/1
1 INJECTION, POWDER, FOR SOLUTION INTRAMUSCULAR; INTRAVENOUS EVERY 24 HOURS
Refills: 0 | Status: COMPLETED | OUTPATIENT
Start: 2024-05-26 | End: 2024-05-26

## 2024-05-26 RX ADMIN — FAMOTIDINE 20 MILLIGRAM(S): 10 INJECTION INTRAVENOUS at 11:16

## 2024-05-26 RX ADMIN — ESCITALOPRAM OXALATE 5 MILLIGRAM(S): 10 TABLET, FILM COATED ORAL at 11:16

## 2024-05-26 RX ADMIN — MUPIROCIN 1 APPLICATION(S): 20 OINTMENT TOPICAL at 17:07

## 2024-05-26 RX ADMIN — MEMANTINE HYDROCHLORIDE 10 MILLIGRAM(S): 10 TABLET ORAL at 17:05

## 2024-05-26 RX ADMIN — Medication 2: at 17:04

## 2024-05-26 RX ADMIN — CEFTRIAXONE 100 MILLIGRAM(S): 500 INJECTION, POWDER, FOR SOLUTION INTRAMUSCULAR; INTRAVENOUS at 12:23

## 2024-05-26 RX ADMIN — DONEPEZIL HYDROCHLORIDE 10 MILLIGRAM(S): 10 TABLET, FILM COATED ORAL at 21:26

## 2024-05-26 RX ADMIN — AMLODIPINE BESYLATE 5 MILLIGRAM(S): 2.5 TABLET ORAL at 05:35

## 2024-05-26 RX ADMIN — Medication 500 MILLIGRAM(S): at 11:16

## 2024-05-26 RX ADMIN — Medication 1 TABLET(S): at 11:17

## 2024-05-26 RX ADMIN — APIXABAN 5 MILLIGRAM(S): 2.5 TABLET, FILM COATED ORAL at 17:05

## 2024-05-26 RX ADMIN — MUPIROCIN 1 APPLICATION(S): 20 OINTMENT TOPICAL at 05:35

## 2024-05-26 RX ADMIN — MEMANTINE HYDROCHLORIDE 10 MILLIGRAM(S): 10 TABLET ORAL at 05:34

## 2024-05-26 RX ADMIN — APIXABAN 5 MILLIGRAM(S): 2.5 TABLET, FILM COATED ORAL at 05:35

## 2024-05-26 RX ADMIN — CHLORHEXIDINE GLUCONATE 1 APPLICATION(S): 213 SOLUTION TOPICAL at 11:19

## 2024-05-26 RX ADMIN — Medication 2: at 12:22

## 2024-05-27 ENCOUNTER — TRANSCRIPTION ENCOUNTER (OUTPATIENT)
Age: 89
End: 2024-05-27

## 2024-05-27 VITALS
RESPIRATION RATE: 18 BRPM | DIASTOLIC BLOOD PRESSURE: 77 MMHG | HEART RATE: 76 BPM | OXYGEN SATURATION: 97 % | TEMPERATURE: 99 F | SYSTOLIC BLOOD PRESSURE: 145 MMHG

## 2024-05-27 LAB
ANION GAP SERPL CALC-SCNC: 9 MMOL/L — SIGNIFICANT CHANGE UP (ref 5–17)
BUN SERPL-MCNC: 34 MG/DL — HIGH (ref 7–23)
CALCIUM SERPL-MCNC: 9.6 MG/DL — SIGNIFICANT CHANGE UP (ref 8.4–10.5)
CHLORIDE SERPL-SCNC: 111 MMOL/L — HIGH (ref 96–108)
CO2 SERPL-SCNC: 23 MMOL/L — SIGNIFICANT CHANGE UP (ref 22–31)
CREAT SERPL-MCNC: 1.34 MG/DL — HIGH (ref 0.5–1.3)
CULTURE RESULTS: SIGNIFICANT CHANGE UP
CULTURE RESULTS: SIGNIFICANT CHANGE UP
EGFR: 38 ML/MIN/1.73M2 — LOW
GLUCOSE BLDC GLUCOMTR-MCNC: 161 MG/DL — HIGH (ref 70–99)
GLUCOSE BLDC GLUCOMTR-MCNC: 182 MG/DL — HIGH (ref 70–99)
GLUCOSE SERPL-MCNC: 138 MG/DL — HIGH (ref 70–99)
POTASSIUM SERPL-MCNC: 4.2 MMOL/L — SIGNIFICANT CHANGE UP (ref 3.5–5.3)
POTASSIUM SERPL-SCNC: 4.2 MMOL/L — SIGNIFICANT CHANGE UP (ref 3.5–5.3)
SODIUM SERPL-SCNC: 143 MMOL/L — SIGNIFICANT CHANGE UP (ref 135–145)
SPECIMEN SOURCE: SIGNIFICANT CHANGE UP
SPECIMEN SOURCE: SIGNIFICANT CHANGE UP

## 2024-05-27 PROCEDURE — 36415 COLL VENOUS BLD VENIPUNCTURE: CPT

## 2024-05-27 PROCEDURE — 83735 ASSAY OF MAGNESIUM: CPT

## 2024-05-27 PROCEDURE — 71046 X-RAY EXAM CHEST 2 VIEWS: CPT

## 2024-05-27 PROCEDURE — 84132 ASSAY OF SERUM POTASSIUM: CPT

## 2024-05-27 PROCEDURE — 85025 COMPLETE CBC W/AUTO DIFF WBC: CPT

## 2024-05-27 PROCEDURE — 83036 HEMOGLOBIN GLYCOSYLATED A1C: CPT

## 2024-05-27 PROCEDURE — 84145 PROCALCITONIN (PCT): CPT

## 2024-05-27 PROCEDURE — 87040 BLOOD CULTURE FOR BACTERIA: CPT

## 2024-05-27 PROCEDURE — 87641 MR-STAPH DNA AMP PROBE: CPT

## 2024-05-27 PROCEDURE — 87637 SARSCOV2&INF A&B&RSV AMP PRB: CPT

## 2024-05-27 PROCEDURE — 85014 HEMATOCRIT: CPT

## 2024-05-27 PROCEDURE — 82330 ASSAY OF CALCIUM: CPT

## 2024-05-27 PROCEDURE — 84484 ASSAY OF TROPONIN QUANT: CPT

## 2024-05-27 PROCEDURE — 85018 HEMOGLOBIN: CPT

## 2024-05-27 PROCEDURE — 87086 URINE CULTURE/COLONY COUNT: CPT

## 2024-05-27 PROCEDURE — 87186 SC STD MICRODIL/AGAR DIL: CPT

## 2024-05-27 PROCEDURE — A9585: CPT

## 2024-05-27 PROCEDURE — 81001 URINALYSIS AUTO W/SCOPE: CPT

## 2024-05-27 PROCEDURE — 80048 BASIC METABOLIC PNL TOTAL CA: CPT

## 2024-05-27 PROCEDURE — 83880 ASSAY OF NATRIURETIC PEPTIDE: CPT

## 2024-05-27 PROCEDURE — 70553 MRI BRAIN STEM W/O & W/DYE: CPT | Mod: MC

## 2024-05-27 PROCEDURE — 82962 GLUCOSE BLOOD TEST: CPT

## 2024-05-27 PROCEDURE — 85027 COMPLETE CBC AUTOMATED: CPT

## 2024-05-27 PROCEDURE — 87077 CULTURE AEROBIC IDENTIFY: CPT

## 2024-05-27 PROCEDURE — 99285 EMERGENCY DEPT VISIT HI MDM: CPT | Mod: 25

## 2024-05-27 PROCEDURE — 80053 COMPREHEN METABOLIC PANEL: CPT

## 2024-05-27 PROCEDURE — 84295 ASSAY OF SERUM SODIUM: CPT

## 2024-05-27 PROCEDURE — 82746 ASSAY OF FOLIC ACID SERUM: CPT

## 2024-05-27 PROCEDURE — 87640 STAPH A DNA AMP PROBE: CPT

## 2024-05-27 PROCEDURE — 70450 CT HEAD/BRAIN W/O DYE: CPT | Mod: MC

## 2024-05-27 PROCEDURE — 82947 ASSAY GLUCOSE BLOOD QUANT: CPT

## 2024-05-27 PROCEDURE — 97162 PT EVAL MOD COMPLEX 30 MIN: CPT

## 2024-05-27 PROCEDURE — 82803 BLOOD GASES ANY COMBINATION: CPT

## 2024-05-27 PROCEDURE — 85610 PROTHROMBIN TIME: CPT

## 2024-05-27 PROCEDURE — 85730 THROMBOPLASTIN TIME PARTIAL: CPT

## 2024-05-27 PROCEDURE — 82607 VITAMIN B-12: CPT

## 2024-05-27 PROCEDURE — 71250 CT THORAX DX C-: CPT | Mod: MC

## 2024-05-27 PROCEDURE — 71045 X-RAY EXAM CHEST 1 VIEW: CPT

## 2024-05-27 PROCEDURE — 84100 ASSAY OF PHOSPHORUS: CPT

## 2024-05-27 PROCEDURE — 84443 ASSAY THYROID STIM HORMONE: CPT

## 2024-05-27 PROCEDURE — 83605 ASSAY OF LACTIC ACID: CPT

## 2024-05-27 PROCEDURE — 82435 ASSAY OF BLOOD CHLORIDE: CPT

## 2024-05-27 PROCEDURE — 96374 THER/PROPH/DIAG INJ IV PUSH: CPT

## 2024-05-27 RX ORDER — ASCORBIC ACID 60 MG
1 TABLET,CHEWABLE ORAL
Qty: 0 | Refills: 0 | DISCHARGE
Start: 2024-05-27

## 2024-05-27 RX ORDER — ACETAMINOPHEN 500 MG
2 TABLET ORAL
Qty: 0 | Refills: 0 | DISCHARGE

## 2024-05-27 RX ADMIN — Medication 1: at 08:44

## 2024-05-27 RX ADMIN — Medication 1 TABLET(S): at 11:18

## 2024-05-27 RX ADMIN — ESCITALOPRAM OXALATE 5 MILLIGRAM(S): 10 TABLET, FILM COATED ORAL at 11:17

## 2024-05-27 RX ADMIN — MEMANTINE HYDROCHLORIDE 10 MILLIGRAM(S): 10 TABLET ORAL at 05:45

## 2024-05-27 RX ADMIN — FAMOTIDINE 20 MILLIGRAM(S): 10 INJECTION INTRAVENOUS at 11:18

## 2024-05-27 RX ADMIN — MUPIROCIN 1 APPLICATION(S): 20 OINTMENT TOPICAL at 05:47

## 2024-05-27 RX ADMIN — AMLODIPINE BESYLATE 5 MILLIGRAM(S): 2.5 TABLET ORAL at 05:45

## 2024-05-27 RX ADMIN — APIXABAN 5 MILLIGRAM(S): 2.5 TABLET, FILM COATED ORAL at 05:45

## 2024-05-27 RX ADMIN — Medication 1: at 12:39

## 2024-05-27 RX ADMIN — CHLORHEXIDINE GLUCONATE 1 APPLICATION(S): 213 SOLUTION TOPICAL at 11:22

## 2024-05-27 RX ADMIN — Medication 500 MILLIGRAM(S): at 11:18

## 2024-05-27 NOTE — PROGRESS NOTE ADULT - SUBJECTIVE AND OBJECTIVE BOX
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 (23 May 2024 11:45)      SUBJECTIVE / OVERNIGHT EVENTS:  " I feel weak "    MEDICATIONS  (STANDING):  cefTRIAXone   IVPB 1000 milliGRAM(s) IV Intermittent every 24 hours  chlorhexidine 2% Cloths 1 Application(s) Topical daily  dextrose 10% Bolus 125 milliLiter(s) IV Bolus once  dextrose 5%. 1000 milliLiter(s) (100 mL/Hr) IV Continuous <Continuous>  dextrose 5%. 1000 milliLiter(s) (50 mL/Hr) IV Continuous <Continuous>  dextrose 50% Injectable 25 Gram(s) IV Push once  dextrose 50% Injectable 12.5 Gram(s) IV Push once  glucagon  Injectable 1 milliGRAM(s) IntraMuscular once  insulin lispro (ADMELOG) corrective regimen sliding scale   SubCutaneous three times a day before meals  insulin lispro (ADMELOG) corrective regimen sliding scale   SubCutaneous at bedtime    MEDICATIONS  (PRN):  dextrose Oral Gel 15 Gram(s) Oral once PRN Blood Glucose LESS THAN 70 milliGRAM(s)/deciliter      Vital Signs Last 24 Hrs  T(C): 36.9 (23 May 2024 11:57), Max: 37.5 (22 May 2024 18:36)  T(F): 98.5 (23 May 2024 11:57), Max: 99.5 (22 May 2024 18:36)  HR: 71 (23 May 2024 11:57) (61 - 75)  BP: 151/73 (23 May 2024 11:57) (130/64 - 151/73)  BP(mean): --  RR: 18 (23 May 2024 11:57) (16 - 20)  SpO2: 97% (23 May 2024 11:57) (96% - 98%)    Parameters below as of 23 May 2024 11:57  Patient On (Oxygen Delivery Method): room air      CAPILLARY BLOOD GLUCOSE      POCT Blood Glucose.: 122 mg/dL (23 May 2024 11:51)  POCT Blood Glucose.: 90 mg/dL (23 May 2024 08:15)  POCT Blood Glucose.: 126 mg/dL (22 May 2024 23:59)    I&O's Summary      PHYSICAL EXAM:  GENERAL: NAD, well-developed  HEAD:  Atraumatic, Normocephalic  EYES: EOMI, PERRLA, conjunctiva and sclera clear  NECK: Supple, No JVD  CHEST/LUNG: Clear to auscultation bilaterally; No wheeze  HEART: Regular rate and rhythm; No murmurs, rubs, or gallops  ABDOMEN: Soft, Nontender, Nondistended; Bowel sounds present  EXTREMITIES:  2+ Peripheral Pulses, No clubbing, cyanosis, or edema  PSYCH: AAOx3  NEUROLOGY: non-focal  SKIN: No rashes or lesions    LABS:                        13.0   10.10 )-----------( 256      ( 23 May 2024 06:45 )             41.6     05-23    142  |  107  |  29<H>  ----------------------------<  79  4.0   |  23  |  1.25    Ca    9.9      23 May 2024 06:45  Phos  3.0     05-23  Mg     2.0     05-23    TPro  6.5  /  Alb  3.6  /  TBili  0.2  /  DBili  x   /  AST  16  /  ALT  18  /  AlkPhos  78  05-22    PT/INR - ( 22 May 2024 18:43 )   PT: 14.1 sec;   INR: 1.35 ratio         PTT - ( 22 May 2024 18:43 )  PTT:32.2 sec      Urinalysis Basic - ( 23 May 2024 06:45 )    Color: x / Appearance: x / SG: x / pH: x  Gluc: 79 mg/dL / Ketone: x  / Bili: x / Urobili: x   Blood: x / Protein: x / Nitrite: x   Leuk Esterase: x / RBC: x / WBC x   Sq Epi: x / Non Sq Epi: x / Bacteria: x    < from: CT Chest No Cont (05.22.24 @ 23:41) >  FINDINGS:    LUNGS AND AIRWAYS: Patentcentral airways.  Left upper lobe opacity   measures 4.2 x 1.6 cm (prior 2.6 x 2.0 cm), corresponding to known mass.   No additional suspicious nodule. No other area of focal consolidation.  PLEURA: No pleural effusion. Left pleural thickening in theupper lobe   adjacent to the opacity.  MEDIASTINUM AND RICARDO: No lymphadenopathy.  VESSELS: Coronary artery and aortic calcifications.  HEART: Heart size is normal. No pericardial effusion. Aortic valvular   calcifications.  CHEST WALL AND LOWER NECK:Right chest wall MediPort with tip in the SVC.   Left chest wall AICD with leads in the right atrium and right ventricle.   Heterogenous thyroid gland containing nodules and calcifications.  VISUALIZED UPPER ABDOMEN: Partially visualized right renal cyst. Cystic   lesions within the pancreas are not well characterized on this exam.   Ossifications within the pancreatic tail, similar to prior.  BONES: Subacute versus chronic left lateral second and third rib   fractures. Degenerative changes.    IMPRESSION:  No evidence of active pneumonia.  Left upper lobe opacity mildly increased in size, unclear if combination   of posttreatment changes and atelectasis or progression of known lung   mass. Correlation with more recent imaging would be of benefit.      < end of copied text >  < from: CT Head No Cont (05.22.24 @ 23:41) >  FINDINGS:    VENTRICLES AND SULCI: Atrophy out of proportion to patient's age.  INTRA-AXIAL: No intracranial mass, acute hemorrhage, or midline shift.   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   Additional encephalomalacia/gliosis noted involving the bilateral   anterior inferior frontal lobes and bilateral anterior temporal lobes,   suggestive of old contusions. There are periventricular and subcortical   white matter hypodensities, consistent withmicrovascular type changes.  EXTRA-AXIAL: No mass or fluid collection. Basal cisterns are normal in   appearance.    VISUALIZED SINUSES:  Clear.  TYMPANOMASTOID CAVITIES:  Clear.  VISUALIZED ORBITS: Status post right lens replacement.  CALVARIUM: Oldmidline posterior occipital bone fracture.    MISCELLANEOUS: None.      IMPRESSION:  Stable exam. No acute intracranial hemorrhage, hydrocephalus, vasogenic   edema or extra-axial collection. Chronic infarcts and sequela of prior   traumatic injury similar to the prior exam. There is concern for   intracranial metastatic disease consider further evaluation with   contrast-enhanced brain MRI.      < end of copied text >      RADIOLOGY & ADDITIONAL TESTS:    Imaging Personally Reviewed:    Consultant(s) Notes Reviewed:      Care Discussed with Consultants/Other Providers:  
DATE OF SERVICE: 05-27-24 @ 10:14    Patient is a 89y old  Female who presents with a chief complaint of weakness (26 May 2024 11:29)      SUBJECTIVE / OVERNIGHT EVENTS:  No chest pain. No shortness of breath. No complaints. No events overnight.     MEDICATIONS  (STANDING):  amLODIPine   Tablet 5 milliGRAM(s) Oral daily  apixaban 5 milliGRAM(s) Oral every 12 hours  ascorbic acid 500 milliGRAM(s) Oral daily  chlorhexidine 2% Cloths 1 Application(s) Topical daily  dextrose 10% Bolus 125 milliLiter(s) IV Bolus once  dextrose 5%. 1000 milliLiter(s) (100 mL/Hr) IV Continuous <Continuous>  dextrose 5%. 1000 milliLiter(s) (50 mL/Hr) IV Continuous <Continuous>  dextrose 50% Injectable 25 Gram(s) IV Push once  dextrose 50% Injectable 12.5 Gram(s) IV Push once  donepezil 10 milliGRAM(s) Oral at bedtime  escitalopram 5 milliGRAM(s) Oral daily  famotidine    Tablet 20 milliGRAM(s) Oral daily  glucagon  Injectable 1 milliGRAM(s) IntraMuscular once  insulin lispro (ADMELOG) corrective regimen sliding scale   SubCutaneous three times a day before meals  insulin lispro (ADMELOG) corrective regimen sliding scale   SubCutaneous at bedtime  memantine 10 milliGRAM(s) Oral two times a day  multivitamin 1 Tablet(s) Oral daily  mupirocin 2% Nasal 1 Application(s) Both Nostrils two times a day    MEDICATIONS  (PRN):  dextrose Oral Gel 15 Gram(s) Oral once PRN Blood Glucose LESS THAN 70 milliGRAM(s)/deciliter      Vital Signs Last 24 Hrs  T(C): 36.4 (27 May 2024 08:28), Max: 36.7 (26 May 2024 11:14)  T(F): 97.6 (27 May 2024 08:28), Max: 98.1 (26 May 2024 11:14)  HR: 76 (27 May 2024 08:28) (75 - 86)  BP: 156/80 (27 May 2024 08:28) (115/67 - 156/80)  BP(mean): --  RR: 18 (27 May 2024 08:28) (18 - 18)  SpO2: 97% (27 May 2024 08:28) (95% - 98%)    Parameters below as of 27 May 2024 08:28  Patient On (Oxygen Delivery Method): room air      CAPILLARY BLOOD GLUCOSE      POCT Blood Glucose.: 182 mg/dL (27 May 2024 08:34)  POCT Blood Glucose.: 204 mg/dL (26 May 2024 21:34)  POCT Blood Glucose.: 221 mg/dL (26 May 2024 16:51)  POCT Blood Glucose.: 211 mg/dL (26 May 2024 11:40)    I&O's Summary    26 May 2024 07:01  -  27 May 2024 07:00  --------------------------------------------------------  IN: 480 mL / OUT: 200 mL / NET: 280 mL        PHYSICAL EXAM:  GENERAL: NAD, well-developed  HEAD:  Atraumatic, Normocephalic  EYES: EOMI, PERRLA, conjunctiva and sclera clear  NECK: Supple, No JVD  CHEST/LUNG: Clear to auscultation bilaterally; No wheeze  HEART: Regular rate and rhythm; No murmurs, rubs, or gallops  ABDOMEN: Soft, Nontender, Nondistended; Bowel sounds present  EXTREMITIES:  2+ Peripheral Pulses, No clubbing, cyanosis, or edema  PSYCH: AAOx3  NEUROLOGY: non-focal  SKIN: No rashes or lesions    LABS:    05-27    143  |  111<H>  |  34<H>  ----------------------------<  138<H>  4.2   |  23  |  1.34<H>    Ca    9.6      27 May 2024 06:46  Phos  2.6     05-25  Mg     2.0     05-25            Urinalysis Basic - ( 27 May 2024 06:46 )    Color: x / Appearance: x / SG: x / pH: x  Gluc: 138 mg/dL / Ketone: x  / Bili: x / Urobili: x   Blood: x / Protein: x / Nitrite: x   Leuk Esterase: x / RBC: x / WBC x   Sq Epi: x / Non Sq Epi: x / Bacteria: x        RADIOLOGY & ADDITIONAL TESTS:    Imaging Personally Reviewed:    Consultant(s) Notes Reviewed:      Care Discussed with Consultants/Other Providers:  
DATE OF SERVICE: 05-25-24 @ 10:18    Patient is a 89y old  Female who presents with a chief complaint of weakness (24 May 2024 15:41)      SUBJECTIVE / OVERNIGHT EVENTS:  No chest pain. No shortness of breath. No complaints. No events overnight.     MEDICATIONS  (STANDING):  amLODIPine   Tablet 5 milliGRAM(s) Oral daily  apixaban 5 milliGRAM(s) Oral every 12 hours  ascorbic acid 500 milliGRAM(s) Oral daily  cefTRIAXone   IVPB 1000 milliGRAM(s) IV Intermittent every 24 hours  chlorhexidine 2% Cloths 1 Application(s) Topical daily  dextrose 10% Bolus 125 milliLiter(s) IV Bolus once  dextrose 5%. 1000 milliLiter(s) (100 mL/Hr) IV Continuous <Continuous>  dextrose 5%. 1000 milliLiter(s) (50 mL/Hr) IV Continuous <Continuous>  dextrose 50% Injectable 25 Gram(s) IV Push once  dextrose 50% Injectable 12.5 Gram(s) IV Push once  donepezil 10 milliGRAM(s) Oral at bedtime  escitalopram 5 milliGRAM(s) Oral daily  famotidine    Tablet 20 milliGRAM(s) Oral daily  glucagon  Injectable 1 milliGRAM(s) IntraMuscular once  insulin lispro (ADMELOG) corrective regimen sliding scale   SubCutaneous three times a day before meals  insulin lispro (ADMELOG) corrective regimen sliding scale   SubCutaneous at bedtime  memantine 10 milliGRAM(s) Oral two times a day  multivitamin 1 Tablet(s) Oral daily  mupirocin 2% Nasal 1 Application(s) Both Nostrils two times a day    MEDICATIONS  (PRN):  dextrose Oral Gel 15 Gram(s) Oral once PRN Blood Glucose LESS THAN 70 milliGRAM(s)/deciliter      Vital Signs Last 24 Hrs  T(C): 36.4 (25 May 2024 04:00), Max: 36.7 (24 May 2024 12:48)  T(F): 97.5 (25 May 2024 04:00), Max: 98.1 (24 May 2024 12:48)  HR: 78 (25 May 2024 04:00) (69 - 78)  BP: 132/81 (25 May 2024 04:00) (116/69 - 137/84)  BP(mean): --  RR: 18 (25 May 2024 04:00) (18 - 18)  SpO2: 96% (25 May 2024 04:00) (96% - 98%)    Parameters below as of 25 May 2024 04:00  Patient On (Oxygen Delivery Method): room air      CAPILLARY BLOOD GLUCOSE      POCT Blood Glucose.: 115 mg/dL (25 May 2024 07:46)  POCT Blood Glucose.: 163 mg/dL (24 May 2024 21:37)  POCT Blood Glucose.: 158 mg/dL (24 May 2024 16:54)  POCT Blood Glucose.: 210 mg/dL (24 May 2024 12:40)    I&O's Summary    24 May 2024 07:01  -  25 May 2024 07:00  --------------------------------------------------------  IN: 240 mL / OUT: 600 mL / NET: -360 mL        PHYSICAL EXAM:  GENERAL: NAD, well-developed  HEAD:  Atraumatic, Normocephalic  EYES: EOMI, PERRLA, conjunctiva and sclera clear  NECK: Supple, No JVD  CHEST/LUNG: Clear to auscultation bilaterally; No wheeze  HEART: Regular rate and rhythm; No murmurs, rubs, or gallops  ABDOMEN: Soft, Nontender, Nondistended; Bowel sounds present  EXTREMITIES:  2+ Peripheral Pulses, No clubbing, cyanosis, or edema  PSYCH: AAOx3  NEUROLOGY: non-focal  SKIN: No rashes or lesions    LABS:                        13.9   9.84  )-----------( 240      ( 24 May 2024 06:21 )             43.9     05-25    143  |  109<H>  |  23  ----------------------------<  121<H>  4.1   |  22  |  1.11    Ca    10.0      25 May 2024 07:24            Urinalysis Basic - ( 25 May 2024 07:24 )    Color: x / Appearance: x / SG: x / pH: x  Gluc: 121 mg/dL / Ketone: x  / Bili: x / Urobili: x   Blood: x / Protein: x / Nitrite: x   Leuk Esterase: x / RBC: x / WBC x   Sq Epi: x / Non Sq Epi: x / Bacteria: x        RADIOLOGY & ADDITIONAL TESTS:    Imaging Personally Reviewed:    Consultant(s) Notes Reviewed:      Care Discussed with Consultants/Other Providers:  
DATE OF SERVICE: 05-24-24 @ 15:43    Patient is a 89y old  Female who presents with a chief complaint of weakness (23 May 2024 15:18)      SUBJECTIVE / OVERNIGHT EVENTS:  No chest pain. No shortness of breath. No complaints. No events overnight.     MEDICATIONS  (STANDING):  amLODIPine   Tablet 5 milliGRAM(s) Oral daily  apixaban 5 milliGRAM(s) Oral every 12 hours  ascorbic acid 500 milliGRAM(s) Oral daily  cefTRIAXone   IVPB 1000 milliGRAM(s) IV Intermittent every 24 hours  chlorhexidine 2% Cloths 1 Application(s) Topical daily  dextrose 10% Bolus 125 milliLiter(s) IV Bolus once  dextrose 5%. 1000 milliLiter(s) (100 mL/Hr) IV Continuous <Continuous>  dextrose 5%. 1000 milliLiter(s) (50 mL/Hr) IV Continuous <Continuous>  dextrose 50% Injectable 25 Gram(s) IV Push once  dextrose 50% Injectable 12.5 Gram(s) IV Push once  donepezil 10 milliGRAM(s) Oral at bedtime  escitalopram 5 milliGRAM(s) Oral daily  famotidine    Tablet 20 milliGRAM(s) Oral daily  glucagon  Injectable 1 milliGRAM(s) IntraMuscular once  insulin lispro (ADMELOG) corrective regimen sliding scale   SubCutaneous three times a day before meals  insulin lispro (ADMELOG) corrective regimen sliding scale   SubCutaneous at bedtime  memantine 10 milliGRAM(s) Oral two times a day  multivitamin 1 Tablet(s) Oral daily  mupirocin 2% Nasal 1 Application(s) Both Nostrils two times a day    MEDICATIONS  (PRN):  dextrose Oral Gel 15 Gram(s) Oral once PRN Blood Glucose LESS THAN 70 milliGRAM(s)/deciliter      Vital Signs Last 24 Hrs  T(C): 36.7 (24 May 2024 12:48), Max: 36.7 (24 May 2024 04:09)  T(F): 98.1 (24 May 2024 12:48), Max: 98.1 (24 May 2024 12:48)  HR: 76 (24 May 2024 12:48) (75 - 77)  BP: 137/84 (24 May 2024 12:48) (132/73 - 137/84)  BP(mean): --  RR: 18 (24 May 2024 12:48) (18 - 18)  SpO2: 98% (24 May 2024 12:48) (96% - 98%)    Parameters below as of 24 May 2024 12:48  Patient On (Oxygen Delivery Method): room air      CAPILLARY BLOOD GLUCOSE      POCT Blood Glucose.: 210 mg/dL (24 May 2024 12:40)  POCT Blood Glucose.: 137 mg/dL (24 May 2024 08:01)  POCT Blood Glucose.: 195 mg/dL (23 May 2024 21:24)  POCT Blood Glucose.: 198 mg/dL (23 May 2024 16:30)    I&O's Summary    23 May 2024 07:01  -  24 May 2024 07:00  --------------------------------------------------------  IN: 240 mL / OUT: 1470 mL / NET: -1230 mL    24 May 2024 07:01  -  24 May 2024 15:43  --------------------------------------------------------  IN: 240 mL / OUT: 0 mL / NET: 240 mL        PHYSICAL EXAM:  GENERAL: NAD, well-developed  HEAD:  Atraumatic, Normocephalic  EYES: EOMI, PERRLA, conjunctiva and sclera clear  NECK: Supple, No JVD  CHEST/LUNG: Clear to auscultation bilaterally; No wheeze  HEART: Regular rate and rhythm; No murmurs, rubs, or gallops  ABDOMEN: Soft, Nontender, Nondistended; Bowel sounds present  EXTREMITIES:  2+ Peripheral Pulses, No clubbing, cyanosis, or edema  PSYCH: AAOx3  NEUROLOGY: non-focal  SKIN: No rashes or lesions    LABS:                        13.9   9.84  )-----------( 240      ( 24 May 2024 06:21 )             43.9     05-24    140  |  106  |  26<H>  ----------------------------<  160<H>  4.4   |  22  |  1.34<H>    Ca    9.8      24 May 2024 06:21  Phos  3.0     05-23  Mg     2.0     05-23    TPro  6.5  /  Alb  3.6  /  TBili  0.2  /  DBili  x   /  AST  16  /  ALT  18  /  AlkPhos  78  05-22    PT/INR - ( 22 May 2024 18:43 )   PT: 14.1 sec;   INR: 1.35 ratio         PTT - ( 22 May 2024 18:43 )  PTT:32.2 sec      Urinalysis Basic - ( 24 May 2024 06:21 )    Color: x / Appearance: x / SG: x / pH: x  Gluc: 160 mg/dL / Ketone: x  / Bili: x / Urobili: x   Blood: x / Protein: x / Nitrite: x   Leuk Esterase: x / RBC: x / WBC x   Sq Epi: x / Non Sq Epi: x / Bacteria: x        RADIOLOGY & ADDITIONAL TESTS:    Imaging Personally Reviewed:    Consultant(s) Notes Reviewed:      Care Discussed with Consultants/Other Providers:  
Patient is a 89y old  Female who presents with a chief complaint of weakness (25 May 2024 10:18)    Patient seen and examined at bedside, aide present. Resting comfortably.  MEDICATIONS  (STANDING):  amLODIPine   Tablet 5 milliGRAM(s) Oral daily  apixaban 5 milliGRAM(s) Oral every 12 hours  ascorbic acid 500 milliGRAM(s) Oral daily  cefTRIAXone   IVPB 1000 milliGRAM(s) IV Intermittent every 24 hours  chlorhexidine 2% Cloths 1 Application(s) Topical daily  dextrose 10% Bolus 125 milliLiter(s) IV Bolus once  dextrose 5%. 1000 milliLiter(s) (100 mL/Hr) IV Continuous <Continuous>  dextrose 5%. 1000 milliLiter(s) (50 mL/Hr) IV Continuous <Continuous>  dextrose 50% Injectable 25 Gram(s) IV Push once  dextrose 50% Injectable 12.5 Gram(s) IV Push once  donepezil 10 milliGRAM(s) Oral at bedtime  escitalopram 5 milliGRAM(s) Oral daily  famotidine    Tablet 20 milliGRAM(s) Oral daily  glucagon  Injectable 1 milliGRAM(s) IntraMuscular once  insulin lispro (ADMELOG) corrective regimen sliding scale   SubCutaneous three times a day before meals  insulin lispro (ADMELOG) corrective regimen sliding scale   SubCutaneous at bedtime  memantine 10 milliGRAM(s) Oral two times a day  multivitamin 1 Tablet(s) Oral daily  mupirocin 2% Nasal 1 Application(s) Both Nostrils two times a day    MEDICATIONS  (PRN):  dextrose Oral Gel 15 Gram(s) Oral once PRN Blood Glucose LESS THAN 70 milliGRAM(s)/deciliter    Vital Signs Last 24 Hrs  T(C): 36.4 (25 May 2024 11:25), Max: 36.7 (24 May 2024 12:48)  T(F): 97.5 (25 May 2024 11:25), Max: 98.1 (24 May 2024 12:48)  HR: 68 (25 May 2024 11:25) (68 - 78)  BP: 121/78 (25 May 2024 11:25) (116/69 - 137/84)  BP(mean): --  RR: 18 (25 May 2024 11:25) (18 - 18)  SpO2: 97% (25 May 2024 11:25) (96% - 98%)    Parameters below as of 25 May 2024 11:25  Patient On (Oxygen Delivery Method): room air    PE  NAD  Awake, alert  Anicteric, MMM  No c/c/e  No rash grossly  FROM                          13.9   9.84  )-----------( 240      ( 24 May 2024 06:21 )             43.9       05-25    143  |  109<H>  |  23  ----------------------------<  121<H>  4.1   |  22  |  1.11    Ca    10.0      25 May 2024 07:24        
Patient is a 89y old  Female who presents with a chief complaint of weakness (26 May 2024 10:04)    Patient seen and examined, aide present. D/w medicine attending.    MEDICATIONS  (STANDING):  amLODIPine   Tablet 5 milliGRAM(s) Oral daily  apixaban 5 milliGRAM(s) Oral every 12 hours  ascorbic acid 500 milliGRAM(s) Oral daily  cefTRIAXone   IVPB 1 milliGRAM(s) IV Intermittent every 24 hours  chlorhexidine 2% Cloths 1 Application(s) Topical daily  dextrose 10% Bolus 125 milliLiter(s) IV Bolus once  dextrose 5%. 1000 milliLiter(s) (100 mL/Hr) IV Continuous <Continuous>  dextrose 5%. 1000 milliLiter(s) (50 mL/Hr) IV Continuous <Continuous>  dextrose 50% Injectable 25 Gram(s) IV Push once  dextrose 50% Injectable 12.5 Gram(s) IV Push once  donepezil 10 milliGRAM(s) Oral at bedtime  escitalopram 5 milliGRAM(s) Oral daily  famotidine    Tablet 20 milliGRAM(s) Oral daily  glucagon  Injectable 1 milliGRAM(s) IntraMuscular once  insulin lispro (ADMELOG) corrective regimen sliding scale   SubCutaneous three times a day before meals  insulin lispro (ADMELOG) corrective regimen sliding scale   SubCutaneous at bedtime  memantine 10 milliGRAM(s) Oral two times a day  multivitamin 1 Tablet(s) Oral daily  mupirocin 2% Nasal 1 Application(s) Both Nostrils two times a day    MEDICATIONS  (PRN):  dextrose Oral Gel 15 Gram(s) Oral once PRN Blood Glucose LESS THAN 70 milliGRAM(s)/deciliter      Vital Signs Last 24 Hrs  T(C): 36.7 (26 May 2024 11:14), Max: 36.7 (25 May 2024 20:32)  T(F): 98.1 (26 May 2024 11:14), Max: 98.1 (25 May 2024 20:32)  HR: 75 (26 May 2024 11:14) (62 - 78)  BP: 115/67 (26 May 2024 11:14) (115/67 - 138/70)  BP(mean): --  RR: 18 (26 May 2024 11:14) (18 - 18)  SpO2: 95% (26 May 2024 11:14) (95% - 97%)    Parameters below as of 26 May 2024 11:14  Patient On (Oxygen Delivery Method): room air    PE  NAD  Awake, alert  Anicteric, MMM  RRR  CTAB  Abd soft, NT, ND  No c/c      05-25    142  |  109<H>  |  32<H>  ----------------------------<  202<H>  4.5   |  22  |  1.47<H>    Ca    9.6      25 May 2024 20:46  Phos  2.6     05-25  Mg     2.0     05-25        
DATE OF SERVICE: 05-26-24 @ 10:05    Patient is a 89y old  Female who presents with a chief complaint of weakness (25 May 2024 11:45)      SUBJECTIVE / OVERNIGHT EVENTS:  No chest pain. No shortness of breath. No complaints. No events overnight.     MEDICATIONS  (STANDING):  amLODIPine   Tablet 5 milliGRAM(s) Oral daily  apixaban 5 milliGRAM(s) Oral every 12 hours  ascorbic acid 500 milliGRAM(s) Oral daily  cefTRIAXone   IVPB 1 milliGRAM(s) IV Intermittent every 24 hours  chlorhexidine 2% Cloths 1 Application(s) Topical daily  dextrose 10% Bolus 125 milliLiter(s) IV Bolus once  dextrose 5%. 1000 milliLiter(s) (100 mL/Hr) IV Continuous <Continuous>  dextrose 5%. 1000 milliLiter(s) (50 mL/Hr) IV Continuous <Continuous>  dextrose 50% Injectable 25 Gram(s) IV Push once  dextrose 50% Injectable 12.5 Gram(s) IV Push once  donepezil 10 milliGRAM(s) Oral at bedtime  escitalopram 5 milliGRAM(s) Oral daily  famotidine    Tablet 20 milliGRAM(s) Oral daily  glucagon  Injectable 1 milliGRAM(s) IntraMuscular once  insulin lispro (ADMELOG) corrective regimen sliding scale   SubCutaneous three times a day before meals  insulin lispro (ADMELOG) corrective regimen sliding scale   SubCutaneous at bedtime  memantine 10 milliGRAM(s) Oral two times a day  multivitamin 1 Tablet(s) Oral daily  mupirocin 2% Nasal 1 Application(s) Both Nostrils two times a day    MEDICATIONS  (PRN):  dextrose Oral Gel 15 Gram(s) Oral once PRN Blood Glucose LESS THAN 70 milliGRAM(s)/deciliter      Vital Signs Last 24 Hrs  T(C): 36.7 (26 May 2024 04:03), Max: 36.7 (25 May 2024 20:32)  T(F): 98 (26 May 2024 04:03), Max: 98.1 (25 May 2024 20:32)  HR: 78 (26 May 2024 04:03) (62 - 78)  BP: 138/70 (26 May 2024 04:03) (121/78 - 138/70)  BP(mean): --  RR: 18 (26 May 2024 04:03) (18 - 18)  SpO2: 97% (26 May 2024 04:03) (95% - 97%)    Parameters below as of 26 May 2024 04:03  Patient On (Oxygen Delivery Method): room air      CAPILLARY BLOOD GLUCOSE      POCT Blood Glucose.: 133 mg/dL (26 May 2024 07:38)  POCT Blood Glucose.: 188 mg/dL (25 May 2024 21:37)  POCT Blood Glucose.: 185 mg/dL (25 May 2024 16:30)  POCT Blood Glucose.: 203 mg/dL (25 May 2024 11:23)    I&O's Summary    25 May 2024 07:01  -  26 May 2024 07:00  --------------------------------------------------------  IN: 180 mL / OUT: 350 mL / NET: -170 mL        PHYSICAL EXAM:  GENERAL: NAD, well-developed  HEAD:  Atraumatic, Normocephalic  EYES: EOMI, PERRLA, conjunctiva and sclera clear  NECK: Supple, No JVD  CHEST/LUNG: Clear to auscultation bilaterally; No wheeze  HEART: Regular rate and rhythm; No murmurs, rubs, or gallops  ABDOMEN: Soft, Nontender, Nondistended; Bowel sounds present  EXTREMITIES:  2+ Peripheral Pulses, No clubbing, cyanosis, or edema  PSYCH: AAOx3  NEUROLOGY: non-focal  SKIN: No rashes or lesions    LABS:    05-25    142  |  109<H>  |  32<H>  ----------------------------<  202<H>  4.5   |  22  |  1.47<H>    Ca    9.6      25 May 2024 20:46  Phos  2.6     05-25  Mg     2.0     05-25            Urinalysis Basic - ( 25 May 2024 20:46 )    Color: x / Appearance: x / SG: x / pH: x  Gluc: 202 mg/dL / Ketone: x  / Bili: x / Urobili: x   Blood: x / Protein: x / Nitrite: x   Leuk Esterase: x / RBC: x / WBC x   Sq Epi: x / Non Sq Epi: x / Bacteria: x    < from: MR Head w/wo IV Cont (05.24.24 @ 12:50) >  FINDINGS:    No hydrocephalus, midline shift, or effacement of the basal cisterns.    No acute intracranial hemorrhage.    No restricted diffusion or acute infarction. No vasogenic edema.    Encephalomalacia and gliosis involving the bilateral anterior frontal   lobes, bilateral anterior temporal lobes, and right posterior   parietotemporal region.    Some associated susceptibility artifact in these regions, consistent with   chronic subarachnoid hemorrhagic products.    Signal voids are seen within the major intracranial vessels consistent   with their patency.    No abnormal parenchymal, leptomeningeal, or dural enhancement.    The visualized paranasal sinuses and mastoid air cells are clear.    The orbits, sellar and suprasellar structures, and craniocervical   junction are unremarkable.    IMPRESSION:    No hydrocephalus, mass effect, acute intracranial hemorrhage, vasogenic   edema, restricted diffusion, or acute territorial infarct.    No abnormal parenchymal, leptomeningeal, or dural enhancement.    No evidence for intracranial metastases.    Multifocal regions of encephalomalacia, gliosis, and susceptibility   artifact.    < end of copied text >      RADIOLOGY & ADDITIONAL TESTS:    Imaging Personally Reviewed:    Consultant(s) Notes Reviewed:      Care Discussed with Consultants/Other Providers:  
Patient is a 89y old  Female who presents with a chief complaint of weakness (27 May 2024 10:13)    Patient seen and examined, feels well. Spoke with aide at bedside.  MEDICATIONS  (STANDING):  amLODIPine   Tablet 5 milliGRAM(s) Oral daily  apixaban 5 milliGRAM(s) Oral every 12 hours  ascorbic acid 500 milliGRAM(s) Oral daily  chlorhexidine 2% Cloths 1 Application(s) Topical daily  dextrose 10% Bolus 125 milliLiter(s) IV Bolus once  dextrose 5%. 1000 milliLiter(s) (100 mL/Hr) IV Continuous <Continuous>  dextrose 5%. 1000 milliLiter(s) (50 mL/Hr) IV Continuous <Continuous>  dextrose 50% Injectable 25 Gram(s) IV Push once  dextrose 50% Injectable 12.5 Gram(s) IV Push once  donepezil 10 milliGRAM(s) Oral at bedtime  escitalopram 5 milliGRAM(s) Oral daily  famotidine    Tablet 20 milliGRAM(s) Oral daily  glucagon  Injectable 1 milliGRAM(s) IntraMuscular once  insulin lispro (ADMELOG) corrective regimen sliding scale   SubCutaneous three times a day before meals  insulin lispro (ADMELOG) corrective regimen sliding scale   SubCutaneous at bedtime  memantine 10 milliGRAM(s) Oral two times a day  multivitamin 1 Tablet(s) Oral daily  mupirocin 2% Nasal 1 Application(s) Both Nostrils two times a day    MEDICATIONS  (PRN):  dextrose Oral Gel 15 Gram(s) Oral once PRN Blood Glucose LESS THAN 70 milliGRAM(s)/deciliter    Vital Signs Last 24 Hrs  T(C): 36.4 (27 May 2024 08:28), Max: 36.7 (26 May 2024 15:41)  T(F): 97.6 (27 May 2024 08:28), Max: 98.1 (26 May 2024 15:41)  HR: 76 (27 May 2024 08:28) (75 - 86)  BP: 156/80 (27 May 2024 08:28) (118/70 - 156/80)  BP(mean): --  RR: 18 (27 May 2024 08:28) (18 - 18)  SpO2: 97% (27 May 2024 08:28) (96% - 98%)    Parameters below as of 27 May 2024 08:28  Patient On (Oxygen Delivery Method): room air    PE  NAD  Awake, alert  Anicteric, MMM  RRR  CTAB  Abd soft, NT, ND  No c/c/e      05-27    143  |  111<H>  |  34<H>  ----------------------------<  138<H>  4.2   |  23  |  1.34<H>    Ca    9.6      27 May 2024 06:46  Phos  2.6     05-25  Mg     2.0     05-25

## 2024-05-27 NOTE — PROGRESS NOTE ADULT - ASSESSMENT
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 Patient has HHA who reported she had chills/shaking two days ago and then had difficulty getting patient out of bed this morning. Whenever she would walk her legs would buckle    UTI  - c/w  ceftriaxone   -  urine culture shows e coli  - f/o sensitivities    generalized weakness 2/2 advanced malignancy  - oncology consult following   - will follow recs  - MR of brain did not show mets  - follow up with oncology as ou pt    diabetes  - fs qid  - hgb a1c  6.4  - insulin ss    depression  - lexapro    HLD  - lipitor    HTN  - norvasc    dvt   - c/w eliquis          
 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 Patient has HHA who reported she had chills/shaking two days ago and then had difficulty getting patient out of bed this morning. Whenever she would walk her legs would buckle    UTI  - ceftriaxone x 3 days  - follow up urine culture    generalized weakness 2/2 advanced malignancy  - oncology consult following   - will follow recs  - MR of brain    diabetes  - fs qid  - hgb a1c  6.4  - insulin ss    depression  - lexapro    HLD  - lipitor    HTN  - norvasc    dvt   - c/w eliquis          
 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 Patient has HHA who reported she had chills/shaking two days ago and then had difficulty getting patient out of bed this morning. Whenever she would walk her legs would buckle    UTI  - ceftriaxone x 3 days  - follow up urine culture    generalized weakness 2/2 advanced malignancy  - oncology consult following   - will follow recs  - MR of brain    diabetes  - fs qid  - hgb a1c  6.4  - insulin ss    depression  - lexapro    HLD  - lipitor    HTN  - norvasc    dvt   - c/w eliquis          
 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 Patient has HHA who reported she had chills/shaking two days ago and then had difficulty getting patient out of bed this morning. Whenever she would walk her legs would buckle    UTI  - ceftriaxone x 3 days  - follow up urine culture    progression metastatic lung cancer  - MRI of brain ( unsure if PPM is MR compatible)  - oncology consult    diabetes  - fs qid  - hgb a1c  6.4  - insulin ss    depression  - lexapro    HLD  - lipitor    HTN  - norvasc    dvt   - c/w eliquis          
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    Stage IV lung cancer  - CT chest: No evidence of active pneumonia. Left upper lobe opacity mildly increased in size, unclear if combination of posttreatment changes and atelectasis or progression of known lung mass.   - CTH: Stable exam. No acute intracranial hemorrhage, hydrocephalus, vasogenic edema or extra-axial collection. 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   MSK read - since Jan 8, 2024, no change, no acute intracranial abnormality.   - [place] [facility] [place]. Last on pembro q 6 weeks given on 1/8/2024, currently on treatment break.   - PET CT 3/2: Since September 25, 2023 no FDG avidity suspicious for malignancy.  - CTH 1/8/2024: Since January 3, 2023, No significant interval change, no evidence of acute intracranial abnormality or new mass effect.  - MRI head with no evidence of malignancy.     UTI  - Completed antibiotics    Discharge planning for home.    Neno Campo PA-C  Hematology/Oncology  [facility]  [phone] (office)
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    Stage IV lung cancer  - CT chest: No evidence of active pneumonia. Left upper lobe opacity mildly increased in size, unclear if combination of posttreatment changes and atelectasis or progression of known lung mass.   - CTH: Stable exam. No acute intracranial hemorrhage, hydrocephalus, vasogenic edema or extra-axial collection. 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   MSK read - since Jan 8, 2024, no change, no acute intracranial abnormality.   - [place] [facility] [place]. Last on pembro q 6 weeks given on 1/8/2024, currently on treatment break.   - PET CT 3/2: Since September 25, 2023 no FDG avidity suspicious for malignancy.  - CTH 1/8/2024: Since January 3, 2023, No significant interval change, no evidence of acute intracranial abnormality or new mass effect.  - MRI head with no evidence of malignancy.     UTI  - Currently on antibiotics    Neno Campo PA-C  Hematology/Oncology  [facility]  [phone] (office)
 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 Patient has HHA who reported she had chills/shaking two days ago and then had difficulty getting patient out of bed this morning. Whenever she would walk her legs would buckle    UTI  - c/w  ceftriaxone   -  urine culture shows e coli  - pansensitive    generalized weakness 2/2 advanced malignancy  - oncology consult following   - will follow recs  - MR of brain did not show mets  - follow up with oncology as ou pt    diabetes  - fs qid  - hgb a1c  6.4  - insulin ss    depression  - lexapro    HLD  - lipitor    HTN  - norvasc    dvt   - c/w eliquis    d/c planning        
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    Stage IV lung cancer  - CT chest: No evidence of active pneumonia. Left upper lobe opacity mildly increased in size, unclear if combination of posttreatment changes and atelectasis or progression of known lung mass.   - CTH: Stable exam. No acute intracranial hemorrhage, hydrocephalus, vasogenic edema or extra-axial collection. 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   MSK read - since Jan 8, 2024, no change, no acute intracranial abnormality.   - [place] [facility] [place]. Last on pembro q 6 weeks given on 1/8/2024, currently on treatment break. Requested [place] of [place] [facility] [place]  - PET CT 3/2: Since September 25, 2023 no FDG avidity suspicious for malignancy.  - CTH 1/8/2024: Since January 3, 2023, No significant interval change, no evidence of acute intracranial abnormality or new mass effect.  - F/u MRI head report    UTI  - Currently on antibiotics    Neno Campo PA-C  Hematology/Oncology  [facility]  [phone] (office)

## 2024-05-27 NOTE — PROGRESS NOTE ADULT - NS ATTEND AMEND GEN_ALL_CORE FT
As above    88 y/o female with metastatic lung cancer, on treatment break at [facility], admitted with lower extremity weakness. MRI head with no evidence of malignancy. On treatment for urinary tract infection. Physical therapy evaluation.
As above    Continue antibiotics for urinary tract infection. no plans for systemic therapy inpatient. Continue physical therapy
As above. Follow-up outpatient with [facility] [place]

## 2024-05-27 NOTE — DISCHARGE NOTE NURSING/CASE MANAGEMENT/SOCIAL WORK - PATIENT PORTAL LINK FT
You can access the FollowMyHealth Patient Portal offered by [facility] by registering at the following website: [email or web address]. By joining [employer]’s FollowMyHealth portal, you will also be able to view your health information using other applications (apps) compatible with our system.

## 2024-05-28 LAB
-  AMPICILLIN: SIGNIFICANT CHANGE UP
-  CIPROFLOXACIN: SIGNIFICANT CHANGE UP
-  LEVOFLOXACIN: SIGNIFICANT CHANGE UP
-  NITROFURANTOIN: SIGNIFICANT CHANGE UP
-  TETRACYCLINE: SIGNIFICANT CHANGE UP
-  VANCOMYCIN: SIGNIFICANT CHANGE UP
CULTURE RESULTS: ABNORMAL
METHOD TYPE: SIGNIFICANT CHANGE UP
ORGANISM # SPEC MICROSCOPIC CNT: ABNORMAL
SPECIMEN SOURCE: SIGNIFICANT CHANGE UP

## 2024-07-28 NOTE — PROGRESS NOTE ADULT - PROBLEM SELECTOR PLAN 2
Normal Results:ANCA negative  Please call patient with results
Suggest to continue medications, monitoring, FU primary team recommendations. .

## 2024-08-05 NOTE — PATIENT PROFILE ADULT - IS PATIENT POST-MENOPAUSAL?
yes Render Risk Assessment In Note?: no Detail Level: Detailed Additional Notes: Significantly improved since last visit with wound care at [facility] in [place]. Continue current management.

## 2025-01-10 NOTE — H&P ADULT - NSICDXPASTMEDICALHX_GEN_ALL_CORE_FT
Patient called on call service with concerns of Fevers and cough with mucus production- clear, nasal congestion. Feeling tired. No SOB, no chest pain. Temperature today is 99F.  Symptoms started 3 days ago. She home covid tested 3 days ago. Said she had exposure to Norovirus, no N/V/D. Taking Mucinex and tylenol for pain. She reports she Feels better today than she did when her symptoms started 3 days ago. She can continue with Mucinex and tylenol for pain. Encouraged her to flu and COVID swab at home. She has a follow up apt with her PCP on Monday.    PAST MEDICAL HISTORY:  Dementia     Diabetes     HTN (hypertension)     Obesity